# Patient Record
Sex: MALE | Race: WHITE | NOT HISPANIC OR LATINO | Employment: OTHER | URBAN - METROPOLITAN AREA
[De-identification: names, ages, dates, MRNs, and addresses within clinical notes are randomized per-mention and may not be internally consistent; named-entity substitution may affect disease eponyms.]

---

## 2017-01-04 ENCOUNTER — OFFICE VISIT (OUTPATIENT)
Dept: SURGERY | Facility: CLINIC | Age: 67
End: 2017-01-04

## 2017-01-04 DIAGNOSIS — Z09 SURGICAL FOLLOW-UP CARE: Primary | ICD-10-CM

## 2017-01-04 PROCEDURE — 99024 POSTOP FOLLOW-UP VISIT: CPT | Performed by: SURGERY

## 2017-01-04 NOTE — PROGRESS NOTES
5 WKS S/P EXC PILONIDAL CYST, PT IS W/O COMPLAINTS  Physical wound is improving.  His wife is doing the wound care without problems.  Wound is much .  There is no cellulitis.  It is smaller than it was.  Continue as is.  I will see him back in 2 weeks.

## 2017-01-04 NOTE — MR AVS SNAPSHOT
Esdras Ko   1/4/2017 2:00 PM   Office Visit    Dept Phone:  [phone]   Encounter #:  [id number]    Provider:  Roe Davila MD   Department:  [facility] GENERAL SURGERY                Your Full Care Plan              Your Updated Medication List          This list is accurate as of: 1/4/17  2:26 PM.  Always use your most recent med list.                ADVAIR DISKUS 250-50 MCG/DOSE DISKUS   Generic drug:  fluticasone-salmeterol       allopurinol 300 MG tablet   Commonly known as:  ZYLOPRIM       AMITIZA 24 MCG capsule   Generic drug:  lubiprostone       amLODIPine 10 MG tablet   Commonly known as:  NORVASC       aspirin 325 MG tablet       cetirizine 10 MG tablet   Commonly known as:  zyrTEC       clopidogrel 75 MG tablet   Commonly known as:  PLAVIX       D3-50 72277 UNITS capsule   Generic drug:  cholecalciferol       furosemide 40 MG tablet   Commonly known as:  LASIX       gabapentin 300 MG capsule   Commonly known as:  NEURONTIN       HYDROcodone-acetaminophen 5-325 MG per tablet   Commonly known as:  NORCO       levETIRAcetam 1000 MG tablet   Commonly known as:  KEPPRA       loratadine 10 MG tablet   Commonly known as:  CLARITIN       metOLazone 2.5 MG tablet   Commonly known as:  ZAROXOLYN       metoprolol tartrate 50 MG tablet   Commonly known as:  LOPRESSOR       pantoprazole 40 MG EC tablet   Commonly known as:  PROTONIX       potassium chloride 20 MEQ CR tablet   Commonly known as:  K-DUR,KLOR-CON       triamcinolone 0.1 % cream   Commonly known as:  KENALOG       ULORIC 80 MG tablet tablet   Generic drug:  febuxostat       VENTOLIN  (90 BASE) MCG/ACT inhaler   Generic drug:  albuterol               Instructions     None    Patient Instructions History      Upcoming Appointments     Visit Type Date Time Department    POST-OP 1/4/2017  2:00 PM [facility]    POST-OP 1/18/2017  2:15 PM [facility]    FOLLOW UP 1 UNIT 2/7/2017  11:40 AM [facility]    LAB 2/7/2017 11:10 AM [facility] ONC CBC LAB LAG      MyChart Signup     Our records indicate that you have declined [facility] sign[facility]. If you would like to sign up for MyChart, please email [email or web address] or call [phone] to obtain an activation code.             Other Info from Your Visit           Your Appointments     Jan 18, 2017  2:15 PM EST   Post-Op with Roe Davila MD   [facility] GENERAL SURGERY (--)    [address]  [place] [address] [phone]            Feb 07, 2017 11:10 AM EST   Lab with LAB CHAIR 1 [facility] ONCOLOGY CBC LAB ([place])    [address] [place] [address] [phone]            Feb 07, 2017 11:40 AM EST   FOLLOW UP with Irving Restrepo MD   [facility] CBC GROUP:CONSULTANTS IN BLOOD DISORDERS AND CANCER ([facility])    [address]  [address]  [place] [address] [phone]              Allergies     Amoxicillin  Hives    Ceftin [Cefuroxime Axetil]  Hives    Codeine      Pharbetol [Acetaminophen]  Hives    Spiriva Handihaler [Tiotropium Bromide Monohydrate]  Hives    Sulfa Antibiotics  Hives      Reason for Visit     Post-op Follow-up           Vital Signs     Smoking Status                   Former Smoker

## 2017-01-18 ENCOUNTER — OFFICE VISIT (OUTPATIENT)
Dept: SURGERY | Facility: CLINIC | Age: 67
End: 2017-01-18

## 2017-01-18 DIAGNOSIS — Z09 SURGICAL FOLLOW-UP CARE: Primary | ICD-10-CM

## 2017-01-18 PROCEDURE — 99024 POSTOP FOLLOW-UP VISIT: CPT | Performed by: SURGERY

## 2017-01-18 NOTE — PROGRESS NOTES
7 wks s/p pilonidal cyst exc, pt is w/o complaints  His wound continues to improve.  It is much smaller and granulating well last drainage and no evidence of cellulitis or abscess.  Continue local care and I will see him back in 1 month.

## 2017-01-18 NOTE — MR AVS SNAPSHOT
Esdras Ko   1/18/2017 2:10 PM   Office Visit    Dept Phone:  [phone]   Encounter #:  [id number]    Provider:  Roe Davila MD   Department:  [facility] GENERAL SURGERY                Your Full Care Plan              Your Updated Medication List          This list is accurate as of: 1/18/17  2:13 PM.  Always use your most recent med list.                ADVAIR DISKUS 250-50 MCG/DOSE DISKUS   Generic drug:  fluticasone-salmeterol       allopurinol 300 MG tablet   Commonly known as:  ZYLOPRIM       AMITIZA 24 MCG capsule   Generic drug:  lubiprostone       amLODIPine 10 MG tablet   Commonly known as:  NORVASC       aspirin 325 MG tablet       cetirizine 10 MG tablet   Commonly known as:  zyrTEC       clopidogrel 75 MG tablet   Commonly known as:  PLAVIX       D3-50 61733 UNITS capsule   Generic drug:  cholecalciferol       furosemide 40 MG tablet   Commonly known as:  LASIX       gabapentin 300 MG capsule   Commonly known as:  NEURONTIN       HYDROcodone-acetaminophen 5-325 MG per tablet   Commonly known as:  NORCO       levETIRAcetam 1000 MG tablet   Commonly known as:  KEPPRA       loratadine 10 MG tablet   Commonly known as:  CLARITIN       metOLazone 2.5 MG tablet   Commonly known as:  ZAROXOLYN       metoprolol tartrate 50 MG tablet   Commonly known as:  LOPRESSOR       pantoprazole 40 MG EC tablet   Commonly known as:  PROTONIX       potassium chloride 20 MEQ CR tablet   Commonly known as:  K-DUR,KLOR-CON       triamcinolone 0.1 % cream   Commonly known as:  KENALOG       ULORIC 80 MG tablet tablet   Generic drug:  febuxostat       VENTOLIN  (90 BASE) MCG/ACT inhaler   Generic drug:  albuterol               You Were Diagnosed With        Codes Comments    Surgical follow-up care    -  Primary ICD-10-CM: Z09  ICD-9-CM: V67.00       Instructions     None    Patient Instructions History      Upcoming Appointments     Visit Type Date Time Department    POST-OP 1/18/2017  2:10 PM [facility]    FOLLOW UP 1 UNIT 2/7/2017 11:40 AM [facility] ONC CBC LAGRANGE    LAB 2/7/2017 11:10 AM [facility]AG ONC CBC LAB LAG    POST-OP 2/20/2017  1:00 PM [facility]      MyChart Signup     Our records indicate that you have declined [facility] MyChart signup. If you would like to sign up for MyChart, please email [email or web address] or call [phone] to obtain an activation code.             Other Info from Your Visit           Your Appointments     Feb 07, 2017 11:10 AM EST   [facility]    [address]  [place] [address] [phone]            Feb 07, 2017 11:40 AM EST   FOLLOW UP with Irving Restrepo MD   [facility] CBC GROUP:CONSULTANTS IN BLOOD DISORDERS AND CANCER ([facility])    [address]  [address]  [place] [address] [phone]            Feb 20, 2017  1:00 PM EST   Post-Op with Roe Davila MD   [facility] GENERAL SURGERY (--)    [address]  [place] [address] [phone]              Allergies     Amoxicillin  Hives    Ceftin [Cefuroxime Axetil]  Hives    Codeine      Pharbetol [Acetaminophen]  Hives    Spiriva Handihaler [Tiotropium Bromide Monohydrate]  Hives    Sulfa Antibiotics  Hives      Reason for Visit     Post-op Follow-up           Vital Signs     Smoking Status                   Former Smoker           Problems and Diagnoses Noted     Surgical follow-up care    -  Primary

## 2017-02-07 ENCOUNTER — LAB (OUTPATIENT)
Dept: LAB | Facility: HOSPITAL | Age: 67
End: 2017-02-07

## 2017-02-07 ENCOUNTER — OFFICE VISIT (OUTPATIENT)
Dept: ONCOLOGY | Facility: CLINIC | Age: 67
End: 2017-02-07

## 2017-02-07 VITALS
SYSTOLIC BLOOD PRESSURE: 123 MMHG | RESPIRATION RATE: 20 BRPM | HEART RATE: 88 BPM | TEMPERATURE: 98 F | OXYGEN SATURATION: 89 % | BODY MASS INDEX: 45.83 KG/M2 | HEIGHT: 68 IN | WEIGHT: 302.4 LBS | DIASTOLIC BLOOD PRESSURE: 81 MMHG

## 2017-02-07 DIAGNOSIS — C83.10 MANTLE CELL LYMPHOMA, UNSPECIFIED BODY REGION (HCC): Primary | ICD-10-CM

## 2017-02-07 DIAGNOSIS — C83.10 MANTLE CELL LYMPHOMA (HCC): ICD-10-CM

## 2017-02-07 LAB
ALBUMIN SERPL-MCNC: 4 G/DL (ref 3.5–5.2)
ALBUMIN/GLOB SERPL: 1.4 G/DL
ALP SERPL-CCNC: 89 U/L (ref 40–129)
ALT SERPL W P-5'-P-CCNC: 14 U/L (ref 5–41)
ANION GAP SERPL CALCULATED.3IONS-SCNC: 14.1 MMOL/L
AST SERPL-CCNC: 14 U/L (ref 5–40)
BASOPHILS # BLD AUTO: 0.04 10*3/MM3 (ref 0–0.2)
BASOPHILS NFR BLD AUTO: 0.5 % (ref 0–2)
BILIRUB SERPL-MCNC: 0.7 MG/DL (ref 0.2–1.2)
BUN BLD-MCNC: 23 MG/DL (ref 8–23)
BUN/CREAT SERPL: 16.5 (ref 7–25)
CALCIUM SPEC-SCNC: 9.8 MG/DL (ref 8.8–10.5)
CHLORIDE SERPL-SCNC: 97 MMOL/L (ref 98–107)
CO2 SERPL-SCNC: 30.9 MMOL/L (ref 22–29)
CREAT BLD-MCNC: 1.39 MG/DL (ref 0.76–1.27)
DEPRECATED RDW RBC AUTO: 45.1 FL (ref 37–54)
EOSINOPHIL # BLD AUTO: 0.25 10*3/MM3 (ref 0.1–0.3)
EOSINOPHIL NFR BLD AUTO: 3.4 % (ref 0–4)
ERYTHROCYTE [DISTWIDTH] IN BLOOD BY AUTOMATED COUNT: 15.4 % (ref 11.5–14.5)
GFR SERPL CREATININE-BSD FRML MDRD: 51 ML/MIN/1.73
GLOBULIN UR ELPH-MCNC: 2.9 GM/DL
GLUCOSE BLD-MCNC: 124 MG/DL (ref 65–99)
HCT VFR BLD AUTO: 50.6 % (ref 42–52)
HGB BLD-MCNC: 16.8 G/DL (ref 14–18)
HOLD SPECIMEN: NORMAL
IMM GRANULOCYTES # BLD: 0.04 10*3/MM3 (ref 0–0.03)
IMM GRANULOCYTES NFR BLD: 0.5 % (ref 0–0.5)
LDH SERPL-CCNC: 178 U/L (ref 135–225)
LYMPHOCYTES # BLD AUTO: 1.2 10*3/MM3 (ref 0.6–4.8)
LYMPHOCYTES NFR BLD AUTO: 16.4 % (ref 20–45)
MCH RBC QN AUTO: 27.9 PG (ref 27–31)
MCHC RBC AUTO-ENTMCNC: 33.2 G/DL (ref 31–37)
MCV RBC AUTO: 84.1 FL (ref 80–94)
MONOCYTES # BLD AUTO: 0.63 10*3/MM3 (ref 0–1)
MONOCYTES NFR BLD AUTO: 8.6 % (ref 3–8)
NEUTROPHILS # BLD AUTO: 5.16 10*3/MM3 (ref 1.5–8.3)
NEUTROPHILS NFR BLD AUTO: 70.6 % (ref 45–70)
NRBC BLD MANUAL-RTO: 0 /100 WBC (ref 0–0)
PLATELET # BLD AUTO: 200 10*3/MM3 (ref 140–500)
PMV BLD AUTO: 10.7 FL (ref 7.4–10.4)
POTASSIUM BLD-SCNC: 3.2 MMOL/L (ref 3.5–5.2)
PROT SERPL-MCNC: 6.9 G/DL (ref 6–8.5)
RBC # BLD AUTO: 6.02 10*6/MM3 (ref 4.7–6.1)
SODIUM BLD-SCNC: 142 MMOL/L (ref 136–145)
WBC NRBC COR # BLD: 7.32 10*3/MM3 (ref 4.8–10.8)

## 2017-02-07 PROCEDURE — 99213 OFFICE O/P EST LOW 20 MIN: CPT | Performed by: INTERNAL MEDICINE

## 2017-02-07 PROCEDURE — 85025 COMPLETE CBC W/AUTO DIFF WBC: CPT | Performed by: INTERNAL MEDICINE

## 2017-02-07 PROCEDURE — 80053 COMPREHEN METABOLIC PANEL: CPT | Performed by: INTERNAL MEDICINE

## 2017-02-07 PROCEDURE — 83615 LACTATE (LD) (LDH) ENZYME: CPT | Performed by: INTERNAL MEDICINE

## 2017-02-07 NOTE — PROGRESS NOTES
Subjective   REASONS FOR FOLLOW-UP:   History of mantle cell lymphoma.       History of Present Illness    Mr. Ko is a 65-year-old man returning for followup of his history of mantle cell lymphoma previously treated with bendamustine/rituximab followed by maintenance Rituxan through the winter of 2013.   He has had no therapy or disease progression since that time.     He returned today for a six-month review doing well.  He denies unusual weight loss, night sweats, severe fatigue.  He has not noted any lymphadenopathy.  He did denies abdominal pain.  He has chronic lumbosacral back pain which is stable.    Since his last visit, he has had surgery for a pilonidal cyst by Dr. Davila.  This seems to be healing well.    PAST MEDICAL HISTORY:   1. Seizure disorder after a head trauma in .   2. Bladder cancer diagnosed in  for which he gets annual cystoscopy, currently with no evidence of disease.   3. Chronic obstructive pulmonary disease.   4. Psoriasis.   5. Hypertension.   6. Chronic renal insufficiency complicated by acute renal failure secondary to tumor lysis in 2011.   7. Ischemic cerebrovascular accidents and TIAs with the most recent event in 2014, which was a TIA.   8. History of tobacco abuse.       SOCIAL HISTORY: He is  and retired mostly from factory work. He quit smoking tobacco around  after 80 pack-year. He drank heavily in the past but none since around . Denies illicit drug use. He has had prior blood transfusions.     FAMILY HISTORY: His father had pancreatic cancer and  at age 56. Sister has anemia. Mother had heart disease. Brother has hypertension.       Review of Systems   Constitutional: Negative for activity change, fatigue and unexpected weight change.   Respiratory: Negative for shortness of breath and wheezing.    Cardiovascular: Positive for leg swelling. Negative for chest pain and palpitations.   Gastrointestinal: Negative for abdominal  "distention, abdominal pain and blood in stool.   Skin: Negative for pallor and rash.   Neurological: Negative for weakness.   Hematological: Negative for adenopathy.      A comprehensive 14 point review of systems was performed and was negative except as mentioned.    Medications:  The current medication list was reviewed in the EMR    ALLERGIES:    Allergies   Allergen Reactions   • Amoxicillin Hives   • Ceftin [Cefuroxime Axetil] Hives   • Codeine    • Pharbetol [Acetaminophen] Hives   • Spiriva Handihaler [Tiotropium Bromide Monohydrate] Hives   • Sulfa Antibiotics Hives       Objective      Vitals:    02/07/17 1134   BP: 123/81   Pulse: 88   Resp: 20   Temp: 98 °F (36.7 °C)   TempSrc: Oral   SpO2: (!) 89%   Weight: (!) 302 lb 6.4 oz (137 kg)   Height: 68\" (172.7 cm)   PainSc: 0-No pain     Current Status 2/7/2017   ECOG score 0       Physical Exam   Constitutional: He appears well-nourished.   Morbidly obese   Cardiovascular: Normal rate and regular rhythm.    Pulmonary/Chest: Effort normal. No respiratory distress.   Abdominal: Soft. He exhibits no mass.   Musculoskeletal: He exhibits edema.   Lymphadenopathy:     He has no cervical adenopathy.     He has no axillary adenopathy.        Right: No supraclavicular adenopathy present.        Left: No supraclavicular adenopathy present.   Skin: No erythema. No pallor.          RECENT LABS:  Hematology WBC   Date Value Ref Range Status   02/07/2017 7.32 4.80 - 10.80 10*3/mm3 Final   02/09/2016 6.77 4.80 - 10.80 K/Cumm Final     RBC   Date Value Ref Range Status   02/07/2017 6.02 4.70 - 6.10 10*6/mm3 Final   02/09/2016 5.90 4.70 - 6.10 Million Final     HEMOGLOBIN   Date Value Ref Range Status   02/07/2017 16.8 14.0 - 18.0 g/dL Final   02/09/2016 16.9 14.0 - 18.0 g/dL Final     HEMATOCRIT   Date Value Ref Range Status   02/07/2017 50.6 42.0 - 52.0 % Final   02/09/2016 50.3 42.0 - 52.0 % Final     PLATELETS   Date Value Ref Range Status   02/07/2017 200 140 - 500 " 10*3/mm3 Final   02/09/2016 186 140 - 500 K/Cumm Final     Lab Results   Component Value Date    GLUCOSE 124 (H) 02/07/2017    BUN 23 02/07/2017    CREATININE 1.39 (H) 02/07/2017    EGFRIFNONA 51 (L) 02/07/2017    EGFRIFAFRI  08/30/2016      Comment:      <15 Indicative of kidney failure.    BCR 16.5 02/07/2017    CO2 30.9 (H) 02/07/2017    CALCIUM 9.8 02/07/2017    ALBUMIN 4.00 02/07/2017    LABIL2 1.4 02/07/2017    AST 14 02/07/2017    ALT 14 02/07/2017              Assessment/Plan     Mr. Ko is a 65-year-old man returning for followup of his history mantle cell lymphoma with bone marrow involvement at diagnosis. He was treated with bendamustine/rituximab followed by maintenance rituximab completed in the winter of 2013. He has had no evidence of disease progression. Today he had negative symptom review and physical exam, although his exam is limited secondary to body habitus. He has no cytopenias.  At this time we will continue observation.      No follow-up CT scans are planned unless the patient develops some form of symptom which needs evaluation.  I requested he return in 6 months with CBC, CMP, and LDH.  I requested he call in the interim if any concerning symptoms such as weight loss, severe fatigue, lymphadenopathy etc.              2/7/2017      CC:

## 2017-02-20 ENCOUNTER — OFFICE VISIT (OUTPATIENT)
Dept: SURGERY | Facility: CLINIC | Age: 67
End: 2017-02-20

## 2017-02-20 DIAGNOSIS — Z09 SURGICAL FOLLOW-UP CARE: Primary | ICD-10-CM

## 2017-02-20 PROCEDURE — 99024 POSTOP FOLLOW-UP VISIT: CPT | Performed by: SURGERY

## 2017-02-20 NOTE — PROGRESS NOTES
11 WKS 5 DAYS S/P PILONIDAL CYSTECTOMY, PT IS W/O COMPLAINTS he is feeling well.  There clean wound with peroxide and showers twice a day.  The wound has nearly sealed and is now very superficial.  There is no evidence of cellulitis.  They should discontinue the hydrogen peroxide but continue the twice a day showers.  If it is not healed in 3 weeks I will need to see him back.

## 2017-03-13 ENCOUNTER — OFFICE VISIT (OUTPATIENT)
Dept: SURGERY | Facility: CLINIC | Age: 67
End: 2017-03-13

## 2017-03-13 DIAGNOSIS — T14.8XXA OPEN WOUND: Primary | ICD-10-CM

## 2017-03-13 PROCEDURE — 99211 OFF/OP EST MAY X REQ PHY/QHP: CPT | Performed by: SURGERY

## 2017-03-13 RX ORDER — DIAZEPAM 5 MG/1
TABLET ORAL
Refills: 0 | Status: ON HOLD | COMMUNITY
Start: 2017-03-07 | End: 2017-06-14

## 2017-03-13 NOTE — PROGRESS NOTES
14 wks 5 days s/p pilonidal cystectomy, pt called earlier last week stating wound wasn't healed but he states it is now  The skin is still superficially opened for a small area on either buttock cheek in the grady cleft.  I feel this is likely secondary to maceration from moisture.  He can decrease his showers to once a day and he should keep a dry gauze in the area to wick any fluid away until this heals.  he will call for problems.

## 2017-04-07 ENCOUNTER — HOSPITAL ENCOUNTER (OUTPATIENT)
Dept: GENERAL RADIOLOGY | Facility: HOSPITAL | Age: 67
Discharge: HOME | End: 2017-04-07
Attending: FAMILY MEDICINE | Admitting: FAMILY MEDICINE

## 2017-04-07 ENCOUNTER — TRANSCRIBE ORDERS (OUTPATIENT)
Dept: ADMINISTRATIVE | Facility: HOSPITAL | Age: 67
End: 2017-04-07

## 2017-04-07 ENCOUNTER — LAB (OUTPATIENT)
Dept: LAB | Facility: HOSPITAL | Age: 67
End: 2017-04-07
Attending: FAMILY MEDICINE

## 2017-04-07 DIAGNOSIS — J11.1 FLU: ICD-10-CM

## 2017-04-07 DIAGNOSIS — J11.1 FLU: Primary | ICD-10-CM

## 2017-04-07 LAB
ANION GAP SERPL CALCULATED.3IONS-SCNC: 17.3 MMOL/L
B PERT DNA SPEC QL NAA+PROBE: NOT DETECTED
BASOPHILS # BLD AUTO: 0.06 10*3/MM3 (ref 0–0.2)
BASOPHILS NFR BLD AUTO: 0.6 % (ref 0–2)
BUN BLD-MCNC: 26 MG/DL (ref 8–23)
BUN/CREAT SERPL: 15.8 (ref 7–25)
C PNEUM DNA NPH QL NAA+NON-PROBE: NOT DETECTED
CALCIUM SPEC-SCNC: 8.8 MG/DL (ref 8.8–10.5)
CHLORIDE SERPL-SCNC: 96 MMOL/L (ref 98–107)
CO2 SERPL-SCNC: 26.7 MMOL/L (ref 22–29)
CREAT BLD-MCNC: 1.65 MG/DL (ref 0.76–1.27)
DEPRECATED RDW RBC AUTO: 47.7 FL (ref 37–54)
EOSINOPHIL # BLD AUTO: 0.16 10*3/MM3 (ref 0.1–0.3)
EOSINOPHIL NFR BLD AUTO: 1.7 % (ref 0–4)
ERYTHROCYTE [DISTWIDTH] IN BLOOD BY AUTOMATED COUNT: 16 % (ref 11.5–14.5)
FLUAV AG NPH QL: NEGATIVE
FLUAV H1 2009 PAND RNA NPH QL NAA+PROBE: NOT DETECTED
FLUAV H1 HA GENE NPH QL NAA+PROBE: NOT DETECTED
FLUAV H3 RNA NPH QL NAA+PROBE: NOT DETECTED
FLUAV SUBTYP SPEC NAA+PROBE: NOT DETECTED
FLUBV AG NPH QL IA: NEGATIVE
FLUBV RNA ISLT QL NAA+PROBE: NOT DETECTED
GFR SERPL CREATININE-BSD FRML MDRD: 42 ML/MIN/1.73
GLUCOSE BLD-MCNC: 118 MG/DL (ref 65–99)
HADV DNA SPEC NAA+PROBE: NOT DETECTED
HCOV 229E RNA SPEC QL NAA+PROBE: NOT DETECTED
HCOV HKU1 RNA SPEC QL NAA+PROBE: NOT DETECTED
HCOV NL63 RNA SPEC QL NAA+PROBE: NOT DETECTED
HCOV OC43 RNA SPEC QL NAA+PROBE: NOT DETECTED
HCT VFR BLD AUTO: 53.1 % (ref 42–52)
HGB BLD-MCNC: 18 G/DL (ref 14–18)
HMPV RNA NPH QL NAA+NON-PROBE: NOT DETECTED
HPIV1 RNA SPEC QL NAA+PROBE: NOT DETECTED
HPIV2 RNA SPEC QL NAA+PROBE: NOT DETECTED
HPIV3 RNA NPH QL NAA+PROBE: NOT DETECTED
HPIV4 P GENE NPH QL NAA+PROBE: NOT DETECTED
IMM GRANULOCYTES # BLD: 0.07 10*3/MM3 (ref 0–0.03)
IMM GRANULOCYTES NFR BLD: 0.8 % (ref 0–0.5)
LYMPHOCYTES # BLD AUTO: 0.7 10*3/MM3 (ref 0.6–4.8)
LYMPHOCYTES NFR BLD AUTO: 7.6 % (ref 20–45)
M PNEUMO IGG SER IA-ACNC: NOT DETECTED
MCH RBC QN AUTO: 29 PG (ref 27–31)
MCHC RBC AUTO-ENTMCNC: 33.9 G/DL (ref 31–37)
MCV RBC AUTO: 85.6 FL (ref 80–94)
MONOCYTES # BLD AUTO: 1.2 10*3/MM3 (ref 0–1)
MONOCYTES NFR BLD AUTO: 13 % (ref 3–8)
NEUTROPHILS # BLD AUTO: 7.05 10*3/MM3 (ref 1.5–8.3)
NEUTROPHILS NFR BLD AUTO: 76.3 % (ref 45–70)
NRBC BLD MANUAL-RTO: 0 /100 WBC (ref 0–0)
PLATELET # BLD AUTO: 173 10*3/MM3 (ref 140–500)
PMV BLD AUTO: 10.5 FL (ref 7.4–10.4)
POTASSIUM BLD-SCNC: 3.1 MMOL/L (ref 3.5–5.2)
RBC # BLD AUTO: 6.2 10*6/MM3 (ref 4.7–6.1)
RHINOVIRUS RNA SPEC NAA+PROBE: NOT DETECTED
RSV RNA NPH QL NAA+NON-PROBE: NOT DETECTED
SODIUM BLD-SCNC: 140 MMOL/L (ref 136–145)
WBC NRBC COR # BLD: 9.24 10*3/MM3 (ref 4.8–10.8)

## 2017-04-07 PROCEDURE — 71020 HC CHEST PA AND LATERAL: CPT

## 2017-04-07 PROCEDURE — 87804 INFLUENZA ASSAY W/OPTIC: CPT

## 2017-04-07 PROCEDURE — 85025 COMPLETE CBC W/AUTO DIFF WBC: CPT

## 2017-04-07 PROCEDURE — 87633 RESP VIRUS 12-25 TARGETS: CPT

## 2017-04-07 PROCEDURE — 87798 DETECT AGENT NOS DNA AMP: CPT

## 2017-04-07 PROCEDURE — 87581 M.PNEUMON DNA AMP PROBE: CPT

## 2017-04-07 PROCEDURE — 87486 CHLMYD PNEUM DNA AMP PROBE: CPT

## 2017-04-07 PROCEDURE — 36415 COLL VENOUS BLD VENIPUNCTURE: CPT

## 2017-04-07 PROCEDURE — 80048 BASIC METABOLIC PNL TOTAL CA: CPT

## 2017-06-14 ENCOUNTER — HOSPITAL ENCOUNTER (INPATIENT)
Facility: HOSPITAL | Age: 67
LOS: 5 days | Discharge: HOME | End: 2017-06-19
Attending: FAMILY MEDICINE | Admitting: FAMILY MEDICINE

## 2017-06-14 ENCOUNTER — APPOINTMENT (OUTPATIENT)
Dept: GENERAL RADIOLOGY | Facility: HOSPITAL | Age: 67
End: 2017-06-14

## 2017-06-14 PROBLEM — J44.9 COPD (CHRONIC OBSTRUCTIVE PULMONARY DISEASE): Status: ACTIVE | Noted: 2017-06-14

## 2017-06-14 LAB
ALBUMIN SERPL-MCNC: 3.6 G/DL (ref 3.5–5.2)
ALBUMIN/GLOB SERPL: 1.2 G/DL
ALP SERPL-CCNC: 75 U/L (ref 40–129)
ALT SERPL W P-5'-P-CCNC: 19 U/L (ref 5–41)
ANION GAP SERPL CALCULATED.3IONS-SCNC: 17 MMOL/L
ARTERIAL PATENCY WRIST A: POSITIVE
AST SERPL-CCNC: 14 U/L (ref 5–40)
ATMOSPHERIC PRESS: 749 MMHG
B PERT DNA SPEC QL NAA+PROBE: NOT DETECTED
BASE EXCESS BLDA CALC-SCNC: 6.2 MMOL/L
BASOPHILS # BLD AUTO: 0.05 10*3/MM3 (ref 0–0.2)
BASOPHILS NFR BLD AUTO: 0.4 % (ref 0–2)
BDY SITE: NORMAL
BILIRUB SERPL-MCNC: 0.6 MG/DL (ref 0.2–1.2)
BUN BLD-MCNC: 28 MG/DL (ref 8–23)
BUN/CREAT SERPL: 22.8 (ref 7–25)
C PNEUM DNA NPH QL NAA+NON-PROBE: NOT DETECTED
CALCIUM SPEC-SCNC: 9 MG/DL (ref 8.8–10.5)
CHLORIDE SERPL-SCNC: 95 MMOL/L (ref 98–107)
CO2 SERPL-SCNC: 28 MMOL/L (ref 22–29)
CREAT BLD-MCNC: 1.23 MG/DL (ref 0.76–1.27)
DEPRECATED RDW RBC AUTO: 44.8 FL (ref 37–54)
EOSINOPHIL # BLD AUTO: 0.34 10*3/MM3 (ref 0.1–0.3)
EOSINOPHIL NFR BLD AUTO: 3 % (ref 0–4)
ERYTHROCYTE [DISTWIDTH] IN BLOOD BY AUTOMATED COUNT: 14.9 % (ref 11.5–14.5)
FLUAV H1 2009 PAND RNA NPH QL NAA+PROBE: NOT DETECTED
FLUAV H1 HA GENE NPH QL NAA+PROBE: NOT DETECTED
FLUAV H3 RNA NPH QL NAA+PROBE: NOT DETECTED
FLUAV SUBTYP SPEC NAA+PROBE: NOT DETECTED
FLUBV RNA ISLT QL NAA+PROBE: NOT DETECTED
GFR SERPL CREATININE-BSD FRML MDRD: 59 ML/MIN/1.73
GLOBULIN UR ELPH-MCNC: 3 GM/DL
GLUCOSE BLD-MCNC: 132 MG/DL (ref 65–99)
HADV DNA SPEC NAA+PROBE: NOT DETECTED
HCO3 BLDA-SCNC: 29.1 MMOL/L
HCOV 229E RNA SPEC QL NAA+PROBE: NOT DETECTED
HCOV HKU1 RNA SPEC QL NAA+PROBE: NOT DETECTED
HCOV NL63 RNA SPEC QL NAA+PROBE: NOT DETECTED
HCOV OC43 RNA SPEC QL NAA+PROBE: NOT DETECTED
HCT VFR BLD AUTO: 47.8 % (ref 42–52)
HGB BLD-MCNC: 16.2 G/DL (ref 14–18)
HGB BLDA-MCNC: 16.8 G/DL
HMPV RNA NPH QL NAA+NON-PROBE: NOT DETECTED
HOROWITZ INDEX BLD+IHG-RTO: 21 %
HPIV1 RNA SPEC QL NAA+PROBE: NOT DETECTED
HPIV2 RNA SPEC QL NAA+PROBE: NOT DETECTED
HPIV3 RNA NPH QL NAA+PROBE: NOT DETECTED
HPIV4 P GENE NPH QL NAA+PROBE: NOT DETECTED
IMM GRANULOCYTES # BLD: 0.11 10*3/MM3 (ref 0–0.03)
IMM GRANULOCYTES NFR BLD: 1 % (ref 0–0.5)
LYMPHOCYTES # BLD AUTO: 1.18 10*3/MM3 (ref 0.6–4.8)
LYMPHOCYTES NFR BLD AUTO: 10.3 % (ref 20–45)
M PNEUMO IGG SER IA-ACNC: NOT DETECTED
MCH RBC QN AUTO: 28.6 PG (ref 27–31)
MCHC RBC AUTO-ENTMCNC: 33.9 G/DL (ref 31–37)
MCV RBC AUTO: 84.3 FL (ref 80–94)
MODALITY: NORMAL
MONOCYTES # BLD AUTO: 0.95 10*3/MM3 (ref 0–1)
MONOCYTES NFR BLD AUTO: 8.3 % (ref 3–8)
NEUTROPHILS # BLD AUTO: 8.8 10*3/MM3 (ref 1.5–8.3)
NEUTROPHILS NFR BLD AUTO: 77 % (ref 45–70)
NRBC BLD MANUAL-RTO: 0 /100 WBC (ref 0–0)
NT-PROBNP SERPL-MCNC: 54.3 PG/ML (ref 5–125)
PCO2 BLDA: 36.5 MM HG
PH BLDA: 7.52 PH UNITS
PLATELET # BLD AUTO: 212 10*3/MM3 (ref 140–500)
PMV BLD AUTO: 10.4 FL (ref 7.4–10.4)
PO2 BLDA: 65.3 MM HG
POTASSIUM BLD-SCNC: 2.9 MMOL/L (ref 3.5–5.2)
PROCALCITONIN SERPL-MCNC: 0.18 NG/ML (ref 0.1–0.25)
PROT SERPL-MCNC: 6.6 G/DL (ref 6–8.5)
RBC # BLD AUTO: 5.67 10*6/MM3 (ref 4.7–6.1)
RHINOVIRUS RNA SPEC NAA+PROBE: DETECTED
RSV RNA NPH QL NAA+NON-PROBE: NOT DETECTED
SAO2 % BLDCOA: 94.6 %
SODIUM BLD-SCNC: 140 MMOL/L (ref 136–145)
TROPONIN T SERPL-MCNC: <0.01 NG/ML (ref 0–0.03)
WBC NRBC COR # BLD: 11.43 10*3/MM3 (ref 4.8–10.8)

## 2017-06-14 PROCEDURE — 82803 BLOOD GASES ANY COMBINATION: CPT | Performed by: FAMILY MEDICINE

## 2017-06-14 PROCEDURE — 87486 CHLMYD PNEUM DNA AMP PROBE: CPT | Performed by: FAMILY MEDICINE

## 2017-06-14 PROCEDURE — 87798 DETECT AGENT NOS DNA AMP: CPT | Performed by: FAMILY MEDICINE

## 2017-06-14 PROCEDURE — 25010000002 METHYLPREDNISOLONE PER 125 MG: Performed by: FAMILY MEDICINE

## 2017-06-14 PROCEDURE — 83880 ASSAY OF NATRIURETIC PEPTIDE: CPT | Performed by: FAMILY MEDICINE

## 2017-06-14 PROCEDURE — 71020 HC CHEST PA AND LATERAL: CPT

## 2017-06-14 PROCEDURE — 87205 SMEAR GRAM STAIN: CPT | Performed by: FAMILY MEDICINE

## 2017-06-14 PROCEDURE — 87633 RESP VIRUS 12-25 TARGETS: CPT | Performed by: FAMILY MEDICINE

## 2017-06-14 PROCEDURE — 94799 UNLISTED PULMONARY SVC/PX: CPT

## 2017-06-14 PROCEDURE — 87070 CULTURE OTHR SPECIMN AEROBIC: CPT | Performed by: FAMILY MEDICINE

## 2017-06-14 PROCEDURE — 36600 WITHDRAWAL OF ARTERIAL BLOOD: CPT | Performed by: FAMILY MEDICINE

## 2017-06-14 PROCEDURE — 85025 COMPLETE CBC W/AUTO DIFF WBC: CPT | Performed by: FAMILY MEDICINE

## 2017-06-14 PROCEDURE — 25010000002 ENOXAPARIN PER 10 MG: Performed by: FAMILY MEDICINE

## 2017-06-14 PROCEDURE — 84145 PROCALCITONIN (PCT): CPT | Performed by: FAMILY MEDICINE

## 2017-06-14 PROCEDURE — 25010000002 FUROSEMIDE PER 20 MG: Performed by: FAMILY MEDICINE

## 2017-06-14 PROCEDURE — 25010000002 LEVOFLOXACIN PER 250 MG: Performed by: FAMILY MEDICINE

## 2017-06-14 PROCEDURE — 80053 COMPREHEN METABOLIC PANEL: CPT | Performed by: FAMILY MEDICINE

## 2017-06-14 PROCEDURE — 87581 M.PNEUMON DNA AMP PROBE: CPT | Performed by: FAMILY MEDICINE

## 2017-06-14 PROCEDURE — 84484 ASSAY OF TROPONIN QUANT: CPT | Performed by: FAMILY MEDICINE

## 2017-06-14 PROCEDURE — 94640 AIRWAY INHALATION TREATMENT: CPT

## 2017-06-14 RX ORDER — ONDANSETRON 4 MG/1
4 TABLET, ORALLY DISINTEGRATING ORAL EVERY 6 HOURS PRN
Status: DISCONTINUED | OUTPATIENT
Start: 2017-06-14 | End: 2017-06-19 | Stop reason: HOSPADM

## 2017-06-14 RX ORDER — LUBIPROSTONE 24 UG/1
24 CAPSULE ORAL
Status: DISCONTINUED | OUTPATIENT
Start: 2017-06-14 | End: 2017-06-19 | Stop reason: HOSPADM

## 2017-06-14 RX ORDER — FEBUXOSTAT 40 MG/1
80 TABLET, FILM COATED ORAL DAILY
Status: DISCONTINUED | OUTPATIENT
Start: 2017-06-14 | End: 2017-06-19 | Stop reason: HOSPADM

## 2017-06-14 RX ORDER — METOPROLOL TARTRATE 50 MG/1
50 TABLET, FILM COATED ORAL 2 TIMES DAILY
Status: DISCONTINUED | OUTPATIENT
Start: 2017-06-14 | End: 2017-06-19 | Stop reason: HOSPADM

## 2017-06-14 RX ORDER — ONDANSETRON 2 MG/ML
4 INJECTION INTRAMUSCULAR; INTRAVENOUS EVERY 6 HOURS PRN
Status: DISCONTINUED | OUTPATIENT
Start: 2017-06-14 | End: 2017-06-19 | Stop reason: HOSPADM

## 2017-06-14 RX ORDER — ASPIRIN 325 MG
325 TABLET ORAL EVERY MORNING
Status: DISCONTINUED | OUTPATIENT
Start: 2017-06-14 | End: 2017-06-19 | Stop reason: HOSPADM

## 2017-06-14 RX ORDER — CETIRIZINE HYDROCHLORIDE 10 MG/1
10 TABLET ORAL EVERY MORNING
Status: DISCONTINUED | OUTPATIENT
Start: 2017-06-15 | End: 2017-06-19 | Stop reason: HOSPADM

## 2017-06-14 RX ORDER — FUROSEMIDE 80 MG
80 TABLET ORAL 2 TIMES DAILY
Status: DISCONTINUED | OUTPATIENT
Start: 2017-06-14 | End: 2017-06-15

## 2017-06-14 RX ORDER — POTASSIUM CHLORIDE 20 MEQ/1
40 TABLET, EXTENDED RELEASE ORAL ONCE
Status: COMPLETED | OUTPATIENT
Start: 2017-06-14 | End: 2017-06-14

## 2017-06-14 RX ORDER — LEVETIRACETAM 500 MG/1
500 TABLET ORAL 2 TIMES DAILY
Status: DISCONTINUED | OUTPATIENT
Start: 2017-06-14 | End: 2017-06-19 | Stop reason: HOSPADM

## 2017-06-14 RX ORDER — GABAPENTIN 300 MG/1
300 CAPSULE ORAL 2 TIMES DAILY
Status: DISCONTINUED | OUTPATIENT
Start: 2017-06-14 | End: 2017-06-19 | Stop reason: HOSPADM

## 2017-06-14 RX ORDER — METOLAZONE 2.5 MG/1
2.5 TABLET ORAL 3 TIMES WEEKLY
Status: DISCONTINUED | OUTPATIENT
Start: 2017-06-14 | End: 2017-06-19 | Stop reason: HOSPADM

## 2017-06-14 RX ORDER — LEVOFLOXACIN 5 MG/ML
500 INJECTION, SOLUTION INTRAVENOUS EVERY 24 HOURS
Status: DISCONTINUED | OUTPATIENT
Start: 2017-06-14 | End: 2017-06-18

## 2017-06-14 RX ORDER — SODIUM CHLORIDE FOR INHALATION 7 %
4 VIAL, NEBULIZER (ML) INHALATION ONCE
Status: DISCONTINUED | OUTPATIENT
Start: 2017-06-14 | End: 2017-06-14

## 2017-06-14 RX ORDER — POTASSIUM CHLORIDE 20 MEQ/1
40 TABLET, EXTENDED RELEASE ORAL 3 TIMES DAILY
Status: DISCONTINUED | OUTPATIENT
Start: 2017-06-14 | End: 2017-06-15

## 2017-06-14 RX ORDER — AMLODIPINE BESYLATE 5 MG/1
10 TABLET ORAL EVERY MORNING
Status: DISCONTINUED | OUTPATIENT
Start: 2017-06-14 | End: 2017-06-19 | Stop reason: HOSPADM

## 2017-06-14 RX ORDER — FUROSEMIDE 10 MG/ML
60 INJECTION INTRAMUSCULAR; INTRAVENOUS ONCE
Status: COMPLETED | OUTPATIENT
Start: 2017-06-14 | End: 2017-06-14

## 2017-06-14 RX ORDER — IPRATROPIUM BROMIDE AND ALBUTEROL SULFATE 2.5; .5 MG/3ML; MG/3ML
3 SOLUTION RESPIRATORY (INHALATION)
Status: DISCONTINUED | OUTPATIENT
Start: 2017-06-14 | End: 2017-06-19 | Stop reason: HOSPADM

## 2017-06-14 RX ORDER — CLOBETASOL PROPIONATE 0.5 MG/G
CREAM TOPICAL EVERY 12 HOURS SCHEDULED
Status: DISCONTINUED | OUTPATIENT
Start: 2017-06-14 | End: 2017-06-19 | Stop reason: HOSPADM

## 2017-06-14 RX ORDER — SODIUM CHLORIDE 0.9 % (FLUSH) 0.9 %
1-10 SYRINGE (ML) INJECTION AS NEEDED
Status: DISCONTINUED | OUTPATIENT
Start: 2017-06-14 | End: 2017-06-19 | Stop reason: HOSPADM

## 2017-06-14 RX ORDER — PANTOPRAZOLE SODIUM 40 MG/1
40 TABLET, DELAYED RELEASE ORAL EVERY MORNING
Status: DISCONTINUED | OUTPATIENT
Start: 2017-06-15 | End: 2017-06-19 | Stop reason: HOSPADM

## 2017-06-14 RX ORDER — CLOPIDOGREL BISULFATE 75 MG/1
75 TABLET ORAL EVERY MORNING
Status: DISCONTINUED | OUTPATIENT
Start: 2017-06-15 | End: 2017-06-19 | Stop reason: HOSPADM

## 2017-06-14 RX ORDER — CALCIUM CARBONATE 200(500)MG
1 TABLET,CHEWABLE ORAL 2 TIMES DAILY PRN
Status: DISCONTINUED | OUTPATIENT
Start: 2017-06-14 | End: 2017-06-19 | Stop reason: HOSPADM

## 2017-06-14 RX ORDER — BISACODYL 5 MG/1
5 TABLET, DELAYED RELEASE ORAL DAILY PRN
Status: DISCONTINUED | OUTPATIENT
Start: 2017-06-14 | End: 2017-06-19 | Stop reason: HOSPADM

## 2017-06-14 RX ORDER — METHYLPREDNISOLONE SODIUM SUCCINATE 125 MG/2ML
60 INJECTION, POWDER, LYOPHILIZED, FOR SOLUTION INTRAMUSCULAR; INTRAVENOUS EVERY 8 HOURS
Status: DISCONTINUED | OUTPATIENT
Start: 2017-06-14 | End: 2017-06-15

## 2017-06-14 RX ORDER — GUAIFENESIN 600 MG/1
1200 TABLET, EXTENDED RELEASE ORAL 2 TIMES DAILY
Status: DISCONTINUED | OUTPATIENT
Start: 2017-06-14 | End: 2017-06-19 | Stop reason: HOSPADM

## 2017-06-14 RX ORDER — LEVOFLOXACIN 500 MG/1
500 TABLET, FILM COATED ORAL DAILY
COMMUNITY
End: 2017-07-18

## 2017-06-14 RX ORDER — ALLOPURINOL 300 MG/1
300 TABLET ORAL EVERY MORNING
Status: DISCONTINUED | OUTPATIENT
Start: 2017-06-15 | End: 2017-06-19 | Stop reason: HOSPADM

## 2017-06-14 RX ORDER — BUDESONIDE 0.5 MG/2ML
0.5 INHALANT ORAL
Status: DISCONTINUED | OUTPATIENT
Start: 2017-06-14 | End: 2017-06-19 | Stop reason: HOSPADM

## 2017-06-14 RX ORDER — ONDANSETRON 4 MG/1
4 TABLET, FILM COATED ORAL EVERY 6 HOURS PRN
Status: DISCONTINUED | OUTPATIENT
Start: 2017-06-14 | End: 2017-06-19 | Stop reason: HOSPADM

## 2017-06-14 RX ORDER — ALBUTEROL SULFATE 2.5 MG/3ML
2.5 SOLUTION RESPIRATORY (INHALATION) ONCE AS NEEDED
Status: DISCONTINUED | OUTPATIENT
Start: 2017-06-14 | End: 2017-06-19 | Stop reason: HOSPADM

## 2017-06-14 RX ORDER — ACETAMINOPHEN 325 MG/1
650 TABLET ORAL EVERY 4 HOURS PRN
Status: DISCONTINUED | OUTPATIENT
Start: 2017-06-14 | End: 2017-06-19 | Stop reason: HOSPADM

## 2017-06-14 RX ADMIN — FUROSEMIDE 60 MG: 10 INJECTION, SOLUTION INTRAMUSCULAR; INTRAVENOUS at 10:48

## 2017-06-14 RX ADMIN — CLOBETASOL PROPIONATE: 0.5 CREAM TOPICAL at 21:06

## 2017-06-14 RX ADMIN — POTASSIUM CHLORIDE 40 MEQ: 1500 TABLET, EXTENDED RELEASE ORAL at 21:05

## 2017-06-14 RX ADMIN — METOPROLOL TARTRATE 50 MG: 50 TABLET, FILM COATED ORAL at 17:58

## 2017-06-14 RX ADMIN — POTASSIUM CHLORIDE 40 MEQ: 1500 TABLET, EXTENDED RELEASE ORAL at 12:03

## 2017-06-14 RX ADMIN — METHYLPREDNISOLONE SODIUM SUCCINATE 60 MG: 125 INJECTION, POWDER, FOR SOLUTION INTRAMUSCULAR; INTRAVENOUS at 21:06

## 2017-06-14 RX ADMIN — FUROSEMIDE 80 MG: 80 TABLET ORAL at 17:59

## 2017-06-14 RX ADMIN — ACETAMINOPHEN 650 MG: 325 TABLET, FILM COATED ORAL at 16:09

## 2017-06-14 RX ADMIN — GABAPENTIN 300 MG: 300 CAPSULE ORAL at 17:59

## 2017-06-14 RX ADMIN — IPRATROPIUM BROMIDE AND ALBUTEROL SULFATE 3 ML: .5; 3 SOLUTION RESPIRATORY (INHALATION) at 20:27

## 2017-06-14 RX ADMIN — IPRATROPIUM BROMIDE AND ALBUTEROL SULFATE 3 ML: .5; 3 SOLUTION RESPIRATORY (INHALATION) at 11:25

## 2017-06-14 RX ADMIN — METHYLPREDNISOLONE SODIUM SUCCINATE 60 MG: 125 INJECTION, POWDER, FOR SOLUTION INTRAMUSCULAR; INTRAVENOUS at 13:47

## 2017-06-14 RX ADMIN — POTASSIUM CHLORIDE 40 MEQ: 1500 TABLET, EXTENDED RELEASE ORAL at 16:14

## 2017-06-14 RX ADMIN — BUDESONIDE 0.5 MG: 0.5 SUSPENSION RESPIRATORY (INHALATION) at 11:28

## 2017-06-14 RX ADMIN — LEVETIRACETAM 500 MG: 500 TABLET ORAL at 17:59

## 2017-06-14 RX ADMIN — ENOXAPARIN SODIUM 30 MG: 30 INJECTION SUBCUTANEOUS at 10:56

## 2017-06-14 RX ADMIN — GUAIFENESIN 1200 MG: 600 TABLET, EXTENDED RELEASE ORAL at 17:59

## 2017-06-14 RX ADMIN — LEVOFLOXACIN 500 MG: 500 INJECTION, SOLUTION INTRAVENOUS at 10:51

## 2017-06-14 RX ADMIN — IPRATROPIUM BROMIDE AND ALBUTEROL SULFATE 3 ML: .5; 3 SOLUTION RESPIRATORY (INHALATION) at 15:47

## 2017-06-14 NOTE — PROGRESS NOTES
"Daily Progress Note:    Subjective: Cough congestion shortness of breath persisted.  Thick sputum    Flowsheet Rows         First Filed Value    Admission Height  68\" (172.7 cm) Documented at 06/14/2017 0944    Admission Weight  292 lb 9.6 oz (133 kg) Documented at 06/14/2017 0944          Patient Vitals for the past 24 hrs:   BP Temp Temp src Pulse Resp SpO2 Height Weight   06/14/17 1132 - - - 104 16 93 % - -   06/14/17 1125 - - - 104 16 92 % - -   06/14/17 0944 134/94 98.8 °F (37.1 °C) Oral 107 16 91 % 68\" (172.7 cm) 292 lb 9.6 oz (133 kg)         Intake/Output Summary (Last 24 hours) at 06/14/17 1233  Last data filed at 06/14/17 1217   Gross per 24 hour   Intake              100 ml   Output              650 ml   Net             -550 ml       Review of Systems   Constitutional: Positive for activity change, fatigue and fever. Negative for appetite change.   Respiratory: Positive for cough, shortness of breath and wheezing. Negative for chest tightness.    Cardiovascular: Positive for leg swelling. Negative for chest pain.   Gastrointestinal: Negative for abdominal distention, abdominal pain, diarrhea, nausea and vomiting.   Genitourinary: Negative for frequency.   Musculoskeletal: Positive for back pain.   Skin: Negative for rash.   Psychiatric/Behavioral: Negative for agitation.       Physical Exam   Constitutional: He appears well-developed and well-nourished.   Morbidly obese   HENT:   Head: Normocephalic.   Mouth/Throat: Oropharynx is clear and moist.   Eyes: Conjunctivae are normal.   Neck: Normal range of motion. No JVD present. No thyromegaly present.   Cardiovascular: Normal rate, regular rhythm and normal heart sounds.    No murmur heard.  Pulmonary/Chest: Effort normal. Tachypnea noted. No respiratory distress. He has wheezes (diffuse). He has no rales.   Abdominal: Soft. Bowel sounds are normal. He exhibits no distension. There is no tenderness. There is no guarding.   Neurological: He is alert.   Skin: " Skin is warm and dry. No rash noted.   Nursing note and vitals reviewed.          Medication Review:   I have reviewed the patient's current medication list      [START ON 6/15/2017] allopurinol 300 mg Oral QAM   amLODIPine 10 mg Oral QAM   aspirin 325 mg Oral QAM   budesonide 0.5 mg Nebulization BID - RT   [START ON 6/15/2017] cetirizine 10 mg Oral QAM   clobetasol  Topical Q12H   [START ON 6/15/2017] clopidogrel 75 mg Oral QAM   enoxaparin 30 mg Subcutaneous Q24H   febuxostat 80 mg Oral Daily   furosemide 80 mg Oral BID   gabapentin 300 mg Oral BID   ipratropium-albuterol 3 mL Nebulization 4x Daily - RT   levETIRAcetam 500 mg Oral BID   levoFLOXacin 500 mg Intravenous Q24H   lubiprostone 24 mcg Oral Daily With Breakfast   metOLazone 2.5 mg Oral Once per day on Mon Wed Fri   metoprolol tartrate 50 mg Oral BID   [START ON 6/15/2017] pantoprazole 40 mg Oral QAM   potassium chloride 40 mEq Oral TID           Labs:    Results from last 7 days  Lab Units 06/14/17  0950   WBC 10*3/mm3 11.43*   HEMOGLOBIN g/dL 16.2   HEMATOCRIT % 47.8   PLATELETS 10*3/mm3 212       Results from last 7 days  Lab Units 06/14/17  0950   SODIUM mmol/L 140   POTASSIUM mmol/L 2.9*   CHLORIDE mmol/L 95*   TOTAL CO2 mmol/L 28.0   BUN mg/dL 28*   CREATININE mg/dL 1.23   CALCIUM mg/dL 9.0   BILIRUBIN mg/dL 0.6   ALK PHOS U/L 75   ALT (SGPT) U/L 19   AST (SGOT) U/L 14   GLUCOSE mg/dL 132*           Lab Results (last 24 hours)     Procedure Component Value Units Date/Time    Procalcitonin [[id number]] Collected:  06/14/17 0950    Specimen:  Blood Updated:  06/14/17 0954    CBC & Differential [[id number]] Collected:  06/14/17 0950    Specimen:  Blood Updated:  06/14/17 0958    Narrative:       The following orders were created for panel order CBC & Differential.  Procedure                               Abnormality         Status                     ---------                               -----------         ------                     CBC Auto  Differential[[id number]]        Abnormal            Final result                 Please view results for these tests on the individual orders.    CBC Auto Differential [[id number]]  (Abnormal) Collected:  06/14/17 0950    Specimen:  Blood Updated:  06/14/17 0958     WBC 11.43 (H) 10*3/mm3      RBC 5.67 10*6/mm3      Hemoglobin 16.2 g/dL      Hematocrit 47.8 %      MCV 84.3 fL      MCH 28.6 pg      MCHC 33.9 g/dL      RDW 14.9 (H) %      RDW-SD 44.8 fl      MPV 10.4 fL      Platelets 212 10*3/mm3      Neutrophil % 77.0 (H) %      Lymphocyte % 10.3 (L) %      Monocyte % 8.3 (H) %      Eosinophil % 3.0 %      Basophil % 0.4 %      Immature Grans % 1.0 (H) %      Neutrophils, Absolute 8.80 (H) 10*3/mm3      Lymphocytes, Absolute 1.18 10*3/mm3      Monocytes, Absolute 0.95 10*3/mm3      Eosinophils, Absolute 0.34 (H) 10*3/mm3      Basophils, Absolute 0.05 10*3/mm3      Immature Grans, Absolute 0.11 (H) 10*3/mm3      nRBC 0.0 /100 WBC     Comprehensive Metabolic Panel [[id number]]  (Abnormal) Collected:  06/14/17 0950    Specimen:  Blood Updated:  06/14/17 1016     Glucose 132 (H) mg/dL      BUN 28 (H) mg/dL      Creatinine 1.23 mg/dL      Sodium 140 mmol/L      Potassium 2.9 (L) mmol/L      Chloride 95 (L) mmol/L      CO2 28.0 mmol/L      Calcium 9.0 mg/dL      Total Protein 6.6 g/dL      Albumin 3.60 g/dL      ALT (SGPT) 19 U/L      AST (SGOT) 14 U/L      Alkaline Phosphatase 75 U/L      Total Bilirubin 0.6 mg/dL      eGFR Non African Amer 59 (L) mL/min/1.73      Globulin 3.0 gm/dL      A/G Ratio 1.2 g/dL      BUN/Creatinine Ratio 22.8     Anion Gap 17.0 mmol/L     Troponin [[id number]]  (Normal) Collected:  06/14/17 0950    Specimen:  Blood Updated:  06/14/17 1016     Troponin T <0.010 ng/mL     Narrative:       Troponin T Reference Ranges:  Less than 0.03 ng/mL:    Negative for AMI  0.03 to 0.09 ng/mL:      Indeterminant for AMI  Greater than 0.09 ng/mL: Positive for AMI    Blood Gas, Arterial [[id number]] Collected:   06/14/17 1014    Specimen:  Arterial Blood Updated:  06/14/17 1017     Site Arterial: left radial     Michael's Test Positive     pH, Arterial 7.519 pH units      pCO2, Arterial 36.5 mm Hg      pO2, Arterial 65.3 mm Hg      HCO3, Arterial 29.1 mmol/L      Base Excess, Arterial 6.2 mmol/L      O2 Saturation Calculated 94.6 %      Hemoglobin, Blood Gas 16.8 g/dL      Barometric Pressure for Blood Gas 749 mmHg      Modality Room air     FIO2 21 %     BNP [[id number]]  (Normal) Collected:  06/14/17 0950    Specimen:  Blood Updated:  06/14/17 1042     proBNP 54.3 pg/mL     Narrative:       Among patients with dyspnea, NT-proBNP is highly sensitive for the detection of acute congestive heart failure. In addition NT-proBNP of <300 pg/ml effectively rules out acute congestive heart failure with 99% negative predictive value.    Respiratory Panel, PCR [[id number]] Collected:  06/14/17 1043    Specimen:  Swab from Nares Updated:  06/14/17 1101    Respiratory Culture [[id number]] Collected:  06/14/17 1042    Specimen:  Sputum from Alveoli Updated:  06/14/17 1101              Radiology:  Imaging Results (last 24 hours)     Procedure Component Value Units Date/Time    XR Chest 2 View [[id number]] Collected:  06/14/17 1153     Updated:  06/14/17 1156    Narrative:       CHEST X-RAY, 06/14/2017:     HISTORY:   66-year-old male with two-week history of cough and fever.     TECHNIQUE:  AP portable upright chest x-ray.     FINDINGS:  Heart size and pulmonary vascularity are normal. Mild bibasilar  scarring. No visible pulmonary infiltrate or pleural effusion. No change  since 04/07/2017. Spinal curvature.       Impression:       No active disease. No change since 04/07/2017.     This report was finalized on 6/14/2017 11:54 AM by Dr. Cehpe Catherine MD.             Cardiology:  ECG/EMG Results (last 24 hours)     ** No results found for the last 24 hours. **          I have reviewed consult notes.    Assessment and Plan:    1.  Acute  exacerbation of COPD acute bronchitis we'll continue aggressive pulmonary toilet mini nebs IV antibiotics and IV steroids.  Check while cultures and sputum cultures    2. hypertension is well controlled on medication be continued    3. chronic kidney disease stage III is a baseline monitor for any further decompensation    4.  Seizure disorder stable nothing acute home as we continued      5. hypokalemia oral medications and been ordered    6.  Reflux disease PPI will be continued

## 2017-06-14 NOTE — PLAN OF CARE
Problem: Patient Care Overview (Adult)  Goal: Plan of Care Review  Outcome: Ongoing (interventions implemented as appropriate)    06/14/17 1524   Coping/Psychosocial Response Interventions   Plan Of Care Reviewed With patient;spouse   Patient Care Overview   Progress progress toward functional goals as expected   Outcome Evaluation   Outcome Summary/Follow up Plan IV antibiotic given, IV lasix given ,up in chair, SOA with exertion, remains on room air, feeling better       Goal: Adult Individualization and Mutuality  Outcome: Ongoing (interventions implemented as appropriate)  Goal: Discharge Needs Assessment  Outcome: Ongoing (interventions implemented as appropriate)    Problem: COPD, Chronic Bronchitis/Emphysema (Adult)  Goal: Signs and Symptoms of Listed Potential Problems Will be Absent or Manageable (COPD, Chronic Bronchitis/Emphysema)  Outcome: Ongoing (interventions implemented as appropriate)

## 2017-06-15 LAB
ANION GAP SERPL CALCULATED.3IONS-SCNC: 16.5 MMOL/L
BUN BLD-MCNC: 40 MG/DL (ref 8–23)
BUN/CREAT SERPL: 26.5 (ref 7–25)
CALCIUM SPEC-SCNC: 9.3 MG/DL (ref 8.8–10.5)
CHLORIDE SERPL-SCNC: 94 MMOL/L (ref 98–107)
CO2 SERPL-SCNC: 26.5 MMOL/L (ref 22–29)
CREAT BLD-MCNC: 1.51 MG/DL (ref 0.76–1.27)
DEPRECATED RDW RBC AUTO: 43.7 FL (ref 37–54)
ERYTHROCYTE [DISTWIDTH] IN BLOOD BY AUTOMATED COUNT: 14.6 % (ref 11.5–14.5)
GFR SERPL CREATININE-BSD FRML MDRD: 46 ML/MIN/1.73
GLUCOSE BLD-MCNC: 261 MG/DL (ref 65–99)
GLUCOSE BLDC GLUCOMTR-MCNC: 308 MG/DL (ref 70–130)
GLUCOSE BLDC GLUCOMTR-MCNC: 337 MG/DL (ref 70–130)
GLUCOSE BLDC GLUCOMTR-MCNC: 399 MG/DL (ref 70–130)
GLUCOSE BLDC GLUCOMTR-MCNC: 426 MG/DL (ref 70–130)
HCT VFR BLD AUTO: 47.5 % (ref 42–52)
HGB BLD-MCNC: 16.1 G/DL (ref 14–18)
MCH RBC QN AUTO: 28.2 PG (ref 27–31)
MCHC RBC AUTO-ENTMCNC: 33.9 G/DL (ref 31–37)
MCV RBC AUTO: 83.3 FL (ref 80–94)
PLATELET # BLD AUTO: 253 10*3/MM3 (ref 140–500)
PMV BLD AUTO: 10.3 FL (ref 7.4–10.4)
POTASSIUM BLD-SCNC: 3.9 MMOL/L (ref 3.5–5.2)
RBC # BLD AUTO: 5.7 10*6/MM3 (ref 4.7–6.1)
SODIUM BLD-SCNC: 137 MMOL/L (ref 136–145)
WBC NRBC COR # BLD: 10.61 10*3/MM3 (ref 4.8–10.8)

## 2017-06-15 PROCEDURE — 85027 COMPLETE CBC AUTOMATED: CPT | Performed by: FAMILY MEDICINE

## 2017-06-15 PROCEDURE — 82962 GLUCOSE BLOOD TEST: CPT

## 2017-06-15 PROCEDURE — 63710000001 INSULIN ASPART PER 5 UNITS: Performed by: FAMILY MEDICINE

## 2017-06-15 PROCEDURE — 80048 BASIC METABOLIC PNL TOTAL CA: CPT | Performed by: FAMILY MEDICINE

## 2017-06-15 PROCEDURE — 25010000002 METHYLPREDNISOLONE PER 40 MG: Performed by: FAMILY MEDICINE

## 2017-06-15 PROCEDURE — 25010000002 METHYLPREDNISOLONE PER 40 MG

## 2017-06-15 PROCEDURE — 94799 UNLISTED PULMONARY SVC/PX: CPT

## 2017-06-15 PROCEDURE — 25010000002 ENOXAPARIN PER 10 MG: Performed by: FAMILY MEDICINE

## 2017-06-15 PROCEDURE — 25010000002 LEVOFLOXACIN PER 250 MG: Performed by: FAMILY MEDICINE

## 2017-06-15 PROCEDURE — 25010000002 METHYLPREDNISOLONE PER 125 MG: Performed by: FAMILY MEDICINE

## 2017-06-15 RX ORDER — METHYLPREDNISOLONE SODIUM SUCCINATE 40 MG/ML
40 INJECTION, POWDER, LYOPHILIZED, FOR SOLUTION INTRAMUSCULAR; INTRAVENOUS EVERY 8 HOURS
Status: DISCONTINUED | OUTPATIENT
Start: 2017-06-15 | End: 2017-06-16

## 2017-06-15 RX ORDER — DEXTROSE MONOHYDRATE 25 G/50ML
25 INJECTION, SOLUTION INTRAVENOUS
Status: DISCONTINUED | OUTPATIENT
Start: 2017-06-15 | End: 2017-06-19 | Stop reason: HOSPADM

## 2017-06-15 RX ORDER — NICOTINE POLACRILEX 4 MG
15 LOZENGE BUCCAL
Status: DISCONTINUED | OUTPATIENT
Start: 2017-06-15 | End: 2017-06-19 | Stop reason: HOSPADM

## 2017-06-15 RX ORDER — METHYLPREDNISOLONE SODIUM SUCCINATE 40 MG/ML
INJECTION, POWDER, LYOPHILIZED, FOR SOLUTION INTRAMUSCULAR; INTRAVENOUS
Status: COMPLETED
Start: 2017-06-15 | End: 2017-06-15

## 2017-06-15 RX ADMIN — IPRATROPIUM BROMIDE AND ALBUTEROL SULFATE 3 ML: .5; 3 SOLUTION RESPIRATORY (INHALATION) at 19:54

## 2017-06-15 RX ADMIN — METHYLPREDNISOLONE SODIUM SUCCINATE 60 MG: 125 INJECTION, POWDER, FOR SOLUTION INTRAMUSCULAR; INTRAVENOUS at 06:25

## 2017-06-15 RX ADMIN — ASPIRIN 325 MG: 325 TABLET, COATED ORAL at 06:24

## 2017-06-15 RX ADMIN — IPRATROPIUM BROMIDE AND ALBUTEROL SULFATE 3 ML: .5; 3 SOLUTION RESPIRATORY (INHALATION) at 15:57

## 2017-06-15 RX ADMIN — BUDESONIDE 0.5 MG: 0.5 SUSPENSION RESPIRATORY (INHALATION) at 19:55

## 2017-06-15 RX ADMIN — LEVOFLOXACIN 500 MG: 500 INJECTION, SOLUTION INTRAVENOUS at 11:26

## 2017-06-15 RX ADMIN — METOPROLOL TARTRATE 50 MG: 50 TABLET, FILM COATED ORAL at 17:17

## 2017-06-15 RX ADMIN — CLOBETASOL PROPIONATE: 0.5 CREAM TOPICAL at 21:33

## 2017-06-15 RX ADMIN — METHYLPREDNISOLONE SODIUM SUCCINATE 60 MG: 40 INJECTION, POWDER, FOR SOLUTION INTRAMUSCULAR; INTRAVENOUS at 06:25

## 2017-06-15 RX ADMIN — GUAIFENESIN 1200 MG: 600 TABLET, EXTENDED RELEASE ORAL at 17:17

## 2017-06-15 RX ADMIN — GABAPENTIN 300 MG: 300 CAPSULE ORAL at 17:17

## 2017-06-15 RX ADMIN — INSULIN ASPART 12 UNITS: 100 INJECTION, SOLUTION INTRAVENOUS; SUBCUTANEOUS at 21:33

## 2017-06-15 RX ADMIN — LEVETIRACETAM 500 MG: 500 TABLET ORAL at 08:21

## 2017-06-15 RX ADMIN — LEVETIRACETAM 500 MG: 500 TABLET ORAL at 17:16

## 2017-06-15 RX ADMIN — INSULIN ASPART 10 UNITS: 100 INJECTION, SOLUTION INTRAVENOUS; SUBCUTANEOUS at 12:49

## 2017-06-15 RX ADMIN — FEBUXOSTAT 80 MG: 40 TABLET ORAL at 08:19

## 2017-06-15 RX ADMIN — PANTOPRAZOLE SODIUM 40 MG: 40 TABLET, DELAYED RELEASE ORAL at 06:25

## 2017-06-15 RX ADMIN — LUBIPROSTONE 24 MCG: 24 CAPSULE, GELATIN COATED ORAL at 08:20

## 2017-06-15 RX ADMIN — BUDESONIDE 0.5 MG: 0.5 SUSPENSION RESPIRATORY (INHALATION) at 07:45

## 2017-06-15 RX ADMIN — IPRATROPIUM BROMIDE AND ALBUTEROL SULFATE 3 ML: .5; 3 SOLUTION RESPIRATORY (INHALATION) at 07:45

## 2017-06-15 RX ADMIN — CLOPIDOGREL 75 MG: 75 TABLET, FILM COATED ORAL at 06:25

## 2017-06-15 RX ADMIN — GABAPENTIN 300 MG: 300 CAPSULE ORAL at 08:18

## 2017-06-15 RX ADMIN — METHYLPREDNISOLONE SODIUM SUCCINATE 40 MG: 125 INJECTION, POWDER, FOR SOLUTION INTRAMUSCULAR; INTRAVENOUS at 12:49

## 2017-06-15 RX ADMIN — AMLODIPINE BESYLATE 10 MG: 5 TABLET ORAL at 06:24

## 2017-06-15 RX ADMIN — ENOXAPARIN SODIUM 30 MG: 30 INJECTION SUBCUTANEOUS at 11:26

## 2017-06-15 RX ADMIN — METOPROLOL TARTRATE 50 MG: 50 TABLET, FILM COATED ORAL at 08:17

## 2017-06-15 RX ADMIN — ALLOPURINOL 300 MG: 300 TABLET ORAL at 06:25

## 2017-06-15 RX ADMIN — GUAIFENESIN 1200 MG: 600 TABLET, EXTENDED RELEASE ORAL at 08:18

## 2017-06-15 RX ADMIN — METHYLPREDNISOLONE SODIUM SUCCINATE 40 MG: 125 INJECTION, POWDER, FOR SOLUTION INTRAMUSCULAR; INTRAVENOUS at 21:33

## 2017-06-15 RX ADMIN — INSULIN ASPART 10 UNITS: 100 INJECTION, SOLUTION INTRAVENOUS; SUBCUTANEOUS at 17:15

## 2017-06-15 RX ADMIN — CETIRIZINE HYDROCHLORIDE 10 MG: 10 TABLET, FILM COATED ORAL at 06:25

## 2017-06-15 RX ADMIN — IPRATROPIUM BROMIDE AND ALBUTEROL SULFATE 3 ML: .5; 3 SOLUTION RESPIRATORY (INHALATION) at 11:45

## 2017-06-15 NOTE — H&P
HISTORY:  This is a 66-year-old white male, well known to me, presented to the office about a week ago, complaining of cough and congestion, shortness of breath, low-grade temperature. Sputum productive of thick sputum. He was diagnosed to have acute bronchitis, placed on oral steroids and oral bronchodilators and cough suppressants and expectorants. The patient returned 2 days later saying he had not gotten any better. He was not febrile with temperature of 101 at home; therefore he was placed on oral antibiotics. The patient called today and said he was not feeling any better. Shortness of breath persisted. He could not even  walk across the floor without  getting short of breath. Complains of chest tightness. No chills. He is being admitted for failed outpatient therapy.     PAST MEDICAL HISTORY:   1.  Hypertension.  2.  Hyperlipidemia.  3.  Impaired fasting glucose.  4.  COPD.  5.  History of partial complex seizures.  6.  Mantle cell lymphoma.  7.  Chronic venous stasis edema.   8.  Allergic rhinitis.  9.  Right hemispheric TIA 02/2014.   10. Remote history of head injury.   11. History of transitional cell carcinoma of the bladder, status post resection.  12. Osteoarthritis.   13. Hyperuricemia.   14. Chronic kidney disease, stage 3.     FAMILY HISTORY:  Positive for hypertension, heart disease.     SOCIAL HISTORY:  The patient is  and lives at home. No alcohol or tobacco use.     MEDICATIONS:   1.  Advair Diskus b.i.d.   2.  Allopurinol 300 daily.   3.  Amitiza 24 mcg daily.  4.  Amlodipine 10 mg daily.  5.  Cetirizine 10 mg daily.   6.  Clobetasol b.i.d.   7.  Plavix 75 mg a day.   8.  Vitamin D3 at 50,000 units weekly.   9.  Lasix 40 mg daily.   10. Gabapentin 300 mg t.i.d.   11. Keppra 1000 mg b.i.d.   12. Levaquin 500 daily.  13. Metolazone 0.5 3 times a week.   14. Metoprolol 50 mg b.i.d.   15. Pantoprazole 40 mg daily.  16. Potassium daily.   17. Uloric 80 mg daily.  18. Ventolin 2 puffs q.i.d.      REVIEW OF SYSTEMS: The patient complains of generalized malaise, fever. No chills. Complains of cough, congestion, shortness of breath, sputum production, yellow in color thick.  Denied any chest pain.  He has chest tightness. No orthopnea or PND. No abdominal pain, nausea or vomiting, no diarrhea. Complains of lower extremity edema and chronic low back pain.      PHYSICAL EXAMINATION:  Reveals an elderly white male who is morbidly obese. His blood pressure is 130/82, respirations are 22, pulse is 100. He is afebrile.   HEENT: Pupils equal, round and reactive to light. Extraocular movements intact.  Nose and throat: Clear.  Mucous membranes moist.   NECK: Supple without adenopathy, no JVD or thyromegaly.   CHEST: Decreased breath sounds. Expiratory wheezes scattered throughout both lung fields, coarse rhonchi at both bases posteriorly.   HEART: Rate and rhythm regular. S1, S2. No S3 or S4.   ABDOMEN: Soft. Obese. Bowel sounds positive. No splenomegaly.   BACK AND SPINE:  Normal.   EXTREMITIES: Without clubbing, cyanosis, 2+ pitting edema below the knees.   NEUROLOGIC: Physiological.     DIAGNOSTIC DATA: At the time of admission: pH 7.51, pO2 of 65, pCO2 of 36. He is on room air. His sodium 140, potassium 2.9, chloride 95, CO2 of 28. BUN 28, creatinine 1.23. His   white count was slightly elevated at 11.43. Hemoglobin 16. Hematocrit 47, platelets were adequate. His chest x-ray did not show acute infiltrates.     ASSESSMENT:   1.  Acute exacerbation of COPD.  2.  Acute bronchitis.   3.  Chronic kidney disease stage 3.  4.  Hypertension.   5.  Hyperlipidemia.   6.  History of mantle cell lymphoma.  7.  Morbid obesity.     PLAN: At this time, the patient will be admitted,  started on IV antibiotics. Sputum cultures, Procalcitonin, respiratory, viral cultures will be done. Aggressive pulmonary toilet, switch to Brovana, Pulmicort and DuoNebs. We will continue to monitor. DVT prophylaxis.       Tory Arias  M.D.*  KK:marion  D:  06/15/2017 07:39:04  T:  06/15/2017 08:27:07   Job ID:  [id number]   Document ID: [id number]  cc:

## 2017-06-15 NOTE — NURSING NOTE
Discharge Planning Assessment  MAGDALENA Pollock     Patient Name: Esdras Ko  MRN: [id number]  Today's Date: 6/15/2017    Admit Date: 6/14/2017          Discharge Needs Assessment       06/15/17 1026    Living Environment    Home Accessibility bed and bath on same level    Stair Railings at Home none    Type of Financial/Environmental Concern none    Transportation Available family or friend will provide    Living Environment Comment Mr Ko lives with wife in a first floor apartment.    Living Environment    Provides Primary Care For no one, unable/limited ability to care for self    Primary Care Provided By spouse/significant other    Quality Of Family Relationships supportive;involved;helpful    Able to Return to Prior Living Arrangements yes    Discharge Needs Assessment    Concerns To Be Addressed denies needs/concerns at this time    Readmission Within The Last 30 Days no previous admission in last 30 days    Anticipated Changes Related to Illness none    Equipment Currently Used at Home none    Equipment Needed After Discharge nebulizer    Discharge Planning Comments Spoke with patient and wife at bedside.  Mr Ko lives in first floor apartment with his wife Charley.  He does not have home health - never has had.  He has no DME of his own in home.  Patient is not on oxygen currently.  Wife assists him with his ADL's.  They use [facility] in [place] and do not have any difficulty paying for meds. He does not have an advanced directive.and does not want any information concerning them.  Facesheet verified. Patient no longer drives due to a stroke in 2012. Wife provides transportation.  Plan is to go home. when medically stable.  Will continue to follow.              Discharge Plan       06/15/17 1035    Case Management/Social Work Plan    Plan To go home when medically stable.      Patient/Family In Agreement With Plan yes    Additional Comments Spoke with patient and wife at bedside.  Mr Ko lives  in first floor apartment with his wife Charley.  He does not have home health - never has had.  He has no DME of his own in home.  Patient is not on oxygen currently.  Wife assists him with his ADL's.  They use [email or web address] in [place] and do not have any difficulty paying for meds. He does not have an advanced directive.and does not want any information concerning them.  Facesheet verified. Patient no longer drives due to a stroke in 2012. Wife provides transportation.  Plan is to go home. when medically stable.  Will continue to follow.          Discharge Placement     No information found                Demographic Summary       06/15/17 1023    Referral Information    Admission Type inpatient    Arrived From home or self-care    Referral Source admission list    Reason For Consult discharge planning    Record Reviewed medical record    Contact Information    Permission Granted to Share Information With ;family/designee    Primary Care Physician Information    Name Dr Arias            Functional Status     None            Psychosocial     None            Abuse/Neglect     None            Legal     None            Substance Abuse     None            Patient Forms     None          Melanie Galicia RN

## 2017-06-15 NOTE — PLAN OF CARE
Problem: Patient Care Overview (Adult)  Goal: Plan of Care Review  Outcome: Ongoing (interventions implemented as appropriate)    06/15/17 1414   Coping/Psychosocial Response Interventions   Plan Of Care Reviewed With patient   Patient Care Overview   Progress progress toward functional goals as expected   Outcome Evaluation   Outcome Summary/Follow up Plan continues on IV steriods,SOA with ADL's ,feeling somewhat better, remains on room air       Goal: Adult Individualization and Mutuality  Outcome: Ongoing (interventions implemented as appropriate)  Goal: Discharge Needs Assessment  Outcome: Ongoing (interventions implemented as appropriate)    Problem: COPD, Chronic Bronchitis/Emphysema (Adult)  Goal: Signs and Symptoms of Listed Potential Problems Will be Absent or Manageable (COPD, Chronic Bronchitis/Emphysema)  Outcome: Ongoing (interventions implemented as appropriate)    Problem: Respiratory Insufficiency (Adult)  Goal: Identify Related Risk Factors and Signs and Symptoms  Outcome: Outcome(s) achieved Date Met:  06/15/17  Goal: Acid/Base Balance  Outcome: Ongoing (interventions implemented as appropriate)  Goal: Effective Ventilation  Outcome: Ongoing (interventions implemented as appropriate)

## 2017-06-15 NOTE — PLAN OF CARE
Problem: Patient Care Overview (Adult)  Goal: Plan of Care Review  Outcome: Ongoing (interventions implemented as appropriate)    06/15/17 0055   Coping/Psychosocial Response Interventions   Plan Of Care Reviewed With patient   Patient Care Overview   Progress progress toward functional goals as expected

## 2017-06-15 NOTE — PLAN OF CARE
Problem: COPD, Chronic Bronchitis/Emphysema (Adult)  Intervention: Match Activity with Ability/Tolerance    06/15/17 0331   Activity   Activity Type activity adjusted per tolerance       Intervention: Optimize Oxygenation/Ventilation/Perfusion    06/15/17 0331   Respiratory Interventions   Airway/Ventilation Management pulmonary hygiene promoted   Promote Aggressive Pulmonary Hygiene/Secretion Management   Cough And Deep Breathing done independently per patient       Intervention: Support/Optimize Psychosocial Response to Chronic Pulmonary Disease    06/15/17 0331   Coping Strategies   Trust Relationship/Rapport care explained         Goal: Signs and Symptoms of Listed Potential Problems Will be Absent or Manageable (COPD, Chronic Bronchitis/Emphysema)  Outcome: Ongoing (interventions implemented as appropriate)    06/15/17 0331   COPD, Chronic Bronchitis/Emphysema   Problems Assessed (COPD, Chronic Bronchitis/Emphysema) dyspnea;hypoxia/hypoxemia   Problems Present (COPD, Chronic Bronchitis/Emphysema) none

## 2017-06-15 NOTE — PROGRESS NOTES
"Daily Progress Note:    Subjective: Does not feel much better.  Still has cough congestion shortness of breath persistent    Flowsheet Rows         First Filed Value    Admission Height  68\" (172.7 cm) Documented at 06/14/2017 0944    Admission Weight  292 lb 9.6 oz (133 kg) Documented at 06/14/2017 0944          Patient Vitals for the past 24 hrs:   BP Temp Temp src Pulse Resp SpO2 Height Weight   06/15/17 0610 118/70 97.7 °F (36.5 °C) Oral 108 20 90 % - -   06/15/17 0343 138/77 97.4 °F (36.3 °C) Oral 109 20 96 % - -   06/14/17 2354 117/80 97.6 °F (36.4 °C) Oral 99 20 91 % - -   06/14/17 2027 - - - 98 20 94 % - -   06/14/17 1927 127/71 98.9 °F (37.2 °C) Oral 108 20 91 % - -   06/14/17 1559 - - - - - 95 % - -   06/14/17 1547 - - - 117 18 95 % - -   06/14/17 1500 122/77 (!) 100.8 °F (38.2 °C) Oral 116 18 92 % - -   06/14/17 1132 - - - 104 16 93 % - -   06/14/17 1125 - - - 104 16 92 % - -   06/14/17 1100 130/88 98.8 °F (37.1 °C) Oral 119 18 93 % - -   06/14/17 0944 134/94 98.8 °F (37.1 °C) Oral 107 16 91 % 68\" (172.7 cm) 292 lb 9.6 oz (133 kg)         Intake/Output Summary (Last 24 hours) at 06/15/17 0652  Last data filed at 06/15/17 0615   Gross per 24 hour   Intake             1180 ml   Output             1750 ml   Net             -570 ml       Review of Systems   Constitutional: Positive for activity change, fatigue and fever. Negative for appetite change.   Respiratory: Positive for cough, shortness of breath and wheezing. Negative for chest tightness.    Cardiovascular: Positive for leg swelling. Negative for chest pain.   Gastrointestinal: Negative for abdominal distention, abdominal pain, diarrhea, nausea and vomiting.   Genitourinary: Negative for frequency.   Musculoskeletal: Positive for back pain.   Skin: Negative for rash.   Psychiatric/Behavioral: Negative for agitation.       Physical Exam   Constitutional: He appears well-developed and well-nourished.   Morbidly obese   HENT:   Head: Normocephalic. "   Mouth/Throat: Oropharynx is clear and moist.   Eyes: Conjunctivae are normal.   Neck: Normal range of motion. No JVD present. No thyromegaly present.   Cardiovascular: Normal rate, regular rhythm and normal heart sounds.    No murmur heard.  Pulmonary/Chest: Effort normal. Tachypnea noted. No respiratory distress. He has wheezes (diffuse). He has no rales.   Abdominal: Soft. Bowel sounds are normal. He exhibits no distension. There is no tenderness. There is no guarding.   Neurological: He is alert.   Skin: Skin is warm and dry. No rash noted.   Nursing note and vitals reviewed.          Medication Review:   I have reviewed the patient's current medication list      allopurinol 300 mg Oral QAM   amLODIPine 10 mg Oral QAM   aspirin 325 mg Oral QAM   budesonide 0.5 mg Nebulization BID - RT   cetirizine 10 mg Oral QAM   clobetasol  Topical Q12H   clopidogrel 75 mg Oral QAM   enoxaparin 30 mg Subcutaneous Q24H   febuxostat 80 mg Oral Daily   furosemide 80 mg Oral BID   gabapentin 300 mg Oral BID   guaiFENesin 1,200 mg Oral BID   ipratropium-albuterol 3 mL Nebulization 4x Daily - RT   levETIRAcetam 500 mg Oral BID   levoFLOXacin 500 mg Intravenous Q24H   lubiprostone 24 mcg Oral Daily With Breakfast   methylPREDNISolone sodium succinate 60 mg Intravenous Q8H   metOLazone 2.5 mg Oral Once per day on Mon Wed Fri   metoprolol tartrate 50 mg Oral BID   pantoprazole 40 mg Oral QAM   potassium chloride 40 mEq Oral TID           Labs:    Results from last 7 days  Lab Units 06/15/17  0632 06/14/17  0950   WBC 10*3/mm3 10.61 11.43*   HEMOGLOBIN g/dL 16.1 16.2   HEMATOCRIT % 47.5 47.8   PLATELETS 10*3/mm3 253 212       Results from last 7 days  Lab Units 06/14/17  0950   SODIUM mmol/L 140   POTASSIUM mmol/L 2.9*   CHLORIDE mmol/L 95*   TOTAL CO2 mmol/L 28.0   BUN mg/dL 28*   CREATININE mg/dL 1.23   CALCIUM mg/dL 9.0   BILIRUBIN mg/dL 0.6   ALK PHOS U/L 75   ALT (SGPT) U/L 19   AST (SGOT) U/L 14   GLUCOSE mg/dL 132*           Lab  Results (last 24 hours)     Procedure Component Value Units Date/Time    CBC & Differential [[id number]] Collected:  06/14/17 0950    Specimen:  Blood Updated:  06/14/17 0958    Narrative:       The following orders were created for panel order CBC & Differential.  Procedure                               Abnormality         Status                     ---------                               -----------         ------                     CBC Auto Differential[[id number]]        Abnormal            Final result                 Please view results for these tests on the individual orders.    CBC Auto Differential [[id number]]  (Abnormal) Collected:  06/14/17 0950    Specimen:  Blood Updated:  06/14/17 0958     WBC 11.43 (H) 10*3/mm3      RBC 5.67 10*6/mm3      Hemoglobin 16.2 g/dL      Hematocrit 47.8 %      MCV 84.3 fL      MCH 28.6 pg      MCHC 33.9 g/dL      RDW 14.9 (H) %      RDW-SD 44.8 fl      MPV 10.4 fL      Platelets 212 10*3/mm3      Neutrophil % 77.0 (H) %      Lymphocyte % 10.3 (L) %      Monocyte % 8.3 (H) %      Eosinophil % 3.0 %      Basophil % 0.4 %      Immature Grans % 1.0 (H) %      Neutrophils, Absolute 8.80 (H) 10*3/mm3      Lymphocytes, Absolute 1.18 10*3/mm3      Monocytes, Absolute 0.95 10*3/mm3      Eosinophils, Absolute 0.34 (H) 10*3/mm3      Basophils, Absolute 0.05 10*3/mm3      Immature Grans, Absolute 0.11 (H) 10*3/mm3      nRBC 0.0 /100 WBC     Comprehensive Metabolic Panel [[id number]]  (Abnormal) Collected:  06/14/17 0950    Specimen:  Blood Updated:  06/14/17 1016     Glucose 132 (H) mg/dL      BUN 28 (H) mg/dL      Creatinine 1.23 mg/dL      Sodium 140 mmol/L      Potassium 2.9 (L) mmol/L      Chloride 95 (L) mmol/L      CO2 28.0 mmol/L      Calcium 9.0 mg/dL      Total Protein 6.6 g/dL      Albumin 3.60 g/dL      ALT (SGPT) 19 U/L      AST (SGOT) 14 U/L      Alkaline Phosphatase 75 U/L      Total Bilirubin 0.6 mg/dL      eGFR Non African Amer 59 (L) mL/min/1.73      Globulin 3.0 gm/dL       A/G Ratio 1.2 g/dL      BUN/Creatinine Ratio 22.8     Anion Gap 17.0 mmol/L     Troponin [[id number]]  (Normal) Collected:  06/14/17 0950    Specimen:  Blood Updated:  06/14/17 1016     Troponin T <0.010 ng/mL     Narrative:       Troponin T Reference Ranges:  Less than 0.03 ng/mL:    Negative for AMI  0.03 to 0.09 ng/mL:      Indeterminant for AMI  Greater than 0.09 ng/mL: Positive for AMI    Blood Gas, Arterial [[id number]] Collected:  06/14/17 1014    Specimen:  Arterial Blood Updated:  06/14/17 1017     Site Arterial: left radial     Michael's Test Positive     pH, Arterial 7.519 pH units      pCO2, Arterial 36.5 mm Hg      pO2, Arterial 65.3 mm Hg      HCO3, Arterial 29.1 mmol/L      Base Excess, Arterial 6.2 mmol/L      O2 Saturation Calculated 94.6 %      Hemoglobin, Blood Gas 16.8 g/dL      Barometric Pressure for Blood Gas 749 mmHg      Modality Room air     FIO2 21 %     BNP [[id number]]  (Normal) Collected:  06/14/17 0950    Specimen:  Blood Updated:  06/14/17 1042     proBNP 54.3 pg/mL     Narrative:       Among patients with dyspnea, NT-proBNP is highly sensitive for the detection of acute congestive heart failure. In addition NT-proBNP of <300 pg/ml effectively rules out acute congestive heart failure with 99% negative predictive value.    Respiratory Culture [[id number]] Collected:  06/14/17 1042    Specimen:  Sputum from [facility] Updated:  06/14/17 1101    Procalcitonin [[id number]]  (Normal) Collected:  06/14/17 0950    Specimen:  Blood Updated:  06/14/17 1516     Procalcitonin 0.18 ng/mL     Narrative:       As a Marker for Sepsis (Non-Neonates):   1. <0.5 ng/mL represents a low risk of severe sepsis and/or septic shock.  1. >2 ng/mL represents a high risk of severe sepsis and/or septic shock.    As a Marker for Lower Respiratory Tract Infections that require antibiotic therapy:  PCT on Admission     Antibiotic Therapy             6-12 Hrs later  > 0.5                Strongly Recommended           "  >0.25 - <0.5         Recommended  0.1 - 0.25           Discouraged                   Remeasure/reassess PCT  <0.1                 Strongly Discouraged          Remeasure/reassess PCT      As 28 day mortality risk marker: \"Change in Procalcitonin Result\" (> 80 % or <=80 %) if Day 0 (or Day 1) and Day 4 values are available. Refer to http://www.[facility]-pct-calculator.com/   Change in PCT <=80 %   A decrease of PCT levels below or equal to 80 % defines a positive change in PCT test result representing a higher risk for 28-day all-cause mortality of patients diagnosed with severe sepsis or septic shock.  Change in PCT > 80 %   A decrease of PCT levels of more than 80 % defines a negative change in PCT result representing a lower risk for 28-day all-cause mortality of patients diagnosed with severe sepsis or septic shock.                Respiratory Panel, PCR [[id number]]  (Abnormal) Collected:  06/14/17 1043    Specimen:  Swab from [place] Updated:  06/14/17 1605     ADENOVIRUS, PCR Not Detected     Coronavirus 229E Not Detected     Coronavirus HKU1 Not Detected     Coronavirus NL63 Not Detected     Coronavirus OC43 Not Detected     Human Metapneumovirus Not Detected     Human Rhinovirus/Enterovirus Detected (A)     Influenza B PCR Not Detected     Parainfluenza Virus 1 Not Detected     Parainfluenza Virus 2 Not Detected     Parainfluenza Virus 3 Not Detected     Parainfluenza Virus 4 Not Detected     Bordetella pertussis pcr Not Detected     Influenza 2009 H1N1 by PCR Not Detected     Chlamydophila pneumoniae PCR Not Detected     Mycoplasma pneumo by PCR Not Detected     Influenza A PCR Not Detected     Influenza A H3 Not Detected     Influenza A H1 Not Detected     RSV, PCR Not Detected    Basic Metabolic Panel [[id number]] Collected:  06/15/17 0632    Specimen:  Blood Updated:  06/15/17 0635    CBC (No Diff) [[id number]]  (Abnormal) Collected:  06/15/17 0632    Specimen:  Blood Updated:  06/15/17 0642     WBC 10.61 " 10*3/mm3      RBC 5.70 10*6/mm3      Hemoglobin 16.1 g/dL      Hematocrit 47.5 %      MCV 83.3 fL      MCH 28.2 pg      MCHC 33.9 g/dL      RDW 14.6 (H) %      RDW-SD 43.7 fl      MPV 10.3 fL      Platelets 253 10*3/mm3               Radiology:  Imaging Results (last 24 hours)     Procedure Component Value Units Date/Time    XR Chest 2 View [[id number]] Collected:  06/14/17 1153     Updated:  06/14/17 1156    Narrative:       CHEST X-RAY, 06/14/2017:     HISTORY:   66-year-old male with two-week history of cough and fever.     TECHNIQUE:  AP portable upright chest x-ray.     FINDINGS:  Heart size and pulmonary vascularity are normal. Mild bibasilar  scarring. No visible pulmonary infiltrate or pleural effusion. No change  since 04/07/2017. Spinal curvature.       Impression:       No active disease. No change since 04/07/2017.     This report was finalized on 6/14/2017 11:54 AM by Dr. Chepe Catherine MD.             Cardiology:  ECG/EMG Results (last 24 hours)     ** No results found for the last 24 hours. **          I have reviewed consult notes.    Assessment and Plan:    1.  Acute exacerbation of COPD acute bronchitis we'll continue aggressive pulmonary toilet mini nebs IV antibiotics and IV steroids.  Respiratory viral panel positive for rhinovirus certainly could explain his COPD exacerbation.  We'll continue supportive therapy IV antibiotics until sputum cultures are back    2. hypertension is well controlled on medication be continued    3. chronic kidney disease stage III is a baseline monitor for any further decompensation    4.  Seizure disorder stable nothing acute home as we continued      5. hypokalemia oral medications and been ordered    6.  Reflux disease PPI will be continued

## 2017-06-15 NOTE — PLAN OF CARE
Problem: COPD, Chronic Bronchitis/Emphysema (Adult)  Intervention: Optimize Oxygenation/Ventilation/Perfusion    06/15/17 4309   Respiratory Interventions   Airway/Ventilation Management pulmonary hygiene promoted   Promote Aggressive Pulmonary Hygiene/Secretion Management   Cough And Deep Breathing done with encouragement

## 2017-06-15 NOTE — PLAN OF CARE
Problem: Patient Care Overview (Adult)  Goal: Discharge Needs Assessment  Outcome: Ongoing (interventions implemented as appropriate)    06/15/17 1026   Discharge Needs Assessment   Concerns To Be Addressed denies needs/concerns at this time   Readmission Within The Last 30 Days no previous admission in last 30 days   Equipment Needed After Discharge nebulizer   Discharge Planning Comments Spoke with patient and wife at bedside. Mr Ko lives in first floor apartment with his wife Charley. He does not have home health - never has had. He has no DME of his own in home. Patient is not on oxygen currently. Wife assists him with his ADL's. They use [facility] in [place] and do not have any difficulty paying for meds. He does not have an advanced directive.and does not want any information concerning them. Facesheet verified. Patient no longer drives due to a stroke in 2012. Wife provides transportation. Plan is to go home. when medically stable. Will continue to follow.    Current Health   Anticipated Changes Related to Illness none   Living Environment   Transportation Available family or friend will provide   Self-Care   Equipment Currently Used at Home none

## 2017-06-16 ENCOUNTER — APPOINTMENT (OUTPATIENT)
Dept: GENERAL RADIOLOGY | Facility: HOSPITAL | Age: 67
End: 2017-06-16

## 2017-06-16 LAB
ALBUMIN SERPL-MCNC: 3.5 G/DL (ref 3.5–5.2)
ALBUMIN/GLOB SERPL: 1.3 G/DL
ALP SERPL-CCNC: 74 U/L (ref 40–129)
ALT SERPL W P-5'-P-CCNC: 19 U/L (ref 5–41)
ANION GAP SERPL CALCULATED.3IONS-SCNC: 18.1 MMOL/L
AST SERPL-CCNC: 13 U/L (ref 5–40)
BACTERIA SPEC RESP CULT: NORMAL
BILIRUB SERPL-MCNC: 0.2 MG/DL (ref 0.2–1.2)
BUN BLD-MCNC: 46 MG/DL (ref 8–23)
BUN/CREAT SERPL: 31.5 (ref 7–25)
CALCIUM SPEC-SCNC: 9.2 MG/DL (ref 8.8–10.5)
CHLORIDE SERPL-SCNC: 93 MMOL/L (ref 98–107)
CO2 SERPL-SCNC: 24.9 MMOL/L (ref 22–29)
CREAT BLD-MCNC: 1.46 MG/DL (ref 0.76–1.27)
DEPRECATED RDW RBC AUTO: 43.3 FL (ref 37–54)
ERYTHROCYTE [DISTWIDTH] IN BLOOD BY AUTOMATED COUNT: 14.4 % (ref 11.5–14.5)
GFR SERPL CREATININE-BSD FRML MDRD: 48 ML/MIN/1.73
GLOBULIN UR ELPH-MCNC: 2.7 GM/DL
GLUCOSE BLD-MCNC: 375 MG/DL (ref 65–99)
GLUCOSE BLD-MCNC: 559 MG/DL (ref 65–99)
GLUCOSE BLDC GLUCOMTR-MCNC: 343 MG/DL (ref 70–130)
GLUCOSE BLDC GLUCOMTR-MCNC: 371 MG/DL (ref 70–130)
GLUCOSE BLDC GLUCOMTR-MCNC: 371 MG/DL (ref 70–130)
GLUCOSE BLDC GLUCOMTR-MCNC: 424 MG/DL (ref 70–130)
GLUCOSE BLDC GLUCOMTR-MCNC: 447 MG/DL (ref 70–130)
GLUCOSE BLDC GLUCOMTR-MCNC: 458 MG/DL (ref 70–130)
GLUCOSE BLDC GLUCOMTR-MCNC: 475 MG/DL (ref 70–130)
GLUCOSE BLDC GLUCOMTR-MCNC: 479 MG/DL (ref 70–130)
GLUCOSE BLDC GLUCOMTR-MCNC: 482 MG/DL (ref 70–130)
GRAM STN SPEC: NORMAL
HCT VFR BLD AUTO: 45.7 % (ref 42–52)
HGB BLD-MCNC: 15.6 G/DL (ref 14–18)
MCH RBC QN AUTO: 28.6 PG (ref 27–31)
MCHC RBC AUTO-ENTMCNC: 34.1 G/DL (ref 31–37)
MCV RBC AUTO: 83.7 FL (ref 80–94)
PLATELET # BLD AUTO: 244 10*3/MM3 (ref 140–500)
PMV BLD AUTO: 10.6 FL (ref 7.4–10.4)
POTASSIUM BLD-SCNC: 3.7 MMOL/L (ref 3.5–5.2)
PROT SERPL-MCNC: 6.2 G/DL (ref 6–8.5)
RBC # BLD AUTO: 5.46 10*6/MM3 (ref 4.7–6.1)
SODIUM BLD-SCNC: 136 MMOL/L (ref 136–145)
WBC NRBC COR # BLD: 11.49 10*3/MM3 (ref 4.8–10.8)

## 2017-06-16 PROCEDURE — 82962 GLUCOSE BLOOD TEST: CPT

## 2017-06-16 PROCEDURE — 25010000002 METHYLPREDNISOLONE PER 125 MG: Performed by: FAMILY MEDICINE

## 2017-06-16 PROCEDURE — 94799 UNLISTED PULMONARY SVC/PX: CPT

## 2017-06-16 PROCEDURE — 94668 MNPJ CHEST WALL SBSQ: CPT

## 2017-06-16 PROCEDURE — 25010000002 LEVOFLOXACIN PER 250 MG: Performed by: FAMILY MEDICINE

## 2017-06-16 PROCEDURE — 63710000001 INSULIN ASPART PER 5 UNITS: Performed by: FAMILY MEDICINE

## 2017-06-16 PROCEDURE — 94667 MNPJ CHEST WALL 1ST: CPT

## 2017-06-16 PROCEDURE — 25010000002 ENOXAPARIN PER 10 MG: Performed by: FAMILY MEDICINE

## 2017-06-16 PROCEDURE — 63710000001 PREDNISONE PER 5 MG: Performed by: FAMILY MEDICINE

## 2017-06-16 PROCEDURE — 63710000001 INSULIN REGULAR HUMAN PER 5 UNITS: Performed by: FAMILY MEDICINE

## 2017-06-16 PROCEDURE — 71020 HC CHEST PA AND LATERAL: CPT

## 2017-06-16 PROCEDURE — 85027 COMPLETE CBC AUTOMATED: CPT | Performed by: FAMILY MEDICINE

## 2017-06-16 PROCEDURE — 82947 ASSAY GLUCOSE BLOOD QUANT: CPT | Performed by: FAMILY MEDICINE

## 2017-06-16 PROCEDURE — 80053 COMPREHEN METABOLIC PANEL: CPT | Performed by: FAMILY MEDICINE

## 2017-06-16 RX ORDER — PREDNISONE 10 MG/1
10 TABLET ORAL
Status: DISCONTINUED | OUTPATIENT
Start: 2017-06-16 | End: 2017-06-18

## 2017-06-16 RX ORDER — FUROSEMIDE 40 MG/1
40 TABLET ORAL DAILY
Status: DISCONTINUED | OUTPATIENT
Start: 2017-06-16 | End: 2017-06-19 | Stop reason: HOSPADM

## 2017-06-16 RX ADMIN — ALLOPURINOL 300 MG: 300 TABLET ORAL at 06:45

## 2017-06-16 RX ADMIN — FUROSEMIDE 40 MG: 40 TABLET ORAL at 10:05

## 2017-06-16 RX ADMIN — LEVETIRACETAM 500 MG: 500 TABLET ORAL at 18:27

## 2017-06-16 RX ADMIN — PREDNISONE 10 MG: 10 TABLET ORAL at 13:37

## 2017-06-16 RX ADMIN — PREDNISONE 10 MG: 10 TABLET ORAL at 18:27

## 2017-06-16 RX ADMIN — HUMAN INSULIN 10 UNITS: 100 INJECTION, SOLUTION SUBCUTANEOUS at 21:37

## 2017-06-16 RX ADMIN — GABAPENTIN 300 MG: 300 CAPSULE ORAL at 18:27

## 2017-06-16 RX ADMIN — IPRATROPIUM BROMIDE AND ALBUTEROL SULFATE 3 ML: .5; 3 SOLUTION RESPIRATORY (INHALATION) at 15:00

## 2017-06-16 RX ADMIN — METOPROLOL TARTRATE 50 MG: 50 TABLET, FILM COATED ORAL at 10:06

## 2017-06-16 RX ADMIN — IPRATROPIUM BROMIDE AND ALBUTEROL SULFATE 3 ML: .5; 3 SOLUTION RESPIRATORY (INHALATION) at 10:57

## 2017-06-16 RX ADMIN — GUAIFENESIN 1200 MG: 600 TABLET, EXTENDED RELEASE ORAL at 18:27

## 2017-06-16 RX ADMIN — ENOXAPARIN SODIUM 30 MG: 30 INJECTION SUBCUTANEOUS at 10:06

## 2017-06-16 RX ADMIN — METOLAZONE 2.5 MG: 2.5 TABLET ORAL at 10:06

## 2017-06-16 RX ADMIN — PREDNISONE 10 MG: 10 TABLET ORAL at 10:05

## 2017-06-16 RX ADMIN — BUDESONIDE 0.5 MG: 0.5 SUSPENSION RESPIRATORY (INHALATION) at 19:44

## 2017-06-16 RX ADMIN — GABAPENTIN 300 MG: 300 CAPSULE ORAL at 10:06

## 2017-06-16 RX ADMIN — BUDESONIDE 0.5 MG: 0.5 SUSPENSION RESPIRATORY (INHALATION) at 07:00

## 2017-06-16 RX ADMIN — GUAIFENESIN 1200 MG: 600 TABLET, EXTENDED RELEASE ORAL at 10:06

## 2017-06-16 RX ADMIN — INSULIN ASPART 12 UNITS: 100 INJECTION, SOLUTION INTRAVENOUS; SUBCUTANEOUS at 13:37

## 2017-06-16 RX ADMIN — LEVOFLOXACIN 500 MG: 500 INJECTION, SOLUTION INTRAVENOUS at 10:19

## 2017-06-16 RX ADMIN — LEVETIRACETAM 500 MG: 500 TABLET ORAL at 10:06

## 2017-06-16 RX ADMIN — AMLODIPINE BESYLATE 10 MG: 5 TABLET ORAL at 06:44

## 2017-06-16 RX ADMIN — IPRATROPIUM BROMIDE AND ALBUTEROL SULFATE 3 ML: .5; 3 SOLUTION RESPIRATORY (INHALATION) at 19:44

## 2017-06-16 RX ADMIN — IPRATROPIUM BROMIDE AND ALBUTEROL SULFATE 3 ML: .5; 3 SOLUTION RESPIRATORY (INHALATION) at 07:00

## 2017-06-16 RX ADMIN — INSULIN ASPART 10 UNITS: 100 INJECTION, SOLUTION INTRAVENOUS; SUBCUTANEOUS at 10:19

## 2017-06-16 RX ADMIN — METHYLPREDNISOLONE SODIUM SUCCINATE 40 MG: 125 INJECTION, POWDER, FOR SOLUTION INTRAMUSCULAR; INTRAVENOUS at 06:44

## 2017-06-16 RX ADMIN — HUMAN INSULIN 10 UNITS: 100 INJECTION, SOLUTION SUBCUTANEOUS at 19:04

## 2017-06-16 RX ADMIN — LUBIPROSTONE 24 MCG: 24 CAPSULE, GELATIN COATED ORAL at 10:06

## 2017-06-16 RX ADMIN — CLOPIDOGREL 75 MG: 75 TABLET, FILM COATED ORAL at 06:45

## 2017-06-16 RX ADMIN — CETIRIZINE HYDROCHLORIDE 10 MG: 10 TABLET, FILM COATED ORAL at 06:44

## 2017-06-16 RX ADMIN — ASPIRIN 325 MG: 325 TABLET, COATED ORAL at 06:45

## 2017-06-16 RX ADMIN — METOPROLOL TARTRATE 50 MG: 50 TABLET, FILM COATED ORAL at 18:27

## 2017-06-16 RX ADMIN — FEBUXOSTAT 80 MG: 40 TABLET ORAL at 10:06

## 2017-06-16 RX ADMIN — PANTOPRAZOLE SODIUM 40 MG: 40 TABLET, DELAYED RELEASE ORAL at 06:45

## 2017-06-16 NOTE — PLAN OF CARE
Problem: Patient Care Overview (Adult)  Goal: Plan of Care Review  Outcome: Ongoing (interventions implemented as appropriate)    06/16/17 1630   Coping/Psychosocial Response Interventions   Plan Of Care Reviewed With patient   Patient Care Overview   Progress improving   Outcome Evaluation   Outcome Summary/Follow up Plan Patient now on PO steriods. Chest physiotherapy started. Patient still on IV Levaquin       Goal: Adult Individualization and Mutuality  Outcome: Ongoing (interventions implemented as appropriate)    06/16/17 1630   Individualization   Patient Specific Goals Cough up sputum   Patient Specific Interventions chest physiotherapy started on patient TID       Goal: Discharge Needs Assessment  Outcome: Ongoing (interventions implemented as appropriate)    06/16/17 1630   Discharge Needs Assessment   Concerns To Be Addressed denies needs/concerns at this time         Problem: COPD, Chronic Bronchitis/Emphysema (Adult)  Goal: Signs and Symptoms of Listed Potential Problems Will be Absent or Manageable (COPD, Chronic Bronchitis/Emphysema)  Outcome: Ongoing (interventions implemented as appropriate)    06/16/17 1630   COPD, Chronic Bronchitis/Emphysema   Problems Assessed (COPD, Chronic Bronchitis/Emphysema) all   Problems Present (COPD, Chronic Bronchitis/Emphysema) dyspnea         Problem: Respiratory Insufficiency (Adult)  Goal: Acid/Base Balance  Outcome: Ongoing (interventions implemented as appropriate)    06/16/17 1630   Respiratory Insufficiency (Adult)   Acid/Base Balance making progress toward outcome       Goal: Effective Ventilation  Outcome: Ongoing (interventions implemented as appropriate)    06/16/17 1630   Respiratory Insufficiency (Adult)   Effective Ventilation making progress toward outcome

## 2017-06-16 NOTE — PLAN OF CARE
Problem: COPD, Chronic Bronchitis/Emphysema (Adult)  Intervention: Optimize Oxygenation/Ventilation/Perfusion    06/16/17 1723   Respiratory Interventions   Airway/Ventilation Management pulmonary hygiene promoted   Promote Aggressive Pulmonary Hygiene/Secretion Management   Cough And Deep Breathing done with encouragement

## 2017-06-16 NOTE — CONSULTS
"Adult Nutrition  Assessment/PES    Patient Name:  Esdras Ko  YOB: 1950  MRN: [id number]  Admit Date:  6/14/2017    Assessment Date:  6/16/2017        Reason for Assessment       06/16/17 1046    Reason for Assessment    Reason For Assessment/Visit nurse/nurse practitioner consult    Identified At Risk By Screening Criteria MST SCORE 2+    Pulmonary/Critical Care COPD    Renal CKD            Data Eval/ Notes       06/16/17 1049     Notes    Note pt with RN & wife. Reports eating well, could eat more! Wife diabetic so she reports they do low sugar at home but otherwise no restrictions.             Nutrition/Diet History       06/16/17 1049    Nutrition/Diet History    Patient Reported Diet/Restrictions/Preferences other (see comments)   wife watches sugar             Anthropometrics       06/16/17 1050    Anthropometrics    RD Documented Current Weight  134 kg (295 lb 6.7 oz)    Anthropometrics (Special Considerations)    RD Calculated BMI (kg/m2) 45    Usual Body Weight (UBW)    Weight Loss 4.99 kg (11 lb)    Weight Loss Time Frame 3 weeks    Body Mass Index (BMI)    BMI Grade greater than 40 - obesity grade III            Labs/Tests/Procedures/Meds       06/16/17 1050    Labs/Tests/Procedures/Meds    Labs/Tests Review Reviewed    Medication Review Steroid;Antibiotic;Insulin;Anticoag            Physical Findings       06/16/17 1054    Physical Findings/Assessment    Additional Documentation Physical Appearance (Group)    Physical Appearance    Overall Physical Appearance obese            Estimated/Assessed Needs       06/16/17 1054    Calculation Measurements    Weight Used For Calculations 134 kg (295 lb 6.7 oz)    Height Used for Calculations 1.727 m (5' 8\")    Estimated/Assessed Energy Needs    Energy Need Method Kcal/kg    kcal/kg 14   actual wt for obesity    14 Kcal/Kg (kcal) 1876    Estimated Kcal Range  1876 kcal     Estimated/Assessed Protein Needs    Weight Used " for Protein Calculation 134 kg (295 lb 6.7 oz)    Protein (gm/kg) 0.8    0.8 Gm Protein (gm) 107.2    Estimated Protein Range 107 gm     Estimated/Assessed Fluid Needs    Fluid Need Method RDA method    RDA Method (mL)  1876            Nutrition Prescription Ordered       06/16/17 1055    Nutrition Prescription PO    Common Modifiers Cardiac            Evaluation of Received Nutrient/Fluid Intake       06/16/17 1055    Evaluation of Received Nutrient/Fluid Intake    Nutrition Delivered Fluid Evaluation    Fluid Intake Evaluation    Oral Fluid (mL) 1080    Total Fluid Intake (mL) 1080    % Fluid Needs 57    PO Evaluation    Number of Meals 3    % PO Intake 100              Problem/Interventions:        Problem 1       06/16/17 1056    Nutrition Diagnoses Problem 1    Problem 1 Overweight/Obesity    Etiology (related to) Medical Diagnosis    Signs/Symptoms (evidenced by) BMI                    Intervention Goal       06/16/17 1057    Intervention Goal    General Meet nutritional needs for age/condition    PO Maintain intake;PO intake (%)    PO Intake % 100 %            Nutrition Intervention       06/16/17 1057    Nutrition Intervention    RD/Tech Action Follow Tx progress;Interview for preference;Advise alternate selection              Education/Evaluation       06/16/17 1058    Education    Education --   Pt benefit from weight loss edu, will cont to follow.     Monitor/Evaluation    Monitor Per protocol;I&O;PO intake;Weight        Comments:    Will cont to follow and monitor, provide education if pt open to change. Will follow.     Electronically signed by:  Talia Palmer RD  06/16/17 10:59 AM

## 2017-06-16 NOTE — PROGRESS NOTES
"Daily Progress Note:    Subjective: Does not feel much better.  Still has cough congestion shortness of breath persistent    Flowsheet Rows         First Filed Value    Admission Height  68\" (172.7 cm) Documented at 06/14/2017 0944    Admission Weight  292 lb 9.6 oz (133 kg) Documented at 06/14/2017 0944          Patient Vitals for the past 24 hrs:   BP Temp Temp src Pulse Resp SpO2 Weight   06/16/17 0617 - - - - - - 296 lb (134 kg)   06/16/17 0611 134/77 97.8 °F (36.6 °C) Oral 102 20 92 % -   06/16/17 0359 129/74 97.5 °F (36.4 °C) Oral 100 20 92 % -   06/16/17 0014 125/71 97.5 °F (36.4 °C) Oral 99 20 92 % -   06/15/17 1954 - - - 96 20 95 % -   06/15/17 1938 123/68 97.5 °F (36.4 °C) Oral 99 20 92 % -   06/15/17 1557 - - - 84 20 91 % -   06/15/17 1523 119/72 98.3 °F (36.8 °C) Oral 106 20 92 % -   06/15/17 1145 - - - 100 20 91 % -   06/15/17 1141 112/70 - - 101 20 90 % -   06/15/17 0745 - - - 84 20 96 % -         Intake/Output Summary (Last 24 hours) at 06/16/17 0737  Last data filed at 06/16/17 0631   Gross per 24 hour   Intake             1180 ml   Output             1300 ml   Net             -120 ml       Review of Systems   Constitutional: Positive for activity change and fatigue. Negative for appetite change.   Respiratory: Positive for cough and shortness of breath. Negative for chest tightness.    Cardiovascular: Positive for leg swelling. Negative for chest pain.   Gastrointestinal: Negative for abdominal distention, abdominal pain, diarrhea, nausea and vomiting.   Genitourinary: Negative for frequency.   Musculoskeletal: Positive for back pain.   Skin: Negative for rash.   Psychiatric/Behavioral: Negative for agitation.       Physical Exam   Constitutional: He appears well-developed and well-nourished.   Morbidly obese   HENT:   Head: Normocephalic.   Mouth/Throat: Oropharynx is clear and moist.   Eyes: Conjunctivae are normal.   Neck: Normal range of motion. No JVD present. No thyromegaly present. "   Cardiovascular: Normal rate, regular rhythm and normal heart sounds.    No murmur heard.  Pulmonary/Chest: Effort normal. No respiratory distress. He has wheezes (scattered). He has no rales.   Abdominal: Soft. Bowel sounds are normal. He exhibits no distension. There is no tenderness. There is no guarding.   Musculoskeletal: He exhibits edema (2 +).   Neurological: He is alert.   Skin: Skin is warm and dry. No rash noted.   Nursing note and vitals reviewed.          Medication Review:   I have reviewed the patient's current medication list      allopurinol 300 mg Oral QAM   amLODIPine 10 mg Oral QAM   aspirin 325 mg Oral QAM   budesonide 0.5 mg Nebulization BID - RT   cetirizine 10 mg Oral QAM   clobetasol  Topical Q12H   clopidogrel 75 mg Oral QAM   enoxaparin 30 mg Subcutaneous Q24H   febuxostat 80 mg Oral Daily   gabapentin 300 mg Oral BID   guaiFENesin 1,200 mg Oral BID   insulin aspart 0-14 Units Subcutaneous 4x Daily AC & at Bedtime   ipratropium-albuterol 3 mL Nebulization 4x Daily - RT   levETIRAcetam 500 mg Oral BID   levoFLOXacin 500 mg Intravenous Q24H   lubiprostone 24 mcg Oral Daily With Breakfast   methylPREDNISolone sodium succinate 40 mg Intravenous Q8H   metOLazone 2.5 mg Oral Once per day on Mon Wed Fri   metoprolol tartrate 50 mg Oral BID   pantoprazole 40 mg Oral QAM           Labs:    Results from last 7 days  Lab Units 06/16/17  0320 06/15/17  0632 06/14/17  0950   WBC 10*3/mm3 11.49* 10.61 11.43*   HEMOGLOBIN g/dL 15.6 16.1 16.2   HEMATOCRIT % 45.7 47.5 47.8   PLATELETS 10*3/mm3 244 253 212       Results from last 7 days  Lab Units 06/16/17  0320 06/15/17  0632 06/14/17  0950   SODIUM mmol/L 136 137 140   POTASSIUM mmol/L 3.7 3.9 2.9*   CHLORIDE mmol/L 93* 94* 95*   TOTAL CO2 mmol/L 24.9 26.5 28.0   BUN mg/dL 46* 40* 28*   CREATININE mg/dL 1.46* 1.51* 1.23   CALCIUM mg/dL 9.2 9.3 9.0   BILIRUBIN mg/dL 0.2  --  0.6   ALK PHOS U/L 74  --  75   ALT (SGPT) U/L 19  --  19   AST (SGOT) U/L 13  --   14   GLUCOSE mg/dL 375* 261* 132*           Lab Results (last 24 hours)     Procedure Component Value Units Date/Time    Respiratory Culture [[id number]] Collected:  06/14/17 1042    Specimen:  Sputum from [place] Updated:  06/15/17 1103     Respiratory Culture Heavy growth (4+) Normal Respiratory [place]     Gram Stain Result Rare (1+) WBCs seen      Rare (1+) Epithelial cells per low power field      Few (2+) Gram positive cocci in pairs, chains and clusters    POC Glucose Fingerstick [[id number]]  (Abnormal) Collected:  06/15/17 1221    Specimen:  Blood Updated:  06/15/17 1247     Glucose 308 (H) mg/dL     Narrative:       Meter: [id number] : [id number] Concetta Celestin NURSING ASSISTANT    POC Glucose Fingerstick [[id number]]  (Abnormal) Collected:  06/15/17 1634    Specimen:  Blood Updated:  06/15/17 1654     Glucose 337 (H) mg/dL     Narrative:       Meter: [id number] : [id number] Concetta Celestin NURSING ASSISTANT    POC Glucose Fingerstick [[id number]]  (Abnormal) Collected:  06/15/17 2017    Specimen:  Blood Updated:  06/15/17 2026     Glucose 426 (H) mg/dL     Narrative:       Critical repeat  Meter: [id number] : [id number] Ben Kendrick RN    POC Glucose Fingerstick [[id number]]  (Abnormal) Collected:  06/15/17 2018    Specimen:  Blood Updated:  06/15/17 2027     Glucose 399 (H) mg/dL     Narrative:       Meter: [id number] : [id number] Ben Kendrick RN    CBC (No Diff) [[id number]]  (Abnormal) Collected:  06/16/17 0320    Specimen:  Blood Updated:  06/16/17 0507     WBC 11.49 (H) 10*3/mm3      RBC 5.46 10*6/mm3      Hemoglobin 15.6 g/dL      Hematocrit 45.7 %      MCV 83.7 fL      MCH 28.6 pg      MCHC 34.1 g/dL      RDW 14.4 %      RDW-SD 43.3 fl      MPV 10.6 (H) fL      Platelets 244 10*3/mm3     Comprehensive Metabolic Panel [[id number]]  (Abnormal) Collected:  06/16/17 0320    Specimen:  Blood Updated:  06/16/17 0538     Glucose 375 (H) mg/dL      BUN 46 (H) mg/dL      Creatinine 1.46 (H)  mg/dL      Sodium 136 mmol/L      Potassium 3.7 mmol/L      Chloride 93 (L) mmol/L      CO2 24.9 mmol/L      Calcium 9.2 mg/dL      Total Protein 6.2 g/dL      Albumin 3.50 g/dL      ALT (SGPT) 19 U/L      AST (SGOT) 13 U/L      Alkaline Phosphatase 74 U/L      Total Bilirubin 0.2 mg/dL      eGFR Non African Amer 48 (L) mL/min/1.73      Globulin 2.7 gm/dL      A/G Ratio 1.3 g/dL      BUN/Creatinine Ratio 31.5 (H)     Anion Gap 18.1 mmol/L     POC Glucose Fingerstick [[id number]]  (Abnormal) Collected:  06/16/17 0716    Specimen:  Blood Updated:  06/16/17 0722     Glucose 343 (H) mg/dL     Narrative:       Meter: [id number] : [id number] Concetta Celestin NURSING ASSISTANT              Radiology:  Imaging Results (last 24 hours)     ** No results found for the last 24 hours. **          Cardiology:  ECG/EMG Results (last 24 hours)     ** No results found for the last 24 hours. **          I have reviewed consult notes.    Assessment and Plan:    1.  Acute exacerbation of COPD acute bronchitis we'll continue aggressive pulmonary toilet mini nebs IV antibiotics and IV steroids.  Respiratory viral panel positive for rhinovirus certainly could explain his COPD exacerbation.  We'll continue supportive therapy IV antibiotics until sputum cultures are back    2. hypertension is well controlled on medication be continued    3. chronic kidney disease stage III Creatinine were up a little bit.  Diuretics on hold    4.  Seizure disorder stable nothing acute home as we continued      5. hypokalemia oral medications and been ordered    6.  Reflux disease PPI will be continued    7. Steroid-induced hyperglycemia will continue with sliding scale insulin

## 2017-06-17 LAB
ANION GAP SERPL CALCULATED.3IONS-SCNC: 15.6 MMOL/L
BUN BLD-MCNC: 38 MG/DL (ref 8–23)
BUN/CREAT SERPL: 29.5 (ref 7–25)
CALCIUM SPEC-SCNC: 9.1 MG/DL (ref 8.8–10.5)
CHLORIDE SERPL-SCNC: 92 MMOL/L (ref 98–107)
CO2 SERPL-SCNC: 26.4 MMOL/L (ref 22–29)
CREAT BLD-MCNC: 1.29 MG/DL (ref 0.76–1.27)
DEPRECATED RDW RBC AUTO: 41.5 FL (ref 37–54)
ERYTHROCYTE [DISTWIDTH] IN BLOOD BY AUTOMATED COUNT: 14.1 % (ref 11.5–14.5)
GFR SERPL CREATININE-BSD FRML MDRD: 56 ML/MIN/1.73
GLUCOSE BLD-MCNC: 312 MG/DL (ref 65–99)
GLUCOSE BLDC GLUCOMTR-MCNC: 263 MG/DL (ref 70–130)
GLUCOSE BLDC GLUCOMTR-MCNC: 276 MG/DL (ref 70–130)
GLUCOSE BLDC GLUCOMTR-MCNC: 289 MG/DL (ref 70–130)
GLUCOSE BLDC GLUCOMTR-MCNC: 316 MG/DL (ref 70–130)
GLUCOSE BLDC GLUCOMTR-MCNC: 379 MG/DL (ref 70–130)
HCT VFR BLD AUTO: 43.1 % (ref 42–52)
HGB BLD-MCNC: 15.1 G/DL (ref 14–18)
MCH RBC QN AUTO: 28.7 PG (ref 27–31)
MCHC RBC AUTO-ENTMCNC: 35 G/DL (ref 31–37)
MCV RBC AUTO: 81.9 FL (ref 80–94)
PLATELET # BLD AUTO: 226 10*3/MM3 (ref 140–500)
PMV BLD AUTO: 10.1 FL (ref 7.4–10.4)
POTASSIUM BLD-SCNC: 3.2 MMOL/L (ref 3.5–5.2)
RBC # BLD AUTO: 5.26 10*6/MM3 (ref 4.7–6.1)
SODIUM BLD-SCNC: 134 MMOL/L (ref 136–145)
WBC NRBC COR # BLD: 11.05 10*3/MM3 (ref 4.8–10.8)

## 2017-06-17 PROCEDURE — 94799 UNLISTED PULMONARY SVC/PX: CPT

## 2017-06-17 PROCEDURE — 80048 BASIC METABOLIC PNL TOTAL CA: CPT | Performed by: FAMILY MEDICINE

## 2017-06-17 PROCEDURE — 25010000002 LEVOFLOXACIN PER 250 MG: Performed by: FAMILY MEDICINE

## 2017-06-17 PROCEDURE — 82962 GLUCOSE BLOOD TEST: CPT

## 2017-06-17 PROCEDURE — 63710000001 PREDNISONE PER 5 MG: Performed by: FAMILY MEDICINE

## 2017-06-17 PROCEDURE — 94668 MNPJ CHEST WALL SBSQ: CPT

## 2017-06-17 PROCEDURE — 63710000001 INSULIN ASPART PER 5 UNITS: Performed by: FAMILY MEDICINE

## 2017-06-17 PROCEDURE — 85027 COMPLETE CBC AUTOMATED: CPT | Performed by: FAMILY MEDICINE

## 2017-06-17 PROCEDURE — 25010000002 ENOXAPARIN PER 10 MG: Performed by: FAMILY MEDICINE

## 2017-06-17 RX ORDER — POTASSIUM CHLORIDE 20 MEQ/1
40 TABLET, EXTENDED RELEASE ORAL ONCE
Status: COMPLETED | OUTPATIENT
Start: 2017-06-17 | End: 2017-06-17

## 2017-06-17 RX ADMIN — LUBIPROSTONE 24 MCG: 24 CAPSULE, GELATIN COATED ORAL at 08:52

## 2017-06-17 RX ADMIN — POTASSIUM CHLORIDE 40 MEQ: 1500 TABLET, EXTENDED RELEASE ORAL at 08:50

## 2017-06-17 RX ADMIN — INSULIN ASPART 16 UNITS: 100 INJECTION, SOLUTION INTRAVENOUS; SUBCUTANEOUS at 17:06

## 2017-06-17 RX ADMIN — INSULIN ASPART 12 UNITS: 100 INJECTION, SOLUTION INTRAVENOUS; SUBCUTANEOUS at 12:28

## 2017-06-17 RX ADMIN — GUAIFENESIN 1200 MG: 600 TABLET, EXTENDED RELEASE ORAL at 08:52

## 2017-06-17 RX ADMIN — PREDNISONE 10 MG: 10 TABLET ORAL at 08:49

## 2017-06-17 RX ADMIN — GUAIFENESIN 1200 MG: 600 TABLET, EXTENDED RELEASE ORAL at 17:07

## 2017-06-17 RX ADMIN — PREDNISONE 10 MG: 10 TABLET ORAL at 12:29

## 2017-06-17 RX ADMIN — PANTOPRAZOLE SODIUM 40 MG: 40 TABLET, DELAYED RELEASE ORAL at 06:50

## 2017-06-17 RX ADMIN — INSULIN ASPART 16 UNITS: 100 INJECTION, SOLUTION INTRAVENOUS; SUBCUTANEOUS at 08:46

## 2017-06-17 RX ADMIN — ASPIRIN 325 MG: 325 TABLET, COATED ORAL at 06:50

## 2017-06-17 RX ADMIN — BUDESONIDE 0.5 MG: 0.5 SUSPENSION RESPIRATORY (INHALATION) at 07:37

## 2017-06-17 RX ADMIN — ENOXAPARIN SODIUM 30 MG: 30 INJECTION SUBCUTANEOUS at 09:08

## 2017-06-17 RX ADMIN — GABAPENTIN 300 MG: 300 CAPSULE ORAL at 08:53

## 2017-06-17 RX ADMIN — ACETAMINOPHEN 650 MG: 325 TABLET, FILM COATED ORAL at 03:36

## 2017-06-17 RX ADMIN — IPRATROPIUM BROMIDE AND ALBUTEROL SULFATE 3 ML: .5; 3 SOLUTION RESPIRATORY (INHALATION) at 15:46

## 2017-06-17 RX ADMIN — LEVETIRACETAM 500 MG: 500 TABLET ORAL at 17:08

## 2017-06-17 RX ADMIN — PREDNISONE 10 MG: 10 TABLET ORAL at 17:09

## 2017-06-17 RX ADMIN — CLOPIDOGREL 75 MG: 75 TABLET, FILM COATED ORAL at 06:50

## 2017-06-17 RX ADMIN — METOPROLOL TARTRATE 50 MG: 50 TABLET, FILM COATED ORAL at 17:08

## 2017-06-17 RX ADMIN — CETIRIZINE HYDROCHLORIDE 10 MG: 10 TABLET, FILM COATED ORAL at 06:50

## 2017-06-17 RX ADMIN — FEBUXOSTAT 80 MG: 40 TABLET ORAL at 08:48

## 2017-06-17 RX ADMIN — ALLOPURINOL 300 MG: 300 TABLET ORAL at 06:50

## 2017-06-17 RX ADMIN — LEVETIRACETAM 500 MG: 500 TABLET ORAL at 08:52

## 2017-06-17 RX ADMIN — BUDESONIDE 0.5 MG: 0.5 SUSPENSION RESPIRATORY (INHALATION) at 19:31

## 2017-06-17 RX ADMIN — INSULIN ASPART 12 UNITS: 100 INJECTION, SOLUTION INTRAVENOUS; SUBCUTANEOUS at 21:52

## 2017-06-17 RX ADMIN — IPRATROPIUM BROMIDE AND ALBUTEROL SULFATE 3 ML: .5; 3 SOLUTION RESPIRATORY (INHALATION) at 19:31

## 2017-06-17 RX ADMIN — GABAPENTIN 300 MG: 300 CAPSULE ORAL at 17:06

## 2017-06-17 RX ADMIN — LEVOFLOXACIN 500 MG: 500 INJECTION, SOLUTION INTRAVENOUS at 09:15

## 2017-06-17 RX ADMIN — METOPROLOL TARTRATE 50 MG: 50 TABLET, FILM COATED ORAL at 08:50

## 2017-06-17 RX ADMIN — IPRATROPIUM BROMIDE AND ALBUTEROL SULFATE 3 ML: .5; 3 SOLUTION RESPIRATORY (INHALATION) at 12:06

## 2017-06-17 RX ADMIN — IPRATROPIUM BROMIDE AND ALBUTEROL SULFATE 3 ML: .5; 3 SOLUTION RESPIRATORY (INHALATION) at 07:37

## 2017-06-17 RX ADMIN — AMLODIPINE BESYLATE 10 MG: 5 TABLET ORAL at 06:50

## 2017-06-17 RX ADMIN — ACETAMINOPHEN 650 MG: 325 TABLET, FILM COATED ORAL at 12:29

## 2017-06-17 RX ADMIN — FUROSEMIDE 40 MG: 40 TABLET ORAL at 08:49

## 2017-06-17 NOTE — PLAN OF CARE
Problem: Patient Care Overview (Adult)  Goal: Plan of Care Review  Outcome: Ongoing (interventions implemented as appropriate)    06/16/17 2012   Coping/Psychosocial Response Interventions   Plan Of Care Reviewed With patient   Patient Care Overview   Progress improving         Problem: COPD, Chronic Bronchitis/Emphysema (Adult)  Intervention: Optimize Oxygenation/Ventilation/Perfusion    06/16/17 2012   Respiratory Interventions   Airway/Ventilation Management airway patency maintained;pulmonary hygiene promoted   Promote Aggressive Pulmonary Hygiene/Secretion Management   Cough And Deep Breathing done with encouragement       Intervention: Reduce/Relieve Breathlessness    06/16/17 2012   Respiratory Interventions   Breathing Techniques/Airway Clearance pursed-lip breathing encouraged

## 2017-06-17 NOTE — PLAN OF CARE
Problem: Patient Care Overview (Adult)  Goal: Plan of Care Review  Outcome: Ongoing (interventions implemented as appropriate)    06/17/17 0339   Coping/Psychosocial Response Interventions   Plan Of Care Reviewed With patient   Patient Care Overview   Progress improving   Outcome Evaluation   Outcome Summary/Follow up Plan continuing to monitor blood glucose. Trending down.          Problem: COPD, Chronic Bronchitis/Emphysema (Adult)  Goal: Signs and Symptoms of Listed Potential Problems Will be Absent or Manageable (COPD, Chronic Bronchitis/Emphysema)  Outcome: Ongoing (interventions implemented as appropriate)    06/17/17 0339   COPD, Chronic Bronchitis/Emphysema   Problems Assessed (COPD, Chronic Bronchitis/Emphysema) all   Problems Present (COPD, Chronic Bronchitis/Emphysema) situational response;dyspnea         Problem: Respiratory Insufficiency (Adult)  Goal: Acid/Base Balance  Outcome: Ongoing (interventions implemented as appropriate)    06/17/17 0339   Respiratory Insufficiency (Adult)   Acid/Base Balance making progress toward outcome       Goal: Effective Ventilation  Outcome: Ongoing (interventions implemented as appropriate)

## 2017-06-17 NOTE — PROGRESS NOTES
"Daily Progress Note:    Subjective: Shortness of breath is better.  Persistent cough.    Flowsheet Rows         First Filed Value    Admission Height  68\" (172.7 cm) Documented at 06/14/2017 0944    Admission Weight  292 lb 9.6 oz (133 kg) Documented at 06/14/2017 0944          Patient Vitals for the past 24 hrs:   BP Temp Temp src Pulse Resp SpO2   06/17/17 1207 - - - 86 16 95 %   06/17/17 1139 122/73 97 °F (36.1 °C) Oral 92 14 95 %   06/17/17 0737 - - - 71 12 95 %   06/17/17 0641 123/77 98.2 °F (36.8 °C) Oral 82 18 96 %   06/16/17 2300 110/70 98.2 °F (36.8 °C) Oral 109 19 98 %   06/16/17 1944 - - - 110 20 96 %   06/16/17 1900 113/66 98 °F (36.7 °C) Oral 107 19 100 %   06/16/17 1538 109/68 96.8 °F (36 °C) Oral 103 20 95 %   06/16/17 1500 - - - 84 20 96 %         Intake/Output Summary (Last 24 hours) at 06/17/17 1313  Last data filed at 06/17/17 1200   Gross per 24 hour   Intake              690 ml   Output             2225 ml   Net            -1535 ml       Review of Systems   Constitutional: Negative for appetite change.   Respiratory: Positive for cough. Negative for chest tightness.    Cardiovascular: Positive for leg swelling. Negative for chest pain.   Gastrointestinal: Negative for abdominal distention, abdominal pain, diarrhea, nausea and vomiting.   Genitourinary: Negative for frequency.   Musculoskeletal: Positive for back pain.   Skin: Negative for rash.   Psychiatric/Behavioral: Negative for agitation.       Physical Exam   Constitutional: He appears well-developed and well-nourished.   Morbidly obese   HENT:   Head: Normocephalic.   Mouth/Throat: Oropharynx is clear and moist.   Eyes: Conjunctivae are normal.   Neck: Normal range of motion. No JVD present. No thyromegaly present.   Cardiovascular: Normal rate, regular rhythm and normal heart sounds.    No murmur heard.  Pulmonary/Chest: Effort normal. No respiratory distress. He has wheezes (scattered). He has no rales.   Abdominal: Soft. Bowel sounds " are normal. He exhibits no distension. There is no tenderness. There is no guarding.   Musculoskeletal: He exhibits edema (2 +).   Neurological: He is alert.   Skin: Skin is warm and dry. No rash noted.   Nursing note and vitals reviewed.          Medication Review:   I have reviewed the patient's current medication list      allopurinol 300 mg Oral QAM   amLODIPine 10 mg Oral QAM   aspirin 325 mg Oral QAM   budesonide 0.5 mg Nebulization BID - RT   cetirizine 10 mg Oral QAM   clobetasol  Topical Q12H   clopidogrel 75 mg Oral QAM   enoxaparin 30 mg Subcutaneous Q24H   febuxostat 80 mg Oral Daily   furosemide 40 mg Oral Daily   gabapentin 300 mg Oral BID   guaiFENesin 1,200 mg Oral BID   insulin aspart 0-24 Units Subcutaneous 4x Daily AC & at Bedtime   ipratropium-albuterol 3 mL Nebulization 4x Daily - RT   levETIRAcetam 500 mg Oral BID   levoFLOXacin 500 mg Intravenous Q24H   lubiprostone 24 mcg Oral Daily With Breakfast   metOLazone 2.5 mg Oral Once per day on Mon Wed Fri   metoprolol tartrate 50 mg Oral BID   pantoprazole 40 mg Oral QAM   predniSONE 10 mg Oral TID With Meals           Labs:    Results from last 7 days  Lab Units 06/17/17  0732 06/16/17  0320 06/15/17  0632   WBC 10*3/mm3 11.05* 11.49* 10.61   HEMOGLOBIN g/dL 15.1 15.6 16.1   HEMATOCRIT % 43.1 45.7 47.5   PLATELETS 10*3/mm3 226 244 253       Results from last 7 days  Lab Units 06/17/17  0732 06/16/17  1717 06/16/17  0320 06/15/17  0632 06/14/17  0950   SODIUM mmol/L 134*  --  136 137 140   POTASSIUM mmol/L 3.2*  --  3.7 3.9 2.9*   CHLORIDE mmol/L 92*  --  93* 94* 95*   TOTAL CO2 mmol/L 26.4  --  24.9 26.5 28.0   BUN mg/dL 38*  --  46* 40* 28*   CREATININE mg/dL 1.29*  --  1.46* 1.51* 1.23   CALCIUM mg/dL 9.1  --  9.2 9.3 9.0   BILIRUBIN mg/dL  --   --  0.2  --  0.6   ALK PHOS U/L  --   --  74  --  75   ALT (SGPT) U/L  --   --  19  --  19   AST (SGOT) U/L  --   --  13  --  14   GLUCOSE mg/dL 312* 559* 375* 261* 132*           Lab Results (last 24  hours)     Procedure Component Value Units Date/Time    POC Glucose Fingerstick [[id number]]  (Abnormal) Collected:  06/16/17 1635    Specimen:  Blood Updated:  06/16/17 1643     Glucose 479 (C) mg/dL     Narrative:       Critical repeat  Repeat Test Meter: [id number] : [id number] Concetta DE LEÓN    POC Glucose Fingerstick [[id number]]  (Abnormal) Collected:  06/16/17 1637    Specimen:  Blood Updated:  06/16/17 1644     Glucose 475 (C) mg/dL     Narrative:       Confirmed by Repeat Meter: [id number] : [id number] Concetta Celestin NURSING AS    Glucose, Random [[id number]]  (Abnormal) Collected:  06/16/17 1717    Specimen:  Blood Updated:  06/16/17 1801     Glucose 559 (C) mg/dL     POC Glucose Fingerstick [[id number]]  (Abnormal) Collected:  06/16/17 1938    Specimen:  Blood Updated:  06/16/17 1947     Glucose 482 (C) mg/dL     Narrative:       Repeat Test RN Meter: [id number] : [id number] Americo HUTSON [employer]    POC Glucose Fingerstick [[id number]]  (Abnormal) Collected:  06/16/17 2059    Specimen:  Blood Updated:  06/16/17 2107     Glucose 447 (H) mg/dL     Narrative:       Critical repeat  Meter: [id number] : [id number] Srini Chapman RN    POC Glucose Fingerstick [[id number]]  (Abnormal) Collected:  06/16/17 2101    Specimen:  Blood Updated:  06/16/17 2107     Glucose 458 (C) mg/dL     Narrative:       OK Meter: [id number] : [id number] Srini Chapman RN    POC Glucose Fingerstick [[id number]]  (Abnormal) Collected:  06/16/17 2319    Specimen:  Blood Updated:  06/16/17 2326     Glucose 371 (H) mg/dL     Narrative:       Meter: [id number] : [id number] Americo HUTSON CERT    POC Glucose Fingerstick [[id number]]  (Abnormal) Collected:  06/17/17 0333    Specimen:  Blood Updated:  06/17/17 0340     Glucose 379 (H) mg/dL     Narrative:       OK Meter: [id number] : [id number] Srini Chapman RN    CBC (No Diff) [[id number]]  (Abnormal) Collected:  06/17/17 0732    Specimen:  Blood Updated:  06/17/17 0736      WBC 11.05 (H) 10*3/mm3      RBC 5.26 10*6/mm3      Hemoglobin 15.1 g/dL      Hematocrit 43.1 %      MCV 81.9 fL      MCH 28.7 pg      MCHC 35.0 g/dL      RDW 14.1 %      RDW-SD 41.5 fl      MPV 10.1 fL      Platelets 226 10*3/mm3     POC Glucose Fingerstick [[id number]]  (Abnormal) Collected:  06/17/17 0732    Specimen:  Blood Updated:  06/17/17 0738     Glucose 276 (H) mg/dL     Narrative:       Meter: [id number] : [id number] Ana Cao Staff RN Validator    Basic Metabolic Panel [[id number]]  (Abnormal) Collected:  06/17/17 0732    Specimen:  Blood Updated:  06/17/17 0802     Glucose 312 (H) mg/dL      BUN 38 (H) mg/dL      Creatinine 1.29 (H) mg/dL      Sodium 134 (L) mmol/L      Potassium 3.2 (L) mmol/L      Chloride 92 (L) mmol/L      CO2 26.4 mmol/L      Calcium 9.1 mg/dL      eGFR Non African Amer 56 (L) mL/min/1.73      BUN/Creatinine Ratio 29.5 (H)     Anion Gap 15.6 mmol/L     Narrative:       GFR Normal >60  Chronic Kidney Disease <60  Kidney Failure <15    POC Glucose Fingerstick [[id number]]  (Abnormal) Collected:  06/17/17 1157    Specimen:  Blood Updated:  06/17/17 1204     Glucose 263 (H) mg/dL     Narrative:       Meter: [id number] : [id number] Khoa Spears NURSING ASSISTANT              Radiology:  Imaging Results (last 24 hours)     ** No results found for the last 24 hours. **          Cardiology:  ECG/EMG Results (last 24 hours)     ** No results found for the last 24 hours. **          I have reviewed consult notes.    Assessment and Plan:    1.  Acute exacerbation of COPD acute bronchitis we'll continue aggressive pulmonary toilet mini nebs IV antibiotics.  Cultures are negative.  Likely due to rhinovirus    2. hypertension is well controlled on medication be continued    3. chronic kidney disease stage III Creatinine were up a little bit.  Diuretics on hold    4.  Seizure disorder stable nothing acute home as we continued      5. hypokalemia resolved    6.  Reflux disease PPI  will be continued    7. Steroid-induced hyperglycemia will continue with sliding scale insulin

## 2017-06-18 LAB
ANION GAP SERPL CALCULATED.3IONS-SCNC: 13.8 MMOL/L
BUN BLD-MCNC: 29 MG/DL (ref 8–23)
BUN/CREAT SERPL: 25 (ref 7–25)
CALCIUM SPEC-SCNC: 8.9 MG/DL (ref 8.8–10.5)
CHLORIDE SERPL-SCNC: 93 MMOL/L (ref 98–107)
CO2 SERPL-SCNC: 29.2 MMOL/L (ref 22–29)
CREAT BLD-MCNC: 1.16 MG/DL (ref 0.76–1.27)
DEPRECATED RDW RBC AUTO: 42 FL (ref 37–54)
ERYTHROCYTE [DISTWIDTH] IN BLOOD BY AUTOMATED COUNT: 14.1 % (ref 11.5–14.5)
GFR SERPL CREATININE-BSD FRML MDRD: 63 ML/MIN/1.73
GLUCOSE BLD-MCNC: 253 MG/DL (ref 65–99)
GLUCOSE BLDC GLUCOMTR-MCNC: 169 MG/DL (ref 70–130)
GLUCOSE BLDC GLUCOMTR-MCNC: 223 MG/DL (ref 70–130)
GLUCOSE BLDC GLUCOMTR-MCNC: 246 MG/DL (ref 70–130)
GLUCOSE BLDC GLUCOMTR-MCNC: 249 MG/DL (ref 70–130)
HCT VFR BLD AUTO: 45.7 % (ref 42–52)
HGB BLD-MCNC: 15.8 G/DL (ref 14–18)
MCH RBC QN AUTO: 28.5 PG (ref 27–31)
MCHC RBC AUTO-ENTMCNC: 34.6 G/DL (ref 31–37)
MCV RBC AUTO: 82.5 FL (ref 80–94)
PLATELET # BLD AUTO: 210 10*3/MM3 (ref 140–500)
PMV BLD AUTO: 10.7 FL (ref 7.4–10.4)
POTASSIUM BLD-SCNC: 3.2 MMOL/L (ref 3.5–5.2)
RBC # BLD AUTO: 5.54 10*6/MM3 (ref 4.7–6.1)
SODIUM BLD-SCNC: 136 MMOL/L (ref 136–145)
WBC NRBC COR # BLD: 9.05 10*3/MM3 (ref 4.8–10.8)

## 2017-06-18 PROCEDURE — 94799 UNLISTED PULMONARY SVC/PX: CPT

## 2017-06-18 PROCEDURE — 25010000002 ENOXAPARIN PER 10 MG: Performed by: FAMILY MEDICINE

## 2017-06-18 PROCEDURE — 80048 BASIC METABOLIC PNL TOTAL CA: CPT | Performed by: FAMILY MEDICINE

## 2017-06-18 PROCEDURE — 25010000002 FUROSEMIDE PER 20 MG: Performed by: FAMILY MEDICINE

## 2017-06-18 PROCEDURE — 63710000001 PREDNISONE PER 5 MG: Performed by: FAMILY MEDICINE

## 2017-06-18 PROCEDURE — 63710000001 INSULIN ASPART PER 5 UNITS: Performed by: FAMILY MEDICINE

## 2017-06-18 PROCEDURE — 85027 COMPLETE CBC AUTOMATED: CPT | Performed by: FAMILY MEDICINE

## 2017-06-18 PROCEDURE — 25010000002 LEVOFLOXACIN PER 250 MG: Performed by: FAMILY MEDICINE

## 2017-06-18 PROCEDURE — 82962 GLUCOSE BLOOD TEST: CPT

## 2017-06-18 PROCEDURE — 94668 MNPJ CHEST WALL SBSQ: CPT

## 2017-06-18 RX ORDER — PREDNISONE 1 MG/1
5 TABLET ORAL
Status: DISCONTINUED | OUTPATIENT
Start: 2017-06-18 | End: 2017-06-19 | Stop reason: HOSPADM

## 2017-06-18 RX ORDER — FUROSEMIDE 10 MG/ML
40 INJECTION INTRAMUSCULAR; INTRAVENOUS ONCE
Status: COMPLETED | OUTPATIENT
Start: 2017-06-18 | End: 2017-06-18

## 2017-06-18 RX ORDER — LEVOFLOXACIN 500 MG/1
500 TABLET, FILM COATED ORAL EVERY 24 HOURS
Status: DISCONTINUED | OUTPATIENT
Start: 2017-06-19 | End: 2017-06-19 | Stop reason: HOSPADM

## 2017-06-18 RX ADMIN — BUDESONIDE 0.5 MG: 0.5 SUSPENSION RESPIRATORY (INHALATION) at 19:31

## 2017-06-18 RX ADMIN — GABAPENTIN 300 MG: 300 CAPSULE ORAL at 08:17

## 2017-06-18 RX ADMIN — METOPROLOL TARTRATE 50 MG: 50 TABLET, FILM COATED ORAL at 17:14

## 2017-06-18 RX ADMIN — PREDNISONE 5 MG: 5 TABLET ORAL at 12:02

## 2017-06-18 RX ADMIN — ALLOPURINOL 300 MG: 300 TABLET ORAL at 06:09

## 2017-06-18 RX ADMIN — PANTOPRAZOLE SODIUM 40 MG: 40 TABLET, DELAYED RELEASE ORAL at 06:10

## 2017-06-18 RX ADMIN — PREDNISONE 5 MG: 5 TABLET ORAL at 17:14

## 2017-06-18 RX ADMIN — FUROSEMIDE 40 MG: 40 TABLET ORAL at 08:35

## 2017-06-18 RX ADMIN — FEBUXOSTAT 80 MG: 40 TABLET ORAL at 08:17

## 2017-06-18 RX ADMIN — IPRATROPIUM BROMIDE AND ALBUTEROL SULFATE 3 ML: .5; 3 SOLUTION RESPIRATORY (INHALATION) at 19:31

## 2017-06-18 RX ADMIN — AMLODIPINE BESYLATE 10 MG: 5 TABLET ORAL at 06:03

## 2017-06-18 RX ADMIN — METOPROLOL TARTRATE 50 MG: 50 TABLET, FILM COATED ORAL at 08:17

## 2017-06-18 RX ADMIN — IPRATROPIUM BROMIDE AND ALBUTEROL SULFATE 3 ML: .5; 3 SOLUTION RESPIRATORY (INHALATION) at 15:45

## 2017-06-18 RX ADMIN — IPRATROPIUM BROMIDE AND ALBUTEROL SULFATE 3 ML: .5; 3 SOLUTION RESPIRATORY (INHALATION) at 07:58

## 2017-06-18 RX ADMIN — PREDNISONE 10 MG: 10 TABLET ORAL at 08:18

## 2017-06-18 RX ADMIN — IPRATROPIUM BROMIDE AND ALBUTEROL SULFATE 3 ML: .5; 3 SOLUTION RESPIRATORY (INHALATION) at 11:31

## 2017-06-18 RX ADMIN — FUROSEMIDE 40 MG: 10 INJECTION, SOLUTION INTRAMUSCULAR; INTRAVENOUS at 08:16

## 2017-06-18 RX ADMIN — LUBIPROSTONE 24 MCG: 24 CAPSULE, GELATIN COATED ORAL at 08:17

## 2017-06-18 RX ADMIN — INSULIN ASPART 8 UNITS: 100 INJECTION, SOLUTION INTRAVENOUS; SUBCUTANEOUS at 12:04

## 2017-06-18 RX ADMIN — LEVETIRACETAM 500 MG: 500 TABLET ORAL at 17:14

## 2017-06-18 RX ADMIN — GABAPENTIN 300 MG: 300 CAPSULE ORAL at 17:14

## 2017-06-18 RX ADMIN — INSULIN ASPART 4 UNITS: 100 INJECTION, SOLUTION INTRAVENOUS; SUBCUTANEOUS at 08:18

## 2017-06-18 RX ADMIN — CLOPIDOGREL 75 MG: 75 TABLET, FILM COATED ORAL at 06:51

## 2017-06-18 RX ADMIN — BUDESONIDE 0.5 MG: 0.5 SUSPENSION RESPIRATORY (INHALATION) at 07:58

## 2017-06-18 RX ADMIN — GUAIFENESIN 1200 MG: 600 TABLET, EXTENDED RELEASE ORAL at 17:14

## 2017-06-18 RX ADMIN — LEVETIRACETAM 500 MG: 500 TABLET ORAL at 08:17

## 2017-06-18 RX ADMIN — ASPIRIN 325 MG: 325 TABLET, COATED ORAL at 06:03

## 2017-06-18 RX ADMIN — GUAIFENESIN 1200 MG: 600 TABLET, EXTENDED RELEASE ORAL at 08:18

## 2017-06-18 RX ADMIN — CETIRIZINE HYDROCHLORIDE 10 MG: 10 TABLET, FILM COATED ORAL at 06:08

## 2017-06-18 RX ADMIN — INSULIN ASPART 8 UNITS: 100 INJECTION, SOLUTION INTRAVENOUS; SUBCUTANEOUS at 21:20

## 2017-06-18 RX ADMIN — INSULIN ASPART 8 UNITS: 100 INJECTION, SOLUTION INTRAVENOUS; SUBCUTANEOUS at 17:14

## 2017-06-18 RX ADMIN — ENOXAPARIN SODIUM 30 MG: 30 INJECTION SUBCUTANEOUS at 08:17

## 2017-06-18 RX ADMIN — LEVOFLOXACIN 500 MG: 500 INJECTION, SOLUTION INTRAVENOUS at 09:57

## 2017-06-18 NOTE — PLAN OF CARE
Problem: Patient Care Overview (Adult)  Goal: Plan of Care Review  Outcome: Ongoing (interventions implemented as appropriate)    06/18/17 5565   Outcome Evaluation   Outcome Summary/Follow up Plan Tentative D/C 6/19/17; D. Wt. 299lb 9.6oz; Strict I&O; AC/HS-High Dose; ABX Therapy; Gualberto-Nebs; Diuretics;Steroids; LBM 6/18         Problem: COPD, Chronic Bronchitis/Emphysema (Adult)  Goal: Signs and Symptoms of Listed Potential Problems Will be Absent or Manageable (COPD, Chronic Bronchitis/Emphysema)  Outcome: Ongoing (interventions implemented as appropriate)

## 2017-06-18 NOTE — PLAN OF CARE
Problem: Patient Care Overview (Adult)  Goal: Plan of Care Review  Outcome: Ongoing (interventions implemented as appropriate)    06/17/17 2314   Coping/Psychosocial Response Interventions   Plan Of Care Reviewed With patient   Patient Care Overview   Progress progress towards functional goals is fair         Problem: COPD, Chronic Bronchitis/Emphysema (Adult)  Goal: Signs and Symptoms of Listed Potential Problems Will be Absent or Manageable (COPD, Chronic Bronchitis/Emphysema)  Outcome: Ongoing (interventions implemented as appropriate)    06/17/17 2314   COPD, Chronic Bronchitis/Emphysema   Problems Assessed (COPD, Chronic Bronchitis/Emphysema) all   Problems Present (COPD, Chronic Bronchitis/Emphysema) none

## 2017-06-18 NOTE — PROGRESS NOTES
"Daily Progress Note:    Subjective: Shortness of breath is better.  Persistent cough.    Flowsheet Rows         First Filed Value    Admission Height  68\" (172.7 cm) Documented at 06/14/2017 0944    Admission Weight  292 lb 9.6 oz (133 kg) Documented at 06/14/2017 0944          Patient Vitals for the past 24 hrs:   BP Temp Temp src Pulse Resp SpO2 Weight   06/18/17 1118 118/72 97.8 °F (36.6 °C) Oral 96 18 95 % -   06/18/17 0800 - - - 70 16 - -   06/18/17 0758 - - - 70 18 94 % -   06/18/17 0621 143/86 97.6 °F (36.4 °C) Oral 67 18 92 % 299 lb 9.6 oz (136 kg)   06/18/17 0300 145/93 97.3 °F (36.3 °C) Oral 73 18 92 % -   06/17/17 2340 136/82 98.2 °F (36.8 °C) Oral 75 18 93 % -   06/17/17 1959 119/77 97.3 °F (36.3 °C) Oral 92 18 93 % -   06/17/17 1945 - - - - - 96 % -   06/17/17 1943 - - - - - 96 % -   06/17/17 1546 123/77 97.1 °F (36.2 °C) - 92 16 94 % -   06/17/17 1207 - - - 86 16 95 % -   06/17/17 1139 122/73 97 °F (36.1 °C) Oral 92 14 95 % -         Intake/Output Summary (Last 24 hours) at 06/18/17 1122  Last data filed at 06/18/17 1100   Gross per 24 hour   Intake              936 ml   Output             3850 ml   Net            -2914 ml       Review of Systems   Constitutional: Negative for appetite change.   Respiratory: Positive for cough. Negative for chest tightness.    Cardiovascular: Positive for leg swelling. Negative for chest pain.   Gastrointestinal: Negative for abdominal distention, abdominal pain, diarrhea, nausea and vomiting.   Genitourinary: Negative for frequency.   Musculoskeletal: Positive for back pain.   Skin: Negative for rash.   Psychiatric/Behavioral: Negative for agitation.       Physical Exam   Constitutional: He appears well-developed and well-nourished.   Morbidly obese   HENT:   Head: Normocephalic.   Mouth/Throat: Oropharynx is clear and moist.   Eyes: Conjunctivae are normal.   Neck: Normal range of motion. No JVD present. No thyromegaly present.   Cardiovascular: Normal rate, regular " rhythm and normal heart sounds.    No murmur heard.  Pulmonary/Chest: Effort normal. No respiratory distress. He has wheezes (scattered). He has no rales.   Abdominal: Soft. Bowel sounds are normal. He exhibits no distension. There is no tenderness. There is no guarding.   Musculoskeletal: He exhibits edema (2 +).   Neurological: He is alert.   Skin: Skin is warm and dry. No rash noted.   Nursing note and vitals reviewed.          Medication Review:   I have reviewed the patient's current medication list      allopurinol 300 mg Oral QAM   amLODIPine 10 mg Oral QAM   aspirin 325 mg Oral QAM   budesonide 0.5 mg Nebulization BID - RT   cetirizine 10 mg Oral QAM   clobetasol  Topical Q12H   clopidogrel 75 mg Oral QAM   enoxaparin 30 mg Subcutaneous Q24H   febuxostat 80 mg Oral Daily   furosemide 40 mg Oral Daily   gabapentin 300 mg Oral BID   guaiFENesin 1,200 mg Oral BID   insulin aspart 0-24 Units Subcutaneous 4x Daily AC & at Bedtime   ipratropium-albuterol 3 mL Nebulization 4x Daily - RT   levETIRAcetam 500 mg Oral BID   levoFLOXacin 500 mg Intravenous Q24H   lubiprostone 24 mcg Oral Daily With Breakfast   metOLazone 2.5 mg Oral Once per day on Mon Wed Fri   metoprolol tartrate 50 mg Oral BID   pantoprazole 40 mg Oral QAM   predniSONE 10 mg Oral TID With Meals           Labs:    Results from last 7 days  Lab Units 06/18/17  0331 06/17/17  0732 06/16/17  0320   WBC 10*3/mm3 9.05 11.05* 11.49*   HEMOGLOBIN g/dL 15.8 15.1 15.6   HEMATOCRIT % 45.7 43.1 45.7   PLATELETS 10*3/mm3 210 226 244       Results from last 7 days  Lab Units 06/18/17  0331 06/17/17  0732 06/16/17  1717 06/16/17  0320  06/14/17  0950   SODIUM mmol/L 136 134*  --  136  < > 140   POTASSIUM mmol/L 3.2* 3.2*  --  3.7  < > 2.9*   CHLORIDE mmol/L 93* 92*  --  93*  < > 95*   TOTAL CO2 mmol/L 29.2* 26.4  --  24.9  < > 28.0   BUN mg/dL 29* 38*  --  46*  < > 28*   CREATININE mg/dL 1.16 1.29*  --  1.46*  < > 1.23   CALCIUM mg/dL 8.9 9.1  --  9.2  < > 9.0    BILIRUBIN mg/dL  --   --   --  0.2  --  0.6   ALK PHOS U/L  --   --   --  74  --  75   ALT (SGPT) U/L  --   --   --  19  --  19   AST (SGOT) U/L  --   --   --  13  --  14   GLUCOSE mg/dL 253* 312* 559* 375*  < > 132*   < > = values in this interval not displayed.        Lab Results (last 24 hours)     Procedure Component Value Units Date/Time    POC Glucose Fingerstick [[id number]]  (Abnormal) Collected:  06/17/17 1157    Specimen:  Blood Updated:  06/17/17 1204     Glucose 263 (H) mg/dL     Narrative:       Meter: [id number] : [id number] Khoa Spears NURSING ASSISTANT    POC Glucose Fingerstick [[id number]]  (Abnormal) Collected:  06/17/17 1633    Specimen:  Blood Updated:  06/17/17 1640     Glucose 316 (H) mg/dL     Narrative:       Meter: [id number] : [id number] Khoa Spears NURSING ASSISTANT    POC Glucose Fingerstick [[id number]]  (Abnormal) Collected:  06/17/17 2026    Specimen:  Blood Updated:  06/17/17 2032     Glucose 289 (H) mg/dL     Narrative:       Meter: [id number] : [id number] Jasen CHANDRA    CBC (No Diff) [[id number]]  (Abnormal) Collected:  06/18/17 0331    Specimen:  Blood Updated:  06/18/17 0430     WBC 9.05 10*3/mm3      RBC 5.54 10*6/mm3      Hemoglobin 15.8 g/dL      Hematocrit 45.7 %      MCV 82.5 fL      MCH 28.5 pg      MCHC 34.6 g/dL      RDW 14.1 %      RDW-SD 42.0 fl      MPV 10.7 (H) fL      Platelets 210 10*3/mm3     Basic Metabolic Panel [[id number]]  (Abnormal) Collected:  06/18/17 0331    Specimen:  Blood Updated:  06/18/17 0444     Glucose 253 (H) mg/dL      BUN 29 (H) mg/dL      Creatinine 1.16 mg/dL      Sodium 136 mmol/L      Potassium 3.2 (L) mmol/L      Chloride 93 (L) mmol/L      CO2 29.2 (H) mmol/L      Calcium 8.9 mg/dL      eGFR Non African Amer 63 mL/min/1.73      BUN/Creatinine Ratio 25.0     Anion Gap 13.8 mmol/L     Narrative:       GFR Normal >60  Chronic Kidney Disease <60  Kidney Failure <15    POC Glucose Fingerstick [[id number]]  (Abnormal)  Collected:  06/18/17 0737    Specimen:  Blood Updated:  06/18/17 0751     Glucose 169 (H) mg/dL     Narrative:       Meter: [id number] : [id number] Radha Harvey NURSING ASSISTANT              Radiology:  Imaging Results (last 24 hours)     ** No results found for the last 24 hours. **          Cardiology:  ECG/EMG Results (last 24 hours)     ** No results found for the last 24 hours. **          I have reviewed consult notes.    Assessment and Plan:    1.  Acute exacerbation of COPD acute bronchitis we'll continue aggressive pulmonary toilet mini nebs IV antibiotics.  Cultures are negative.  Likely due to rhinovirus    2. hypertension is well controlled on medication be continued    3. chronic kidney disease stage III Creatinine were up a little bit.  Diuretics on hold    4.  Seizure disorder stable nothing acute home as we continued      5. hypokalemia resolved    6.  Reflux disease PPI will be continued    7. Steroid-induced hyperglycemia Improved.  Will decrease by mouth steroids likely be discharged tomorrow

## 2017-06-18 NOTE — PLAN OF CARE
Problem: COPD, Chronic Bronchitis/Emphysema (Adult)  Intervention: Match Activity with Ability/Tolerance    06/17/17 2340   Activity   Activity Type up in chair       Intervention: Optimize Oxygenation/Ventilation/Perfusion    06/17/17 2340   Respiratory Interventions   Airway/Ventilation Management airway patency maintained;pulmonary hygiene promoted   Promote Aggressive Pulmonary Hygiene/Secretion Management   Cough And Deep Breathing done with encouragement       Intervention: Reduce/Relieve Breathlessness    06/17/17 2340   Respiratory Interventions   Breathing Techniques/Airway Clearance pursed-lip breathing encouraged         Goal: Signs and Symptoms of Listed Potential Problems Will be Absent or Manageable (COPD, Chronic Bronchitis/Emphysema)  Outcome: Ongoing (interventions implemented as appropriate)    06/17/17 2340   COPD, Chronic Bronchitis/Emphysema   Problems Assessed (COPD, Chronic Bronchitis/Emphysema) all   Problems Present (COPD, Chronic Bronchitis/Emphysema) none         Problem: Respiratory Insufficiency (Adult)  Intervention: Provide Oxygenation/Ventilation/Perfusion Support    06/17/17 2340   Activity   Activity Type up in chair;activity adjusted per tolerance   Respiratory Interventions   Airway/Ventilation Management airway patency maintained;pulmonary hygiene promoted         Goal: Acid/Base Balance  Outcome: Ongoing (interventions implemented as appropriate)    06/17/17 2340   Respiratory Insufficiency (Adult)   Acid/Base Balance making progress toward outcome       Goal: Effective Ventilation  Outcome: Ongoing (interventions implemented as appropriate)    06/17/17 2340   Respiratory Insufficiency (Adult)   Effective Ventilation making progress toward outcome

## 2017-06-19 VITALS
BODY MASS INDEX: 45.44 KG/M2 | TEMPERATURE: 97 F | DIASTOLIC BLOOD PRESSURE: 87 MMHG | RESPIRATION RATE: 20 BRPM | HEIGHT: 68 IN | WEIGHT: 299.8 LBS | SYSTOLIC BLOOD PRESSURE: 136 MMHG | OXYGEN SATURATION: 92 % | HEART RATE: 88 BPM

## 2017-06-19 LAB
ALBUMIN SERPL-MCNC: 3.3 G/DL (ref 3.5–5.2)
ALBUMIN/GLOB SERPL: 1.4 G/DL
ALP SERPL-CCNC: 60 U/L (ref 40–129)
ALT SERPL W P-5'-P-CCNC: 19 U/L (ref 5–41)
ANION GAP SERPL CALCULATED.3IONS-SCNC: 14.7 MMOL/L
AST SERPL-CCNC: 13 U/L (ref 5–40)
BILIRUB SERPL-MCNC: 0.3 MG/DL (ref 0.2–1.2)
BUN BLD-MCNC: 28 MG/DL (ref 8–23)
BUN/CREAT SERPL: 24.6 (ref 7–25)
CALCIUM SPEC-SCNC: 8.7 MG/DL (ref 8.8–10.5)
CHLORIDE SERPL-SCNC: 94 MMOL/L (ref 98–107)
CO2 SERPL-SCNC: 29.3 MMOL/L (ref 22–29)
CREAT BLD-MCNC: 1.14 MG/DL (ref 0.76–1.27)
GFR SERPL CREATININE-BSD FRML MDRD: 64 ML/MIN/1.73
GLOBULIN UR ELPH-MCNC: 2.3 GM/DL
GLUCOSE BLD-MCNC: 182 MG/DL (ref 65–99)
GLUCOSE BLDC GLUCOMTR-MCNC: 120 MG/DL (ref 70–130)
POTASSIUM BLD-SCNC: 3 MMOL/L (ref 3.5–5.2)
PROT SERPL-MCNC: 5.6 G/DL (ref 6–8.5)
SODIUM BLD-SCNC: 138 MMOL/L (ref 136–145)

## 2017-06-19 PROCEDURE — 80053 COMPREHEN METABOLIC PANEL: CPT | Performed by: FAMILY MEDICINE

## 2017-06-19 PROCEDURE — 94799 UNLISTED PULMONARY SVC/PX: CPT

## 2017-06-19 PROCEDURE — 94668 MNPJ CHEST WALL SBSQ: CPT

## 2017-06-19 PROCEDURE — 63710000001 PREDNISONE PER 5 MG: Performed by: FAMILY MEDICINE

## 2017-06-19 PROCEDURE — 82962 GLUCOSE BLOOD TEST: CPT

## 2017-06-19 RX ORDER — PREDNISONE 1 MG/1
5 TABLET ORAL
Qty: 9 TABLET | Refills: 0 | Status: SHIPPED | OUTPATIENT
Start: 2017-06-19 | End: 2017-06-22

## 2017-06-19 RX ORDER — POTASSIUM CHLORIDE 20 MEQ/1
40 TABLET, EXTENDED RELEASE ORAL ONCE
Status: COMPLETED | OUTPATIENT
Start: 2017-06-19 | End: 2017-06-19

## 2017-06-19 RX ORDER — POTASSIUM CHLORIDE 20 MEQ/1
TABLET, EXTENDED RELEASE ORAL
Status: COMPLETED
Start: 2017-06-19 | End: 2017-06-19

## 2017-06-19 RX ADMIN — METOPROLOL TARTRATE 50 MG: 50 TABLET, FILM COATED ORAL at 08:22

## 2017-06-19 RX ADMIN — CLOPIDOGREL 75 MG: 75 TABLET, FILM COATED ORAL at 06:09

## 2017-06-19 RX ADMIN — CETIRIZINE HYDROCHLORIDE 10 MG: 10 TABLET, FILM COATED ORAL at 06:09

## 2017-06-19 RX ADMIN — ALLOPURINOL 300 MG: 300 TABLET ORAL at 06:09

## 2017-06-19 RX ADMIN — METOLAZONE 2.5 MG: 2.5 TABLET ORAL at 08:22

## 2017-06-19 RX ADMIN — PANTOPRAZOLE SODIUM 40 MG: 40 TABLET, DELAYED RELEASE ORAL at 06:09

## 2017-06-19 RX ADMIN — POTASSIUM CHLORIDE 40 MEQ: 20 TABLET, EXTENDED RELEASE ORAL at 06:10

## 2017-06-19 RX ADMIN — LUBIPROSTONE 24 MCG: 24 CAPSULE, GELATIN COATED ORAL at 08:21

## 2017-06-19 RX ADMIN — AMLODIPINE BESYLATE 10 MG: 5 TABLET ORAL at 06:09

## 2017-06-19 RX ADMIN — FUROSEMIDE 40 MG: 40 TABLET ORAL at 08:23

## 2017-06-19 RX ADMIN — LEVOFLOXACIN 500 MG: 500 TABLET, FILM COATED ORAL at 08:20

## 2017-06-19 RX ADMIN — POTASSIUM CHLORIDE 40 MEQ: 1500 TABLET, EXTENDED RELEASE ORAL at 06:10

## 2017-06-19 RX ADMIN — BUDESONIDE 0.5 MG: 0.5 SUSPENSION RESPIRATORY (INHALATION) at 07:12

## 2017-06-19 RX ADMIN — FEBUXOSTAT 80 MG: 40 TABLET ORAL at 08:21

## 2017-06-19 RX ADMIN — GUAIFENESIN 1200 MG: 600 TABLET, EXTENDED RELEASE ORAL at 08:23

## 2017-06-19 RX ADMIN — ASPIRIN 325 MG: 325 TABLET, COATED ORAL at 06:09

## 2017-06-19 RX ADMIN — GABAPENTIN 300 MG: 300 CAPSULE ORAL at 08:31

## 2017-06-19 RX ADMIN — LEVETIRACETAM 500 MG: 500 TABLET ORAL at 08:21

## 2017-06-19 RX ADMIN — IPRATROPIUM BROMIDE AND ALBUTEROL SULFATE 3 ML: .5; 3 SOLUTION RESPIRATORY (INHALATION) at 07:12

## 2017-06-19 RX ADMIN — PREDNISONE 5 MG: 5 TABLET ORAL at 08:23

## 2017-06-19 NOTE — PLAN OF CARE
Problem: Patient Care Overview (Adult)  Goal: Plan of Care Review  Outcome: Outcome(s) achieved Date Met:  06/19/17 06/19/17 0821   Coping/Psychosocial Response Interventions   Plan Of Care Reviewed With patient;spouse   Patient Care Overview   Progress improving       Goal: Adult Individualization and Mutuality  Outcome: Outcome(s) achieved Date Met:  06/19/17  Goal: Discharge Needs Assessment  Outcome: Outcome(s) achieved Date Met:  06/19/17    Problem: COPD, Chronic Bronchitis/Emphysema (Adult)  Goal: Signs and Symptoms of Listed Potential Problems Will be Absent or Manageable (COPD, Chronic Bronchitis/Emphysema)  Outcome: Outcome(s) achieved Date Met:  06/19/17    Problem: Respiratory Insufficiency (Adult)  Goal: Acid/Base Balance  Outcome: Outcome(s) achieved Date Met:  06/19/17  Goal: Effective Ventilation  Outcome: Outcome(s) achieved Date Met:  06/19/17

## 2017-06-19 NOTE — PROGRESS NOTES
"Daily Progress Note:    Subjective: Shortness of breath is better.  Persistent cough.    Flowsheet Rows         First Filed Value    Admission Height  68\" (172.7 cm) Documented at 06/14/2017 0944    Admission Weight  292 lb 9.6 oz (133 kg) Documented at 06/14/2017 0944          Patient Vitals for the past 24 hrs:   BP Temp Temp src Pulse Resp SpO2 Weight   06/19/17 0619 136/87 97 °F (36.1 °C) Oral 66 20 90 % 299 lb 12.8 oz (136 kg)   06/19/17 0326 124/77 97.4 °F (36.3 °C) Oral 79 20 91 % -   06/18/17 2314 114/79 97.9 °F (36.6 °C) Oral 91 20 92 % -   06/18/17 1931 - - - 87 20 92 % -   06/18/17 1900 109/68 96.7 °F (35.9 °C) Oral 87 18 93 % -   06/18/17 1545 - - - 90 18 96 % -   06/18/17 1527 119/75 97.5 °F (36.4 °C) Oral 92 18 95 % -   06/18/17 1131 - - - 96 18 98 % -   06/18/17 1118 118/72 97.8 °F (36.6 °C) Oral 96 18 95 % -   06/18/17 0800 - - - 70 16 - -   06/18/17 0758 - - - 70 18 94 % -         Intake/Output Summary (Last 24 hours) at 06/19/17 0739  Last data filed at 06/19/17 0649   Gross per 24 hour   Intake             3331 ml   Output             4800 ml   Net            -1469 ml       Review of Systems   Constitutional: Negative for appetite change.   Respiratory: Positive for cough. Negative for chest tightness.    Cardiovascular: Positive for leg swelling. Negative for chest pain.   Gastrointestinal: Negative for abdominal distention, abdominal pain, diarrhea, nausea and vomiting.   Genitourinary: Negative for frequency.   Musculoskeletal: Positive for back pain.   Skin: Negative for rash.   Psychiatric/Behavioral: Negative for agitation.       Physical Exam   Constitutional: He appears well-developed and well-nourished.   Morbidly obese   HENT:   Head: Normocephalic.   Mouth/Throat: Oropharynx is clear and moist.   Eyes: Conjunctivae are normal.   Neck: Normal range of motion. No JVD present. No thyromegaly present.   Cardiovascular: Normal rate, regular rhythm and normal heart sounds.    No murmur " heard.  Pulmonary/Chest: Effort normal. No respiratory distress. He has wheezes (scattered). He has no rales.   Abdominal: Soft. Bowel sounds are normal. He exhibits no distension. There is no tenderness. There is no guarding.   Musculoskeletal: He exhibits edema (2 +).   Neurological: He is alert.   Skin: Skin is warm and dry. No rash noted.   Nursing note and vitals reviewed.          Medication Review:   I have reviewed the patient's current medication list      allopurinol 300 mg Oral QAM   amLODIPine 10 mg Oral QAM   aspirin 325 mg Oral QAM   budesonide 0.5 mg Nebulization BID - RT   cetirizine 10 mg Oral QAM   clobetasol  Topical Q12H   clopidogrel 75 mg Oral QAM   enoxaparin 30 mg Subcutaneous Q24H   febuxostat 80 mg Oral Daily   furosemide 40 mg Oral Daily   gabapentin 300 mg Oral BID   guaiFENesin 1,200 mg Oral BID   insulin aspart 0-24 Units Subcutaneous 4x Daily AC & at Bedtime   ipratropium-albuterol 3 mL Nebulization 4x Daily - RT   levETIRAcetam 500 mg Oral BID   levoFLOXacin 500 mg Oral Q24H   lubiprostone 24 mcg Oral Daily With Breakfast   metOLazone 2.5 mg Oral Once per day on Mon Wed Fri   metoprolol tartrate 50 mg Oral BID   pantoprazole 40 mg Oral QAM   predniSONE 5 mg Oral TID With Meals           Labs:    Results from last 7 days  Lab Units 06/18/17  0331 06/17/17  0732 06/16/17  0320   WBC 10*3/mm3 9.05 11.05* 11.49*   HEMOGLOBIN g/dL 15.8 15.1 15.6   HEMATOCRIT % 45.7 43.1 45.7   PLATELETS 10*3/mm3 210 226 244       Results from last 7 days  Lab Units 06/19/17  0336 06/18/17  0331 06/17/17  0732  06/16/17  0320  06/14/17  0950   SODIUM mmol/L 138 136 134*  --  136  < > 140   POTASSIUM mmol/L 3.0* 3.2* 3.2*  --  3.7  < > 2.9*   CHLORIDE mmol/L 94* 93* 92*  --  93*  < > 95*   TOTAL CO2 mmol/L 29.3* 29.2* 26.4  --  24.9  < > 28.0   BUN mg/dL 28* 29* 38*  --  46*  < > 28*   CREATININE mg/dL 1.14 1.16 1.29*  --  1.46*  < > 1.23   CALCIUM mg/dL 8.7* 8.9 9.1  --  9.2  < > 9.0   BILIRUBIN mg/dL 0.3   --   --   --  0.2  --  0.6   ALK PHOS U/L 60  --   --   --  74  --  75   ALT (SGPT) U/L 19  --   --   --  19  --  19   AST (SGOT) U/L 13  --   --   --  13  --  14   GLUCOSE mg/dL 182* 253* 312*  < > 375*  < > 132*   < > = values in this interval not displayed.        Lab Results (last 24 hours)     Procedure Component Value Units Date/Time    POC Glucose Fingerstick [[id number]]  (Abnormal) Collected:  06/18/17 0737    Specimen:  Blood Updated:  06/18/17 0751     Glucose 169 (H) mg/dL     Narrative:       Meter: [id number] : [id number] Sylvan Source Candice NURSING ASSISTANT    POC Glucose Fingerstick [[id number]]  (Abnormal) Collected:  06/18/17 1122    Specimen:  Blood Updated:  06/18/17 1150     Glucose 249 (H) mg/dL     Narrative:       Meter: [id number] : [id number] Sylvan Source Candice NURSING ASSISTANT    POC Glucose Fingerstick [[id number]]  (Abnormal) Collected:  06/18/17 1639    Specimen:  Blood Updated:  06/18/17 1647     Glucose 223 (H) mg/dL     Narrative:       Meter: [id number] : [id number] Sylvan Source Candice NURSING ASSISTANT    POC Glucose Fingerstick [[id number]]  (Abnormal) Collected:  06/18/17 2006    Specimen:  Blood Updated:  06/18/17 2018     Glucose 246 (H) mg/dL     Narrative:       Meter: [id number] : [id number] Clarice CHANDRA.    Comprehensive Metabolic Panel [[id number]]  (Abnormal) Collected:  06/19/17 0336    Specimen:  Blood Updated:  06/19/17 0455     Glucose 182 (H) mg/dL      BUN 28 (H) mg/dL      Creatinine 1.14 mg/dL      Sodium 138 mmol/L      Potassium 3.0 (L) mmol/L      Chloride 94 (L) mmol/L      CO2 29.3 (H) mmol/L      Calcium 8.7 (L) mg/dL      Total Protein 5.6 (L) g/dL      Albumin 3.30 (L) g/dL      ALT (SGPT) 19 U/L      AST (SGOT) 13 U/L      Alkaline Phosphatase 60 U/L      Total Bilirubin 0.3 mg/dL      eGFR Non African Amer 64 mL/min/1.73      Globulin 2.3 gm/dL      A/G Ratio 1.4 g/dL      BUN/Creatinine Ratio 24.6     Anion Gap 14.7 mmol/L                Radiology:  Imaging Results (last 24 hours)     ** No results found for the last 24 hours. **          Cardiology:  ECG/EMG Results (last 24 hours)     ** No results found for the last 24 hours. **          I have reviewed consult notes.    Assessment and Plan:    1.  Acute exacerbation of COPD acute bronchitis much improved back to baseline okay discharge home today 5 days.  Levaquin    2. hypertension is well controlled on medication be continued    3. chronic kidney disease stage III Creatinine were up a little bit.  Diuretics on hold    4.  Seizure disorder stable nothing acute home as we continued      5. hypokalemia     6.  Reflux disease PPI will be continued    7. Steroid-induced hyperglycemia Improved.

## 2017-06-19 NOTE — PLAN OF CARE
Problem: COPD, Chronic Bronchitis/Emphysema (Adult)  Goal: Signs and Symptoms of Listed Potential Problems Will be Absent or Manageable (COPD, Chronic Bronchitis/Emphysema)  Outcome: Ongoing (interventions implemented as appropriate)    06/19/17 0339   COPD, Chronic Bronchitis/Emphysema   Problems Assessed (COPD, Chronic Bronchitis/Emphysema) all   Problems Present (COPD, Chronic Bronchitis/Emphysema) none

## 2017-06-19 NOTE — DISCHARGE SUMMARY
DATE OF ADMISSION:  06/14/2017  DATE OF DISCHARGE:  06/19/2017    DISCHARGE DIAGNOSES:    1.   Acute chronic obstructive pulmonary disease, probably secondary to rhinovirus.   2.   Acute bronchitis.   3.   Hypertension.   4.   Hyperlipidemia.   5.   Chronic kidney disease, stage 3.   6.   Profound hyperglycemia secondary to steroids.   7.   Mantle cell lymphoma.   8.   History of bladder cancer.   9.   Anemia of chronic disease.   10. Hypokalemia.   11. Morbid obesity.   12. Multijoint osteoarthritis.   13. Partial complex seizures.     HISTORY:  This is a 66-year-old white male she presented a week ago chronic cough, no shortness of breath issues, oral antibiotics and steroids, did not improve, persistent thick, yellow sputum.  The patient was admitted for acute COPD bronchitis. He was started on IV Levaquin, responded to it, Theresa Dominique and Sravan.  The patient had a temperature of 100.5 for the first 48 hours but then become afebrile. His procalcitonin was negative. Sputum cultures came back negative and his respiratory viral panel came up with rhinovirus. The patient developed significant for hyperglycemia from IV steroids, which was treated with insulin sliding scale. The patient gradually improved. His steroids improved. Her steroids were decreased. The patient has had some hypokalemia which will resolve with p.o. potassium supplementation. His renal insufficiency worsened for a couple of days during the hospital.  BUN went up to 40, creatinine went to 1.8. Diuretics were held and this responded well.  The patient was back to baseline. Weight was stable and discharged in satisfactory condition. Only new medicine of prednisone 5 mg t.i.d. for 3 more days.  He already has an appointment in the office on Wednesday, which he will keep.  He is to continue low-carb, low-sodium diet and finish off the Levaquin that he had been prescribed before.            Tory Arias M.D.*  Doris  D:  06/19/2017  07:46:09   T:  06/19/2017 09:07:35   Job ID:  [id number]  Document ID:  [id number]  cc:

## 2017-06-19 NOTE — NURSING NOTE
Continued Stay Note  MAGDALENA Pollock     Patient Name: Esdras Ko  MRN: [id number]  Today's Date: 6/19/2017    Admit Date: 6/14/2017          Discharge Plan       06/19/17 0837    Case Management/Social Work Plan    Plan Home today    Patient/Family In Agreement With Plan yes    Additional Comments Spoke with patient and wife at bedside.  Updated IMM.  NO needs identified.  Will continue to follow to discharge.                Discharge Codes     None        Expected Discharge Date and Time     Expected Discharge Date Expected Discharge Time    Jun 19, 2017             Melanie Galicia RN

## 2017-06-19 NOTE — PLAN OF CARE
Problem: Patient Care Overview (Adult)  Goal: Plan of Care Review  Outcome: Ongoing (interventions implemented as appropriate)    06/19/17 0021   Coping/Psychosocial Response Interventions   Plan Of Care Reviewed With patient         Problem: Respiratory Insufficiency (Adult)  Intervention: Provide Oxygenation/Ventilation/Perfusion Support    06/19/17 0021   Respiratory Interventions   Airway/Ventilation Management airway patency maintained;pulmonary hygiene promoted

## 2017-07-05 ENCOUNTER — TRANSCRIBE ORDERS (OUTPATIENT)
Dept: ADMINISTRATIVE | Facility: HOSPITAL | Age: 67
End: 2017-07-05

## 2017-07-05 DIAGNOSIS — R09.02 HYPOXIA: Primary | ICD-10-CM

## 2017-07-07 ENCOUNTER — HOSPITAL ENCOUNTER (OUTPATIENT)
Dept: CT IMAGING | Facility: HOSPITAL | Age: 67
Discharge: HOME | End: 2017-07-07
Attending: INTERNAL MEDICINE | Admitting: INTERNAL MEDICINE

## 2017-07-07 DIAGNOSIS — R09.02 HYPOXIA: ICD-10-CM

## 2017-07-07 PROCEDURE — 0 IOPAMIDOL PER 1 ML: Performed by: INTERNAL MEDICINE

## 2017-07-07 PROCEDURE — 71275 CT ANGIOGRAPHY CHEST: CPT

## 2017-07-07 RX ADMIN — IOPAMIDOL 50 ML: 755 INJECTION, SOLUTION INTRAVENOUS at 13:08

## 2017-07-18 ENCOUNTER — OFFICE VISIT (OUTPATIENT)
Dept: ONCOLOGY | Facility: CLINIC | Age: 67
End: 2017-07-18

## 2017-07-18 ENCOUNTER — LAB (OUTPATIENT)
Dept: LAB | Facility: HOSPITAL | Age: 67
End: 2017-07-18

## 2017-07-18 VITALS
HEART RATE: 108 BPM | RESPIRATION RATE: 20 BRPM | OXYGEN SATURATION: 88 % | WEIGHT: 290.1 LBS | BODY MASS INDEX: 43.97 KG/M2 | DIASTOLIC BLOOD PRESSURE: 76 MMHG | TEMPERATURE: 98.3 F | SYSTOLIC BLOOD PRESSURE: 103 MMHG | HEIGHT: 68 IN

## 2017-07-18 DIAGNOSIS — C83.10 MANTLE CELL LYMPHOMA, UNSPECIFIED BODY REGION (HCC): ICD-10-CM

## 2017-07-18 DIAGNOSIS — C83.10 MANTLE CELL LYMPHOMA, UNSPECIFIED BODY REGION (HCC): Primary | ICD-10-CM

## 2017-07-18 LAB
ALBUMIN SERPL-MCNC: 4.2 G/DL (ref 3.5–5.2)
ALBUMIN/GLOB SERPL: 1.5 G/DL
ALP SERPL-CCNC: 92 U/L (ref 40–129)
ALT SERPL W P-5'-P-CCNC: 21 U/L (ref 5–41)
ANION GAP SERPL CALCULATED.3IONS-SCNC: 14.5 MMOL/L
AST SERPL-CCNC: 19 U/L (ref 5–40)
BASOPHILS # BLD AUTO: 0.06 10*3/MM3 (ref 0–0.2)
BASOPHILS NFR BLD AUTO: 0.7 % (ref 0–2)
BILIRUB SERPL-MCNC: 0.9 MG/DL (ref 0.2–1.2)
BUN BLD-MCNC: 27 MG/DL (ref 8–23)
BUN/CREAT SERPL: 18.2 (ref 7–25)
CALCIUM SPEC-SCNC: 9.9 MG/DL (ref 8.8–10.5)
CHLORIDE SERPL-SCNC: 95 MMOL/L (ref 98–107)
CO2 SERPL-SCNC: 30.5 MMOL/L (ref 22–29)
CREAT BLD-MCNC: 1.48 MG/DL (ref 0.76–1.27)
DEPRECATED RDW RBC AUTO: 47.4 FL (ref 37–54)
EOSINOPHIL # BLD AUTO: 0.24 10*3/MM3 (ref 0.1–0.3)
EOSINOPHIL NFR BLD AUTO: 2.9 % (ref 0–4)
ERYTHROCYTE [DISTWIDTH] IN BLOOD BY AUTOMATED COUNT: 17.4 % (ref 11.5–14.5)
GFR SERPL CREATININE-BSD FRML MDRD: 47 ML/MIN/1.73
GLOBULIN UR ELPH-MCNC: 2.8 GM/DL
GLUCOSE BLD-MCNC: 128 MG/DL (ref 65–99)
HCT VFR BLD AUTO: 46.6 % (ref 42–52)
HGB BLD-MCNC: 15.9 G/DL (ref 14–18)
IMM GRANULOCYTES # BLD: 0.12 10*3/MM3 (ref 0–0.03)
IMM GRANULOCYTES NFR BLD: 1.5 % (ref 0–0.5)
LDH SERPL-CCNC: 272 U/L (ref 135–225)
LYMPHOCYTES # BLD AUTO: 1.37 10*3/MM3 (ref 0.6–4.8)
LYMPHOCYTES NFR BLD AUTO: 16.7 % (ref 20–45)
MCH RBC QN AUTO: 27.9 PG (ref 27–31)
MCHC RBC AUTO-ENTMCNC: 34.1 G/DL (ref 31–37)
MCV RBC AUTO: 81.9 FL (ref 80–94)
MONOCYTES # BLD AUTO: 0.85 10*3/MM3 (ref 0–1)
MONOCYTES NFR BLD AUTO: 10.4 % (ref 3–8)
NEUTROPHILS # BLD AUTO: 5.54 10*3/MM3 (ref 1.5–8.3)
NEUTROPHILS NFR BLD AUTO: 67.8 % (ref 45–70)
NRBC BLD MANUAL-RTO: 0 /100 WBC (ref 0–0)
PLATELET # BLD AUTO: 206 10*3/MM3 (ref 140–500)
PMV BLD AUTO: 10.1 FL (ref 7.4–10.4)
POTASSIUM BLD-SCNC: 3.1 MMOL/L (ref 3.5–5.2)
PROT SERPL-MCNC: 7 G/DL (ref 6–8.5)
RBC # BLD AUTO: 5.69 10*6/MM3 (ref 4.7–6.1)
SODIUM BLD-SCNC: 140 MMOL/L (ref 136–145)
WBC NRBC COR # BLD: 8.18 10*3/MM3 (ref 4.8–10.8)

## 2017-07-18 PROCEDURE — 36415 COLL VENOUS BLD VENIPUNCTURE: CPT

## 2017-07-18 PROCEDURE — 85025 COMPLETE CBC W/AUTO DIFF WBC: CPT

## 2017-07-18 PROCEDURE — 99214 OFFICE O/P EST MOD 30 MIN: CPT | Performed by: INTERNAL MEDICINE

## 2017-07-18 PROCEDURE — 83615 LACTATE (LD) (LDH) ENZYME: CPT

## 2017-07-18 PROCEDURE — 80053 COMPREHEN METABOLIC PANEL: CPT

## 2017-07-18 NOTE — PROGRESS NOTES
Subjective   REASONS FOR FOLLOW-UP:   History of mantle cell lymphoma.       History of Present Illness    Mr. Ko is a 65-year-old man returning for followup of his history of mantle cell lymphoma previously treated with bendamustine/rituximab followed by maintenance Rituxan through the winter of 2013.   He has had no therapy or disease progression since that time.     He returns today for review.  He was admitted to the hospital in June with acute hypoxic respiratory failure secondary to rhinovirus.  He continues to feel short of breath and weak.  He has lost 10 pounds of weight.  He is not wearing oxygen today as prescribed and O2 sats on room air were 88%.  He is seeing pulmonary medicine and had a recent CT of the chest with arteriogram showing no thrombus.  He had centrilobular emphysema and bronchial wall thickening throughout the central lungs with ill-defined reticular nodular interstitial pattern in both lungs including peripheral tree and blood nodularity suggesting bronchiolitis.  Pulmonary medicine is considering bronchoscopy.    The patient complains of 10 pound weight loss over the past 2 weeks.  He reports a good appetite.  He was having some lower quadrant abdominal pain 1 week ago which has now improved.  He denies nausea, vomiting, diarrhea.  He denies fever.  He denies night sweats.    PAST MEDICAL HISTORY:   1. Seizure disorder after a head trauma in 1970.   2. Bladder cancer diagnosed in 2008 for which he gets annual cystoscopy, currently with no evidence of disease.   3. Chronic obstructive pulmonary disease.   4. Psoriasis.   5. Hypertension.   6. Chronic renal insufficiency complicated by acute renal failure secondary to tumor lysis in April 2011.   7. Ischemic cerebrovascular accidents and TIAs with the most recent event in February of 2014, which was a TIA.   8. History of tobacco abuse.       SOCIAL HISTORY: He is  and retired mostly from factory work. He quit smoking tobacco  "around  after 80 pack-year. He drank heavily in the past but none since around . Denies illicit drug use. He has had prior blood transfusions.     FAMILY HISTORY: His father had pancreatic cancer and  at age 56. Sister has anemia. Mother had heart disease. Brother has hypertension.       Review of Systems   Constitutional: Positive for unexpected weight change. Negative for activity change and fatigue.   Respiratory: Negative for shortness of breath and wheezing.    Cardiovascular: Positive for leg swelling. Negative for chest pain and palpitations.   Gastrointestinal: Positive for abdominal pain. Negative for abdominal distention and blood in stool.   Skin: Negative for pallor and rash.   Neurological: Negative for weakness.   Hematological: Negative for adenopathy.      A comprehensive 14 point review of systems was performed and was negative except as mentioned.    Medications:  The current medication list was reviewed in the EMR    ALLERGIES:    Allergies   Allergen Reactions   • Amoxicillin Hives   • Ceftin [Cefuroxime Axetil] Hives   • Codeine    • Flonase [Fluticasone] Hives   • Pharbetol [Acetaminophen] Hives   • Spiriva Handihaler [Tiotropium Bromide Monohydrate] Hives   • Sulfa Antibiotics Hives   • Phenobarbital Rash       Objective      Vitals:    17 1301   Weight: 290 lb 1.6 oz (132 kg)   Height: 68\" (172.7 cm)   PainSc: 0-No pain     Current Status 2017   ECOG score 1       Physical Exam   Constitutional: He appears well-nourished.   Morbidly obese   Cardiovascular: Normal rate and regular rhythm.    Pulmonary/Chest: Effort normal. No respiratory distress.   Abdominal: Soft. He exhibits no mass.   Musculoskeletal: He exhibits edema.   Lymphadenopathy:     He has no cervical adenopathy.     He has no axillary adenopathy.        Right: No supraclavicular adenopathy present.        Left: No supraclavicular adenopathy present.   Skin: No erythema. No pallor.          RECENT " LABS:  Hematology WBC   Date Value Ref Range Status   06/18/2017 9.05 4.80 - 10.80 10*3/mm3 Final     RBC   Date Value Ref Range Status   06/18/2017 5.54 4.70 - 6.10 10*6/mm3 Final     Hemoglobin   Date Value Ref Range Status   06/18/2017 15.8 14.0 - 18.0 g/dL Final     Hematocrit   Date Value Ref Range Status   06/18/2017 45.7 42.0 - 52.0 % Final     Platelets   Date Value Ref Range Status   06/18/2017 210 140 - 500 10*3/mm3 Final     Lab Results   Component Value Date    GLUCOSE 182 (H) 06/19/2017    BUN 28 (H) 06/19/2017    CREATININE 1.14 06/19/2017    EGFRIFNONA 64 06/19/2017    EGFRIFAFRI  08/30/2016      Comment:      <15 Indicative of kidney failure.    BCR 24.6 06/19/2017    CO2 29.3 (H) 06/19/2017    CALCIUM 8.7 (L) 06/19/2017    ALBUMIN 3.30 (L) 06/19/2017    LABIL2 1.4 06/19/2017    AST 13 06/19/2017    ALT 19 06/19/2017          CT injury gram of the chest 7/7/17 showed no PE, evidence of pulmonary emphysema and bronchiolitis.  There was no lymphadenopathy in the chest to suggest recurrent lymphoma.    Assessment/Plan     Mr. Ko is a 65-year-old man returning for followup of his history mantle cell lymphoma with bone marrow involvement at diagnosis. He was treated with bendamustine/rituximab followed by maintenance rituximab completed in the winter of 2013.     The patient comes in today with complaints of weight loss and was having abdominal pain last week which has now resolved.  He was recently admitted with Dony virus and acute hypoxic respiratory failure.  He remains on home oxygen although he is not wearing today.  CT scan shows emphysema and bronchiolitis in the lungs.    He and his wife are concerned regarding recurrent lymphoma.  I see no evidence of lymphoma in the chest.  Given his weight loss and abdominal symptoms, I recommended that we complete evaluation with a CT of the abdomen and pelvis which will be scheduled.  CBC is unremarkable, so there does not appear to be suspicion of  progressive disease in the marrow.      We will call the patient with the CT results when available and arrange follow-up thereafter.      ADDENDUM:  CT of the abdomen and pelvis reviewed shows no progressive lymphadenopathy and stable mild splenomegaly with no clear evidence of progressive lymphoma.  We will update the patient by phone and arrange follow-up in 4 months with labs.            7/18/2017      CC:

## 2017-07-21 ENCOUNTER — HOSPITAL ENCOUNTER (OUTPATIENT)
Dept: CT IMAGING | Facility: HOSPITAL | Age: 67
Discharge: HOME | End: 2017-07-21
Attending: INTERNAL MEDICINE | Admitting: INTERNAL MEDICINE

## 2017-07-21 ENCOUNTER — APPOINTMENT (OUTPATIENT)
Dept: CT IMAGING | Facility: HOSPITAL | Age: 67
End: 2017-07-21
Attending: INTERNAL MEDICINE

## 2017-07-21 ENCOUNTER — TELEPHONE (OUTPATIENT)
Dept: ONCOLOGY | Facility: CLINIC | Age: 67
End: 2017-07-21

## 2017-07-21 ENCOUNTER — TELEPHONE (OUTPATIENT)
Dept: ONCOLOGY | Facility: HOSPITAL | Age: 67
End: 2017-07-21

## 2017-07-21 DIAGNOSIS — C83.10 MANTLE CELL LYMPHOMA, UNSPECIFIED BODY REGION (HCC): ICD-10-CM

## 2017-07-21 DIAGNOSIS — C83.10 MANTLE CELL LYMPHOMA, UNSPECIFIED BODY REGION (HCC): Primary | ICD-10-CM

## 2017-07-21 PROCEDURE — 74176 CT ABD & PELVIS W/O CONTRAST: CPT

## 2017-07-21 NOTE — TELEPHONE ENCOUNTER
----- Message from Irving Restrepo MD sent at 7/21/2017  1:10 PM EDT -----  Please let this patient know CT abdomen/pelvis was negative with no evidence lymphoma recurrence.  He can follow up with [facility] clinic 4 months with cbc cmp ldh day of visit  ----- Message -----     From: Interface, Rad Results [place] In     Sent: 7/21/2017   9:40 AM       To: Irving Restrepo MD

## 2017-07-21 NOTE — TELEPHONE ENCOUNTER
----- Message from Irving Restrepo MD sent at 7/21/2017  1:10 PM EDT -----  Please let this patient know CT abdomen/pelvis was negative with no evidence lymphoma recurrence.  He can follow up with [facility] clinic 4 months with cbc cmp ldh day of visit  ----- Message -----     From: Interface, Rad Results [place] In     Sent: 7/21/2017   9:40 AM       To: Irving Restrepo MD        Called and spoke with pt's wife. She v/u. Sent message to scheduling to make appt in 4 months. Order for labs placed.

## 2017-07-25 ENCOUNTER — APPOINTMENT (OUTPATIENT)
Dept: SLEEP MEDICINE | Facility: HOSPITAL | Age: 67
End: 2017-07-25

## 2017-08-17 ENCOUNTER — TRANSCRIBE ORDERS (OUTPATIENT)
Dept: SLEEP MEDICINE | Facility: HOSPITAL | Age: 67
End: 2017-08-17

## 2017-08-17 DIAGNOSIS — G47.33 OSA (OBSTRUCTIVE SLEEP APNEA): Primary | ICD-10-CM

## 2017-08-18 ENCOUNTER — ANESTHESIA EVENT (OUTPATIENT)
Dept: PERIOP | Facility: HOSPITAL | Age: 67
End: 2017-08-18

## 2017-08-21 ENCOUNTER — ANESTHESIA (OUTPATIENT)
Dept: PERIOP | Facility: HOSPITAL | Age: 67
End: 2017-08-21

## 2017-08-21 ENCOUNTER — APPOINTMENT (OUTPATIENT)
Dept: GENERAL RADIOLOGY | Facility: HOSPITAL | Age: 67
End: 2017-08-21

## 2017-08-21 ENCOUNTER — HOSPITAL ENCOUNTER (OUTPATIENT)
Facility: HOSPITAL | Age: 67
Setting detail: HOSPITAL OUTPATIENT SURGERY
Discharge: HOME | End: 2017-08-21
Attending: INTERNAL MEDICINE | Admitting: INTERNAL MEDICINE

## 2017-08-21 VITALS
DIASTOLIC BLOOD PRESSURE: 81 MMHG | SYSTOLIC BLOOD PRESSURE: 132 MMHG | HEIGHT: 68 IN | HEART RATE: 87 BPM | OXYGEN SATURATION: 95 % | TEMPERATURE: 98.9 F | RESPIRATION RATE: 18 BRPM | WEIGHT: 294 LBS | BODY MASS INDEX: 44.56 KG/M2

## 2017-08-21 DIAGNOSIS — R91.8 PULMONARY INFILTRATE: ICD-10-CM

## 2017-08-21 DIAGNOSIS — R91.8 PULMONARY INFILTRATES: ICD-10-CM

## 2017-08-21 LAB
APPEARANCE FLD: ABNORMAL
COLOR FLD: COLORLESS
LYMPHOCYTES NFR FLD MANUAL: 66 %
MONOS+MACROS NFR FLD: 30 %
NEUTROPHILS NFR FLD MANUAL: 4 %
OTHER CELLS FLUID PER 100/WBCS: 7 /100 WBCS
POTASSIUM BLD-SCNC: 3.5 MMOL/L (ref 3.5–5.2)
RBC # FLD AUTO: 420 /MM3
WBC # FLD: 173 /MM3

## 2017-08-21 PROCEDURE — 87206 SMEAR FLUORESCENT/ACID STAI: CPT | Performed by: INTERNAL MEDICINE

## 2017-08-21 PROCEDURE — 76000 FLUOROSCOPY <1 HR PHYS/QHP: CPT

## 2017-08-21 PROCEDURE — 25010000002 MIDAZOLAM PER 1 MG: Performed by: NURSE ANESTHETIST, CERTIFIED REGISTERED

## 2017-08-21 PROCEDURE — 87071 CULTURE AEROBIC QUANT OTHER: CPT | Performed by: INTERNAL MEDICINE

## 2017-08-21 PROCEDURE — 87116 MYCOBACTERIA CULTURE: CPT | Performed by: INTERNAL MEDICINE

## 2017-08-21 PROCEDURE — 25010000002 ONDANSETRON PER 1 MG: Performed by: NURSE ANESTHETIST, CERTIFIED REGISTERED

## 2017-08-21 PROCEDURE — 93005 ELECTROCARDIOGRAM TRACING: CPT | Performed by: NURSE ANESTHETIST, CERTIFIED REGISTERED

## 2017-08-21 PROCEDURE — 87205 SMEAR GRAM STAIN: CPT | Performed by: INTERNAL MEDICINE

## 2017-08-21 PROCEDURE — 25010000002 PROPOFOL 10 MG/ML EMULSION: Performed by: NURSE ANESTHETIST, CERTIFIED REGISTERED

## 2017-08-21 PROCEDURE — 89051 BODY FLUID CELL COUNT: CPT | Performed by: INTERNAL MEDICINE

## 2017-08-21 PROCEDURE — 25010000002 DIPHENHYDRAMINE PER 50 MG

## 2017-08-21 PROCEDURE — 94640 AIRWAY INHALATION TREATMENT: CPT

## 2017-08-21 PROCEDURE — 84132 ASSAY OF SERUM POTASSIUM: CPT | Performed by: NURSE ANESTHETIST, CERTIFIED REGISTERED

## 2017-08-21 RX ORDER — LIDOCAINE HYDROCHLORIDE 20 MG/ML
INJECTION, SOLUTION INFILTRATION; PERINEURAL AS NEEDED
Status: DISCONTINUED | OUTPATIENT
Start: 2017-08-21 | End: 2017-08-21 | Stop reason: HOSPADM

## 2017-08-21 RX ORDER — KETAMINE HYDROCHLORIDE 100 MG/ML
INJECTION INTRAMUSCULAR; INTRAVENOUS AS NEEDED
Status: DISCONTINUED | OUTPATIENT
Start: 2017-08-21 | End: 2017-08-21 | Stop reason: SURG

## 2017-08-21 RX ORDER — LIDOCAINE HYDROCHLORIDE 10 MG/ML
0.5 INJECTION, SOLUTION EPIDURAL; INFILTRATION; INTRACAUDAL; PERINEURAL ONCE AS NEEDED
Status: DISCONTINUED | OUTPATIENT
Start: 2017-08-21 | End: 2017-08-21 | Stop reason: HOSPADM

## 2017-08-21 RX ORDER — SODIUM CHLORIDE, SODIUM LACTATE, POTASSIUM CHLORIDE, CALCIUM CHLORIDE 600; 310; 30; 20 MG/100ML; MG/100ML; MG/100ML; MG/100ML
9 INJECTION, SOLUTION INTRAVENOUS CONTINUOUS
Status: DISCONTINUED | OUTPATIENT
Start: 2017-08-21 | End: 2017-08-21 | Stop reason: HOSPADM

## 2017-08-21 RX ORDER — LIDOCAINE HYDROCHLORIDE 20 MG/ML
INJECTION, SOLUTION INFILTRATION; PERINEURAL AS NEEDED
Status: DISCONTINUED | OUTPATIENT
Start: 2017-08-21 | End: 2017-08-21 | Stop reason: SURG

## 2017-08-21 RX ORDER — SODIUM CHLORIDE 0.9 % (FLUSH) 0.9 %
1-10 SYRINGE (ML) INJECTION AS NEEDED
Status: DISCONTINUED | OUTPATIENT
Start: 2017-08-21 | End: 2017-08-21 | Stop reason: HOSPADM

## 2017-08-21 RX ORDER — ONDANSETRON 2 MG/ML
4 INJECTION INTRAMUSCULAR; INTRAVENOUS ONCE AS NEEDED
Status: COMPLETED | OUTPATIENT
Start: 2017-08-21 | End: 2017-08-21

## 2017-08-21 RX ORDER — DIPHENHYDRAMINE HYDROCHLORIDE 50 MG/ML
12.5 INJECTION INTRAMUSCULAR; INTRAVENOUS ONCE
Status: COMPLETED | OUTPATIENT
Start: 2017-08-21 | End: 2017-08-21

## 2017-08-21 RX ORDER — IPRATROPIUM BROMIDE AND ALBUTEROL SULFATE 2.5; .5 MG/3ML; MG/3ML
3 SOLUTION RESPIRATORY (INHALATION)
Status: DISCONTINUED | OUTPATIENT
Start: 2017-08-21 | End: 2017-08-21 | Stop reason: HOSPADM

## 2017-08-21 RX ORDER — MIDAZOLAM HYDROCHLORIDE 1 MG/ML
INJECTION INTRAMUSCULAR; INTRAVENOUS AS NEEDED
Status: DISCONTINUED | OUTPATIENT
Start: 2017-08-21 | End: 2017-08-21 | Stop reason: SURG

## 2017-08-21 RX ORDER — PROPOFOL 10 MG/ML
VIAL (ML) INTRAVENOUS AS NEEDED
Status: DISCONTINUED | OUTPATIENT
Start: 2017-08-21 | End: 2017-08-21 | Stop reason: SURG

## 2017-08-21 RX ORDER — FAMOTIDINE 10 MG/ML
20 INJECTION, SOLUTION INTRAVENOUS
Status: DISCONTINUED | OUTPATIENT
Start: 2017-08-21 | End: 2017-08-21 | Stop reason: HOSPADM

## 2017-08-21 RX ORDER — LIDOCAINE HYDROCHLORIDE 10 MG/ML
INJECTION, SOLUTION INFILTRATION; PERINEURAL AS NEEDED
Status: DISCONTINUED | OUTPATIENT
Start: 2017-08-21 | End: 2017-08-21 | Stop reason: HOSPADM

## 2017-08-21 RX ORDER — DIPHENHYDRAMINE HYDROCHLORIDE 50 MG/ML
INJECTION INTRAMUSCULAR; INTRAVENOUS
Status: COMPLETED
Start: 2017-08-21 | End: 2017-08-21

## 2017-08-21 RX ADMIN — KETAMINE HYDROCHLORIDE 50 MG: 100 INJECTION INTRAMUSCULAR; INTRAVENOUS at 07:53

## 2017-08-21 RX ADMIN — DIPHENHYDRAMINE HYDROCHLORIDE 12.5 MG: 50 INJECTION INTRAMUSCULAR; INTRAVENOUS at 07:12

## 2017-08-21 RX ADMIN — FAMOTIDINE 20 MG: 10 INJECTION, SOLUTION INTRAVENOUS at 07:16

## 2017-08-21 RX ADMIN — ONDANSETRON 4 MG: 2 INJECTION, SOLUTION INTRAMUSCULAR; INTRAVENOUS at 07:16

## 2017-08-21 RX ADMIN — LIDOCAINE HYDROCHLORIDE 100 MG: 20 INJECTION, SOLUTION INFILTRATION; PERINEURAL at 07:53

## 2017-08-21 RX ADMIN — SODIUM CHLORIDE, POTASSIUM CHLORIDE, SODIUM LACTATE AND CALCIUM CHLORIDE: 600; 310; 30; 20 INJECTION, SOLUTION INTRAVENOUS at 07:44

## 2017-08-21 RX ADMIN — KETAMINE HYDROCHLORIDE 20 MG: 100 INJECTION INTRAMUSCULAR; INTRAVENOUS at 07:58

## 2017-08-21 RX ADMIN — PROPOFOL 30 MG: 10 INJECTION, EMULSION INTRAVENOUS at 07:59

## 2017-08-21 RX ADMIN — LIDOCAINE HYDROCHLORIDE 100 MG: 20 INJECTION, SOLUTION INFILTRATION; PERINEURAL at 07:50

## 2017-08-21 RX ADMIN — DIPHENHYDRAMINE HYDROCHLORIDE 12.5 MG: 50 INJECTION, SOLUTION INTRAMUSCULAR; INTRAVENOUS at 07:12

## 2017-08-21 RX ADMIN — PROPOFOL 30 MG: 10 INJECTION, EMULSION INTRAVENOUS at 07:53

## 2017-08-21 RX ADMIN — MIDAZOLAM HYDROCHLORIDE 1 MG: 1 INJECTION, SOLUTION INTRAMUSCULAR; INTRAVENOUS at 07:53

## 2017-08-21 RX ADMIN — TOPICAL ANESTHETIC 2 SPRAY: 200 SPRAY DENTAL; PERIODONTAL at 07:49

## 2017-08-21 RX ADMIN — PROPOFOL 30 MG: 10 INJECTION, EMULSION INTRAVENOUS at 07:56

## 2017-08-21 RX ADMIN — IPRATROPIUM BROMIDE AND ALBUTEROL SULFATE 3 ML: .5; 3 SOLUTION RESPIRATORY (INHALATION) at 07:13

## 2017-08-21 RX ADMIN — MIDAZOLAM HYDROCHLORIDE 1 MG: 1 INJECTION, SOLUTION INTRAMUSCULAR; INTRAVENOUS at 07:56

## 2017-08-21 NOTE — PLAN OF CARE
Problem: Patient Care Overview (Adult)  Goal: Plan of Care Review  Outcome: Ongoing (interventions implemented as appropriate)    08/21/17 0703   Coping/Psychosocial Response Interventions   Plan Of Care Reviewed With patient;spouse   Patient Care Overview   Progress no change   Outcome Evaluation   Outcome Summary/Follow up Plan VSS, NO C/O, READY FOR PROCEDURE       Goal: Adult Individualization and Mutuality  Outcome: Ongoing (interventions implemented as appropriate)    Problem: Bronchoscopy (Adult)  Goal: Signs and Symptoms of Listed Potential Problems Will be Absent or Manageable (Bronchoscopy)  Outcome: Ongoing (interventions implemented as appropriate)

## 2017-08-21 NOTE — OP NOTE
Bronchoscopy with BAL of the RML and transbronchial biopsies of the RLL     Indications:  Diffuse bilateral micronodular pulmonary infiltrates     Sedation:  MAC with LMA and Ketamine, Versed and Propofol      Procedure note:  An informed consent was obtained from the patient. Risks and benefits of the procedure were explained at length.      Time out was performed.    The bronchoscope was inserted into the airway through the LMA tube. 5 ml Lidocaine 2% were used to anesthetize the cords and another 5 ml instilled into the trachea.   The tracheobronchial tree was examined at the level of segmental and subsegmental bronchi. The bronchial mucosa looked normal and there was no endobronchial lesions.  BAL of the RML, medal sgment was performed by instillation of 120 ml saline. Return was about 40 ml and was cloudy in color. Fluid was sent for bacterial culture, cell count, AFB and cytology.   6 transbronchial biopsies of the anterior and lateral segments were done with fluoroscopy guidance.      Estimated blood loss was: < 3 ml  The patient tolerated the procedure very well without immediate complications.

## 2017-08-21 NOTE — PLAN OF CARE
Problem: Bronchoscopy (Adult)  Goal: Signs and Symptoms of Listed Potential Problems Will be Absent or Manageable (Bronchoscopy)  Outcome: Ongoing (interventions implemented as appropriate)    08/21/17 0855   Bronchoscopy   Problems Assessed (Bronchoscopy) all   Problems Present (Bronchoscopy) none

## 2017-08-21 NOTE — ANESTHESIA PREPROCEDURE EVALUATION
Anesthesia Evaluation     Patient summary reviewed and Nursing notes reviewed   no history of anesthetic complications:  NPO Solid Status: > 8 hours  NPO Liquid Status: > 8 hours     Airway   Mallampati: II  TM distance: >3 FB  Neck ROM: full  possible difficult intubation  Dental    (+) edentulous    Pulmonary    (+) a smoker (quit 2002 smoked for 40yrs) Former, COPD severe, shortness of breath, sleep apnea (doesn't use CPAP), decreased breath sounds,     ROS comment: 02 2L   INDICATION:  Cough and congestion. Shortness of air      COMPARISON:  06/14/2017      FINDINGS: PA and lateral views of the chest.  Heart and mediastinal  contours are normal.  The lungs are hyperinflated suggesting background  obstructive lung disease. No focal consolidation. Mildly increased  chronic interstitial opacities are unchanged.  No pneumothorax or  pleural effusion.      IMPRESSION:  No acute findings. Background COPD.      This report was finalized on 6/16/2017 8:48 AM by Dr. Jose Carlos Costa MD.      Cardiovascular   Exercise tolerance: poor (<4 METS)    Rhythm: regular  Rate: normal    (+) hypertension well controlled, past MI , PVD (swelling in legs), hyperlipidemia    ROS comment: · Left ventricular function is normal. Calculated EF = 70.4%. Estimated EF was in agreement with the calculated EF. Estimated EF = 70%. Normal left ventricular cavity size and wall thickness noted. All left ventricular wall segments contract normally. Left ventricular diastolic dysfunction is noted (grade I) consistent with impaired relaxation.  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  Use of blood products discussed with patient  Consented to blood products.

## 2017-08-21 NOTE — H&P
Please review the scanned H&P dated 8/7/17.  No changes in the H&P since the last visit.  Essentially, this is a 66-year-old male patient with COPD and bilateral micronodular, peripherally located infiltrates suggestive of an interstitial process.  He is here today for bronchoscopy with BAL and TBB.

## 2017-08-21 NOTE — PLAN OF CARE
Problem: Patient Care Overview (Adult)  Goal: Plan of Care Review  Outcome: Outcome(s) achieved Date Met:  08/21/17 08/21/17 0900   Coping/Psychosocial Response Interventions   Plan Of Care Reviewed With patient;spouse   Patient Care Overview   Progress improving   Outcome Evaluation   Outcome Summary/Follow up Plan VSS, NO C/O, ENC. TO USE HOME O2 TODAY, TAKING PO, READY FOR D/C HOME       Goal: Adult Individualization and Mutuality  Outcome: Outcome(s) achieved Date Met:  08/21/17    Problem: Bronchoscopy (Adult)  Goal: Signs and Symptoms of Listed Potential Problems Will be Absent or Manageable (Bronchoscopy)  Outcome: Outcome(s) achieved Date Met:  08/21/17

## 2017-08-21 NOTE — ANESTHESIA POSTPROCEDURE EVALUATION
Patient: Esdras Ko    Procedure Summary     Date Anesthesia Start Anesthesia Stop Room / Location    08/21/17 0744 0833 [facility] / [facility]       Procedure Diagnosis Surgeon Provider    BRONCHOSCOPY with fluro and biopsy and lavage. (N/A Bronchus) (R91.8) MD Porsha Hart CRNA          Anesthesia Type: MAC  Last vitals  BP        Temp        Pulse       Resp        SpO2          Post Anesthesia Care and Evaluation    Patient location during evaluation: PHASE II  Patient participation: complete - patient participated  Level of consciousness: awake and alert  Pain score: 0  Pain management: adequate  Airway patency: patent  Anesthetic complications: No anesthetic complications    Cardiovascular status: acceptable  Respiratory status: acceptable  Hydration status: acceptable

## 2017-08-22 LAB
LAB AP CASE REPORT: NORMAL
LAB AP CASE REPORT: NORMAL
Lab: NORMAL
Lab: NORMAL
PATH REPORT.FINAL DX SPEC: NORMAL
STAT OF ADQ CVX/VAG CYTO-IMP: NORMAL

## 2017-08-23 LAB
BACTERIA SPEC AEROBE CULT: NO GROWTH
GRAM STN SPEC: NORMAL

## 2017-09-01 ENCOUNTER — APPOINTMENT (OUTPATIENT)
Dept: SLEEP MEDICINE | Facility: HOSPITAL | Age: 67
End: 2017-09-01

## 2017-09-07 ENCOUNTER — HOSPITAL ENCOUNTER (OUTPATIENT)
Dept: SLEEP MEDICINE | Facility: HOSPITAL | Age: 67
Discharge: HOME | End: 2017-09-07
Attending: INTERNAL MEDICINE | Admitting: INTERNAL MEDICINE

## 2017-09-07 DIAGNOSIS — G47.33 OSA (OBSTRUCTIVE SLEEP APNEA): ICD-10-CM

## 2017-09-07 PROCEDURE — 95810 POLYSOM 6/> YRS 4/> PARAM: CPT

## 2017-09-13 DIAGNOSIS — G47.33 OSA (OBSTRUCTIVE SLEEP APNEA): Primary | ICD-10-CM

## 2017-09-14 ENCOUNTER — TELEPHONE (OUTPATIENT)
Dept: SLEEP MEDICINE | Facility: HOSPITAL | Age: 67
End: 2017-09-14

## 2017-09-19 ENCOUNTER — TELEPHONE (OUTPATIENT)
Dept: SLEEP MEDICINE | Facility: HOSPITAL | Age: 67
End: 2017-09-19

## 2017-09-19 NOTE — TELEPHONE ENCOUNTER
Tech called out to Dr. Topete, Pt is not wanting to do Over night titration study. Starting pt On Auto Cpap per Dr. Topete. Pt cancled Titration study portion. MAB

## 2017-10-02 LAB
MYCOBACTERIUM SPEC CULT: NORMAL
NIGHT BLUE STAIN TISS: NORMAL

## 2017-10-26 ENCOUNTER — TELEPHONE (OUTPATIENT)
Dept: ONCOLOGY | Facility: HOSPITAL | Age: 67
End: 2017-10-26

## 2017-10-26 NOTE — TELEPHONE ENCOUNTER
Pt wife called, states pt has been feeling great and would like to push his appt out to February which would be one year.  Reviewed with Dr Restrepo and ok to push out to February. Message sent to scheduling to reschedule.

## 2018-02-13 ENCOUNTER — LAB (OUTPATIENT)
Dept: LAB | Facility: HOSPITAL | Age: 68
End: 2018-02-13

## 2018-02-13 ENCOUNTER — OFFICE VISIT (OUTPATIENT)
Dept: ONCOLOGY | Facility: CLINIC | Age: 68
End: 2018-02-13

## 2018-02-13 VITALS
HEART RATE: 103 BPM | WEIGHT: 306.2 LBS | TEMPERATURE: 97.8 F | BODY MASS INDEX: 46.41 KG/M2 | SYSTOLIC BLOOD PRESSURE: 123 MMHG | HEIGHT: 68 IN | RESPIRATION RATE: 16 BRPM | DIASTOLIC BLOOD PRESSURE: 82 MMHG | OXYGEN SATURATION: 93 %

## 2018-02-13 DIAGNOSIS — C83.10 MANTLE CELL LYMPHOMA, UNSPECIFIED BODY REGION (HCC): ICD-10-CM

## 2018-02-13 DIAGNOSIS — C83.10 MANTLE CELL LYMPHOMA, UNSPECIFIED BODY REGION (HCC): Primary | ICD-10-CM

## 2018-02-13 LAB
ALBUMIN SERPL-MCNC: 4.1 G/DL (ref 3.5–5.2)
ALBUMIN/GLOB SERPL: 1.6 G/DL
ALP SERPL-CCNC: 103 U/L (ref 40–129)
ALT SERPL W P-5'-P-CCNC: 23 U/L (ref 5–41)
ANION GAP SERPL CALCULATED.3IONS-SCNC: 16.8 MMOL/L
AST SERPL-CCNC: 18 U/L (ref 5–40)
BASOPHILS # BLD AUTO: 0.05 10*3/MM3 (ref 0–0.2)
BASOPHILS NFR BLD AUTO: 0.5 % (ref 0–2)
BILIRUB SERPL-MCNC: 0.7 MG/DL (ref 0.2–1.2)
BUN BLD-MCNC: 20 MG/DL (ref 8–23)
BUN/CREAT SERPL: 15.7 (ref 7–25)
CALCIUM SPEC-SCNC: 9.4 MG/DL (ref 8.8–10.5)
CHLORIDE SERPL-SCNC: 96 MMOL/L (ref 98–107)
CO2 SERPL-SCNC: 30.2 MMOL/L (ref 22–29)
CREAT BLD-MCNC: 1.27 MG/DL (ref 0.76–1.27)
DEPRECATED RDW RBC AUTO: 43.8 FL (ref 37–54)
EOSINOPHIL # BLD AUTO: 0.29 10*3/MM3 (ref 0.1–0.3)
EOSINOPHIL NFR BLD AUTO: 3.2 % (ref 0–4)
ERYTHROCYTE [DISTWIDTH] IN BLOOD BY AUTOMATED COUNT: 15.2 % (ref 11.5–14.5)
GFR SERPL CREATININE-BSD FRML MDRD: 56 ML/MIN/1.73
GLOBULIN UR ELPH-MCNC: 2.6 GM/DL
GLUCOSE BLD-MCNC: 191 MG/DL (ref 65–99)
HCT VFR BLD AUTO: 50.1 % (ref 42–52)
HGB BLD-MCNC: 17.3 G/DL (ref 14–18)
IMM GRANULOCYTES # BLD: 0.1 10*3/MM3 (ref 0–0.03)
IMM GRANULOCYTES NFR BLD: 1.1 % (ref 0–0.5)
LDH SERPL-CCNC: 192 U/L (ref 135–225)
LYMPHOCYTES # BLD AUTO: 1.44 10*3/MM3 (ref 0.6–4.8)
LYMPHOCYTES NFR BLD AUTO: 15.7 % (ref 20–45)
MCH RBC QN AUTO: 28.8 PG (ref 27–31)
MCHC RBC AUTO-ENTMCNC: 34.5 G/DL (ref 31–37)
MCV RBC AUTO: 83.5 FL (ref 80–94)
MONOCYTES # BLD AUTO: 0.65 10*3/MM3 (ref 0–1)
MONOCYTES NFR BLD AUTO: 7.1 % (ref 3–8)
NEUTROPHILS # BLD AUTO: 6.67 10*3/MM3 (ref 1.5–8.3)
NEUTROPHILS NFR BLD AUTO: 72.4 % (ref 45–70)
NRBC BLD MANUAL-RTO: 0 /100 WBC (ref 0–0)
PLATELET # BLD AUTO: 165 10*3/MM3 (ref 140–500)
PMV BLD AUTO: 10.9 FL (ref 7.4–10.4)
POTASSIUM BLD-SCNC: 3.2 MMOL/L (ref 3.5–5.2)
PROT SERPL-MCNC: 6.7 G/DL (ref 6–8.5)
RBC # BLD AUTO: 6 10*6/MM3 (ref 4.7–6.1)
SODIUM BLD-SCNC: 143 MMOL/L (ref 136–145)
WBC NRBC COR # BLD: 9.2 10*3/MM3 (ref 4.8–10.8)

## 2018-02-13 PROCEDURE — 83615 LACTATE (LD) (LDH) ENZYME: CPT | Performed by: INTERNAL MEDICINE

## 2018-02-13 PROCEDURE — 99213 OFFICE O/P EST LOW 20 MIN: CPT | Performed by: INTERNAL MEDICINE

## 2018-02-13 PROCEDURE — 80053 COMPREHEN METABOLIC PANEL: CPT | Performed by: INTERNAL MEDICINE

## 2018-02-13 PROCEDURE — 85025 COMPLETE CBC W/AUTO DIFF WBC: CPT | Performed by: INTERNAL MEDICINE

## 2018-02-13 PROCEDURE — 36415 COLL VENOUS BLD VENIPUNCTURE: CPT | Performed by: INTERNAL MEDICINE

## 2018-02-13 NOTE — PROGRESS NOTES
Subjective   REASONS FOR FOLLOW-UP:   History of mantle cell lymphoma.       History of Present Illness    Mr. Ko is a 65-year-old man returning for followup of his history of mantle cell lymphoma previously treated with bendamustine/rituximab followed by maintenance Rituxan through the winter of 2013.   He has had no therapy or disease progression since that time.     The patient returned today for a 6 month follow-up and review.  He has been doing well with no unusual weight loss, night sweats, severe fatigue and has not noted any lymphadenopathy.    The patient underwent a bronchoscopy in 2017 for evaluation of reticulonodular opacities in the lung, I reviewed the pathology which was negative for any malignancy.    PAST MEDICAL HISTORY:   1. Seizure disorder after a head trauma in .   2. Bladder cancer diagnosed in  for which he gets annual cystoscopy, currently with no evidence of disease.   3. Chronic obstructive pulmonary disease.   4. Psoriasis.   5. Hypertension.   6. Chronic renal insufficiency complicated by acute renal failure secondary to tumor lysis in 2011.   7. Ischemic cerebrovascular accidents and TIAs with the most recent event in 2014, which was a TIA.   8. History of tobacco abuse.       SOCIAL HISTORY: He is  and retired mostly from factory work. He quit smoking tobacco around  after 80 pack-year. He drank heavily in the past but none since around . Denies illicit drug use. He has had prior blood transfusions.     FAMILY HISTORY: His father had pancreatic cancer and  at age 56. Sister has anemia. Mother had heart disease. Brother has hypertension.       Review of Systems   Constitutional: Negative for activity change, fatigue and unexpected weight change.   Respiratory: Negative for shortness of breath.    Cardiovascular: Positive for leg swelling. Negative for chest pain and palpitations.   Gastrointestinal: Positive for abdominal pain.  "Negative for abdominal distention and blood in stool.   Skin: Negative for pallor and rash.   Neurological: Negative for weakness.   Hematological: Negative for adenopathy.      A comprehensive 14 point review of systems was performed and was negative except as mentioned.    Medications:  The current medication list was reviewed in the EMR    ALLERGIES:    Allergies   Allergen Reactions   • Amoxicillin Hives   • Ceftin [Cefuroxime Axetil] Hives   • Codeine    • Flonase [Fluticasone] Hives   • Pharbetol [Acetaminophen] Hives   • Spiriva Handihaler [Tiotropium Bromide Monohydrate] Hives   • Sulfa Antibiotics Hives   • Phenobarbital Rash       Objective      Vitals:    02/13/18 1038   BP: 123/82   Pulse: 103   Resp: 16   Temp: 97.8 °F (36.6 °C)   TempSrc: Oral   SpO2: 93%   Weight: (!) 139 kg (306 lb 3.2 oz)   Height: 172.7 cm (67.99\")   PainSc: 0-No pain     Current Status 2/13/2018   ECOG score 0       Physical Exam   Constitutional: He appears well-nourished.   Morbidly obese   Cardiovascular: Normal rate and regular rhythm.    Pulmonary/Chest: Effort normal. No respiratory distress.   Abdominal: Soft. He exhibits no mass.   Musculoskeletal: He exhibits edema.   Lymphadenopathy:     He has no cervical adenopathy.     He has no axillary adenopathy.        Right: No supraclavicular adenopathy present.        Left: No supraclavicular adenopathy present.   Skin: No erythema. No pallor.          RECENT LABS:  Hematology WBC   Date Value Ref Range Status   02/13/2018 9.20 4.80 - 10.80 10*3/mm3 Final     RBC   Date Value Ref Range Status   02/13/2018 6.00 4.70 - 6.10 10*6/mm3 Final     Hemoglobin   Date Value Ref Range Status   02/13/2018 17.3 14.0 - 18.0 g/dL Final     Hematocrit   Date Value Ref Range Status   02/13/2018 50.1 42.0 - 52.0 % Final     Platelets   Date Value Ref Range Status   02/13/2018 165 140 - 500 10*3/mm3 Final     Lab Results   Component Value Date    GLUCOSE 128 (H) 07/18/2017    BUN 27 (H) " 07/18/2017    CREATININE 1.48 (H) 07/18/2017    EGFRIFNONA 47 (L) 07/18/2017    EGFRIFAFRI  08/30/2016      Comment:      <15 Indicative of kidney failure.    BCR 18.2 07/18/2017    CO2 30.5 (H) 07/18/2017    CALCIUM 9.9 07/18/2017    ALBUMIN 4.20 07/18/2017    LABIL2 1.5 07/18/2017    AST 19 07/18/2017    ALT 21 07/18/2017              Assessment/Plan     Mr. Ko is a 65-year-old man returning for followup of his history mantle cell lymphoma with bone marrow involvement at diagnosis. He was treated with bendamustine/rituximab followed by maintenance rituximab completed in the winter of 2013.     He returned today for routine follow-up with no specific complaints.  He has no unusual weight loss, night sweats, fever or fatigue out of his ordinary.  His exam is negative for any obvious lymphadenopathy or splenomegaly although severely limited secondary to his morbid obesity.    His most recent scans were performed in July 2017 with no lymphadenopathy or splenomegaly noted.    I discussed with the patient today his routine follow-up.  He did not want to undergo any form of surveillance scan unless a symptom develops that needs evaluation.  I will see him back in 6 months for review.  I recommended the patient called our clinic in the interim if he develops weight loss, night sweats, lymphadenopathy, etc.            2/13/2018      CC:

## 2018-03-14 ENCOUNTER — PREP FOR SURGERY (OUTPATIENT)
Dept: OTHER | Facility: HOSPITAL | Age: 68
End: 2018-03-14

## 2018-03-14 RX ORDER — SODIUM CHLORIDE 9 MG/ML
40 INJECTION, SOLUTION INTRAVENOUS AS NEEDED
Status: CANCELLED | OUTPATIENT
Start: 2018-03-14

## 2018-03-14 RX ORDER — BISACODYL 5 MG/1
5 TABLET, DELAYED RELEASE ORAL DAILY PRN
Status: CANCELLED | OUTPATIENT
Start: 2018-03-14

## 2018-03-14 RX ORDER — SODIUM CHLORIDE 0.9 % (FLUSH) 0.9 %
1-10 SYRINGE (ML) INJECTION AS NEEDED
Status: CANCELLED | OUTPATIENT
Start: 2018-03-14

## 2018-03-14 RX ORDER — NICOTINE POLACRILEX 4 MG
15 LOZENGE BUCCAL
Status: CANCELLED | OUTPATIENT
Start: 2018-03-14

## 2018-03-14 RX ORDER — DEXTROSE MONOHYDRATE 25 G/50ML
25 INJECTION, SOLUTION INTRAVENOUS
Status: CANCELLED | OUTPATIENT
Start: 2018-03-14

## 2018-03-14 RX ORDER — CALCIUM CARBONATE 200(500)MG
1 TABLET,CHEWABLE ORAL 2 TIMES DAILY PRN
Status: CANCELLED | OUTPATIENT
Start: 2018-03-14

## 2018-03-15 ENCOUNTER — HOSPITAL ENCOUNTER (INPATIENT)
Facility: HOSPITAL | Age: 68
LOS: 4 days | Discharge: HOME | End: 2018-03-19
Attending: FAMILY MEDICINE | Admitting: FAMILY MEDICINE

## 2018-03-15 PROBLEM — IMO0002 UNCONTROLLED DIABETES MELLITUS: Status: ACTIVE | Noted: 2018-03-15

## 2018-03-15 LAB
ANION GAP SERPL CALCULATED.3IONS-SCNC: 20.9 MMOL/L
BUN BLD-MCNC: 36 MG/DL (ref 8–23)
BUN/CREAT SERPL: 21.1 (ref 7–25)
CALCIUM SPEC-SCNC: 9.9 MG/DL (ref 8.8–10.5)
CHLORIDE SERPL-SCNC: 81 MMOL/L (ref 98–107)
CO2 SERPL-SCNC: 26.1 MMOL/L (ref 22–29)
CREAT BLD-MCNC: 1.71 MG/DL (ref 0.76–1.27)
GFR SERPL CREATININE-BSD FRML MDRD: 40 ML/MIN/1.73
GLUCOSE BLD-MCNC: 675 MG/DL (ref 65–99)
GLUCOSE BLD-MCNC: 686 MG/DL (ref 65–99)
GLUCOSE BLDC GLUCOMTR-MCNC: 387 MG/DL (ref 70–130)
GLUCOSE BLDC GLUCOMTR-MCNC: 404 MG/DL (ref 70–130)
GLUCOSE BLDC GLUCOMTR-MCNC: 414 MG/DL (ref 70–130)
GLUCOSE BLDC GLUCOMTR-MCNC: 425 MG/DL (ref 70–130)
GLUCOSE BLDC GLUCOMTR-MCNC: 425 MG/DL (ref 70–130)
GLUCOSE BLDC GLUCOMTR-MCNC: 467 MG/DL (ref 70–130)
GLUCOSE BLDC GLUCOMTR-MCNC: 475 MG/DL (ref 70–130)
GLUCOSE BLDC GLUCOMTR-MCNC: 479 MG/DL (ref 70–130)
GLUCOSE BLDC GLUCOMTR-MCNC: 496 MG/DL (ref 70–130)
GLUCOSE BLDC GLUCOMTR-MCNC: 506 MG/DL (ref 70–130)
GLUCOSE BLDC GLUCOMTR-MCNC: 553 MG/DL (ref 70–130)
GLUCOSE BLDC GLUCOMTR-MCNC: 571 MG/DL (ref 70–130)
GLUCOSE BLDC GLUCOMTR-MCNC: 586 MG/DL (ref 70–130)
GLUCOSE BLDC GLUCOMTR-MCNC: >599 MG/DL (ref 70–130)
GLUCOSE BLDC GLUCOMTR-MCNC: >599 MG/DL (ref 70–130)
HBA1C MFR BLD: 10 % (ref 4.8–5.6)
POTASSIUM BLD-SCNC: 3.9 MMOL/L (ref 3.5–5.2)
SODIUM BLD-SCNC: 128 MMOL/L (ref 136–145)

## 2018-03-15 PROCEDURE — 63710000001 INSULIN DETEMIR PER 5 UNITS: Performed by: FAMILY MEDICINE

## 2018-03-15 PROCEDURE — 63710000001 INSULIN REGULAR HUMAN PER 5 UNITS: Performed by: FAMILY MEDICINE

## 2018-03-15 PROCEDURE — 63710000001 INSULIN ASPART PER 5 UNITS: Performed by: FAMILY MEDICINE

## 2018-03-15 PROCEDURE — 83036 HEMOGLOBIN GLYCOSYLATED A1C: CPT | Performed by: FAMILY MEDICINE

## 2018-03-15 PROCEDURE — 82947 ASSAY GLUCOSE BLOOD QUANT: CPT | Performed by: FAMILY MEDICINE

## 2018-03-15 PROCEDURE — 94640 AIRWAY INHALATION TREATMENT: CPT

## 2018-03-15 PROCEDURE — 82962 GLUCOSE BLOOD TEST: CPT

## 2018-03-15 PROCEDURE — 80048 BASIC METABOLIC PNL TOTAL CA: CPT | Performed by: FAMILY MEDICINE

## 2018-03-15 PROCEDURE — 94799 UNLISTED PULMONARY SVC/PX: CPT

## 2018-03-15 PROCEDURE — 25010000002 ENOXAPARIN PER 10 MG: Performed by: FAMILY MEDICINE

## 2018-03-15 RX ORDER — SODIUM CHLORIDE 9 MG/ML
40 INJECTION, SOLUTION INTRAVENOUS AS NEEDED
Status: DISCONTINUED | OUTPATIENT
Start: 2018-03-15 | End: 2018-03-19 | Stop reason: HOSPADM

## 2018-03-15 RX ORDER — ALBUTEROL SULFATE 2.5 MG/3ML
2.5 SOLUTION RESPIRATORY (INHALATION) EVERY 6 HOURS PRN
Status: DISCONTINUED | OUTPATIENT
Start: 2018-03-15 | End: 2018-03-19 | Stop reason: HOSPADM

## 2018-03-15 RX ORDER — AMLODIPINE BESYLATE 5 MG/1
10 TABLET ORAL EVERY MORNING
Status: DISCONTINUED | OUTPATIENT
Start: 2018-03-15 | End: 2018-03-19 | Stop reason: HOSPADM

## 2018-03-15 RX ORDER — CLOBETASOL PROPIONATE 0.5 MG/G
CREAM TOPICAL EVERY 12 HOURS SCHEDULED
Status: DISCONTINUED | OUTPATIENT
Start: 2018-03-15 | End: 2018-03-19 | Stop reason: HOSPADM

## 2018-03-15 RX ORDER — PANTOPRAZOLE SODIUM 40 MG/1
40 TABLET, DELAYED RELEASE ORAL EVERY MORNING
Status: DISCONTINUED | OUTPATIENT
Start: 2018-03-15 | End: 2018-03-19 | Stop reason: HOSPADM

## 2018-03-15 RX ORDER — BISACODYL 5 MG/1
5 TABLET, DELAYED RELEASE ORAL DAILY PRN
Status: DISCONTINUED | OUTPATIENT
Start: 2018-03-15 | End: 2018-03-19 | Stop reason: HOSPADM

## 2018-03-15 RX ORDER — NICOTINE POLACRILEX 4 MG
15 LOZENGE BUCCAL
Status: DISCONTINUED | OUTPATIENT
Start: 2018-03-15 | End: 2018-03-19 | Stop reason: HOSPADM

## 2018-03-15 RX ORDER — METOPROLOL TARTRATE 50 MG/1
50 TABLET, FILM COATED ORAL EVERY 12 HOURS SCHEDULED
Status: DISCONTINUED | OUTPATIENT
Start: 2018-03-15 | End: 2018-03-19 | Stop reason: HOSPADM

## 2018-03-15 RX ORDER — LUBIPROSTONE 24 UG/1
24 CAPSULE ORAL 2 TIMES DAILY WITH MEALS
Status: DISCONTINUED | OUTPATIENT
Start: 2018-03-15 | End: 2018-03-19 | Stop reason: HOSPADM

## 2018-03-15 RX ORDER — SODIUM CHLORIDE 0.9 % (FLUSH) 0.9 %
1-10 SYRINGE (ML) INJECTION AS NEEDED
Status: DISCONTINUED | OUTPATIENT
Start: 2018-03-15 | End: 2018-03-19 | Stop reason: HOSPADM

## 2018-03-15 RX ORDER — BUDESONIDE AND FORMOTEROL FUMARATE DIHYDRATE 160; 4.5 UG/1; UG/1
2 AEROSOL RESPIRATORY (INHALATION)
Status: DISCONTINUED | OUTPATIENT
Start: 2018-03-15 | End: 2018-03-19 | Stop reason: HOSPADM

## 2018-03-15 RX ORDER — LEVETIRACETAM 500 MG/1
500 TABLET ORAL EVERY 12 HOURS SCHEDULED
Status: DISCONTINUED | OUTPATIENT
Start: 2018-03-15 | End: 2018-03-19 | Stop reason: HOSPADM

## 2018-03-15 RX ORDER — DEXTROSE MONOHYDRATE 25 G/50ML
25 INJECTION, SOLUTION INTRAVENOUS
Status: DISCONTINUED | OUTPATIENT
Start: 2018-03-15 | End: 2018-03-19 | Stop reason: HOSPADM

## 2018-03-15 RX ORDER — CLOPIDOGREL BISULFATE 75 MG/1
75 TABLET ORAL EVERY MORNING
Status: DISCONTINUED | OUTPATIENT
Start: 2018-03-15 | End: 2018-03-19 | Stop reason: HOSPADM

## 2018-03-15 RX ORDER — ALLOPURINOL 300 MG/1
300 TABLET ORAL EVERY MORNING
Status: DISCONTINUED | OUTPATIENT
Start: 2018-03-15 | End: 2018-03-19 | Stop reason: HOSPADM

## 2018-03-15 RX ORDER — CALCIUM CARBONATE 200(500)MG
1 TABLET,CHEWABLE ORAL 2 TIMES DAILY PRN
Status: DISCONTINUED | OUTPATIENT
Start: 2018-03-15 | End: 2018-03-19 | Stop reason: HOSPADM

## 2018-03-15 RX ORDER — SODIUM CHLORIDE 9 MG/ML
75 INJECTION, SOLUTION INTRAVENOUS CONTINUOUS
Status: DISCONTINUED | OUTPATIENT
Start: 2018-03-15 | End: 2018-03-17

## 2018-03-15 RX ORDER — CETIRIZINE HYDROCHLORIDE 10 MG/1
10 TABLET ORAL EVERY MORNING
Status: DISCONTINUED | OUTPATIENT
Start: 2018-03-15 | End: 2018-03-19 | Stop reason: HOSPADM

## 2018-03-15 RX ORDER — ASPIRIN 325 MG
325 TABLET ORAL EVERY MORNING
Status: DISCONTINUED | OUTPATIENT
Start: 2018-03-15 | End: 2018-03-19 | Stop reason: HOSPADM

## 2018-03-15 RX ORDER — FEBUXOSTAT 40 MG/1
80 TABLET, FILM COATED ORAL DAILY
Status: DISCONTINUED | OUTPATIENT
Start: 2018-03-15 | End: 2018-03-19 | Stop reason: HOSPADM

## 2018-03-15 RX ADMIN — CLOBETASOL PROPIONATE: 0.5 CREAM TOPICAL at 20:40

## 2018-03-15 RX ADMIN — METOPROLOL TARTRATE 50 MG: 50 TABLET ORAL at 08:35

## 2018-03-15 RX ADMIN — SODIUM CHLORIDE 20 UNITS/HR: 9 INJECTION, SOLUTION INTRAVENOUS at 22:23

## 2018-03-15 RX ADMIN — IPRATROPIUM BROMIDE 0.5 MG: 0.5 SOLUTION RESPIRATORY (INHALATION) at 12:06

## 2018-03-15 RX ADMIN — FEBUXOSTAT 80 MG: 40 TABLET ORAL at 08:35

## 2018-03-15 RX ADMIN — ENOXAPARIN SODIUM 40 MG: 40 INJECTION SUBCUTANEOUS at 08:35

## 2018-03-15 RX ADMIN — CLOBETASOL PROPIONATE: 0.5 CREAM TOPICAL at 08:39

## 2018-03-15 RX ADMIN — METOPROLOL TARTRATE 50 MG: 50 TABLET ORAL at 20:43

## 2018-03-15 RX ADMIN — SODIUM CHLORIDE 100 ML/HR: 9 INJECTION, SOLUTION INTRAVENOUS at 08:20

## 2018-03-15 RX ADMIN — HUMAN INSULIN 5 UNITS: 100 INJECTION, SOLUTION SUBCUTANEOUS at 08:30

## 2018-03-15 RX ADMIN — IPRATROPIUM BROMIDE 0.5 MG: 0.5 SOLUTION RESPIRATORY (INHALATION) at 19:23

## 2018-03-15 RX ADMIN — INSULIN ASPART 10 UNITS: 100 INJECTION, SOLUTION INTRAVENOUS; SUBCUTANEOUS at 06:51

## 2018-03-15 RX ADMIN — SODIUM CHLORIDE 18 UNITS/HR: 9 INJECTION, SOLUTION INTRAVENOUS at 23:32

## 2018-03-15 RX ADMIN — CETIRIZINE HYDROCHLORIDE 10 MG: 10 TABLET, FILM COATED ORAL at 08:36

## 2018-03-15 RX ADMIN — LEVETIRACETAM 500 MG: 500 TABLET ORAL at 20:41

## 2018-03-15 RX ADMIN — SODIUM CHLORIDE 100 ML/HR: 9 INJECTION, SOLUTION INTRAVENOUS at 17:33

## 2018-03-15 RX ADMIN — LEVETIRACETAM 500 MG: 500 TABLET ORAL at 08:36

## 2018-03-15 RX ADMIN — INSULIN DETEMIR 25 UNITS: 100 INJECTION, SOLUTION SUBCUTANEOUS at 20:37

## 2018-03-15 RX ADMIN — CLOPIDOGREL 75 MG: 75 TABLET, FILM COATED ORAL at 08:36

## 2018-03-15 RX ADMIN — AMLODIPINE BESYLATE 10 MG: 5 TABLET ORAL at 08:36

## 2018-03-15 RX ADMIN — PANTOPRAZOLE SODIUM 40 MG: 40 TABLET, DELAYED RELEASE ORAL at 08:35

## 2018-03-15 RX ADMIN — IPRATROPIUM BROMIDE 0.5 MG: 0.5 SOLUTION RESPIRATORY (INHALATION) at 16:27

## 2018-03-15 RX ADMIN — BUDESONIDE AND FORMOTEROL FUMARATE DIHYDRATE 2 PUFF: 160; 4.5 AEROSOL RESPIRATORY (INHALATION) at 19:23

## 2018-03-15 RX ADMIN — HUMAN INSULIN 10 UNITS: 100 INJECTION, SOLUTION SUBCUTANEOUS at 18:52

## 2018-03-15 RX ADMIN — HUMAN INSULIN 10 UNITS: 100 INJECTION, SOLUTION SUBCUTANEOUS at 15:04

## 2018-03-15 RX ADMIN — ALLOPURINOL 300 MG: 300 TABLET ORAL at 08:36

## 2018-03-15 RX ADMIN — LINAGLIPTIN 5 MG: 5 TABLET, FILM COATED ORAL at 08:36

## 2018-03-15 RX ADMIN — HUMAN INSULIN 10 UNITS: 100 INJECTION, SOLUTION SUBCUTANEOUS at 11:12

## 2018-03-15 RX ADMIN — ENOXAPARIN SODIUM 40 MG: 40 INJECTION SUBCUTANEOUS at 20:40

## 2018-03-15 RX ADMIN — BUDESONIDE AND FORMOTEROL FUMARATE DIHYDRATE 2 PUFF: 160; 4.5 AEROSOL RESPIRATORY (INHALATION) at 09:55

## 2018-03-15 RX ADMIN — ASPIRIN 325 MG: 325 TABLET, COATED ORAL at 08:36

## 2018-03-15 NOTE — NURSING NOTE
Discharge Planning Assessment  [facility]     Patient Name: Esdras Ko  MRN: [id number]  Today's Date: 3/15/2018    Admit Date: 3/15/2018          Discharge Needs Assessment     Row Name 03/15/18 1138       Living Environment    Potentially Unsafe Housing Conditions --   Wife states oxygen tubing feels unsafe stretched across the floow as though a falls hazard.    Row Name 03/15/18 1115       Discharge Needs Assessment    Discharge Coordination/Progress Spoke with Mr Ko and his wife (with permission) at bedside.  They live in an apartment here in [place].  He has not had home health in the past.  He currently has oxygen through [facility] @ 2l/min/nc.  Spoke with Macey @ [facility] and he has a concentraotr at home and that is all - no portable tank.   He has no other DME.  He has been independent with his ADL's and the wife drives him to his appointments.  The pharmacy is [facility] in [place].  There are no issues with paying for his prescriptions.   Facesheet verified.  He does not have an advanced directive and has no interest in such.  Plan is uncertain at this time. Will continue to follow.    Row Name 03/15/18 1052       Living Environment    Lives With spouse    Name(s) of Who Lives With Patient Charley Ko    Current Living Arrangements home/apartment/condo    Primary Care Provided by self    Provides Primary Care For no one, unable/limited ability to care for self    Able to Return to Prior Arrangements yes       Resource/Environmental Concerns    Resource/Environmental Concerns none            Discharge Plan     Row Name 03/15/18 2611       Plan    Plan home    Plan Comments Spoke with Mr Ko and his wife (with permission) at bedside.  They live in an apartment here in [place].  He has not had home health in the past.  He currently has oxygen through [facility] @ 2l/min/nc.  Spoke with Macey @ [facility] and he has a concentraotr at home and that is all - no portable tank.   He has no other  DME.  He has been independent with his ADL's and the wife drives him to his appointments.  The pharmacy is [email or web address] in [place].  There are no issues with paying for his prescriptions.   Facesheet verified.  He does not have an advanced directive and has no interest in such.  Plan is uncertain at this time. Will continue to follow.        Destination     No service coordination in this encounter.      Durable Medical Equipment     No service coordination in this encounter.      Dialysis/Infusion     No service coordination in this encounter.      Home Medical Care     No service coordination in this encounter.      Social Care     No service coordination in this encounter.                Demographic Summary     Row Name 03/15/18 2565       General Information    Admission Type inpatient    Arrived From home    Referral Source admission list;high risk screening    Reason for Consult discharge planning    Preferred Language English     Used During This Interaction no       Contact Information    Permission Granted to Share Info With             Functional Status    No documentation.           Psychosocial    No documentation.           Abuse/Neglect    No documentation.           Legal    No documentation.           Substance Abuse    No documentation.           Patient Forms    No documentation.         Melanie Galicia RN

## 2018-03-15 NOTE — NURSING NOTE
Contacted Dr. Arias to notify him regarding elevated glucose level of 686 and to inform him of symptoms pt was having (dizziness, nasua, hunger, thirst). He told me that he had orders pending and to give IV insulin.

## 2018-03-15 NOTE — PLAN OF CARE
Problem: Patient Care Overview  Goal: Discharge Needs Assessment  Outcome: Ongoing (interventions implemented as appropriate)   03/15/18 0542 03/15/18 1115   Discharge Needs Assessment   Patient/Family Anticipates Transition to home --    Transportation Concerns car, none --    Discharge Coordination/Progress --  Spoke with Mr Ko and his wife (with permission) at bedside. They live in an apartment here in [place]. He has not had home health in the past. He currently has oxygen through [facility] @ 2l/min/nc. Spoke with Macey @ [facility] and he has a concentraotr at home and that is all - no portable tank. He has no other DME. He has been independent with his ADL's and the wife drives him to his appointments. The pharmacy is [facility] in [place]. There are no issues with paying for his prescriptions. Facesheet verified. He does not have an advanced directive and has no interest in such. Plan is uncertain at this time. Will continue to follow.

## 2018-03-15 NOTE — NURSING NOTE
Notified Dr. Arias that per hospital policy, novalog was not able to be given in med/surg IV. As per his instructions (last resort) give sub q and recheck in 1 hour. Dr. Arias became agitated that I gave him a courtesy update that I gave subq due to policy as well as IV status. Aid, Shelly was told to recheck glucose at 8:00 am. Today.

## 2018-03-15 NOTE — PLAN OF CARE
Problem: Patient Care Overview  Goal: Plan of Care Review  Outcome: Ongoing (interventions implemented as appropriate)   03/15/18 1542   Coping/Psychosocial   Plan of Care Reviewed With patient;spouse   Plan of Care Review   Progress improving   OTHER   Outcome Summary VSS. Blood glucose levels steadly lowering, pt. receiving Novolin SQ and IV. Tolerating IV fluids. On 2L O2, wears at home. Kpad for leg cramps.        Problem: Fall Risk (Adult)  Goal: Identify Related Risk Factors and Signs and Symptoms  Outcome: Ongoing (interventions implemented as appropriate)   03/15/18 1542   Fall Risk (Adult)   Related Risk Factors (Fall Risk) fatigue/slow reaction;gait/mobility problems;homeostatic imbalance;environment unfamiliar   Signs and Symptoms (Fall Risk) presence of risk factors     Goal: Absence of Fall  Outcome: Ongoing (interventions implemented as appropriate)   03/15/18 1542   Fall Risk (Adult)   Absence of Fall making progress toward outcome       Problem: Diabetes, Type 2 (Adult)  Goal: Signs and Symptoms of Listed Potential Problems Will be Absent, Minimized or Managed (Diabetes, Type 2)  Outcome: Ongoing (interventions implemented as appropriate)   03/15/18 1542   Goal/Outcome Evaluation   Problems Assessed (Type 2 Diabetes) all   Problems Present (Type 2 Diabetes) hyperglycemia;situational response

## 2018-03-15 NOTE — NURSING NOTE
Called Dr. Arias back to verify type of IV insulin he wanted to give patient considering trouble to provide IV access. He told me to give insulin subq at last resort. Pharmacy said the only way to give novalog insulin IV was to move patient to ICU. Dr. Arias said he did not want the patient to be moved to the unit. After unsuccessful attempt to give IV, family said the doppler needed to be used. Charge, house, and ICU was notified and unavailable.

## 2018-03-15 NOTE — H&P
HISTORY AND PHYSICAL      Patient Care Team:  Nitesh Arias MD as PCP - General (Family Medicine)  Beto Jama MD as Referring Physician (Hematology and Oncology)  Irving Restrepo MD as Consulting Physician (Hematology and Oncology)    CHIEF COMPLAINT: Weakness, dizziness, weight loss    HISTORY OF PRESENT ILLNESS:    67-year-old white male with multiple medical problems who presented for a routine office follow-up yesterday was complaining of increasing fatigue, lack of energy, unintentional 10-15 pound weight loss, dizziness, and generalized malaise.  Patient has a known history of impaired fasting glucose and fasting blood sugars usually run between 120 and 1:30 routine blood work returned from his office visit with a blood sugar of 767.  He is being admitted for new onset diabetes    PAST MEDICAL HISTORY:   1.  Hypertension.  2.  Hyperlipidemia.  3.  Impaired fasting glucose.  4.  COPD.  5.  History of partial complex seizures.  6.  Mantle cell lymphoma.  7.  Chronic venous stasis edema.   8.  Allergic rhinitis.  9.  Right hemispheric TIA 02/2014.   10. Remote history of head injury.   11. History of transitional cell carcinoma of the bladder, status post resection.  12. Osteoarthritis.   13. Hyperuricemia.   14. Chronic kidney disease, stage 3.     Past Surgical History:   Procedure Laterality Date   • BRONCHOSCOPY N/A 8/21/2017    Procedure: BRONCHOSCOPY with fluro and biopsy and lavage.;  Surgeon: Red Topete MD;  Location: [facility];  Service:    • PILONIDAL CYST / SINUS EXCISION     • PILONIDAL CYSTECTOMY N/A 11/30/2016    Procedure: PILONIDAL CYSTECTOMY;  Surgeon: Roe Davila MD;  Location: [facility] OR;  Service:    • SINUS SURGERY     • SKIN LESION EXCISION      DR. DAVILA ABOUT 10 YEARS AGO     Family History   Problem Relation Age of Onset   • Heart defect Mother    • Pancreatic cancer Father 56   • Anemia Sister    • Hypertension Brother    • Heart disease Brother    •  Mental illness Brother    • Other Brother      Splenectomy   • Cancer Cousin      Social History   Substance Use Topics   • Smoking status: Former Smoker     Packs/day: 3.00     Years: 40.00     Types: Cigarettes     Quit date: 2000   • Smokeless tobacco: Never Used      Comment: 80 pack year   • Alcohol use No      Comment: Heavy in past, none since around 2000     Prescriptions Prior to Admission   Medication Sig Dispense Refill Last Dose   • ADVAIR DISKUS 250-50 MCG/DOSE DISKUS Inhale 1 puff 2 (two) times a day. inhale 1 dose by mouth twice a day  0 3/14/2018 at 1900   • allopurinol (ZYLOPRIM) 300 MG tablet Take 300 mg by mouth Every Morning. take 1 tablet by mouth daily  0 3/14/2018 at 0700   • AMITIZA 24 MCG capsule 24 mcg 2 (Two) Times a Day As Needed.  0 Past Week at Unknown time   • amLODIPine (NORVASC) 10 MG tablet Take 10 mg by mouth Every Morning.  0 3/14/2018 at 0700   • aspirin 325 MG tablet Take 325 mg by mouth Every Morning.   3/14/2018 at 0700   • cetirizine (ZyrTEC) 10 MG tablet Take 10 mg by mouth Every Morning.   3/14/2018 at 0700   • clobetasol (TEMOVATE) 0.05 % cream apply to affected area (RASH FREELY) twice a day  0 3/14/2018 at 0700    • clopidogrel (PLAVIX) 75 MG tablet Take 75 mg by mouth Every Morning.  0 3/14/2018 at 41409   • D3-50 75327 UNITS capsule Take 50,000 Units by mouth Every 7 (Seven) Days. On mondays  0 Past Week at Unknown time   • furosemide (LASIX) 40 MG tablet Take 80 mg by mouth 2 (two) times a day. take 2 tablets by mouth twice a day  0 3/14/2018 at 1200   • INCRUSE ELLIPTA 62.5 MCG/INH aerosol powder  Inhale 1 puff Daily.  0 3/14/2018 at 0700   • levETIRAcetam (KEPPRA) 1000 MG tablet Take 500 mg by mouth 2 (Two) Times a Day.  0 3/14/2018 at 1900   • metolazone (ZAROXOLYN) 2.5 MG tablet Take 2.5 mg by mouth. Take on Monday, Wednesday & friday  0 3/14/2018 at 0700   • metoprolol tartrate (LOPRESSOR) 50 MG tablet Take 50 mg by mouth 2 (two) times a day.  0 3/14/2018 at 1900  "  • O2 (OXYGEN) Inhale 2 L/min Daily.   3/15/2018 at Unknown time   • pantoprazole (PROTONIX) 40 MG EC tablet Take 40 mg by mouth Every Morning. take 1 tablet by mouth once daily  0 3/14/2018 at 0700   • potassium chloride (K-DUR,KLOR-CON) 20 MEQ CR tablet Take 40 mEq by mouth 3 (Three) Times a Day. 3 TABS BID AND 2 TABS ONCE A DAY  0 3/14/2018 at 1900   • ULORIC 80 MG tablet tablet 80 mg Daily.  0 3/14/2018 at 0700   • VENTOLIN  (90 BASE) MCG/ACT inhaler inhale 2 puffs by mouth four times a day  0 3/14/2018 at 1900     Allergies:  Amoxicillin; Ceftin [cefuroxime axetil]; Codeine; Flonase [fluticasone]; Pharbetol [acetaminophen]; Spiriva handihaler [tiotropium bromide monohydrate]; Sulfa antibiotics; and Phenobarbital     Review of Systems   Constitutional: Positive for activity change, fatigue and unexpected weight change. Negative for appetite change.   Respiratory: Negative for cough, chest tightness, shortness of breath and wheezing.    Cardiovascular: Positive for leg swelling. Negative for chest pain and palpitations.   Gastrointestinal: Negative for abdominal distention, abdominal pain, diarrhea, nausea and vomiting.   Genitourinary: Positive for urgency (incontinence). Negative for frequency.   Musculoskeletal: Positive for arthralgias and back pain.   Skin: Negative for rash.   Neurological: Positive for dizziness and light-headedness.   Psychiatric/Behavioral: Positive for confusion and sleep disturbance. Negative for agitation.       Vital Signs  Temp:  [97.5 °F (36.4 °C)] 97.5 °F (36.4 °C)  Heart Rate:  [112] 112  Resp:  [22] 22  BP: (156)/(95) 156/95    Flowsheet Rows    Flowsheet Row First Filed Value   Admission Height 172.7 cm (68\") Documented at 03/15/2018 0512   Admission Weight (!)  136 kg (300 lb 4 oz) Documented at 03/15/2018 0512             Physical Exam   Constitutional: He appears well-developed and well-nourished.   Morbidly obese   HENT:   Head: Normocephalic.   Mouth/Throat: " Oropharynx is clear and moist.   Eyes: Conjunctivae are normal.   Neck: Normal range of motion. No JVD present. No thyromegaly present.   Cardiovascular: Normal rate, regular rhythm and normal heart sounds.    No murmur heard.  Pulmonary/Chest: Effort normal and breath sounds normal. No respiratory distress. He has no wheezes. He has no rales.   Abdominal: Soft. Bowel sounds are normal. He exhibits no distension. There is no tenderness. There is no guarding.   Musculoskeletal: He exhibits edema (Trace bilateral lower extremity).   Neurological: He is alert.   Skin: Skin is warm and dry. No rash noted.   Nursing note and vitals reviewed.     Results Review:    I reviewed the patient's new clinical results.  Lab Results (most recent)     Procedure Component Value Units Date/Time    Basic Metabolic Panel [[id number]]  (Abnormal) Collected:  03/15/18 0618    Specimen:  Blood Updated:  03/15/18 0700     Glucose 675 (C) mg/dL      BUN 36 (H) mg/dL      Creatinine 1.71 (H) mg/dL      Sodium 128 (L) mmol/L      Potassium 3.9 mmol/L      Chloride 81 (L) mmol/L      CO2 26.1 mmol/L      Calcium 9.9 mg/dL      eGFR Non African Amer 40 (L) mL/min/1.73      BUN/Creatinine Ratio 21.1     Anion Gap 20.9 mmol/L     Narrative:       GFR Normal >60  Chronic Kidney Disease <60  Kidney Failure <15    Hemoglobin A1c [[id number]]  (Abnormal) Collected:  03/15/18 0618    Specimen:  Blood Updated:  03/15/18 0635     Hemoglobin A1C 10.00 (H) %     Narrative:       Hemoglobin A1C Ranges:    Increased Risk for Diabetes  5.7% to 6.4%  Diabetes                     >= 6.5%  Diabetic Goal                < 7.0%    Glucose, Random [[id number]]  (Abnormal) Collected:  03/15/18 0526    Specimen:  Blood Updated:  03/15/18 0544     Glucose 686 (C) mg/dL     POC Glucose Once [[id number]]  (Abnormal) Collected:  03/15/18 0507    Specimen:  Blood Updated:  03/15/18 0517     Glucose >599 (C) mg/dL     Narrative:       RN NOTIFIED Meter: [id number] :  [id number] Mikieevaristo Hernandez CN          Imaging Results (most recent)     None        reviewed    ECG/EMG Results (most recent)     None        reviewed    Assessment/Plan     1.  Symptomatic uncontrolled new onset diabetes patient will be started on Accu-Cheks every 2 hours and insulin if patient does not respond will start on insulin drip    2.  Acute kidney injury chronic kidney disease IV fluids were started and patient we monitored carefully    3.  Malaise fatigue and weight loss likely due to uncontrolled diabetes will continue to monitor    4.  Hypertension well controlled home medications be continued    5.  Hyperlipidemia nothing acute continue statin    6.  COPD home inhalers we continued nothing acute    7.  Seizure disorder stable continue Keppra    8.  History of right hemispheric TIA continue aspirin Plavix    9.  History of transitional cell cancer of the bladder status post resection stable    10.  History of mantle cell lymphoma    11.  Recurrent major depression continue SSRI    12.  GERD change to H2 from his PPI had until renal functions improve    13.  Morbid obesity weight loss interventions have discussed    I discussed the patients findings and my recommendations with patient and wife.     Ntiesh Arias MD  03/15/18  7:44 AM

## 2018-03-16 LAB
ANION GAP SERPL CALCULATED.3IONS-SCNC: 12.3 MMOL/L
BUN BLD-MCNC: 33 MG/DL (ref 8–23)
BUN/CREAT SERPL: 23.9 (ref 7–25)
CALCIUM SPEC-SCNC: 8.9 MG/DL (ref 8.8–10.5)
CHLORIDE SERPL-SCNC: 91 MMOL/L (ref 98–107)
CO2 SERPL-SCNC: 30.7 MMOL/L (ref 22–29)
CREAT BLD-MCNC: 1.38 MG/DL (ref 0.76–1.27)
GFR SERPL CREATININE-BSD FRML MDRD: 51 ML/MIN/1.73
GLUCOSE BLD-MCNC: 135 MG/DL (ref 65–99)
GLUCOSE BLDC GLUCOMTR-MCNC: 116 MG/DL (ref 70–130)
GLUCOSE BLDC GLUCOMTR-MCNC: 124 MG/DL (ref 70–130)
GLUCOSE BLDC GLUCOMTR-MCNC: 133 MG/DL (ref 70–130)
GLUCOSE BLDC GLUCOMTR-MCNC: 141 MG/DL (ref 70–130)
GLUCOSE BLDC GLUCOMTR-MCNC: 167 MG/DL (ref 70–130)
GLUCOSE BLDC GLUCOMTR-MCNC: 267 MG/DL (ref 70–130)
GLUCOSE BLDC GLUCOMTR-MCNC: 284 MG/DL (ref 70–130)
GLUCOSE BLDC GLUCOMTR-MCNC: 306 MG/DL (ref 70–130)
GLUCOSE BLDC GLUCOMTR-MCNC: 336 MG/DL (ref 70–130)
GLUCOSE BLDC GLUCOMTR-MCNC: 385 MG/DL (ref 70–130)
GLUCOSE BLDC GLUCOMTR-MCNC: 391 MG/DL (ref 70–130)
GLUCOSE BLDC GLUCOMTR-MCNC: 99 MG/DL (ref 70–130)
POTASSIUM BLD-SCNC: 2.6 MMOL/L (ref 3.5–5.2)
SODIUM BLD-SCNC: 134 MMOL/L (ref 136–145)

## 2018-03-16 PROCEDURE — 94799 UNLISTED PULMONARY SVC/PX: CPT

## 2018-03-16 PROCEDURE — 63710000001 INSULIN ASPART PER 5 UNITS: Performed by: FAMILY MEDICINE

## 2018-03-16 PROCEDURE — 25010000002 ENOXAPARIN PER 10 MG: Performed by: FAMILY MEDICINE

## 2018-03-16 PROCEDURE — 82962 GLUCOSE BLOOD TEST: CPT

## 2018-03-16 PROCEDURE — 80048 BASIC METABOLIC PNL TOTAL CA: CPT | Performed by: FAMILY MEDICINE

## 2018-03-16 PROCEDURE — 63710000001 INSULIN DETEMIR PER 5 UNITS: Performed by: FAMILY MEDICINE

## 2018-03-16 RX ORDER — POTASSIUM CHLORIDE 20 MEQ/1
40 TABLET, EXTENDED RELEASE ORAL
Status: COMPLETED | OUTPATIENT
Start: 2018-03-16 | End: 2018-03-16

## 2018-03-16 RX ADMIN — INSULIN ASPART 20 UNITS: 100 INJECTION, SOLUTION INTRAVENOUS; SUBCUTANEOUS at 12:04

## 2018-03-16 RX ADMIN — INSULIN ASPART 16 UNITS: 100 INJECTION, SOLUTION INTRAVENOUS; SUBCUTANEOUS at 21:18

## 2018-03-16 RX ADMIN — IPRATROPIUM BROMIDE 0.5 MG: 0.5 SOLUTION RESPIRATORY (INHALATION) at 07:31

## 2018-03-16 RX ADMIN — POTASSIUM CHLORIDE 40 MEQ: 1500 TABLET, EXTENDED RELEASE ORAL at 10:07

## 2018-03-16 RX ADMIN — LEVETIRACETAM 500 MG: 500 TABLET ORAL at 21:16

## 2018-03-16 RX ADMIN — METOPROLOL TARTRATE 50 MG: 50 TABLET ORAL at 10:10

## 2018-03-16 RX ADMIN — ASPIRIN 325 MG: 325 TABLET, COATED ORAL at 06:29

## 2018-03-16 RX ADMIN — SODIUM CHLORIDE 75 ML/HR: 9 INJECTION, SOLUTION INTRAVENOUS at 16:13

## 2018-03-16 RX ADMIN — BUDESONIDE AND FORMOTEROL FUMARATE DIHYDRATE 2 PUFF: 160; 4.5 AEROSOL RESPIRATORY (INHALATION) at 07:36

## 2018-03-16 RX ADMIN — IPRATROPIUM BROMIDE 0.5 MG: 0.5 SOLUTION RESPIRATORY (INHALATION) at 19:21

## 2018-03-16 RX ADMIN — PANTOPRAZOLE SODIUM 40 MG: 40 TABLET, DELAYED RELEASE ORAL at 06:29

## 2018-03-16 RX ADMIN — INSULIN DETEMIR 30 UNITS: 100 INJECTION, SOLUTION SUBCUTANEOUS at 21:20

## 2018-03-16 RX ADMIN — ENOXAPARIN SODIUM 40 MG: 40 INJECTION SUBCUTANEOUS at 21:15

## 2018-03-16 RX ADMIN — ALLOPURINOL 300 MG: 300 TABLET ORAL at 06:29

## 2018-03-16 RX ADMIN — CLOBETASOL PROPIONATE: 0.5 CREAM TOPICAL at 10:08

## 2018-03-16 RX ADMIN — POTASSIUM CHLORIDE 40 MEQ: 1500 TABLET, EXTENDED RELEASE ORAL at 06:29

## 2018-03-16 RX ADMIN — METOPROLOL TARTRATE 50 MG: 50 TABLET ORAL at 21:16

## 2018-03-16 RX ADMIN — IPRATROPIUM BROMIDE 0.5 MG: 0.5 SOLUTION RESPIRATORY (INHALATION) at 13:16

## 2018-03-16 RX ADMIN — LINAGLIPTIN 5 MG: 5 TABLET, FILM COATED ORAL at 10:10

## 2018-03-16 RX ADMIN — CETIRIZINE HYDROCHLORIDE 10 MG: 10 TABLET, FILM COATED ORAL at 06:29

## 2018-03-16 RX ADMIN — INSULIN ASPART 10 UNITS: 100 INJECTION, SOLUTION INTRAVENOUS; SUBCUTANEOUS at 17:32

## 2018-03-16 RX ADMIN — ENOXAPARIN SODIUM 40 MG: 40 INJECTION SUBCUTANEOUS at 10:07

## 2018-03-16 RX ADMIN — AMLODIPINE BESYLATE 10 MG: 5 TABLET ORAL at 06:29

## 2018-03-16 RX ADMIN — CLOPIDOGREL 75 MG: 75 TABLET, FILM COATED ORAL at 06:29

## 2018-03-16 RX ADMIN — INSULIN ASPART 10 UNITS: 100 INJECTION, SOLUTION INTRAVENOUS; SUBCUTANEOUS at 14:47

## 2018-03-16 RX ADMIN — BUDESONIDE AND FORMOTEROL FUMARATE DIHYDRATE 2 PUFF: 160; 4.5 AEROSOL RESPIRATORY (INHALATION) at 19:21

## 2018-03-16 RX ADMIN — LEVETIRACETAM 500 MG: 500 TABLET ORAL at 10:07

## 2018-03-16 RX ADMIN — FEBUXOSTAT 80 MG: 40 TABLET ORAL at 10:08

## 2018-03-16 RX ADMIN — IPRATROPIUM BROMIDE 0.5 MG: 0.5 SOLUTION RESPIRATORY (INHALATION) at 16:50

## 2018-03-16 RX ADMIN — SODIUM CHLORIDE 100 ML/HR: 9 INJECTION, SOLUTION INTRAVENOUS at 03:51

## 2018-03-16 NOTE — PROGRESS NOTES
"Daily Progress Note:    Subjective: Patient relates no complaints.  Had poor response to bolus dosing of insulin yesterday and blood sugars did the crease but not significantly.  He was subsequently moved to the ICU used to her insulin drip and his blood sugars are much better.    Flowsheet Rows    Flowsheet Row First Filed Value   Admission Height 172.7 cm (68\") Documented at 03/15/2018 0512   Admission Weight (!)  136 kg (300 lb 4 oz) Documented at 03/15/2018 0512          Documented weights    03/15/18 0512   Weight: (!) 136 kg (300 lb 4 oz)       Patient Vitals for the past 24 hrs:   BP Temp Temp src Pulse Resp SpO2   03/16/18 0357 122/60 97.9 °F (36.6 °C) Oral 83 20 91 %   03/15/18 2330 126/84 97.3 °F (36.3 °C) Oral 89 20 94 %   03/15/18 2155 126/79 - - 99 - 94 %   03/15/18 2009 125/81 98 °F (36.7 °C) Oral 109 20 94 %   03/15/18 1932 - - - 100 20 97 %   03/15/18 1923 - - - 102 20 97 %   03/15/18 1634 - - - 96 20 -   03/15/18 1627 - - - 96 20 97 %   03/15/18 1511 119/78 97.4 °F (36.3 °C) Oral 97 20 91 %   03/15/18 1214 - - - 92 20 (!) 63 %   03/15/18 1206 - - - 88 20 96 %   03/15/18 1001 - - - 88 20 -   03/15/18 0955 - - - 88 20 97 %       (!) 136 kg (300 lb 4 oz)      Intake/Output Summary (Last 24 hours) at 03/16/18 0705  Last data filed at 03/16/18 0505   Gross per 24 hour   Intake          4244.83 ml   Output              725 ml   Net          3519.83 ml       Review of Systems   Constitutional: Negative for appetite change.   Respiratory: Positive for cough. Negative for chest tightness.    Cardiovascular: Positive for leg swelling. Negative for chest pain.   Gastrointestinal: Negative for abdominal distention, abdominal pain, diarrhea, nausea and vomiting.   Genitourinary: Negative for frequency.   Musculoskeletal: Positive for back pain.   Skin: Negative for rash.   Psychiatric/Behavioral: Negative for agitation.       Physical Exam   Constitutional: He appears well-developed and well-nourished. "   Morbidly obese   HENT:   Head: Normocephalic.   Mouth/Throat: Oropharynx is clear and moist.   Eyes: Conjunctivae are normal.   Neck: Normal range of motion. No JVD present. No thyromegaly present.   Cardiovascular: Normal rate, regular rhythm and normal heart sounds.    No murmur heard.  Pulmonary/Chest: Effort normal. No respiratory distress. He has wheezes (scattered). He has no rales.   Abdominal: Soft. Bowel sounds are normal. He exhibits no distension. There is no tenderness. There is no guarding.   Musculoskeletal: He exhibits edema (trace).   Neurological: He is alert.   Skin: Skin is warm and dry. No rash noted.   Nursing note and vitals reviewed.          Medication Review:     Medication Review:   I have reviewed the patient's current medication list    Current Facility-Administered Medications:   •  albuterol (PROVENTIL) nebulizer solution 0.083% 2.5 mg/3mL, 2.5 mg, Nebulization, Q6H PRN, Nitesh Arias MD  •  allopurinol (ZYLOPRIM) tablet 300 mg, 300 mg, Oral, QAM, Nitesh Arias MD, 300 mg at 03/16/18 0629  •  amLODIPine (NORVASC) tablet 10 mg, 10 mg, Oral, QAM, Nitesh Arias MD, 10 mg at 03/16/18 0629  •  aspirin tablet 325 mg, 325 mg, Oral, QAM, Nitesh Arias MD, 325 mg at 03/16/18 0629  •  bisacodyl (DULCOLAX) EC tablet 5 mg, 5 mg, Oral, Daily PRN, Nitesh Arias MD  •  budesonide-formoterol (SYMBICORT) 160-4.5 MCG/ACT inhaler 2 puff, 2 puff, Inhalation, BID - RT, Nitesh Arias MD, 2 puff at 03/15/18 1923  •  calcium carbonate (TUMS) chewable tablet 500 mg (200 mg elemental), 1 tablet, Oral, BID PRN, Nitesh Arias MD  •  cetirizine (zyrTEC) tablet 10 mg, 10 mg, Oral, QAM, Nitesh Arias MD, 10 mg at 03/16/18 0629  •  [START ON 3/19/2018] cholecalciferol (VITAMIN D3) capsule 50,000 Units, 50,000 Units, Oral, Q7 Days, Nitesh Arias MD  •  clobetasol (TEMOVATE) 0.05 % cream, , Topical, Q12H,  Nitesh Arias MD  •  clopidogrel (PLAVIX) tablet 75 mg, 75 mg, Oral, QAM, Nitesh Arias MD, 75 mg at 03/16/18 0629  •  dextrose (D50W) solution 25 g, 25 g, Intravenous, Q15 Min PRN, Nitesh Arias MD  •  dextrose (GLUTOSE) oral gel 15 g, 15 g, Oral, Q15 Min PRN, Nitesh Arias MD  •  enoxaparin (LOVENOX) syringe 40 mg, 40 mg, Subcutaneous, Q12H, Nitesh Arias MD, 40 mg at 03/15/18 2040  •  febuxostat (ULORIC) tablet 80 mg, 80 mg, Oral, Daily, Nitesh Arias MD, 80 mg at 03/15/18 0835  •  glucagon (GLUCAGEN) injection 1 mg, 1 mg, Subcutaneous, PRN, Nitesh Arias MD  •  insulin detemir (LEVEMIR) injection 25 Units, 25 Units, Subcutaneous, Nightly, Nitesh Arias MD, 25 Units at 03/15/18 2037  •  insulin regular (humuLIN R,novoLIN R) 100 UNIT/ML injection  - ADS Override Pull, , , ,   •  insulin regular (HumuLIN R,NovoLIN R) 100 Units in sodium chloride 0.9 % 100 mL (1 Units/mL) infusion, 20 Units/hr, Intravenous, Titrated, Nitesh Arias MD, Last Rate: 3.2 mL/hr at 03/16/18 0449, 3.2 Units/hr at 03/16/18 0449  •  ipratropium (ATROVENT) nebulizer solution 0.5 mg, 0.5 mg, Nebulization, 4x Daily - RT, Nitesh Arias MD, 0.5 mg at 03/15/18 1923  •  levETIRAcetam (KEPPRA) tablet 500 mg, 500 mg, Oral, Q12H, Nitesh Arias MD, 500 mg at 03/15/18 2041  •  linagliptin (TRADJENTA) tablet 5 mg, 5 mg, Oral, Daily, Nitesh Arias MD, 5 mg at 03/15/18 0836  •  lubiprostone (AMITIZA) capsule 24 mcg, 24 mcg, Oral, BID With Meals, Nitesh Arias MD  •  magnesium hydroxide (MILK OF MAGNESIA) suspension 2400 mg/10mL 10 mL, 10 mL, Oral, Daily PRN, Nitesh Arias MD  •  metoprolol tartrate (LOPRESSOR) tablet 50 mg, 50 mg, Oral, Q12H, Nitesh Arias MD, 50 mg at 03/15/18 2043  •  O2 (OXYGEN), 2 L/min, Inhalation, Daily, Nitesh Arias MD, 2 L/min at 03/15/18  0834  •  pantoprazole (PROTONIX) EC tablet 40 mg, 40 mg, Oral, QAM, Nitesh Arias MD, 40 mg at 03/16/18 0629  •  potassium chloride (K-DUR,KLOR-CON) CR tablet 40 mEq, 40 mEq, Oral, Q2H, Nitesh Arias MD, 40 mEq at 03/16/18 0629  •  sodium chloride 0.9 % flush 1-10 mL, 1-10 mL, Intravenous, PRN, Nitesh Arias MD  •  sodium chloride 0.9 % infusion 40 mL, 40 mL, Intravenous, PRN, Nitesh Arias MD  •  sodium chloride 0.9 % infusion, 100 mL/hr, Intravenous, Continuous, Nitesh Arias MD, Last Rate: 100 mL/hr at 03/16/18 0351, 100 mL/hr at 03/16/18 0351      Labs:        Invalid input(s): NEUTOPHILPCT    Results from last 7 days  Lab Units 03/16/18  0510 03/15/18  0618 03/15/18  0526   SODIUM mmol/L 134* 128*  --    POTASSIUM mmol/L 2.6* 3.9  --    CHLORIDE mmol/L 91* 81*  --    CO2 mmol/L 30.7* 26.1  --    BUN mg/dL 33* 36*  --    CREATININE mg/dL 1.38* 1.71*  --    CALCIUM mg/dL 8.9 9.9  --    GLUCOSE mg/dL 135* 675* 686*           Lab Results (last 24 hours)     Procedure Component Value Units Date/Time    POC Glucose Once [[id number]]  (Abnormal) Collected:  03/16/18 0645    Specimen:  Blood Updated:  03/16/18 0652     Glucose 141 (H) mg/dL     Narrative:       Meter: [id number] : [id number] Nayely Jackson RN    Basic Metabolic Panel [[id number]]  (Abnormal) Collected:  03/16/18 0510    Specimen:  Blood Updated:  03/16/18 0557     Glucose 135 (H) mg/dL      BUN 33 (H) mg/dL      Creatinine 1.38 (H) mg/dL      Sodium 134 (L) mmol/L      Potassium 2.6 (L) mmol/L      Chloride 91 (L) mmol/L      CO2 30.7 (H) mmol/L      Calcium 8.9 mg/dL      eGFR Non African Amer 51 (L) mL/min/1.73      BUN/Creatinine Ratio 23.9     Anion Gap 12.3 mmol/L     Narrative:       GFR Normal >60  Chronic Kidney Disease <60  Kidney Failure <15    POC Glucose Once [[id number]]  (Abnormal) Collected:  03/16/18 0446    Specimen:  Blood Updated:  03/16/18 0455     Glucose 133 (H) mg/dL      Narrative:       Meter: [id number] : [id number] Nayely Jackson RN    POC Glucose Once [[id number]]  (Normal) Collected:  03/16/18 0329    Specimen:  Blood Updated:  03/16/18 0339     Glucose 124 mg/dL     Narrative:       Meter: [id number] : [id number] Nayely Jackson RN    POC Glucose Once [[id number]]  (Normal) Collected:  03/16/18 0229    Specimen:  Blood Updated:  03/16/18 0238     Glucose 99 mg/dL     Narrative:       Meter: [id number] : [id number] Nayely Jackson RN    POC Glucose Once [[id number]]  (Normal) Collected:  03/16/18 0134    Specimen:  Blood Updated:  03/16/18 0140     Glucose 116 mg/dL     Narrative:       Meter: [id number] : [id number] Jasen CHANDRA    POC Glucose Once [[id number]]  (Abnormal) Collected:  03/16/18 0027    Specimen:  Blood Updated:  03/16/18 0033     Glucose 267 (H) mg/dL     Narrative:       Meter: [id number] : [id number] Nayely Jackson RN    POC Glucose Once [[id number]]  (Abnormal) Collected:  03/15/18 2329    Specimen:  Blood Updated:  03/15/18 2341     Glucose 414 (H) mg/dL     Narrative:       Confirmed by Repeat Meter: [id number] : [id number] Nayely Jackson RN    POC Glucose Once [[id number]]  (Abnormal) Collected:  03/15/18 2241    Specimen:  Blood Updated:  03/15/18 2249     Glucose 553 (C) mg/dL     Narrative:       Confirmed by Repeat Meter: [id number] : [id number] Nayely Jackson RN    POC Glucose Once [[id number]]  (Abnormal) Collected:  03/15/18 2037    Specimen:  Blood Updated:  03/15/18 2045     Glucose 586 (C) mg/dL     Narrative:       Repeat Test Meter: [id number] : [id number] Yuli Tamayo  RN    POC Glucose Once [[id number]]  (Abnormal) Collected:  03/15/18 2035    Specimen:  Blood Updated:  03/15/18 2044     Glucose >599 (C) mg/dL     Narrative:       RN NOTIIED Meter: [id number] : [id number] Yuli Tamayo RN    POC Glucose Once [[id number]]  (Abnormal) Collected:  03/15/18 1827    Specimen:  Blood Updated:  03/15/18 1948      Glucose 425 (H) mg/dL     Narrative:       RN Notified R and V Physician Notified PER RN SOFIYA Meter: [id number] :    POC Glucose Once [[id number]]  (Abnormal) Collected:  03/15/18 1826    Specimen:  Blood Updated:  03/15/18 1948     Glucose 506 (C) mg/dL     Narrative:       Repeat Test Meter: [id number] : [id number] Alirio Shelly NURSING ASSISTANT    POC Glucose Once [[id number]]  (Abnormal) Collected:  03/15/18 1639    Specimen:  Blood Updated:  03/15/18 1912     Glucose 387 (H) mg/dL     Narrative:       RN Notified R and V Physician Notified PER RN SOFIYA Meter: [id number] :    POC Glucose Once [[id number]]  (Abnormal) Collected:  03/15/18 1638    Specimen:  Blood Updated:  03/15/18 1911     Glucose 425 (H) mg/dL     Narrative:       Repeat Test Meter: [id number] : [id number] Alirio Museie NURSING ASSISTANT    POC Glucose Once [[id number]]  (Abnormal) Collected:  03/15/18 1418    Specimen:  Blood Updated:  03/15/18 1426     Glucose 479 (C) mg/dL     Narrative:       RN Notified R and V Physician Notified PER RN SOFIYA Meter: [id number] :    POC Glucose Once [[id number]]  (Abnormal) Collected:  03/15/18 1417    Specimen:  Blood Updated:  03/15/18 1426     Glucose 475 (C) mg/dL     Narrative:       Repeat Test Meter: [id number] : [id number] Alirio Museie NURSING ASSISTANT    POC Glucose Once [[id number]]  (Abnormal) Collected:  03/15/18 1236    Specimen:  Blood Updated:  03/15/18 1246     Glucose 467 (C) mg/dL     Narrative:       RN Notified R and V Physician Notified PER SOFIYA RN Meter: [id number] :    POC Glucose Once [[id number]]  (Abnormal) Collected:  03/15/18 1234    Specimen:  Blood Updated:  03/15/18 1245     Glucose 404 (H) mg/dL     Narrative:       Repeat Test Meter: [id number] : [id number] Alirio Shelly NURSING ASSISTANT    POC Glucose Once [[id number]]  (Abnormal) Collected:  03/15/18 1024    Specimen:  Blood Updated:  03/15/18 1032     Glucose 496 (C)  mg/dL     Narrative:       RN Notified R and V Physician Notified PER JO ANN ARRIAZA Meter: [id number] :    POC Glucose Once [[id number]]  (Abnormal) Collected:  03/15/18 0756    Specimen:  Blood Updated:  03/15/18 0804     Glucose 571 (C) mg/dL     Narrative:       RN Notified R and V Physician Notified PER SOFIYA CHERRY Meter: [id number] :              Radiology:  Imaging Results (last 24 hours)     ** No results found for the last 24 hours. **          Cardiology:  ECG/EMG Results (last 24 hours)     ** No results found for the last 24 hours. **          I have reviewed consult notes.    Assessment and Plan:    1.  Symptomatic uncontrolled new onset diabetes good response to insulin drip will change to Accu-Cheks and sliding scale insulin and monitor   2.  Acute kidney injury chronic kidney disease is improving with IV fluids will continue to monitor     3.  Hypokalemia.  Potassium is been ordered     4.  Hypertension well controlled home medications be continued     5.  Hyperlipidemia nothing acute continue statin     6.  COPD home inhalers we continued nothing acute     7.  Seizure disorder stable continue Keppra     8.  History of right hemispheric TIA continue aspirin Plavix     9.  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  History of mantle cell lymphoma     11.  Recurrent major depression continue SSRI     12.  GERD change to H2 from his PPI had until renal functions improve

## 2018-03-16 NOTE — CONSULTS
"Diabetes Education  Assessment/Teaching    Patient Name:  Esdras Ko  YOB: 1950  MRN: [id number]  Admit Date:  3/15/2018      Assessment Date:  3/16/2018  Flowsheet Row Most Recent Value   General Information    Height 172.7 cm (68\")   Height Method Stated   Weight (!)  136 kg (300 lb 4 oz)   Weight Method Bed scale   Pregnancy Assessment   Diabetes History   What type of diabetes do you have? Type 2   Length of Diabetes Diagnosis Newly diagnosed <6 months   Education Preferences   Nutrition Information   Assessment Topics   Healthy Eating - Assessment Needs education   Being Active - Assessment Needs education   Taking Medication - Assessment Needs education   Problem Solving - Assessment Needs education   Reducing Risk - Assessment Needs education   Healthy Coping - Assessment Needs education   Monitoring - Assessment Needs education   DM Goals          Flowsheet Row Most Recent Value   DM Education Needs   Meter Meter provided [Contour Next One]   Meter Type Contour   Frequency of Testing 3 times a day   Blood Glucose Target Range fasting  and less than 180 2 hours after a meal   Medication Insulin, Administration, Pen   Problem Solving Hypoglycemia, Hyperglycemia, Signs, Symptoms, Treatment   Reducing Risks A1C testing   Physical Activity None   Physical Activity Frequency Discussed exercise importance   Healthy Coping Appropriate   Discharge Plan Home   Motivation Moderate   Teaching Method Explanation, Discussion, Demonstration, Handouts, Teach back   Patient Response Verbalized understanding, Demonstrates adequately            Other Comments:  Instructed the patient on how to use an insulin pen if he should happen to go home on insulin.  He was able to correctly demonstrate how to use but needed many prompts.  He states that he has memory problems.  His wife was at the bedside and stated that she has used an insulin pen before and remember how to use.  In case he goes home on " syringe/vial method, his wife knows how to do this as  This is how she gives herself inulin.  I also provided the patient with a Contour Next One BGM and instructed in use.  I recommended that he check his blood glucose at least 3 times a day.  I highly recommended that he stop drinking regular Pepsi and eating Milky Way candy bars.  Literature and BGM left at the bedside.  Thank you for this referral.  Please reconsult if needed.        Electronically signed by:  Yessenia Swain RN  03/16/18 1:45 PM

## 2018-03-16 NOTE — NURSING NOTE
Pts insulin drip d/c'd this AM. Blood sugar was 391 when checked at noon. Pt was given 20 units of novolog per md order, sliding scale.     Rechecked pts blood sugar at 1400, and pts blood sugar is 385. Will notify MD and continue to monitor.

## 2018-03-16 NOTE — PAYOR COMM NOTE
"Lexie Ko (67 y.o. Male)     ATTN: NURSE REVIEWER  REF#[id number]  [facility] FOR INPT REVIEW. PLEASE REPLY TO BERTRAND CAMPA AT FAX#[phone] OR PHONE#[phone]. THANK YOU.       Date of Birth Social Security Number Address Home Phone MRN    1950  [id number]  [facility] [place] [address] [phone] [id number]    Amish Marital Status          Judaism        Admission Date Admission Type Admitting Provider Attending Provider Department, Room/Bed    3/15/18 Elective Nitesh Arias MD Kemparajurs, Plavakeerthi, MD [facility] ICU, ICU6/1    Discharge Date Discharge Disposition Discharge Destination                       Attending Provider:  Nitesh Arias MD    Allergies:  Amoxicillin, Ceftin [Cefuroxime Axetil], Codeine, Flonase [Fluticasone], Pharbetol [Acetaminophen], Spiriva Handihaler [Tiotropium Bromide Monohydrate], Sulfa Antibiotics, Phenobarbital    Isolation:  None   Infection:  None   Code Status:  FULL    Ht:  172.7 cm (68\")   Wt:  136 kg (300 lb 4 oz)    Admission Cmt:  None   Principal Problem:  None                Active Insurance as of 3/15/2018     Primary Coverage     Payor Plan Insurance Group Employer/Plan Group    ANTHEM MEDICARE REPLACEMENT ANTHEM MEDICARE ADVANTAGE [id number]     Payor Plan Address Payor Plan Phone Number Effective From Effective To    [address] [phone] 1/1/2018     [place] [address]       Subscriber Name Subscriber Birth Date Member ID       LEXIE KO 1950 [id number]                 Emergency Contacts      (Rel.) Home Phone Work Phone Mobile Phone    Charley Ko (Spouse) [phone] -- --               History & Physical      Nitesh Arias MD at 3/15/2018  7:44 AM              HISTORY AND PHYSICAL      Patient Care Team:  Nitesh Arias MD as PCP - General (Family Medicine)  Beto Jama MD as Referring Physician (Hematology and " Oncology)  Irving Restrepo MD as Consulting Physician (Hematology and Oncology)    CHIEF COMPLAINT: Weakness, dizziness, weight loss    HISTORY OF PRESENT ILLNESS:    67-year-old white male with multiple medical problems who presented for a routine office follow-up yesterday was complaining of increasing fatigue, lack of energy, unintentional 10-15 pound weight loss, dizziness, and generalized malaise.  Patient has a known history of impaired fasting glucose and fasting blood sugars usually run between 120 and 1:30 routine blood work returned from his office visit with a blood sugar of 767.  He is being admitted for new onset diabetes    PAST MEDICAL HISTORY:   1.  Hypertension.  2.  Hyperlipidemia.  3.  Impaired fasting glucose.  4.  COPD.  5.  History of partial complex seizures.  6.  Mantle cell lymphoma.  7.  Chronic venous stasis edema.   8.  Allergic rhinitis.  9.  Right hemispheric TIA 02/2014.   10. Remote history of head injury.   11. History of transitional cell carcinoma of the bladder, status post resection.  12. Osteoarthritis.   13. Hyperuricemia.   14. Chronic kidney disease, stage 3.     Past Surgical History:   Procedure Laterality Date   • BRONCHOSCOPY N/A 8/21/2017    Procedure: BRONCHOSCOPY with fluro and biopsy and lavage.;  Surgeon: Red Topete MD;  Location: [facility] OR;  Service:    • PILONIDAL CYST / SINUS EXCISION     • PILONIDAL CYSTECTOMY N/A 11/30/2016    Procedure: PILONIDAL CYSTECTOMY;  Surgeon: Roe Davila MD;  Location: [facility] OR;  Service:    • SINUS SURGERY     • SKIN LESION EXCISION      DR. DAVILA ABOUT 10 YEARS AGO     Family History   Problem Relation Age of Onset   • Heart defect Mother    • Pancreatic cancer Father 56   • Anemia Sister    • Hypertension Brother    • Heart disease Brother    • Mental illness Brother    • Other Brother      Splenectomy   • Cancer Cousin      Social History   Substance Use Topics   • Smoking status: Former Smoker     Packs/day: 3.00      Years: 40.00     Types: Cigarettes     Quit date: 2000   • Smokeless tobacco: Never Used      Comment: 80 pack year   • Alcohol use No      Comment: Heavy in past, none since around 2000     Prescriptions Prior to Admission   Medication Sig Dispense Refill Last Dose   • ADVAIR DISKUS 250-50 MCG/DOSE DISKUS Inhale 1 puff 2 (two) times a day. inhale 1 dose by mouth twice a day  0 3/14/2018 at 1900   • allopurinol (ZYLOPRIM) 300 MG tablet Take 300 mg by mouth Every Morning. take 1 tablet by mouth daily  0 3/14/2018 at 0700   • AMITIZA 24 MCG capsule 24 mcg 2 (Two) Times a Day As Needed.  0 Past Week at Unknown time   • amLODIPine (NORVASC) 10 MG tablet Take 10 mg by mouth Every Morning.  0 3/14/2018 at 0700   • aspirin 325 MG tablet Take 325 mg by mouth Every Morning.   3/14/2018 at 0700   • cetirizine (ZyrTEC) 10 MG tablet Take 10 mg by mouth Every Morning.   3/14/2018 at 0700   • clobetasol (TEMOVATE) 0.05 % cream apply to affected area (RASH FREELY) twice a day  0 3/14/2018 at 0700    • clopidogrel (PLAVIX) 75 MG tablet Take 75 mg by mouth Every Morning.  0 3/14/2018 at 37246   • D3-50 76482 UNITS capsule Take 50,000 Units by mouth Every 7 (Seven) Days. On mondays  0 Past Week at Unknown time   • furosemide (LASIX) 40 MG tablet Take 80 mg by mouth 2 (two) times a day. take 2 tablets by mouth twice a day  0 3/14/2018 at 1200   • INCRUSE ELLIPTA 62.5 MCG/INH aerosol powder  Inhale 1 puff Daily.  0 3/14/2018 at 0700   • levETIRAcetam (KEPPRA) 1000 MG tablet Take 500 mg by mouth 2 (Two) Times a Day.  0 3/14/2018 at 1900   • metolazone (ZAROXOLYN) 2.5 MG tablet Take 2.5 mg by mouth. Take on Monday, Wednesday & friday  0 3/14/2018 at 0700   • metoprolol tartrate (LOPRESSOR) 50 MG tablet Take 50 mg by mouth 2 (two) times a day.  0 3/14/2018 at 1900   • O2 (OXYGEN) Inhale 2 L/min Daily.   3/15/2018 at Unknown time   • pantoprazole (PROTONIX) 40 MG EC tablet Take 40 mg by mouth Every Morning. take 1 tablet by mouth once  "daily  0 3/14/2018 at 0700   • potassium chloride (K-DUR,KLOR-CON) 20 MEQ CR tablet Take 40 mEq by mouth 3 (Three) Times a Day. 3 TABS BID AND 2 TABS ONCE A DAY  0 3/14/2018 at 1900   • ULORIC 80 MG tablet tablet 80 mg Daily.  0 3/14/2018 at 0700   • VENTOLIN  (90 BASE) MCG/ACT inhaler inhale 2 puffs by mouth four times a day  0 3/14/2018 at 1900     Allergies:  Amoxicillin; Ceftin [cefuroxime axetil]; Codeine; Flonase [fluticasone]; Pharbetol [acetaminophen]; Spiriva handihaler [tiotropium bromide monohydrate]; Sulfa antibiotics; and Phenobarbital     Review of Systems   Constitutional: Positive for activity change, fatigue and unexpected weight change. Negative for appetite change.   Respiratory: Negative for cough, chest tightness, shortness of breath and wheezing.    Cardiovascular: Positive for leg swelling. Negative for chest pain and palpitations.   Gastrointestinal: Negative for abdominal distention, abdominal pain, diarrhea, nausea and vomiting.   Genitourinary: Positive for urgency (incontinence). Negative for frequency.   Musculoskeletal: Positive for arthralgias and back pain.   Skin: Negative for rash.   Neurological: Positive for dizziness and light-headedness.   Psychiatric/Behavioral: Positive for confusion and sleep disturbance. Negative for agitation.       Vital Signs  Temp:  [97.5 °F (36.4 °C)] 97.5 °F (36.4 °C)  Heart Rate:  [112] 112  Resp:  [22] 22  BP: (156)/(95) 156/95    Flowsheet Rows    Flowsheet Row First Filed Value   Admission Height 172.7 cm (68\") Documented at 03/15/2018 0512   Admission Weight (!)  136 kg (300 lb 4 oz) Documented at 03/15/2018 0512             Physical Exam   Constitutional: He appears well-developed and well-nourished.   Morbidly obese   HENT:   Head: Normocephalic.   Mouth/Throat: Oropharynx is clear and moist.   Eyes: Conjunctivae are normal.   Neck: Normal range of motion. No JVD present. No thyromegaly present.   Cardiovascular: Normal rate, regular " rhythm and normal heart sounds.    No murmur heard.  Pulmonary/Chest: Effort normal and breath sounds normal. No respiratory distress. He has no wheezes. He has no rales.   Abdominal: Soft. Bowel sounds are normal. He exhibits no distension. There is no tenderness. There is no guarding.   Musculoskeletal: He exhibits edema (Trace bilateral lower extremity).   Neurological: He is alert.   Skin: Skin is warm and dry. No rash noted.   Nursing note and vitals reviewed.     Results Review:    I reviewed the patient's new clinical results.  Lab Results (most recent)     Procedure Component Value Units Date/Time    Basic Metabolic Panel [[id number]]  (Abnormal) Collected:  03/15/18 0618    Specimen:  Blood Updated:  03/15/18 0700     Glucose 675 (C) mg/dL      BUN 36 (H) mg/dL      Creatinine 1.71 (H) mg/dL      Sodium 128 (L) mmol/L      Potassium 3.9 mmol/L      Chloride 81 (L) mmol/L      CO2 26.1 mmol/L      Calcium 9.9 mg/dL      eGFR Non African Amer 40 (L) mL/min/1.73      BUN/Creatinine Ratio 21.1     Anion Gap 20.9 mmol/L     Narrative:       GFR Normal >60  Chronic Kidney Disease <60  Kidney Failure <15    Hemoglobin A1c [[id number]]  (Abnormal) Collected:  03/15/18 0618    Specimen:  Blood Updated:  03/15/18 0635     Hemoglobin A1C 10.00 (H) %     Narrative:       Hemoglobin A1C Ranges:    Increased Risk for Diabetes  5.7% to 6.4%  Diabetes                     >= 6.5%  Diabetic Goal                < 7.0%    Glucose, Random [[id number]]  (Abnormal) Collected:  03/15/18 0526    Specimen:  Blood Updated:  03/15/18 0544     Glucose 686 (C) mg/dL     POC Glucose Once [[id number]]  (Abnormal) Collected:  03/15/18 0507    Specimen:  Blood Updated:  03/15/18 0517     Glucose >599 (C) mg/dL     Narrative:       RN NOTIFIED Meter: [id number] : [id number] Mikie Mary LOUIE          Imaging Results (most recent)     None        reviewed    ECG/EMG Results (most recent)     None        reviewed    Assessment/Plan     1.   Symptomatic uncontrolled new onset diabetes patient will be started on Accu-Cheks every 2 hours and insulin if patient does not respond will start on insulin drip    2.  Acute kidney injury chronic kidney disease IV fluids were started and patient we monitored carefully    3.  Malaise fatigue and weight loss likely due to uncontrolled diabetes will continue to monitor    4.  Hypertension well controlled home medications be continued    5.  Hyperlipidemia nothing acute continue statin    6.  COPD home inhalers we continued nothing acute    7.  Seizure disorder stable continue Keppra    8.  History of right hemispheric TIA continue aspirin Plavix    9.  History of transitional cell cancer of the bladder status post resection stable    10.  History of mantle cell lymphoma    11.  Recurrent major depression continue SSRI    12.  GERD change to H2 from his PPI had until renal functions improve    13.  Morbid obesity weight loss interventions have discussed    I discussed the patients findings and my recommendations with patient and wife.     Nitesh Arias MD  03/15/18  7:44 AM        Electronically signed by Nitesh Arias MD at 3/15/2018  7:53 AM             ICU Vital Signs     Row Name 03/16/18 1100 03/16/18 0741 03/16/18 0736 03/16/18 0731 03/16/18 0357       Vitals    Temp 97.4 °F (36.3 °C)  --  --  -- 97.9 °F (36.6 °C)    Temp src Oral  --  --  -- Oral    Pulse  -- 82 82 82 83    Heart Rate Source Monitor Monitor Monitor Monitor Monitor    Resp 20 20 20 20 20    Resp Rate Source Visual Visual Visual Visual Visual    /64  --  --  -- 122/60    BP Location Left arm  --  --  -- Left arm    BP Method Automatic  --  --  -- Automatic    Patient Position Lying  --  --  -- Sitting       Oxygen Therapy    SpO2  -- 94 % 92 % 92 % 91 %    Pulse Oximetry Type  -- Continuous Continuous Continuous Continuous    Device (Oxygen Therapy)  -- nasal cannula nasal cannula nasal cannula nasal cannula    Flow  (L/min)  -- 2 2 2 2    Row Name 03/15/18 2333 03/15/18 2330 03/15/18 2230 03/15/18 2155 03/15/18 2009       Vitals    Temp -- 97.3 °F (36.3 °C)  --  -- 98 °F (36.7 °C)    Temp src  -- Oral  --  -- Oral    Pulse  -- 89  -- 99 109    Heart Rate Source  -- Monitor  --  -- Monitor    Resp  -- 20  --  -- 20    Resp Rate Source  --  --  --  -- Visual    BP  -- 126/84  -- 126/79 125/81    Noninvasive MAP (mmHg)  -- 97  -- 98  --    BP Location  -- Left arm  --  -- Left arm    BP Method  -- Automatic  --  -- Automatic    Patient Position  -- Lying  --  -- Lying       Oxygen Therapy    SpO2  -- 94 %  -- 94 % 94 %    Pulse Oximetry Type  -- Intermittent  --  --  --    Device (Oxygen Therapy)  -- nasal cannula  --  -- nasal cannula    Flow (L/min)  -- 2 2  -- 2    Row Name 03/15/18 1932 03/15/18 1923 03/15/18 1634 03/15/18 1627 03/15/18 1511       Vitals    Temp  --  --  --  -- 97.4 °F (36.3 °C)    Temp src  --  --  --  -- Oral    Pulse 100 102 96 96 97    Heart Rate Source  --  --  --  -- Monitor    Resp 20 20 20 20 20    Resp Rate Source  --  --  --  -- Visual    BP  --  --  --  -- 119/78    BP Location  --  --  --  -- Left arm    BP Method  --  --  --  -- Automatic    Patient Position  --  --  --  -- Sitting       Oxygen Therapy    SpO2 97 % 97 %  -- 97 % 91 %    Pulse Oximetry Type Intermittent Intermittent  --  --  --    Device (Oxygen Therapy) nasal cannula nasal cannula  -- nasal cannula nasal cannula    Flow (L/min) 2 2  -- 2 2        Lines, Drains & Airways    Active LDAs     Name:   Placement date:   Placement time:   Site:   Days:    Peripheral IV 03/15/18 Right Forearm  03/15/18    0740    Forearm    1         Inactive LDAs     None                Hospital Medications (all)       Dose Frequency Start End    albuterol (PROVENTIL) nebulizer solution 0.083% 2.5 mg/3mL 2.5 mg Every 6 Hours PRN 3/15/2018     Sig - Route: Take 2.5 mg by nebulization Every 6 (Six) Hours As Needed for Shortness of Air. - Nebulization     allopurinol (ZYLOPRIM) tablet 300 mg 300 mg Every Morning 3/15/2018     Sig - Route: Take 1 tablet by mouth Every Morning. - Oral    amLODIPine (NORVASC) tablet 10 mg 10 mg Every Morning 3/15/2018     Sig - Route: Take 2 tablets by mouth Every Morning. - Oral    aspirin tablet 325 mg 325 mg Every Morning 3/15/2018     Sig - Route: Take 1 tablet by mouth Every Morning. - Oral    bisacodyl (DULCOLAX) EC tablet 5 mg 5 mg Daily PRN 3/15/2018     Sig - Route: Take 1 tablet by mouth Daily As Needed for Constipation. - Oral    budesonide-formoterol (SYMBICORT) 160-4.5 MCG/ACT inhaler 2 puff 2 puff 2 Times Daily - RT 3/15/2018     Sig - Route: Inhale 2 puffs 2 (Two) Times a Day. - Inhalation    calcium carbonate (TUMS) chewable tablet 500 mg (200 mg elemental) 1 tablet 2 Times Daily PRN 3/15/2018     Sig - Route: Chew 500 mg 2 (Two) Times a Day As Needed for Heartburn. - Oral    cetirizine (zyrTEC) tablet 10 mg 10 mg Every Morning 3/15/2018     Sig - Route: Take 1 tablet by mouth Every Morning. - Oral    cholecalciferol (VITAMIN D3) capsule 50,000 Units 50,000 Units Every 7 Days 3/19/2018     Sig - Route: Take 1 capsule by mouth Every 7 (Seven) Days. - Oral    clobetasol (TEMOVATE) 0.05 % cream  Every 12 Hours Scheduled 3/15/2018     Sig - Route: Apply  topically Every 12 (Twelve) Hours. - Topical    clopidogrel (PLAVIX) tablet 75 mg 75 mg Every Morning 3/15/2018     Sig - Route: Take 1 tablet by mouth Every Morning. - Oral    dextrose (D50W) solution 25 g 25 g Every 15 Minutes PRN 3/15/2018     Sig - Route: Infuse 50 mL into a venous catheter Every 15 (Fifteen) Minutes As Needed for Low Blood Sugar (Blood Sugar Less Than 70). - Intravenous    dextrose (GLUTOSE) oral gel 15 g 15 g Every 15 Minutes PRN 3/15/2018     Sig - Route: Take 15 g by mouth Every 15 (Fifteen) Minutes As Needed for Low Blood Sugar (Blood sugar less than 70). - Oral    enoxaparin (LOVENOX) syringe 40 mg 40 mg Every 12 Hours 3/15/2018     Sig - Route:  Inject 0.4 mL under the skin Every 12 (Twelve) Hours. - Subcutaneous    febuxostat (ULORIC) tablet 80 mg 80 mg Daily 3/15/2018     Sig - Route: Take 2 tablets by mouth Daily. - Oral    glucagon (GLUCAGEN) injection 1 mg 1 mg As Needed 3/15/2018     Sig - Route: Inject 1 mg under the skin As Needed (Blood Glucose Less Than 70). - Subcutaneous    insulin aspart (novoLOG) 100 UNIT/ML injection  - ADS Override Pull   3/15/2018 3/15/2018    Notes to Pharmacy: Created by cabinet override    insulin aspart (novoLOG) injection 0-24 Units 0-24 Units 4 Times Daily Before Meals & Nightly 3/16/2018     Sig - Route: Inject 0-24 Units under the skin 4 (Four) Times a Day Before Meals & at Bedtime. - Subcutaneous    insulin aspart (novoLOG) injection 10 Units 10 Units Once 3/15/2018 3/15/2018    Sig - Route: Inject 10 Units under the skin 1 (One) Time. - Subcutaneous    insulin detemir (LEVEMIR) injection 25 Units 25 Units Nightly 3/15/2018     Sig - Route: Inject 25 Units under the skin Every Night. - Subcutaneous    insulin regular (humuLIN R,novoLIN R) 100 UNIT/ML injection  - ADS Override Pull   3/15/2018 3/16/2018    Notes to Pharmacy: Created by cabinet override    insulin regular (humuLIN R,novoLIN R) injection 10 Units 10 Units Once 3/15/2018 3/15/2018    Sig - Route: Inject 10 Units under the skin 1 (One) Time. - Subcutaneous    insulin regular (humuLIN R,novoLIN R) injection 10 Units 10 Units Once 3/15/2018 3/15/2018    Sig - Route: Infuse 10 Units into a venous catheter 1 (One) Time. - Intravenous    insulin regular (humuLIN R,novoLIN R) injection 10 Units 10 Units Once 3/15/2018 3/15/2018    Sig - Route: Infuse 10 Units into a venous catheter 1 (One) Time. - Intravenous    insulin regular (humuLIN R,novoLIN R) injection 5 Units 5 Units Once 3/15/2018 3/15/2018    Sig - Route: Infuse 5 Units into a venous catheter 1 (One) Time. - Intravenous    ipratropium (ATROVENT) nebulizer solution 0.5 mg 0.5 mg 4 Times Daily - RT  3/15/2018     Sig - Route: Take 2.5 mL by nebulization 4 (Four) Times a Day. - Nebulization    levETIRAcetam (KEPPRA) tablet 500 mg 500 mg Every 12 Hours Scheduled 3/15/2018     Sig - Route: Take 1 tablet by mouth Every 12 (Twelve) Hours. - Oral    linagliptin (TRADJENTA) tablet 5 mg 5 mg Daily 3/15/2018     Sig - Route: Take 1 tablet by mouth Daily. - Oral    lubiprostone (AMITIZA) capsule 24 mcg 24 mcg 2 Times Daily With Meals 3/15/2018     Sig - Route: Take 1 capsule by mouth 2 (Two) Times a Day With Meals. - Oral    magnesium hydroxide (MILK OF MAGNESIA) suspension 2400 mg/10mL 10 mL 10 mL Daily PRN 3/15/2018     Sig - Route: Take 10 mL by mouth Daily As Needed for Constipation. - Oral    metoprolol tartrate (LOPRESSOR) tablet 50 mg 50 mg Every 12 Hours Scheduled 3/15/2018     Sig - Route: Take 1 tablet by mouth Every 12 (Twelve) Hours. - Oral    O2 (OXYGEN) 2 L/min Daily 3/15/2018     Sig - Route: Inhale 2 L/min Daily. - Inhalation    pantoprazole (PROTONIX) EC tablet 40 mg 40 mg Every Morning 3/15/2018     Sig - Route: Take 1 tablet by mouth Every Morning. - Oral    potassium chloride (K-DUR,KLOR-CON) CR tablet 40 mEq 40 mEq Every 2 Hours 3/16/2018 3/16/2018    Sig - Route: Take 2 tablets by mouth Every 2 (Two) Hours. - Oral    sodium chloride 0.9 % flush 1-10 mL 1-10 mL As Needed 3/15/2018     Sig - Route: Infuse 1-10 mL into a venous catheter As Needed for Line Care. - Intravenous    sodium chloride 0.9 % infusion 40 mL 40 mL As Needed 3/15/2018     Sig - Route: Infuse 40 mL into a venous catheter As Needed for Line Care. - Intravenous    sodium chloride 0.9 % infusion 75 mL/hr Continuous 3/15/2018     Sig - Route: Infuse 75 mL/hr into a venous catheter Continuous. - Intravenous    insulin regular (HumuLIN R,NovoLIN R) 100 Units in sodium chloride 0.9 % 100 mL (1 Units/mL) infusion (Discontinued) 20 Units/hr Titrated 3/15/2018 3/16/2018    Sig - Route: Infuse 20 Units/hr into a venous catheter Dose Adjusted  By Provider As Needed. - Intravenous    linagliptin (TRADJENTA) tablet 5 mg (Discontinued) 5 mg Daily 3/16/2018 3/16/2018    Sig - Route: Take 1 tablet by mouth Daily. - Oral    Reason for Discontinue: Duplicate order          Blood Administration Record     None          Lab Results (last 24 hours)     Procedure Component Value Units Date/Time    POC Glucose Once [[id number]]  (Abnormal) Collected:  03/16/18 1139    Specimen:  Blood Updated:  03/16/18 1151     Glucose 391 (H) mg/dL     Narrative:       Meter: [id number] : [id number] Ruiz Karen Mointor Tech    POC Glucose Once [[id number]]  (Abnormal) Collected:  03/16/18 0814    Specimen:  Blood Updated:  03/16/18 0826     Glucose 167 (H) mg/dL     Narrative:       Meter: [id number] : [id number] Ruiz Karen Mointor Tech    POC Glucose Once [[id number]]  (Abnormal) Collected:  03/16/18 0645    Specimen:  Blood Updated:  03/16/18 0652     Glucose 141 (H) mg/dL     Narrative:       Meter: [id number] : [id number] Nayely Jackson RN    Basic Metabolic Panel [[id number]]  (Abnormal) Collected:  03/16/18 0510    Specimen:  Blood Updated:  03/16/18 0557     Glucose 135 (H) mg/dL      BUN 33 (H) mg/dL      Creatinine 1.38 (H) mg/dL      Sodium 134 (L) mmol/L      Potassium 2.6 (L) mmol/L      Chloride 91 (L) mmol/L      CO2 30.7 (H) mmol/L      Calcium 8.9 mg/dL      eGFR Non African Amer 51 (L) mL/min/1.73      BUN/Creatinine Ratio 23.9     Anion Gap 12.3 mmol/L     Narrative:       GFR Normal >60  Chronic Kidney Disease <60  Kidney Failure <15    POC Glucose Once [[id number]]  (Abnormal) Collected:  03/16/18 0446    Specimen:  Blood Updated:  03/16/18 0455     Glucose 133 (H) mg/dL     Narrative:       Meter: [id number] : [id number] Nayely Jackson RN    POC Glucose Once [[id number]]  (Normal) Collected:  03/16/18 0329    Specimen:  Blood Updated:  03/16/18 0339     Glucose 124 mg/dL     Narrative:       Meter: [id number] : [id number] Nayely Jackson RN    POC  Glucose Once [[id number]]  (Normal) Collected:  03/16/18 0229    Specimen:  Blood Updated:  03/16/18 0238     Glucose 99 mg/dL     Narrative:       Meter: [id number] : [id number] Nayely Jackson RN    POC Glucose Once [[id number]]  (Normal) Collected:  03/16/18 0134    Specimen:  Blood Updated:  03/16/18 0140     Glucose 116 mg/dL     Narrative:       Meter: [id number] : [id number] Jasen Ritchie HARESH [facility]    POC Glucose Once [[id number]]  (Abnormal) Collected:  03/16/18 0027    Specimen:  Blood Updated:  03/16/18 0033     Glucose 267 (H) mg/dL     Narrative:       Meter: [id number] : [id number] Nayely Jackson RN    POC Glucose Once [[id number]]  (Abnormal) Collected:  03/15/18 2329    Specimen:  Blood Updated:  03/15/18 2341     Glucose 414 (H) mg/dL     Narrative:       Confirmed by Repeat Meter: [id number] : [id number] Nayely Jackson RN    POC Glucose Once [[id number]]  (Abnormal) Collected:  03/15/18 2241    Specimen:  Blood Updated:  03/15/18 2249     Glucose 553 (C) mg/dL     Narrative:       Confirmed by Repeat Meter: [id number] : [id number] Nayely Jackson RN    POC Glucose Once [[id number]]  (Abnormal) Collected:  03/15/18 2037    Specimen:  Blood Updated:  03/15/18 2045     Glucose 586 (C) mg/dL     Narrative:       Repeat Test Meter: [id number] : [id number] Yuli Tamayo RN    POC Glucose Once [[id number]]  (Abnormal) Collected:  03/15/18 2035    Specimen:  Blood Updated:  03/15/18 2044     Glucose >599 (C) mg/dL     Narrative:       JO ANN NOTIIED Meter: [id number] : [id number] Yuli Tamayo RN    POC Glucose Once [[id number]]  (Abnormal) Collected:  03/15/18 1827    Specimen:  Blood Updated:  03/15/18 1948     Glucose 425 (H) mg/dL     Narrative:       RN Notified R and V Physician Notified PER JO ANN ARRIAZA Meter: [id number] :    POC Glucose Once [[id number]]  (Abnormal) Collected:  03/15/18 1826    Specimen:  Blood Updated:  03/15/18 1948     Glucose 506 (C) mg/dL     Narrative:   "     Repeat Test Meter: [id number] : [id number] Alirio Bravo NURSING ASSISTANT    POC Glucose Once [[id number]]  (Abnormal) Collected:  03/15/18 1639    Specimen:  Blood Updated:  03/15/18 1912     Glucose 387 (H) mg/dL     Narrative:       RN Notified R and V Physician Notified PER RN SOFIYA Meter: [id number] :    POC Glucose Once [[id number]]  (Abnormal) Collected:  03/15/18 1638    Specimen:  Blood Updated:  03/15/18 1911     Glucose 425 (H) mg/dL     Narrative:       Repeat Test Meter: [id number] : [id number] Alirio Bravo NURSING ASSISTANT    POC Glucose Once [[id number]]  (Abnormal) Collected:  03/15/18 1418    Specimen:  Blood Updated:  03/15/18 1426     Glucose 479 (C) mg/dL     Narrative:       RN Notified R and V Physician Notified PER RN SOFIYA Meter: [id number] :    POC Glucose Once [[id number]]  (Abnormal) Collected:  03/15/18 1417    Specimen:  Blood Updated:  03/15/18 1426     Glucose 475 (C) mg/dL     Narrative:       Repeat Test Meter: [id number] : [id number] Alirio Bravo NURSING ASSISTANT             Physician Progress Notes (last 72 hours) (Notes from 3/13/2018  1:01 PM through 3/16/2018  1:01 PM)      Nitesh Arias MD at 3/16/2018  7:05 AM          Daily Progress Note:    Subjective: Patient relates no complaints.  Had poor response to bolus dosing of insulin yesterday and blood sugars did the crease but not significantly.  He was subsequently moved to the ICU used to her insulin drip and his blood sugars are much better.    Flowsheet Rows    Flowsheet Row First Filed Value   Admission Height 172.7 cm (68\") Documented at 03/15/2018 0512   Admission Weight (!)  136 kg (300 lb 4 oz) Documented at 03/15/2018 0512          Documented weights    03/15/18 0512   Weight: (!) 136 kg (300 lb 4 oz)       Patient Vitals for the past 24 hrs:   BP Temp Temp src Pulse Resp SpO2   03/16/18 0357 122/60 97.9 °F (36.6 °C) Oral 83 20 91 %   03/15/18 2330 126/84 97.3 °F (36.3 " °C) Oral 89 20 94 %   03/15/18 2155 126/79 - - 99 - 94 %   03/15/18 2009 125/81 98 °F (36.7 °C) Oral 109 20 94 %   03/15/18 1932 - - - 100 20 97 %   03/15/18 1923 - - - 102 20 97 %   03/15/18 1634 - - - 96 20 -   03/15/18 1627 - - - 96 20 97 %   03/15/18 1511 119/78 97.4 °F (36.3 °C) Oral 97 20 91 %   03/15/18 1214 - - - 92 20 (!) 63 %   03/15/18 1206 - - - 88 20 96 %   03/15/18 1001 - - - 88 20 -   03/15/18 0955 - - - 88 20 97 %       (!) 136 kg (300 lb 4 oz)      Intake/Output Summary (Last 24 hours) at 03/16/18 0705  Last data filed at 03/16/18 0505   Gross per 24 hour   Intake          4244.83 ml   Output              725 ml   Net          3519.83 ml       Review of Systems   Constitutional: Negative for appetite change.   Respiratory: Positive for cough. Negative for chest tightness.    Cardiovascular: Positive for leg swelling. Negative for chest pain.   Gastrointestinal: Negative for abdominal distention, abdominal pain, diarrhea, nausea and vomiting.   Genitourinary: Negative for frequency.   Musculoskeletal: Positive for back pain.   Skin: Negative for rash.   Psychiatric/Behavioral: Negative for agitation.       Physical Exam   Constitutional: He appears well-developed and well-nourished.   Morbidly obese   HENT:   Head: Normocephalic.   Mouth/Throat: Oropharynx is clear and moist.   Eyes: Conjunctivae are normal.   Neck: Normal range of motion. No JVD present. No thyromegaly present.   Cardiovascular: Normal rate, regular rhythm and normal heart sounds.    No murmur heard.  Pulmonary/Chest: Effort normal. No respiratory distress. He has wheezes (scattered). He has no rales.   Abdominal: Soft. Bowel sounds are normal. He exhibits no distension. There is no tenderness. There is no guarding.   Musculoskeletal: He exhibits edema (trace).   Neurological: He is alert.   Skin: Skin is warm and dry. No rash noted.   Nursing note and vitals reviewed.          Medication Review:     Medication Review:   I have  reviewed the patient's current medication list    Current Facility-Administered Medications:   •  albuterol (PROVENTIL) nebulizer solution 0.083% 2.5 mg/3mL, 2.5 mg, Nebulization, Q6H PRN, Nitesh Arias MD  •  allopurinol (ZYLOPRIM) tablet 300 mg, 300 mg, Oral, QAM, Nitesh Arias MD, 300 mg at 03/16/18 0629  •  amLODIPine (NORVASC) tablet 10 mg, 10 mg, Oral, QAM, Nitesh Arias MD, 10 mg at 03/16/18 0629  •  aspirin tablet 325 mg, 325 mg, Oral, QAM, Nitesh Arias MD, 325 mg at 03/16/18 0629  •  bisacodyl (DULCOLAX) EC tablet 5 mg, 5 mg, Oral, Daily PRN, Nitesh Arias MD  •  budesonide-formoterol (SYMBICORT) 160-4.5 MCG/ACT inhaler 2 puff, 2 puff, Inhalation, BID - RT, Nitesh Arias MD, 2 puff at 03/15/18 1923  •  calcium carbonate (TUMS) chewable tablet 500 mg (200 mg elemental), 1 tablet, Oral, BID PRN, Nitesh Arias MD  •  cetirizine (zyrTEC) tablet 10 mg, 10 mg, Oral, QAM, Nitesh Arias MD, 10 mg at 03/16/18 0629  •  [START ON 3/19/2018] cholecalciferol (VITAMIN D3) capsule 50,000 Units, 50,000 Units, Oral, Q7 Days, Nitesh Arias MD  •  clobetasol (TEMOVATE) 0.05 % cream, , Topical, Q12H, Nitesh Arias MD  •  clopidogrel (PLAVIX) tablet 75 mg, 75 mg, Oral, QAM, Nitesh Arias MD, 75 mg at 03/16/18 0629  •  dextrose (D50W) solution 25 g, 25 g, Intravenous, Q15 Min PRN, Nitesh Arias MD  •  dextrose (GLUTOSE) oral gel 15 g, 15 g, Oral, Q15 Min PRN, Nitesh Arias MD  •  enoxaparin (LOVENOX) syringe 40 mg, 40 mg, Subcutaneous, Q12H, Nitesh Arias MD, 40 mg at 03/15/18 2040  •  febuxostat (ULORIC) tablet 80 mg, 80 mg, Oral, Daily, Nitesh Arias MD, 80 mg at 03/15/18 0835  •  glucagon (GLUCAGEN) injection 1 mg, 1 mg, Subcutaneous, PRN, Nitesh Arias MD  •  insulin detemir (LEVEMIR) injection 25 Units, 25 Units, Subcutaneous,  Nightly, Nitesh Arias MD, 25 Units at 03/15/18 2037  •  insulin regular (humuLIN R,novoLIN R) 100 UNIT/ML injection  - ADS Override Pull, , , ,   •  insulin regular (HumuLIN R,NovoLIN R) 100 Units in sodium chloride 0.9 % 100 mL (1 Units/mL) infusion, 20 Units/hr, Intravenous, Titrated, Nitesh Arias MD, Last Rate: 3.2 mL/hr at 03/16/18 0449, 3.2 Units/hr at 03/16/18 0449  •  ipratropium (ATROVENT) nebulizer solution 0.5 mg, 0.5 mg, Nebulization, 4x Daily - RT, Nitesh Arias MD, 0.5 mg at 03/15/18 1923  •  levETIRAcetam (KEPPRA) tablet 500 mg, 500 mg, Oral, Q12H, Nitesh Arias MD, 500 mg at 03/15/18 2041  •  linagliptin (TRADJENTA) tablet 5 mg, 5 mg, Oral, Daily, Nitesh Arias MD, 5 mg at 03/15/18 0836  •  lubiprostone (AMITIZA) capsule 24 mcg, 24 mcg, Oral, BID With Meals, Nitesh Arias MD  •  magnesium hydroxide (MILK OF MAGNESIA) suspension 2400 mg/10mL 10 mL, 10 mL, Oral, Daily PRN, Nitesh Arias MD  •  metoprolol tartrate (LOPRESSOR) tablet 50 mg, 50 mg, Oral, Q12H, Nitesh Arias MD, 50 mg at 03/15/18 2043  •  O2 (OXYGEN), 2 L/min, Inhalation, Daily, Nitesh Arias MD, 2 L/min at 03/15/18 0834  •  pantoprazole (PROTONIX) EC tablet 40 mg, 40 mg, Oral, QAM, Nitesh Arias MD, 40 mg at 03/16/18 0629  •  potassium chloride (K-DUR,KLOR-CON) CR tablet 40 mEq, 40 mEq, Oral, Q2H, Nitesh Arias MD, 40 mEq at 03/16/18 0629  •  sodium chloride 0.9 % flush 1-10 mL, 1-10 mL, Intravenous, PRN, Nitesh Arias MD  •  sodium chloride 0.9 % infusion 40 mL, 40 mL, Intravenous, PRN, Nitesh Arias MD  •  sodium chloride 0.9 % infusion, 100 mL/hr, Intravenous, Continuous, Nitesh Arias MD, Last Rate: 100 mL/hr at 03/16/18 0351, 100 mL/hr at 03/16/18 0351      Labs:        Invalid input(s): NEUTOPHILPCT    Results from last 7 days  Lab Units 03/16/18  0510 03/15/18  0618  03/15/18  0526   SODIUM mmol/L 134* 128*  --    POTASSIUM mmol/L 2.6* 3.9  --    CHLORIDE mmol/L 91* 81*  --    CO2 mmol/L 30.7* 26.1  --    BUN mg/dL 33* 36*  --    CREATININE mg/dL 1.38* 1.71*  --    CALCIUM mg/dL 8.9 9.9  --    GLUCOSE mg/dL 135* 675* 686*           Lab Results (last 24 hours)     Procedure Component Value Units Date/Time    POC Glucose Once [[id number]]  (Abnormal) Collected:  03/16/18 0645    Specimen:  Blood Updated:  03/16/18 0652     Glucose 141 (H) mg/dL     Narrative:       Meter: [id number] : [id number] Jackson RN    Basic Metabolic Panel [[id number]]  (Abnormal) Collected:  03/16/18 0510    Specimen:  Blood Updated:  03/16/18 0557     Glucose 135 (H) mg/dL      BUN 33 (H) mg/dL      Creatinine 1.38 (H) mg/dL      Sodium 134 (L) mmol/L      Potassium 2.6 (L) mmol/L      Chloride 91 (L) mmol/L      CO2 30.7 (H) mmol/L      Calcium 8.9 mg/dL      eGFR Non African Amer 51 (L) mL/min/1.73      BUN/Creatinine Ratio 23.9     Anion Gap 12.3 mmol/L     Narrative:       GFR Normal >60  Chronic Kidney Disease <60  Kidney Failure <15    POC Glucose Once [[id number]]  (Abnormal) Collected:  03/16/18 0446    Specimen:  Blood Updated:  03/16/18 0455     Glucose 133 (H) mg/dL     Narrative:       Meter: [id number] : [id number] Jackson RN    POC Glucose Once [[id number]]  (Normal) Collected:  03/16/18 0329    Specimen:  Blood Updated:  03/16/18 0339     Glucose 124 mg/dL     Narrative:       Meter: [id number] : [id number] Jackson RN    POC Glucose Once [[id number]]  (Normal) Collected:  03/16/18 0229    Specimen:  Blood Updated:  03/16/18 0238     Glucose 99 mg/dL     Narrative:       Meter: [id number] : [id number] Jackson RN    POC Glucose Once [[id number]]  (Normal) Collected:  03/16/18 0134    Specimen:  Blood Updated:  03/16/18 0140     Glucose 116 mg/dL     Narrative:       Meter: [id number] : [id number] Jasen CHANDRA    POC Glucose Once [[id number]]   (Abnormal) Collected:  03/16/18 0027    Specimen:  Blood Updated:  03/16/18 0033     Glucose 267 (H) mg/dL     Narrative:       Meter: [id number] : [id number] Nayely Jackson RN    POC Glucose Once [[id number]]  (Abnormal) Collected:  03/15/18 2329    Specimen:  Blood Updated:  03/15/18 2341     Glucose 414 (H) mg/dL     Narrative:       Confirmed by Repeat Meter: [id number] : [id number] Nayely Jackson RN    POC Glucose Once [[id number]]  (Abnormal) Collected:  03/15/18 2241    Specimen:  Blood Updated:  03/15/18 2249     Glucose 553 (C) mg/dL     Narrative:       Confirmed by Repeat Meter: [id number] : [id number] Nayely Jackson RN    POC Glucose Once [[id number]]  (Abnormal) Collected:  03/15/18 2037    Specimen:  Blood Updated:  03/15/18 2045     Glucose 586 (C) mg/dL     Narrative:       Repeat Test Meter: [id number] : [id number] Yuli Tamayo RN    POC Glucose Once [[id number]]  (Abnormal) Collected:  03/15/18 2035    Specimen:  Blood Updated:  03/15/18 2044     Glucose >599 (C) mg/dL     Narrative:       RN NOTIIED Meter: [id number] : [id number] Yuli Tamayo RN    POC Glucose Once [[id number]]  (Abnormal) Collected:  03/15/18 1827    Specimen:  Blood Updated:  03/15/18 1948     Glucose 425 (H) mg/dL     Narrative:       RN Notified R and V Physician Notified PER JO ANN SOFIYA Meter: [id number] :    POC Glucose Once [[id number]]  (Abnormal) Collected:  03/15/18 1826    Specimen:  Blood Updated:  03/15/18 1948     Glucose 506 (C) mg/dL     Narrative:       Repeat Test Meter: [id number] : [id number] Alirio Bravo NURSING ASSISTANT    POC Glucose Once [[id number]]  (Abnormal) Collected:  03/15/18 1639    Specimen:  Blood Updated:  03/15/18 1912     Glucose 387 (H) mg/dL     Narrative:       RN Notified R and V Physician Notified PER RN SOFIYA Meter: [id number] :    POC Glucose Once [[id number]]  (Abnormal) Collected:  03/15/18 1638    Specimen:  Blood Updated:  03/15/18 1918      Glucose 425 (H) mg/dL     Narrative:       Repeat Test Meter: [id number] : [id number] Alirio Shelly NURSING ASSISTANT    POC Glucose Once [[id number]]  (Abnormal) Collected:  03/15/18 1418    Specimen:  Blood Updated:  03/15/18 1426     Glucose 479 (C) mg/dL     Narrative:       RN Notified R and V Physician Notified PER RN SOFIYA Meter: [id number] :    POC Glucose Once [[id number]]  (Abnormal) Collected:  03/15/18 1417    Specimen:  Blood Updated:  03/15/18 1426     Glucose 475 (C) mg/dL     Narrative:       Repeat Test Meter: [id number] : [id number] Alirio Shelly NURSING ASSISTANT    POC Glucose Once [[id number]]  (Abnormal) Collected:  03/15/18 1236    Specimen:  Blood Updated:  03/15/18 1246     Glucose 467 (C) mg/dL     Narrative:       RN Notified R and V Physician Notified PER SOFIYA RN Meter: [id number] :    POC Glucose Once [[id number]]  (Abnormal) Collected:  03/15/18 1234    Specimen:  Blood Updated:  03/15/18 1245     Glucose 404 (H) mg/dL     Narrative:       Repeat Test Meter: [id number] : [id number] Alirio Shelly NURSING ASSISTANT    POC Glucose Once [[id number]]  (Abnormal) Collected:  03/15/18 1024    Specimen:  Blood Updated:  03/15/18 1032     Glucose 496 (C) mg/dL     Narrative:       RN Notified R and V Physician Notified PER RN SOFIYA Meter: [id number] :    POC Glucose Once [[id number]]  (Abnormal) Collected:  03/15/18 0756    Specimen:  Blood Updated:  03/15/18 0804     Glucose 571 (C) mg/dL     Narrative:       RN Notified R and V Physician Notified PER SOFIYA RN Meter: [id number] :              Radiology:  Imaging Results (last 24 hours)     ** No results found for the last 24 hours. **          Cardiology:  ECG/EMG Results (last 24 hours)     ** No results found for the last 24 hours. **          I have reviewed consult notes.    Assessment and Plan:    1.  Symptomatic uncontrolled new onset diabetes good response to insulin drip will change to  Accu-Cheks and sliding scale insulin and monitor   2.  Acute kidney injury chronic kidney disease is improving with IV fluids will continue to monitor     3.   Hypokalemia.  Potassium is been ordered     4.  Hypertension well controlled home medications be continued     5.  Hyperlipidemia nothing acute continue statin     6.  COPD home inhalers we continued nothing acute     7.  Seizure disorder stable continue Keppra     8.  History of right hemispheric TIA continue aspirin Plavix     9.  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  History of mantle cell lymphoma     11.  Recurrent major depression continue SSRI     12.  GERD change to H2 from his PPI had until renal functions improve         Electronically signed by Nitesh Arias MD at 3/16/2018  7:13 AM

## 2018-03-17 LAB
ANION GAP SERPL CALCULATED.3IONS-SCNC: 10.4 MMOL/L
BUN BLD-MCNC: 26 MG/DL (ref 8–23)
BUN/CREAT SERPL: 21.7 (ref 7–25)
CALCIUM SPEC-SCNC: 8.5 MG/DL (ref 8.8–10.5)
CHLORIDE SERPL-SCNC: 97 MMOL/L (ref 98–107)
CO2 SERPL-SCNC: 25.6 MMOL/L (ref 22–29)
CREAT BLD-MCNC: 1.2 MG/DL (ref 0.76–1.27)
DEPRECATED RDW RBC AUTO: 44.7 FL (ref 37–54)
ERYTHROCYTE [DISTWIDTH] IN BLOOD BY AUTOMATED COUNT: 14.9 % (ref 11.5–14.5)
GFR SERPL CREATININE-BSD FRML MDRD: 60 ML/MIN/1.73
GLUCOSE BLD-MCNC: 376 MG/DL (ref 65–99)
GLUCOSE BLDC GLUCOMTR-MCNC: 219 MG/DL (ref 70–130)
GLUCOSE BLDC GLUCOMTR-MCNC: 261 MG/DL (ref 70–130)
GLUCOSE BLDC GLUCOMTR-MCNC: 274 MG/DL (ref 70–130)
GLUCOSE BLDC GLUCOMTR-MCNC: 316 MG/DL (ref 70–130)
GLUCOSE BLDC GLUCOMTR-MCNC: 347 MG/DL (ref 70–130)
GLUCOSE BLDC GLUCOMTR-MCNC: 377 MG/DL (ref 70–130)
HCT VFR BLD AUTO: 44.5 % (ref 42–52)
HGB BLD-MCNC: 15.7 G/DL (ref 14–18)
MAGNESIUM SERPL-MCNC: 2.3 MG/DL (ref 1.7–2.5)
MCH RBC QN AUTO: 29.6 PG (ref 27–31)
MCHC RBC AUTO-ENTMCNC: 35.3 G/DL (ref 31–37)
MCV RBC AUTO: 83.8 FL (ref 80–94)
PLATELET # BLD AUTO: 126 10*3/MM3 (ref 140–500)
PMV BLD AUTO: 11.9 FL (ref 7.4–10.4)
POTASSIUM BLD-SCNC: 3.2 MMOL/L (ref 3.5–5.2)
RBC # BLD AUTO: 5.31 10*6/MM3 (ref 4.7–6.1)
SODIUM BLD-SCNC: 133 MMOL/L (ref 136–145)
WBC NRBC COR # BLD: 6.54 10*3/MM3 (ref 4.8–10.8)

## 2018-03-17 PROCEDURE — 85027 COMPLETE CBC AUTOMATED: CPT | Performed by: FAMILY MEDICINE

## 2018-03-17 PROCEDURE — 94799 UNLISTED PULMONARY SVC/PX: CPT

## 2018-03-17 PROCEDURE — 80048 BASIC METABOLIC PNL TOTAL CA: CPT | Performed by: FAMILY MEDICINE

## 2018-03-17 PROCEDURE — 82962 GLUCOSE BLOOD TEST: CPT

## 2018-03-17 PROCEDURE — 63710000001 INSULIN ASPART PER 5 UNITS: Performed by: FAMILY MEDICINE

## 2018-03-17 PROCEDURE — 25010000002 ENOXAPARIN PER 10 MG: Performed by: FAMILY MEDICINE

## 2018-03-17 PROCEDURE — 63710000001 INSULIN DETEMIR PER 5 UNITS: Performed by: FAMILY MEDICINE

## 2018-03-17 PROCEDURE — 83735 ASSAY OF MAGNESIUM: CPT | Performed by: FAMILY MEDICINE

## 2018-03-17 RX ORDER — GLIPIZIDE 5 MG/1
5 TABLET ORAL
Status: DISCONTINUED | OUTPATIENT
Start: 2018-03-17 | End: 2018-03-19 | Stop reason: HOSPADM

## 2018-03-17 RX ORDER — POTASSIUM CHLORIDE 20 MEQ/1
40 TABLET, EXTENDED RELEASE ORAL ONCE
Status: COMPLETED | OUTPATIENT
Start: 2018-03-17 | End: 2018-03-17

## 2018-03-17 RX ORDER — FUROSEMIDE 80 MG
80 TABLET ORAL DAILY
Status: DISCONTINUED | OUTPATIENT
Start: 2018-03-17 | End: 2018-03-19 | Stop reason: HOSPADM

## 2018-03-17 RX ADMIN — INSULIN DETEMIR 40 UNITS: 100 INJECTION, SOLUTION SUBCUTANEOUS at 22:16

## 2018-03-17 RX ADMIN — ENOXAPARIN SODIUM 40 MG: 40 INJECTION SUBCUTANEOUS at 22:23

## 2018-03-17 RX ADMIN — IPRATROPIUM BROMIDE 0.5 MG: 0.5 SOLUTION RESPIRATORY (INHALATION) at 11:34

## 2018-03-17 RX ADMIN — IPRATROPIUM BROMIDE 0.5 MG: 0.5 SOLUTION RESPIRATORY (INHALATION) at 18:59

## 2018-03-17 RX ADMIN — LEVETIRACETAM 500 MG: 500 TABLET ORAL at 22:25

## 2018-03-17 RX ADMIN — GLIPIZIDE 5 MG: 5 TABLET ORAL at 08:32

## 2018-03-17 RX ADMIN — METOPROLOL TARTRATE 50 MG: 50 TABLET ORAL at 22:25

## 2018-03-17 RX ADMIN — BUDESONIDE AND FORMOTEROL FUMARATE DIHYDRATE 2 PUFF: 160; 4.5 AEROSOL RESPIRATORY (INHALATION) at 07:48

## 2018-03-17 RX ADMIN — ENOXAPARIN SODIUM 40 MG: 40 INJECTION SUBCUTANEOUS at 08:22

## 2018-03-17 RX ADMIN — SODIUM CHLORIDE 75 ML/HR: 9 INJECTION, SOLUTION INTRAVENOUS at 05:34

## 2018-03-17 RX ADMIN — FUROSEMIDE 80 MG: 80 TABLET ORAL at 14:34

## 2018-03-17 RX ADMIN — FEBUXOSTAT 80 MG: 40 TABLET ORAL at 08:22

## 2018-03-17 RX ADMIN — ALLOPURINOL 300 MG: 300 TABLET ORAL at 07:42

## 2018-03-17 RX ADMIN — ASPIRIN 325 MG: 325 TABLET, COATED ORAL at 07:42

## 2018-03-17 RX ADMIN — METOPROLOL TARTRATE 50 MG: 50 TABLET ORAL at 08:22

## 2018-03-17 RX ADMIN — INSULIN ASPART 12 UNITS: 100 INJECTION, SOLUTION INTRAVENOUS; SUBCUTANEOUS at 12:23

## 2018-03-17 RX ADMIN — IPRATROPIUM BROMIDE 0.5 MG: 0.5 SOLUTION RESPIRATORY (INHALATION) at 16:37

## 2018-03-17 RX ADMIN — INSULIN ASPART 8 UNITS: 100 INJECTION, SOLUTION INTRAVENOUS; SUBCUTANEOUS at 16:44

## 2018-03-17 RX ADMIN — BISACODYL 5 MG: 5 TABLET, COATED ORAL at 10:55

## 2018-03-17 RX ADMIN — LUBIPROSTONE 24 MCG: 24 CAPSULE ORAL at 10:03

## 2018-03-17 RX ADMIN — INSULIN ASPART 16 UNITS: 100 INJECTION, SOLUTION INTRAVENOUS; SUBCUTANEOUS at 08:23

## 2018-03-17 RX ADMIN — LINAGLIPTIN 5 MG: 5 TABLET, FILM COATED ORAL at 08:22

## 2018-03-17 RX ADMIN — CETIRIZINE HYDROCHLORIDE 10 MG: 10 TABLET, FILM COATED ORAL at 07:41

## 2018-03-17 RX ADMIN — LUBIPROSTONE 24 MCG: 24 CAPSULE ORAL at 18:13

## 2018-03-17 RX ADMIN — PANTOPRAZOLE SODIUM 40 MG: 40 TABLET, DELAYED RELEASE ORAL at 07:41

## 2018-03-17 RX ADMIN — INSULIN ASPART 12 UNITS: 100 INJECTION, SOLUTION INTRAVENOUS; SUBCUTANEOUS at 22:19

## 2018-03-17 RX ADMIN — GLIPIZIDE 5 MG: 5 TABLET ORAL at 16:44

## 2018-03-17 RX ADMIN — LEVETIRACETAM 500 MG: 500 TABLET ORAL at 08:22

## 2018-03-17 RX ADMIN — AMLODIPINE BESYLATE 10 MG: 5 TABLET ORAL at 07:42

## 2018-03-17 RX ADMIN — CLOPIDOGREL 75 MG: 75 TABLET, FILM COATED ORAL at 07:41

## 2018-03-17 RX ADMIN — BUDESONIDE AND FORMOTEROL FUMARATE DIHYDRATE 2 PUFF: 160; 4.5 AEROSOL RESPIRATORY (INHALATION) at 18:59

## 2018-03-17 RX ADMIN — POTASSIUM CHLORIDE 40 MEQ: 1500 TABLET, EXTENDED RELEASE ORAL at 08:22

## 2018-03-17 RX ADMIN — INSULIN ASPART 5 UNITS: 100 INJECTION, SOLUTION INTRAVENOUS; SUBCUTANEOUS at 08:23

## 2018-03-17 RX ADMIN — IPRATROPIUM BROMIDE 0.5 MG: 0.5 SOLUTION RESPIRATORY (INHALATION) at 07:50

## 2018-03-17 NOTE — PROGRESS NOTES
"Daily Progress Note:    Subjective: Patient feels much better today.  Slept well no shortness of breath no cough congestion blood sugars slowly improving glipizide started this am    Flowsheet Rows    Flowsheet Row First Filed Value   Admission Height 172.7 cm (68\") Documented at 03/15/2018 0512   Admission Weight (!)  136 kg (300 lb 4 oz) Documented at 03/15/2018 0512          Documented weights    03/15/18 0512   Weight: (!) 136 kg (300 lb 4 oz)       Patient Vitals for the past 24 hrs:   BP Temp Temp src Pulse Resp SpO2   03/17/18 1140 - - - 75 - 95 %   03/17/18 1135 - - - 76 20 94 %   03/17/18 1116 101/64 - - 79 20 94 %   03/17/18 0800 - - - - - 97 %   03/17/18 0750 - - - 88 16 93 %   03/17/18 0609 119/69 98.7 °F (37.1 °C) Oral 85 18 95 %   03/17/18 0318 107/65 98 °F (36.7 °C) Oral 84 16 91 %   03/16/18 2319 115/96 98.5 °F (36.9 °C) Oral 95 18 92 %   03/16/18 1922 - - - 85 18 95 %   03/16/18 1900 133/82 98.1 °F (36.7 °C) Oral 89 18 92 %   03/16/18 1700 - - - 85 16 90 %   03/16/18 1650 - - - 84 18 93 %   03/16/18 1500 121/70 97.5 °F (36.4 °C) Oral - 18 -   03/16/18 1325 - - - 83 18 93 %   03/16/18 1316 - - - 83 18 90 %       (!) 136 kg (300 lb 4 oz)      Intake/Output Summary (Last 24 hours) at 03/17/18 1237  Last data filed at 03/17/18 0832   Gross per 24 hour   Intake          2221.25 ml   Output              750 ml   Net          1471.25 ml       Review of Systems   Constitutional: Negative for appetite change.   Respiratory: Negative for chest tightness.    Cardiovascular: Positive for leg swelling. Negative for chest pain.   Gastrointestinal: Negative for abdominal distention, abdominal pain, diarrhea, nausea and vomiting.   Genitourinary: Negative for frequency.   Skin: Negative for rash.   Psychiatric/Behavioral: Negative for agitation.       Physical Exam   Constitutional: He appears well-developed and well-nourished.   Morbidly obese   HENT:   Head: Normocephalic.   Mouth/Throat: Oropharynx is clear and " moist.   Eyes: Conjunctivae are normal.   Neck: Normal range of motion. No JVD present. No thyromegaly present.   Cardiovascular: Normal rate, regular rhythm and normal heart sounds.    No murmur heard.  Pulmonary/Chest: Effort normal. No respiratory distress. He has no rales.   Abdominal: Soft. Bowel sounds are normal. He exhibits no distension. There is no tenderness. There is no guarding.   Musculoskeletal: He exhibits edema (trace).   Neurological: He is alert.   Skin: Skin is warm and dry. No rash noted.   Nursing note and vitals reviewed.          Medication Review:     Medication Review:   I have reviewed the patient's current medication list    Current Facility-Administered Medications:   •  albuterol (PROVENTIL) nebulizer solution 0.083% 2.5 mg/3mL, 2.5 mg, Nebulization, Q6H PRN, Nitesh Arias MD  •  allopurinol (ZYLOPRIM) tablet 300 mg, 300 mg, Oral, QAM, Nitesh Arias MD, 300 mg at 03/17/18 0742  •  amLODIPine (NORVASC) tablet 10 mg, 10 mg, Oral, QAM, Nitesh Arias MD, 10 mg at 03/17/18 0742  •  aspirin tablet 325 mg, 325 mg, Oral, QAM, Nitesh Arias MD, 325 mg at 03/17/18 0742  •  bisacodyl (DULCOLAX) EC tablet 5 mg, 5 mg, Oral, Daily PRN, Nitesh Arias MD, 5 mg at 03/17/18 1055  •  budesonide-formoterol (SYMBICORT) 160-4.5 MCG/ACT inhaler 2 puff, 2 puff, Inhalation, BID - RT, Nitesh Arias MD, 2 puff at 03/17/18 0748  •  calcium carbonate (TUMS) chewable tablet 500 mg (200 mg elemental), 1 tablet, Oral, BID PRN, Nitesh Arias MD  •  cetirizine (zyrTEC) tablet 10 mg, 10 mg, Oral, QAM, Nitesh Arias MD, 10 mg at 03/17/18 0741  •  [START ON 3/19/2018] cholecalciferol (VITAMIN D3) capsule 50,000 Units, 50,000 Units, Oral, Q7 Days, Nitesh Arias MD  •  clobetasol (TEMOVATE) 0.05 % cream, , Topical, Q12H, Nitesh Arias MD  •  clopidogrel (PLAVIX) tablet 75 mg, 75 mg, Oral, QAM, Nitesh  MD [place], 75 mg at 03/17/18 0741  •  dextrose (D50W) solution 25 g, 25 g, Intravenous, Q15 Min PRN, Nitesh Arias MD  •  dextrose (GLUTOSE) oral gel 15 g, 15 g, Oral, Q15 Min PRN, Nitesh Arias MD  •  enoxaparin (LOVENOX) syringe 40 mg, 40 mg, Subcutaneous, Q12H, Nitesh Arias MD, 40 mg at 03/17/18 0822  •  febuxostat (ULORIC) tablet 80 mg, 80 mg, Oral, Daily, Nitesh Arias MD, 80 mg at 03/17/18 0822  •  glipiZIDE (GLUCOTROL) tablet 5 mg, 5 mg, Oral, BID AC, Nitesh Arias MD, 5 mg at 03/17/18 0832  •  glucagon (GLUCAGEN) injection 1 mg, 1 mg, Subcutaneous, PRN, Nitesh Arias MD  •  insulin aspart (novoLOG) injection 0-24 Units, 0-24 Units, Subcutaneous, 4x Daily AC & at Bedtime, Nitesh Arias MD, 12 Units at 03/17/18 1223  •  insulin detemir (LEVEMIR) injection 30 Units, 30 Units, Subcutaneous, Nightly, Nitesh Arias MD, 30 Units at 03/16/18 2120  •  ipratropium (ATROVENT) nebulizer solution 0.5 mg, 0.5 mg, Nebulization, 4x Daily - RT, Nitesh Arias MD, 0.5 mg at 03/17/18 1134  •  levETIRAcetam (KEPPRA) tablet 500 mg, 500 mg, Oral, Q12H, Nitesh Arias MD, 500 mg at 03/17/18 0822  •  linagliptin (TRADJENTA) tablet 5 mg, 5 mg, Oral, Daily, Nitesh Arias MD, 5 mg at 03/17/18 0822  •  lubiprostone (AMITIZA) capsule 24 mcg, 24 mcg, Oral, BID With Meals, Nitesh Arias MD, 24 mcg at 03/17/18 1003  •  magnesium hydroxide (MILK OF MAGNESIA) suspension 2400 mg/10mL 10 mL, 10 mL, Oral, Daily PRN, Nitesh Arias MD  •  metoprolol tartrate (LOPRESSOR) tablet 50 mg, 50 mg, Oral, Q12H, Nitesh Arias MD, 50 mg at 03/17/18 0822  •  O2 (OXYGEN), 2 L/min, Inhalation, Daily, Nitesh Arias MD, 2 L/min at 03/16/18 1009  •  pantoprazole (PROTONIX) EC tablet 40 mg, 40 mg, Oral, QAM, Nitesh Arias MD, 40 mg at 03/17/18 0741  •  sodium chloride  0.9 % flush 1-10 mL, 1-10 mL, Intravenous, PRN, Nitesh Arias MD  •  sodium chloride 0.9 % infusion 40 mL, 40 mL, Intravenous, PRN, Nitesh Arias MD      Labs:    Results from last 7 days  Lab Units 03/17/18  0438   WBC 10*3/mm3 6.54   HEMOGLOBIN g/dL 15.7   HEMATOCRIT % 44.5   PLATELETS 10*3/mm3 126*       Results from last 7 days  Lab Units 03/17/18  0438 03/16/18  0510 03/15/18  0618 03/15/18  0526   SODIUM mmol/L 133* 134* 128*  --    POTASSIUM mmol/L 3.2* 2.6* 3.9  --    CHLORIDE mmol/L 97* 91* 81*  --    CO2 mmol/L 25.6 30.7* 26.1  --    BUN mg/dL 26* 33* 36*  --    CREATININE mg/dL 1.20 1.38* 1.71*  --    CALCIUM mg/dL 8.5* 8.9 9.9  --    GLUCOSE mg/dL 376* 135* 675* 686*       Results from last 7 days  Lab Units 03/17/18  0438   MAGNESIUM mg/dL 2.3       Lab Results (last 24 hours)     Procedure Component Value Units Date/Time    POC Glucose Once [[id number]]  (Abnormal) Collected:  03/17/18 1201    Specimen:  Blood Updated:  03/17/18 1208     Glucose 261 (H) mg/dL     Narrative:       Meter: [id number] : [id number] [employer] CNA    Magnesium [[id number]]  (Normal) Collected:  03/17/18 0438    Specimen:  Blood Updated:  03/17/18 0815     Magnesium 2.3 mg/dL     POC Glucose Once [[id number]]  (Abnormal) Collected:  03/17/18 0720    Specimen:  Blood Updated:  03/17/18 0726     Glucose 347 (H) mg/dL     Narrative:       Meter: [id number] : [id number] [employer] CNA    Basic Metabolic Panel [[id number]]  (Abnormal) Collected:  03/17/18 0438    Specimen:  Blood Updated:  03/17/18 0535     Glucose 376 (H) mg/dL      BUN 26 (H) mg/dL      Creatinine 1.20 mg/dL      Sodium 133 (L) mmol/L      Potassium 3.2 (L) mmol/L      Chloride 97 (L) mmol/L      CO2 25.6 mmol/L      Calcium 8.5 (L) mg/dL      eGFR Non African Amer 60 (L) mL/min/1.73      BUN/Creatinine Ratio 21.7     Anion Gap 10.4 mmol/L     Narrative:       GFR Normal >60  Chronic Kidney Disease <60  Kidney Failure <15    CBC  (No Diff) [[id number]]  (Abnormal) Collected:  03/17/18 0438    Specimen:  Blood Updated:  03/17/18 0508     WBC 6.54 10*3/mm3      RBC 5.31 10*6/mm3      Hemoglobin 15.7 g/dL      Hematocrit 44.5 %      MCV 83.8 fL      MCH 29.6 pg      MCHC 35.3 g/dL      RDW 14.9 (H) %      RDW-SD 44.7 fl      MPV 11.9 (H) fL      Platelets 126 (L) 10*3/mm3     POC Glucose Once [[id number]]  (Abnormal) Collected:  03/17/18 0237    Specimen:  Blood Updated:  03/17/18 0243     Glucose 377 (H) mg/dL     Narrative:       Meter: [id number] : [id number] ROSSY SALAZAR RN    POC Glucose Once [[id number]]  (Abnormal) Collected:  03/17/18 0027    Specimen:  Blood Updated:  03/17/18 0033     Glucose 316 (H) mg/dL     Narrative:       Meter: [id number] : [id number] ROSSY SALAZAR RN    POC Glucose Once [[id number]]  (Abnormal) Collected:  03/16/18 2100    Specimen:  Blood Updated:  03/16/18 2107     Glucose 336 (H) mg/dL     Narrative:       Meter: [id number] : [id number] ROSSY SALAZAR RN    POC Glucose Once [[id number]]  (Abnormal) Collected:  03/16/18 1656    Specimen:  Blood Updated:  03/16/18 1704     Glucose 306 (H) mg/dL     Narrative:       Meter: [id number] : [id number] Ruiz Karen Mointor Tech    POC Glucose Once [[id number]]  (Abnormal) Collected:  03/16/18 1606    Specimen:  Blood Updated:  03/16/18 1614     Glucose 284 (H) mg/dL     Narrative:       Meter: [id number] : [id number] Ruiz Karen Mointor Tech    POC Glucose Once [[id number]]  (Abnormal) Collected:  03/16/18 1413    Specimen:  Blood Updated:  03/16/18 1426     Glucose 385 (H) mg/dL     Narrative:       Meter: [id number] : [id number] Nikki Moss RN              Radiology:  Imaging Results (last 24 hours)     ** No results found for the last 24 hours. **          Cardiology:  ECG/EMG Results (last 24 hours)     ** No results found for the last 24 hours. **          I have reviewed consult notes.    Assessment and Plan:    1.  Recent onset  of diabetes mellitus with hyperglycemia is slowly improving have added glipizide today along with Januvia and long acting Levemir will continue to adjust dosage accordingly    2.  Acute kidney injury chronic kidney disease has resolved and is back to baseline     3.  Hypokalemia.  Potassium is been ordered     4.  Hypertension well controlled home medications be continued     5.  Hyperlipidemia nothing acute continue statin     6.  COPD home inhalers we continued nothing acute     7.  Seizure disorder stable continue Keppra     8.  History of right hemispheric TIA continue aspirin Plavix     9.  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  History of mantle cell lymphoma     11.  Recurrent major depression continue SSRI     12.  GERD change to H2 from his PPI had until renal functions improve

## 2018-03-17 NOTE — PLAN OF CARE
Problem: Patient Care Overview  Goal: Plan of Care Review  Outcome: Ongoing (interventions implemented as appropriate)   03/17/18 1853   Coping/Psychosocial   Plan of Care Reviewed With patient   Plan of Care Review   Progress improving   OTHER   Outcome Summary Patient's blood glucose now down in the 200s and maintaining. Glipizide started. IV Novolog given this AM. SSI given ACHS. IVF stopped. Lasix given. Legs elevated to reduce swelling. Patient ambulated in room with walker and stand-by assist. Will continue to monitor blood glucose levels.     Goal: Individualization and Mutuality  Outcome: Ongoing (interventions implemented as appropriate)   03/17/18 1853   Individualization   Patient Specific Goals (Include Timeframe) blood glucose management before discharge   Patient Specific Interventions patient on accuchecks, glipizide, SSI, and levemir     Goal: Discharge Needs Assessment  Outcome: Ongoing (interventions implemented as appropriate)   03/17/18 1853   Discharge Needs Assessment   Concerns to be Addressed denies needs/concerns at this time     Goal: Interprofessional Rounds/Family Conf  Outcome: Ongoing (interventions implemented as appropriate)      Problem: Fall Risk (Adult)  Goal: Identify Related Risk Factors and Signs and Symptoms  Outcome: Outcome(s) achieved Date Met: 03/17/18   03/15/18 1542   Fall Risk (Adult)   Related Risk Factors (Fall Risk) fatigue/slow reaction;gait/mobility problems;homeostatic imbalance;environment unfamiliar   Signs and Symptoms (Fall Risk) presence of risk factors     Goal: Absence of Fall  Outcome: Ongoing (interventions implemented as appropriate)   03/17/18 1853   Fall Risk (Adult)   Absence of Fall making progress toward outcome       Problem: Diabetes, Type 2 (Adult)  Goal: Signs and Symptoms of Listed Potential Problems Will be Absent, Minimized or Managed (Diabetes, Type 2)  Outcome: Ongoing (interventions implemented as appropriate)   03/17/18 1853   Goal/Outcome  Evaluation   Problems Assessed (Type 2 Diabetes) all   Problems Present (Type 2 Diabetes) hyperglycemia;situational response       Problem: Skin Injury Risk (Adult)  Goal: Identify Related Risk Factors and Signs and Symptoms  Outcome: Outcome(s) achieved Date Met: 03/17/18 03/17/18 1853   Skin Injury Risk (Adult)   Related Risk Factors (Skin Injury Risk) edema;body weight extremes;critical care admission;mobility impaired     Goal: Skin Health and Integrity  Outcome: Ongoing (interventions implemented as appropriate)   03/17/18 1853   Skin Injury Risk (Adult)   Skin Health and Integrity making progress toward outcome       Problem: Fluid Volume Excess (Adult)  Goal: Identify Related Risk Factors and Signs and Symptoms  Outcome: Outcome(s) achieved Date Met: 03/17/18 03/17/18 1853   Fluid Volume Excess (Adult)   Related Risk Factors (Fluid Volume Excess) fluid intake excessive   Signs and Symptoms (Fluid Volume Excess) acute weight gain;edema     Goal: Optimal Fluid Balance  Outcome: Ongoing (interventions implemented as appropriate)   03/17/18 1853   Fluid Volume Excess (Adult)   Optimal Fluid Balance making progress toward outcome       Problem: Renal Failure/Kidney Injury, Acute (Adult)  Goal: Signs and Symptoms of Listed Potential Problems Will be Absent, Minimized or Managed (Renal Failure/Kidney Injury, Acute)  Outcome: Ongoing (interventions implemented as appropriate)   03/17/18 1853   Goal/Outcome Evaluation   Problems Assessed (Acute Renal Failure/Kidney Injury) all   Problems Present (ARF/Kidney Injury) fluid imbalance

## 2018-03-18 LAB
ANION GAP SERPL CALCULATED.3IONS-SCNC: 10.2 MMOL/L
BUN BLD-MCNC: 18 MG/DL (ref 8–23)
BUN/CREAT SERPL: 16.7 (ref 7–25)
CALCIUM SPEC-SCNC: 9 MG/DL (ref 8.8–10.5)
CHLORIDE SERPL-SCNC: 98 MMOL/L (ref 98–107)
CO2 SERPL-SCNC: 27.8 MMOL/L (ref 22–29)
CORTIS SERPL-MCNC: 10.98 MCG/DL
CREAT BLD-MCNC: 1.08 MG/DL (ref 0.76–1.27)
GFR SERPL CREATININE-BSD FRML MDRD: 68 ML/MIN/1.73
GLUCOSE BLD-MCNC: 233 MG/DL (ref 65–99)
GLUCOSE BLDC GLUCOMTR-MCNC: 200 MG/DL (ref 70–130)
GLUCOSE BLDC GLUCOMTR-MCNC: 245 MG/DL (ref 70–130)
GLUCOSE BLDC GLUCOMTR-MCNC: 264 MG/DL (ref 70–130)
GLUCOSE BLDC GLUCOMTR-MCNC: 279 MG/DL (ref 70–130)
GLUCOSE BLDC GLUCOMTR-MCNC: 366 MG/DL (ref 70–130)
POTASSIUM BLD-SCNC: 3.3 MMOL/L (ref 3.5–5.2)
SODIUM BLD-SCNC: 136 MMOL/L (ref 136–145)

## 2018-03-18 PROCEDURE — 63710000001 INSULIN ASPART PER 5 UNITS: Performed by: FAMILY MEDICINE

## 2018-03-18 PROCEDURE — 80048 BASIC METABOLIC PNL TOTAL CA: CPT | Performed by: FAMILY MEDICINE

## 2018-03-18 PROCEDURE — 25010000002 ENOXAPARIN PER 10 MG: Performed by: FAMILY MEDICINE

## 2018-03-18 PROCEDURE — 94799 UNLISTED PULMONARY SVC/PX: CPT

## 2018-03-18 PROCEDURE — 82533 TOTAL CORTISOL: CPT | Performed by: FAMILY MEDICINE

## 2018-03-18 PROCEDURE — 63710000001 INSULIN DETEMIR PER 5 UNITS: Performed by: FAMILY MEDICINE

## 2018-03-18 PROCEDURE — 82962 GLUCOSE BLOOD TEST: CPT

## 2018-03-18 RX ORDER — POTASSIUM CHLORIDE 20 MEQ/1
40 TABLET, EXTENDED RELEASE ORAL ONCE
Status: COMPLETED | OUTPATIENT
Start: 2018-03-18 | End: 2018-03-18

## 2018-03-18 RX ORDER — SENNA AND DOCUSATE SODIUM 50; 8.6 MG/1; MG/1
TABLET, FILM COATED ORAL
Status: COMPLETED
Start: 2018-03-18 | End: 2018-03-18

## 2018-03-18 RX ORDER — SENNOSIDES 8.6 MG
1 TABLET ORAL 2 TIMES DAILY
Status: DISCONTINUED | OUTPATIENT
Start: 2018-03-18 | End: 2018-03-19 | Stop reason: HOSPADM

## 2018-03-18 RX ADMIN — CETIRIZINE HYDROCHLORIDE 10 MG: 10 TABLET, FILM COATED ORAL at 06:24

## 2018-03-18 RX ADMIN — LEVETIRACETAM 500 MG: 500 TABLET ORAL at 08:04

## 2018-03-18 RX ADMIN — CLOBETASOL PROPIONATE: 0.5 CREAM TOPICAL at 21:26

## 2018-03-18 RX ADMIN — FEBUXOSTAT 80 MG: 40 TABLET ORAL at 08:04

## 2018-03-18 RX ADMIN — BUDESONIDE AND FORMOTEROL FUMARATE DIHYDRATE 2 PUFF: 160; 4.5 AEROSOL RESPIRATORY (INHALATION) at 19:42

## 2018-03-18 RX ADMIN — GLIPIZIDE 5 MG: 5 TABLET ORAL at 08:04

## 2018-03-18 RX ADMIN — LINAGLIPTIN 5 MG: 5 TABLET, FILM COATED ORAL at 08:04

## 2018-03-18 RX ADMIN — INSULIN ASPART 8 UNITS: 100 INJECTION, SOLUTION INTRAVENOUS; SUBCUTANEOUS at 07:58

## 2018-03-18 RX ADMIN — SENNOSIDES 8.6 MG: 8.6 TABLET, FILM COATED ORAL at 21:29

## 2018-03-18 RX ADMIN — METOPROLOL TARTRATE 50 MG: 50 TABLET ORAL at 21:15

## 2018-03-18 RX ADMIN — DOCUSATE SODIUM AND SENNOSIDES 1 TABLET: 8.6; 5 TABLET, FILM COATED ORAL at 21:16

## 2018-03-18 RX ADMIN — CLOPIDOGREL 75 MG: 75 TABLET, FILM COATED ORAL at 06:24

## 2018-03-18 RX ADMIN — ASPIRIN 325 MG: 325 TABLET, COATED ORAL at 06:24

## 2018-03-18 RX ADMIN — LUBIPROSTONE 24 MCG: 24 CAPSULE ORAL at 08:04

## 2018-03-18 RX ADMIN — OFLOXACIN 50000 UNITS: 300 TABLET, COATED ORAL at 23:37

## 2018-03-18 RX ADMIN — POTASSIUM CHLORIDE 40 MEQ: 1500 TABLET, EXTENDED RELEASE ORAL at 11:55

## 2018-03-18 RX ADMIN — IPRATROPIUM BROMIDE 0.5 MG: 0.5 SOLUTION RESPIRATORY (INHALATION) at 19:40

## 2018-03-18 RX ADMIN — INSULIN ASPART 8 UNITS: 100 INJECTION, SOLUTION INTRAVENOUS; SUBCUTANEOUS at 11:55

## 2018-03-18 RX ADMIN — INSULIN DETEMIR 50 UNITS: 100 INJECTION, SOLUTION SUBCUTANEOUS at 21:26

## 2018-03-18 RX ADMIN — METOPROLOL TARTRATE 50 MG: 50 TABLET ORAL at 08:04

## 2018-03-18 RX ADMIN — IPRATROPIUM BROMIDE 0.5 MG: 0.5 SOLUTION RESPIRATORY (INHALATION) at 16:31

## 2018-03-18 RX ADMIN — AMLODIPINE BESYLATE 10 MG: 5 TABLET ORAL at 06:24

## 2018-03-18 RX ADMIN — PANTOPRAZOLE SODIUM 40 MG: 40 TABLET, DELAYED RELEASE ORAL at 06:24

## 2018-03-18 RX ADMIN — BUDESONIDE AND FORMOTEROL FUMARATE DIHYDRATE 2 PUFF: 160; 4.5 AEROSOL RESPIRATORY (INHALATION) at 09:40

## 2018-03-18 RX ADMIN — ALLOPURINOL 300 MG: 300 TABLET ORAL at 06:24

## 2018-03-18 RX ADMIN — IPRATROPIUM BROMIDE 0.5 MG: 0.5 SOLUTION RESPIRATORY (INHALATION) at 13:16

## 2018-03-18 RX ADMIN — ENOXAPARIN SODIUM 40 MG: 40 INJECTION SUBCUTANEOUS at 21:16

## 2018-03-18 RX ADMIN — GLIPIZIDE 5 MG: 5 TABLET ORAL at 17:24

## 2018-03-18 RX ADMIN — LEVETIRACETAM 500 MG: 500 TABLET ORAL at 21:16

## 2018-03-18 RX ADMIN — ENOXAPARIN SODIUM 40 MG: 40 INJECTION SUBCUTANEOUS at 08:04

## 2018-03-18 RX ADMIN — CLOBETASOL PROPIONATE: 0.5 CREAM TOPICAL at 08:04

## 2018-03-18 RX ADMIN — IPRATROPIUM BROMIDE 0.5 MG: 0.5 SOLUTION RESPIRATORY (INHALATION) at 09:40

## 2018-03-18 RX ADMIN — FUROSEMIDE 80 MG: 80 TABLET ORAL at 10:14

## 2018-03-18 RX ADMIN — LUBIPROSTONE 24 MCG: 24 CAPSULE ORAL at 17:24

## 2018-03-18 RX ADMIN — INSULIN ASPART 12 UNITS: 100 INJECTION, SOLUTION INTRAVENOUS; SUBCUTANEOUS at 17:23

## 2018-03-18 RX ADMIN — INSULIN ASPART 20 UNITS: 100 INJECTION, SOLUTION INTRAVENOUS; SUBCUTANEOUS at 21:26

## 2018-03-18 NOTE — PROGRESS NOTES
"Daily Progress Note:    Subjective: Feels much better no new complaints    Flowsheet Rows    Flowsheet Row First Filed Value   Admission Height 172.7 cm (68\") Documented at 03/15/2018 0512   Admission Weight (!)  136 kg (300 lb 4 oz) Documented at 03/15/2018 0512          Documented weights    03/15/18 0512 03/17/18 1814 03/18/18 0630   Weight: (!) 136 kg (300 lb 4 oz) (!) 140 kg (309 lb 9.6 oz) (!) 138 kg (304 lb)       Patient Vitals for the past 24 hrs:   BP Temp Temp src Pulse Resp SpO2 Weight   03/18/18 1107 133/66 97.5 °F (36.4 °C) Oral 75 20 92 % -   03/18/18 0940 - - - 68 20 91 % -   03/18/18 0630 139/87 97.7 °F (36.5 °C) Oral 91 20 91 % (!) 138 kg (304 lb)   03/18/18 0329 97/70 97.8 °F (36.6 °C) Oral 90 22 91 % -   03/17/18 2300 147/87 97.7 °F (36.5 °C) Oral 98 20 91 % -   03/17/18 2137 119/65 97.7 °F (36.5 °C) Oral 96 21 93 % -   03/17/18 1906 - - - 82 - 96 % -   03/17/18 1859 - - - 80 16 96 % -   03/17/18 1814 - - - - - - (!) 140 kg (309 lb 9.6 oz)   03/17/18 1637 - - - 81 16 94 % -   03/17/18 1620 100/66 98.3 °F (36.8 °C) Oral 80 16 92 % -   03/17/18 1140 - - - 75 - 95 % -   03/17/18 1135 - - - 76 20 94 % -   03/17/18 1116 101/64 98 °F (36.7 °C) Oral 79 20 94 % -       (!) 138 kg (304 lb)      Intake/Output Summary (Last 24 hours) at 03/18/18 1115  Last data filed at 03/18/18 0917   Gross per 24 hour   Intake             1260 ml   Output              400 ml   Net              860 ml       Review of Systems   Constitutional: Positive for fatigue. Negative for appetite change.   Respiratory: Negative for chest tightness.    Cardiovascular: Positive for leg swelling. Negative for chest pain.   Gastrointestinal: Negative for abdominal distention, abdominal pain, diarrhea, nausea and vomiting.   Genitourinary: Negative for frequency.   Skin: Negative for rash.   Psychiatric/Behavioral: Negative for agitation.       Physical Exam   Constitutional: He appears well-developed and well-nourished.   Morbidly obese "   HENT:   Head: Normocephalic.   Mouth/Throat: Oropharynx is clear and moist.   Eyes: Conjunctivae are normal.   Neck: Normal range of motion. No JVD present. No thyromegaly present.   Cardiovascular: Normal rate, regular rhythm and normal heart sounds.    No murmur heard.  Pulmonary/Chest: Effort normal. No respiratory distress. He has no rales.   Abdominal: Soft. Bowel sounds are normal. He exhibits no distension. There is no tenderness. There is no guarding.   Musculoskeletal: He exhibits edema (trace).   Neurological: He is alert.   Skin: Skin is warm and dry. No rash noted.   Nursing note and vitals reviewed.          Medication Review:     Medication Review:   I have reviewed the patient's current medication list    Current Facility-Administered Medications:   •  albuterol (PROVENTIL) nebulizer solution 0.083% 2.5 mg/3mL, 2.5 mg, Nebulization, Q6H PRN, Nitesh Arias MD  •  allopurinol (ZYLOPRIM) tablet 300 mg, 300 mg, Oral, QAM, Nitesh rAias MD, 300 mg at 03/18/18 0624  •  amLODIPine (NORVASC) tablet 10 mg, 10 mg, Oral, QAM, Nitesh Arias MD, 10 mg at 03/18/18 0624  •  aspirin tablet 325 mg, 325 mg, Oral, QAM, Nitesh Arias MD, 325 mg at 03/18/18 0624  •  bisacodyl (DULCOLAX) EC tablet 5 mg, 5 mg, Oral, Daily PRN, Nitesh Arias MD, 5 mg at 03/17/18 1055  •  budesonide-formoterol (SYMBICORT) 160-4.5 MCG/ACT inhaler 2 puff, 2 puff, Inhalation, BID - RT, Nitesh Arias MD, 2 puff at 03/18/18 0940  •  calcium carbonate (TUMS) chewable tablet 500 mg (200 mg elemental), 1 tablet, Oral, BID PRN, Nitesh Arias MD  •  cetirizine (zyrTEC) tablet 10 mg, 10 mg, Oral, QAM, Nitesh Arias MD, 10 mg at 03/18/18 0624  •  [START ON 3/19/2018] cholecalciferol (VITAMIN D3) capsule 50,000 Units, 50,000 Units, Oral, Q7 Days, Nitesh Arias MD  •  clobetasol (TEMOVATE) 0.05 % cream, , Topical, Q12H, Nitesh Arias  MD  •  clopidogrel (PLAVIX) tablet 75 mg, 75 mg, Oral, QAM, Nitesh Arias MD, 75 mg at 03/18/18 0624  •  dextrose (D50W) solution 25 g, 25 g, Intravenous, Q15 Min PRN, Nitesh Arias MD  •  dextrose (GLUTOSE) oral gel 15 g, 15 g, Oral, Q15 Min PRN, Nitesh Arias MD  •  enoxaparin (LOVENOX) syringe 40 mg, 40 mg, Subcutaneous, Q12H, Nitesh Arias MD, 40 mg at 03/18/18 0804  •  febuxostat (ULORIC) tablet 80 mg, 80 mg, Oral, Daily, Nitesh Arias MD, 80 mg at 03/18/18 0804  •  furosemide (LASIX) tablet 80 mg, 80 mg, Oral, Daily, Nitesh Arias MD, 80 mg at 03/18/18 1014  •  glipiZIDE (GLUCOTROL) tablet 5 mg, 5 mg, Oral, BID AC, Nitesh Arias MD, 5 mg at 03/18/18 0804  •  glucagon (GLUCAGEN) injection 1 mg, 1 mg, Subcutaneous, PRN, Nitesh Arias MD  •  insulin aspart (novoLOG) injection 0-24 Units, 0-24 Units, Subcutaneous, 4x Daily AC & at Bedtime, Nitesh Arias MD, 8 Units at 03/18/18 0758  •  insulin detemir (LEVEMIR) injection 40 Units, 40 Units, Subcutaneous, Nightly, Nitesh Arias MD, 40 Units at 03/17/18 2216  •  ipratropium (ATROVENT) nebulizer solution 0.5 mg, 0.5 mg, Nebulization, 4x Daily - RT, Nitesh Arias MD, 0.5 mg at 03/18/18 0940  •  levETIRAcetam (KEPPRA) tablet 500 mg, 500 mg, Oral, Q12H, Nitesh Arias MD, 500 mg at 03/18/18 0804  •  linagliptin (TRADJENTA) tablet 5 mg, 5 mg, Oral, Daily, Nitesh Arias MD, 5 mg at 03/18/18 0804  •  lubiprostone (AMITIZA) capsule 24 mcg, 24 mcg, Oral, BID With Meals, Nitesh Arias MD, 24 mcg at 03/18/18 0804  •  magnesium hydroxide (MILK OF MAGNESIA) suspension 2400 mg/10mL 10 mL, 10 mL, Oral, Daily PRN, Nitesh Arias MD  •  metoprolol tartrate (LOPRESSOR) tablet 50 mg, 50 mg, Oral, Q12H, Nitesh Arias MD, 50 mg at 03/18/18 0804  •  O2 (OXYGEN), 2 L/min, Inhalation, Daily,  Nitesh Arias MD, 2 L/min at 03/16/18 1009  •  pantoprazole (PROTONIX) EC tablet 40 mg, 40 mg, Oral, QAM, Nitesh Arias MD, 40 mg at 03/18/18 0624  •  potassium chloride (K-DUR,KLOR-CON) CR tablet 40 mEq, 40 mEq, Oral, Once, Nitesh Arias MD  •  sodium chloride 0.9 % flush 1-10 mL, 1-10 mL, Intravenous, PRN, Nitesh Arias MD  •  sodium chloride 0.9 % infusion 40 mL, 40 mL, Intravenous, PRN, Nitesh Arias MD      Labs:    Results from last 7 days  Lab Units 03/17/18 0438   WBC 10*3/mm3 6.54   HEMOGLOBIN g/dL 15.7   HEMATOCRIT % 44.5   PLATELETS 10*3/mm3 126*       Results from last 7 days  Lab Units 03/18/18  0355 03/17/18  0438 03/16/18  0510 03/15/18  0618 03/15/18  0526   SODIUM mmol/L 136 133* 134* 128*  --    POTASSIUM mmol/L 3.3* 3.2* 2.6* 3.9  --    CHLORIDE mmol/L 98 97* 91* 81*  --    CO2 mmol/L 27.8 25.6 30.7* 26.1  --    BUN mg/dL 18 26* 33* 36*  --    CREATININE mg/dL 1.08 1.20 1.38* 1.71*  --    CALCIUM mg/dL 9.0 8.5* 8.9 9.9  --    GLUCOSE mg/dL 233* 376* 135* 675* 686*       Results from last 7 days  Lab Units 03/17/18  0438   MAGNESIUM mg/dL 2.3       Lab Results (last 24 hours)     Procedure Component Value Units Date/Time    Cortisol [[id number]]  (Normal) Collected:  03/18/18 0355    Specimen:  Blood Updated:  03/18/18 0917     Cortisol 10.98 mcg/dL     Narrative:       Cortisol Reference Ranges:    Cortisol 6AM - 10AM Range: 6.02-18.40 mcg/dl  Cortisol 4PM - 8PM Range: 2.68-10.50 mcg/dl  Critical AM/PM:    Less than 2 mcg/dl    POC Glucose Once [[id number]]  (Abnormal) Collected:  03/18/18 0729    Specimen:  Blood Updated:  03/18/18 0735     Glucose 200 (H) mg/dL     Narrative:       Meter: [id number] : [id number] Gurpreet Rice NURSING ASSISTANT    Basic Metabolic Panel [[id number]]  (Abnormal) Collected:  03/18/18 0355    Specimen:  Blood Updated:  03/18/18 0502     Glucose 233 (H) mg/dL      BUN 18 mg/dL      Creatinine 1.08 mg/dL       Sodium 136 mmol/L      Potassium 3.3 (L) mmol/L      Chloride 98 mmol/L      CO2 27.8 mmol/L      Calcium 9.0 mg/dL      eGFR Non African Amer 68 mL/min/1.73      BUN/Creatinine Ratio 16.7     Anion Gap 10.2 mmol/L     Narrative:       GFR Normal >60  Chronic Kidney Disease <60  Kidney Failure <15    POC Glucose Once [[id number]]  (Abnormal) Collected:  03/18/18 0047    Specimen:  Blood Updated:  03/18/18 0104     Glucose 264 (H) mg/dL     Narrative:       Meter: [id number] : [id number] Emily Ace RN    POC Glucose Once [[id number]]  (Abnormal) Collected:  03/17/18 2131    Specimen:  Blood Updated:  03/17/18 2150     Glucose 274 (H) mg/dL     Narrative:       Meter: [id number] : [id number] Justin Stone CNA    POC Glucose Once [[id number]]  (Abnormal) Collected:  03/17/18 1619    Specimen:  Blood Updated:  03/17/18 1625     Glucose 219 (H) mg/dL     Narrative:       Meter: [id number] : [id number] Flavia Lange CNA    POC Glucose Once [[id number]]  (Abnormal) Collected:  03/17/18 1201    Specimen:  Blood Updated:  03/17/18 1208     Glucose 261 (H) mg/dL     Narrative:       Meter: [id number] : [id number] Flavia Lange CNA              Radiology:  Imaging Results (last 24 hours)     ** No results found for the last 24 hours. **          Cardiology:  ECG/EMG Results (last 24 hours)     ** No results found for the last 24 hours. **          I have reviewed consult notes.    Assessment and Plan:      1.  Recent onset of diabetes mellitus with hyperglycemia is slowly improving have added glipizide today along with Januvia and long acting Levemir will continue to adjust dosage accordingly    2.  Acute kidney injury chronic kidney disease has resolved and is back to baseline     3.  Hypokalemia.  Potassium is been ordered     4.  Hypertension well controlled home medications be continued     5.  Hyperlipidemia nothing acute continue statin     6.  COPD home inhalers we continued nothing acute     7.   Seizure disorder stable continue Keppra     8.  History of right hemispheric TIA continue aspirin Plavix     9.  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  History of mantle cell lymphoma     11.  Recurrent major depression continue SSRI     12.  GERD stable

## 2018-03-18 NOTE — PAYOR COMM NOTE
"Lexie Ko (67 y.o. Male)     ATTN:NURSE REVIEWER  REF#[id number]  FAXING ADDITIONAL CLINICALS ON PENDING INPT REVIEW. PLEASE REPLY TO BERTRAND CAMPA AT FAX#[phone] OR PHONE#[phone]. THANK YOU.      Date of Birth Social Security Number Address Home Phone MRN    1950  [id number]  [place] [address] [phone] [id number]    Buddhism Marital Status          Catholic        Admission Date Admission Type Admitting Provider Attending Provider Department, Room/Bed    3/15/18 Elective Nitesh Arias MD Kemparajurs, Plavakeerthi, MD [facility] MED SURG, 1414/1    Discharge Date Discharge Disposition Discharge Destination                       Attending Provider:  Nitesh Arias MD    Allergies:  Amoxicillin, Ceftin [Cefuroxime Axetil], Codeine, Flonase [Fluticasone], Pharbetol [Acetaminophen], Spiriva Handihaler [Tiotropium Bromide Monohydrate], Sulfa Antibiotics, Phenobarbital    Isolation:  None   Infection:  None   Code Status:  FULL    Ht:  172.7 cm (68\")   Wt:  138 kg (304 lb)    Admission Cmt:  None   Principal Problem:  None                Active Insurance as of 3/15/2018     Primary Coverage     Payor Plan Insurance Group Employer/Plan Group    ANTH MEDICARE REPLACEMENT ANTH MEDICARE ADVANTAGE [id number]     Payor Plan Address Payor Plan Phone Number Effective From Effective To    [address] [phone] 1/1/2018     [place] [address]       Subscriber Name Subscriber Birth Date Member ID       LEXIE KO 1950 [id number]                 Emergency Contacts      (Rel.) Home Phone Work Phone Mobile Phone    Charley Ko (Spouse) [phone] -- --                  ICU Vital Signs     Row Name 03/18/18 1640 03/18/18 1631 03/18/18 1534 03/18/18 1323 03/18/18 1316       Vitals    Temp  --  -- 98.1 °F (36.7 °C)  --  --    Temp src  --  -- Oral  --  --    Pulse 84 82 87 81 91    Heart Rate Source Monitor Monitor " Monitor Monitor Monitor    Resp 18 18 18 16 16    Resp Rate Source Visual Visual Visual Visual Visual    BP  --  -- 120/73  --  --    BP Location  --  -- Left arm  --  --    BP Method  --  -- Automatic  --  --    Patient Position  --  -- Sitting  --  --       Oxygen Therapy    SpO2 92 % 94 % 95 % 93 % 92 %    Pulse Oximetry Type Intermittent Intermittent  -- Intermittent Intermittent    Device (Oxygen Therapy) room air room air room air room air room air    Row Name 03/18/18 1107 03/18/18 0940 03/18/18 0808 03/18/18 0630 03/18/18 0445       Height and Weight    Weight  --  --  -- (!)  138 kg (304 lb)  --    Weight Method  --  --  -- Bed scale  --       Vitals    Temp 97.5 °F (36.4 °C)  --  -- 97.7 °F (36.5 °C)  --    Temp src Oral  --  -- Oral  --    Pulse 75 68  -- 91  --    Heart Rate Source Monitor Monitor  -- Monitor  --    Resp 20 20  -- 20  --    Resp Rate Source Visual Visual  -- Visual  --    /66  --  -- 139/87  --    BP Location Right leg  --  -- Left arm  --    BP Method Automatic  --  -- Automatic  --    Patient Position Lying  --  -- Sitting  --       Oxygen Therapy    SpO2 92 % 91 %  -- 91 %  --    Pulse Oximetry Type  -- Intermittent  --  --  --    Device (Oxygen Therapy) room air room air room air room air room air    Row Name 03/18/18 0329 03/17/18 2300 03/17/18 2137 03/17/18 1906 03/17/18 1859       Vitals    Temp 97.8 °F (36.6 °C) 97.7 °F (36.5 °C) 97.7 °F (36.5 °C)  --  --    Temp src Oral Oral Oral  --  --    Pulse 90 98 96 82 80    Heart Rate Source Monitor Monitor Monitor Monitor Monitor    Resp 22 20 21  -- 16    Resp Rate Source Visual Visual Visual  -- Visual    BP 97/70 147/87 119/65  --  --    BP Location Left arm Right arm Left arm  --  --    BP Method Automatic Automatic Automatic  --  --    Patient Position Sitting Sitting Sitting  --  --       Oxygen Therapy    SpO2 91 % 91 % 93 % 96 % 96 %    Pulse Oximetry Type  --  -- Intermittent  --  --    Device (Oxygen Therapy) room air  room air nasal cannula;humidified  -- nasal cannula    Flow (L/min)  --  --  --  -- 2        Lines, Drains & Airways    Active LDAs     Name:   Placement date:   Placement time:   Site:   Days:    Peripheral IV 03/15/18 Right Forearm  03/15/18    0740    Forearm    3                [facility] Medications (active)       Dose Frequency Start End    albuterol (PROVENTIL) nebulizer solution 0.083% 2.5 mg/3mL 2.5 mg Every 6 Hours PRN 3/15/2018     Sig - Route: Take 2.5 mg by nebulization Every 6 (Six) Hours As Needed for Shortness of Air. - Nebulization    allopurinol (ZYLOPRIM) tablet 300 mg 300 mg Every Morning 3/15/2018     Sig - Route: Take 1 tablet by mouth Every Morning. - Oral    amLODIPine (NORVASC) tablet 10 mg 10 mg Every Morning 3/15/2018     Sig - Route: Take 2 tablets by mouth Every Morning. - Oral    aspirin tablet 325 mg 325 mg Every Morning 3/15/2018     Sig - Route: Take 1 tablet by mouth Every Morning. - Oral    bisacodyl (DULCOLAX) EC tablet 5 mg 5 mg Daily PRN 3/15/2018     Sig - Route: Take 1 tablet by mouth Daily As Needed for Constipation. - Oral    budesonide-formoterol (SYMBICORT) 160-4.5 MCG/ACT inhaler 2 puff 2 puff 2 Times Daily - RT 3/15/2018     Sig - Route: Inhale 2 puffs 2 (Two) Times a Day. - Inhalation    calcium carbonate (TUMS) chewable tablet 500 mg (200 mg elemental) 1 tablet 2 Times Daily PRN 3/15/2018     Sig - Route: Chew 500 mg 2 (Two) Times a Day As Needed for Heartburn. - Oral    cetirizine (zyrTEC) tablet 10 mg 10 mg Every Morning 3/15/2018     Sig - Route: Take 1 tablet by mouth Every Morning. - Oral    cholecalciferol (VITAMIN D3) capsule 50,000 Units 50,000 Units Every 7 Days 3/19/2018     Sig - Route: Take 1 capsule by mouth Every 7 (Seven) Days. - Oral    clobetasol (TEMOVATE) 0.05 % cream  Every 12 Hours Scheduled 3/15/2018     Sig - Route: Apply  topically Every 12 (Twelve) Hours. - Topical    clopidogrel (PLAVIX) tablet 75 mg 75 mg Every Morning 3/15/2018     Sig -  Route: Take 1 tablet by mouth Every Morning. - Oral    dextrose (D50W) solution 25 g 25 g Every 15 Minutes PRN 3/15/2018     Sig - Route: Infuse 50 mL into a venous catheter Every 15 (Fifteen) Minutes As Needed for Low Blood Sugar (Blood Sugar Less Than 70). - Intravenous    dextrose (GLUTOSE) oral gel 15 g 15 g Every 15 Minutes PRN 3/15/2018     Sig - Route: Take 15 g by mouth Every 15 (Fifteen) Minutes As Needed for Low Blood Sugar (Blood sugar less than 70). - Oral    enoxaparin (LOVENOX) syringe 40 mg 40 mg Every 12 Hours 3/15/2018     Sig - Route: Inject 0.4 mL under the skin Every 12 (Twelve) Hours. - Subcutaneous    febuxostat (ULORIC) tablet 80 mg 80 mg Daily 3/15/2018     Sig - Route: Take 2 tablets by mouth Daily. - Oral    furosemide (LASIX) tablet 80 mg 80 mg Daily 3/17/2018     Sig - Route: Take 1 tablet by mouth Daily. - Oral    glipiZIDE (GLUCOTROL) tablet 5 mg 5 mg 2 Times Daily Before Meals 3/17/2018     Sig - Route: Take 1 tablet by mouth 2 (Two) Times a Day Before Meals. - Oral    glucagon (GLUCAGEN) injection 1 mg 1 mg As Needed 3/15/2018     Sig - Route: Inject 1 mg under the skin As Needed (Blood Glucose Less Than 70). - Subcutaneous    insulin aspart (novoLOG) injection 0-24 Units 0-24 Units 4 Times Daily Before Meals & Nightly 3/16/2018     Sig - Route: Inject 0-24 Units under the skin 4 (Four) Times a Day Before Meals & at Bedtime. - Subcutaneous    insulin detemir (LEVEMIR) injection 50 Units 50 Units Nightly 3/18/2018     Sig - Route: Inject 50 Units under the skin Every Night. - Subcutaneous    ipratropium (ATROVENT) nebulizer solution 0.5 mg 0.5 mg 4 Times Daily - RT 3/15/2018     Sig - Route: Take 2.5 mL by nebulization 4 (Four) Times a Day. - Nebulization    levETIRAcetam (KEPPRA) tablet 500 mg 500 mg Every 12 Hours Scheduled 3/15/2018     Sig - Route: Take 1 tablet by mouth Every 12 (Twelve) Hours. - Oral    linagliptin (TRADJENTA) tablet 5 mg 5 mg Daily 3/15/2018     Sig - Route:  Take 1 tablet by mouth Daily. - Oral    lubiprostone (AMITIZA) capsule 24 mcg 24 mcg 2 Times Daily With Meals 3/15/2018     Sig - Route: Take 1 capsule by mouth 2 (Two) Times a Day With Meals. - Oral    magnesium hydroxide (MILK OF MAGNESIA) suspension 2400 mg/10mL 10 mL 10 mL Daily PRN 3/15/2018     Sig - Route: Take 10 mL by mouth Daily As Needed for Constipation. - Oral    metoprolol tartrate (LOPRESSOR) tablet 50 mg 50 mg Every 12 Hours Scheduled 3/15/2018     Sig - Route: Take 1 tablet by mouth Every 12 (Twelve) Hours. - Oral    O2 (OXYGEN) 2 L/min Daily 3/15/2018     Sig - Route: Inhale 2 L/min Daily. - Inhalation    pantoprazole (PROTONIX) EC tablet 40 mg 40 mg Every Morning 3/15/2018     Sig - Route: Take 1 tablet by mouth Every Morning. - Oral    potassium chloride (K-DUR,KLOR-CON) CR tablet 40 mEq 40 mEq Once 3/18/2018 3/18/2018    Sig - Route: Take 2 tablets by mouth 1 (One) Time. - Oral    sodium chloride 0.9 % flush 1-10 mL 1-10 mL As Needed 3/15/2018     Sig - Route: Infuse 1-10 mL into a venous catheter As Needed for Line Care. - Intravenous    sodium chloride 0.9 % infusion 40 mL 40 mL As Needed 3/15/2018     Sig - Route: Infuse 40 mL into a venous catheter As Needed for Line Care. - Intravenous    insulin detemir (LEVEMIR) injection 40 Units (Discontinued) 40 Units Nightly 3/17/2018 3/18/2018    Sig - Route: Inject 40 Units under the skin Every Night. - Subcutaneous          Lab Results (last 24 hours)     Procedure Component Value Units Date/Time    POC Glucose Once [[id number]]  (Abnormal) Collected:  03/18/18 1638    Specimen:  Blood Updated:  03/18/18 1644     Glucose 279 (H) mg/dL     Narrative:       Meter: [id number] : [id number] Gurpreet Rice NURSING ASSISTANT    POC Glucose Once [[id number]]  (Abnormal) Collected:  03/18/18 1110    Specimen:  Blood Updated:  03/18/18 1116     Glucose 245 (H) mg/dL     Narrative:       Meter: [id number] : [id number] Gurpreet Rice NURSING ASSISTANT     "Cortisol [[id number]]  (Normal) Collected:  03/18/18 0355    Specimen:  Blood Updated:  03/18/18 0917     Cortisol 10.98 mcg/dL     Narrative:       Cortisol Reference Ranges:    Cortisol 6AM - 10AM Range: 6.02-18.40 mcg/dl  Cortisol 4PM - 8PM Range: 2.68-10.50 mcg/dl  Critical AM/PM:    Less than 2 mcg/dl    POC Glucose Once [[id number]]  (Abnormal) Collected:  03/18/18 0729    Specimen:  Blood Updated:  03/18/18 0735     Glucose 200 (H) mg/dL     Narrative:       Meter: [id number] : [id number] Gurpreet RICKS ASSISTANT    Basic Metabolic Panel [[id number]]  (Abnormal) Collected:  03/18/18 0355    Specimen:  Blood Updated:  03/18/18 0502     Glucose 233 (H) mg/dL      BUN 18 mg/dL      Creatinine 1.08 mg/dL      Sodium 136 mmol/L      Potassium 3.3 (L) mmol/L      Chloride 98 mmol/L      CO2 27.8 mmol/L      Calcium 9.0 mg/dL      eGFR Non African Amer 68 mL/min/1.73      BUN/Creatinine Ratio 16.7     Anion Gap 10.2 mmol/L     Narrative:       GFR Normal >60  Chronic Kidney Disease <60  Kidney Failure <15    POC Glucose Once [[id number]]  (Abnormal) Collected:  03/18/18 0047    Specimen:  Blood Updated:  03/18/18 0104     Glucose 264 (H) mg/dL     Narrative:       Meter: [id number] : [id number] Emily Ace RN    POC Glucose Once [[id number]]  (Abnormal) Collected:  03/17/18 2131    Specimen:  Blood Updated:  03/17/18 2150     Glucose 274 (H) mg/dL     Narrative:       Meter: [id number] : [id number] Justin Stone CNA             Physician Progress Notes (last 24 hours) (Notes from 3/17/2018  6:52 PM through 3/18/2018  6:52 PM)      Nitesh Arias MD at 3/18/2018 11:15 AM          Daily Progress Note:    Subjective: Feels much better no new complaints    Flowsheet Rows    Flowsheet Row First Filed Value   Admission Height 172.7 cm (68\") Documented at 03/15/2018 0512   Admission Weight (!)  136 kg (300 lb 4 oz) Documented at 03/15/2018 0512          Documented weights    03/15/18 0512 " 03/17/18 1814 03/18/18 0630   Weight: (!) 136 kg (300 lb 4 oz) (!) 140 kg (309 lb 9.6 oz) (!) 138 kg (304 lb)       Patient Vitals for the past 24 hrs:   BP Temp Temp src Pulse Resp SpO2 Weight   03/18/18 1107 133/66 97.5 °F (36.4 °C) Oral 75 20 92 % -   03/18/18 0940 - - - 68 20 91 % -   03/18/18 0630 139/87 97.7 °F (36.5 °C) Oral 91 20 91 % (!) 138 kg (304 lb)   03/18/18 0329 97/70 97.8 °F (36.6 °C) Oral 90 22 91 % -   03/17/18 2300 147/87 97.7 °F (36.5 °C) Oral 98 20 91 % -   03/17/18 2137 119/65 97.7 °F (36.5 °C) Oral 96 21 93 % -   03/17/18 1906 - - - 82 - 96 % -   03/17/18 1859 - - - 80 16 96 % -   03/17/18 1814 - - - - - - (!) 140 kg (309 lb 9.6 oz)   03/17/18 1637 - - - 81 16 94 % -   03/17/18 1620 100/66 98.3 °F (36.8 °C) Oral 80 16 92 % -   03/17/18 1140 - - - 75 - 95 % -   03/17/18 1135 - - - 76 20 94 % -   03/17/18 1116 101/64 98 °F (36.7 °C) Oral 79 20 94 % -       (!) 138 kg (304 lb)      Intake/Output Summary (Last 24 hours) at 03/18/18 1115  Last data filed at 03/18/18 0917   Gross per 24 hour   Intake             1260 ml   Output              400 ml   Net              860 ml       Review of Systems   Constitutional: Positive for fatigue. Negative for appetite change.   Respiratory: Negative for chest tightness.    Cardiovascular: Positive for leg swelling. Negative for chest pain.   Gastrointestinal: Negative for abdominal distention, abdominal pain, diarrhea, nausea and vomiting.   Genitourinary: Negative for frequency.   Skin: Negative for rash.   Psychiatric/Behavioral: Negative for agitation.       Physical Exam   Constitutional: He appears well-developed and well-nourished.   Morbidly obese   HENT:   Head: Normocephalic.   Mouth/Throat: Oropharynx is clear and moist.   Eyes: Conjunctivae are normal.   Neck: Normal range of motion. No JVD present. No thyromegaly present.   Cardiovascular: Normal rate, regular rhythm and normal heart sounds.    No murmur heard.  Pulmonary/Chest: Effort normal. No  respiratory distress. He has no rales.   Abdominal: Soft. Bowel sounds are normal. He exhibits no distension. There is no tenderness. There is no guarding.   Musculoskeletal: He exhibits edema (trace).   Neurological: He is alert.   Skin: Skin is warm and dry. No rash noted.   Nursing note and vitals reviewed.          Medication Review:     Medication Review:   I have reviewed the patient's current medication list    Current Facility-Administered Medications:   •  albuterol (PROVENTIL) nebulizer solution 0.083% 2.5 mg/3mL, 2.5 mg, Nebulization, Q6H PRN, Nitesh Arias MD  •  allopurinol (ZYLOPRIM) tablet 300 mg, 300 mg, Oral, QAM, Nitesh Arais MD, 300 mg at 03/18/18 0624  •  amLODIPine (NORVASC) tablet 10 mg, 10 mg, Oral, QAM, Nitesh Arias MD, 10 mg at 03/18/18 0624  •  aspirin tablet 325 mg, 325 mg, Oral, QAM, Nitesh Arias MD, 325 mg at 03/18/18 0624  •  bisacodyl (DULCOLAX) EC tablet 5 mg, 5 mg, Oral, Daily PRN, Nitesh Arias MD, 5 mg at 03/17/18 1055  •  budesonide-formoterol (SYMBICORT) 160-4.5 MCG/ACT inhaler 2 puff, 2 puff, Inhalation, BID - RT, Nitesh Arias MD, 2 puff at 03/18/18 0940  •  calcium carbonate (TUMS) chewable tablet 500 mg (200 mg elemental), 1 tablet, Oral, BID PRN, Nitesh Arias MD  •  cetirizine (zyrTEC) tablet 10 mg, 10 mg, Oral, QAM, Nitesh Arias MD, 10 mg at 03/18/18 0624  •  [START ON 3/19/2018] cholecalciferol (VITAMIN D3) capsule 50,000 Units, 50,000 Units, Oral, Q7 Days, Nitesh Arias MD  •  clobetasol (TEMOVATE) 0.05 % cream, , Topical, Q12H, Nitesh Arias MD  •  clopidogrel (PLAVIX) tablet 75 mg, 75 mg, Oral, QAM, Nitesh Arias MD, 75 mg at 03/18/18 0624  •  dextrose (D50W) solution 25 g, 25 g, Intravenous, Q15 Min PRN, Nitesh Arias MD  •  dextrose (GLUTOSE) oral gel 15 g, 15 g, Oral, Q15 Min PRN, Nitesh Arias MD  •   enoxaparin (LOVENOX) syringe 40 mg, 40 mg, Subcutaneous, Q12H, Nitesh Arias MD, 40 mg at 03/18/18 0804  •  febuxostat (ULORIC) tablet 80 mg, 80 mg, Oral, Daily, Nitesh Arias MD, 80 mg at 03/18/18 0804  •  furosemide (LASIX) tablet 80 mg, 80 mg, Oral, Daily, Nitesh Arias MD, 80 mg at 03/18/18 1014  •  glipiZIDE (GLUCOTROL) tablet 5 mg, 5 mg, Oral, BID AC, Nitesh Arias MD, 5 mg at 03/18/18 0804  •  glucagon (GLUCAGEN) injection 1 mg, 1 mg, Subcutaneous, PRN, Nitesh Arias MD  •  insulin aspart (novoLOG) injection 0-24 Units, 0-24 Units, Subcutaneous, 4x Daily AC & at Bedtime, Nitesh Arais MD, 8 Units at 03/18/18 0758  •  insulin detemir (LEVEMIR) injection 40 Units, 40 Units, Subcutaneous, Nightly, Nitesh Arias MD, 40 Units at 03/17/18 2216  •  ipratropium (ATROVENT) nebulizer solution 0.5 mg, 0.5 mg, Nebulization, 4x Daily - RT, Nitesh Arias MD, 0.5 mg at 03/18/18 0940  •  levETIRAcetam (KEPPRA) tablet 500 mg, 500 mg, Oral, Q12H, Nitesh Arias MD, 500 mg at 03/18/18 0804  •  linagliptin (TRADJENTA) tablet 5 mg, 5 mg, Oral, Daily, Nitesh Arias MD, 5 mg at 03/18/18 0804  •  lubiprostone (AMITIZA) capsule 24 mcg, 24 mcg, Oral, BID With Meals, Nitesh Arias MD, 24 mcg at 03/18/18 0804  •  magnesium hydroxide (MILK OF MAGNESIA) suspension 2400 mg/10mL 10 mL, 10 mL, Oral, Daily PRN, Nitesh Arias MD  •  metoprolol tartrate (LOPRESSOR) tablet 50 mg, 50 mg, Oral, Q12H, Nitesh Arias MD, 50 mg at 03/18/18 0804  •  O2 (OXYGEN), 2 L/min, Inhalation, Daily, Nitesh Arias MD, 2 L/min at 03/16/18 1009  •  pantoprazole (PROTONIX) EC tablet 40 mg, 40 mg, Oral, QAM, Nitesh Arias MD, 40 mg at 03/18/18 0624  •  potassium chloride (K-DUR,KLOR-CON) CR tablet 40 mEq, 40 mEq, Oral, Once, Nitesh Arias MD  •  sodium chloride 0.9 % flush 1-10  mL, 1-10 mL, Intravenous, PRN, Nitesh Arias MD  •  sodium chloride 0.9 % infusion 40 mL, 40 mL, Intravenous, PRN, Nitesh Arias MD      Labs:    Results from last 7 days  Lab Units 03/17/18  0438   WBC 10*3/mm3 6.54   HEMOGLOBIN g/dL 15.7   HEMATOCRIT % 44.5   PLATELETS 10*3/mm3 126*       Results from last 7 days  Lab Units 03/18/18  0355 03/17/18  0438 03/16/18  0510 03/15/18  0618 03/15/18  0526   SODIUM mmol/L 136 133* 134* 128*  --    POTASSIUM mmol/L 3.3* 3.2* 2.6* 3.9  --    CHLORIDE mmol/L 98 97* 91* 81*  --    CO2 mmol/L 27.8 25.6 30.7* 26.1  --    BUN mg/dL 18 26* 33* 36*  --    CREATININE mg/dL 1.08 1.20 1.38* 1.71*  --    CALCIUM mg/dL 9.0 8.5* 8.9 9.9  --    GLUCOSE mg/dL 233* 376* 135* 675* 686*       Results from last 7 days  Lab Units 03/17/18  0438   MAGNESIUM mg/dL 2.3       Lab Results (last 24 hours)     Procedure Component Value Units Date/Time    Cortisol [[id number]]  (Normal) Collected:  03/18/18 0355    Specimen:  Blood Updated:  03/18/18 0917     Cortisol 10.98 mcg/dL     Narrative:       Cortisol Reference Ranges:    Cortisol 6AM - 10AM Range: 6.02-18.40 mcg/dl  Cortisol 4PM - 8PM Range: 2.68-10.50 mcg/dl  Critical AM/PM:    Less than 2 mcg/dl    POC Glucose Once [[id number]]  (Abnormal) Collected:  03/18/18 0729    Specimen:  Blood Updated:  03/18/18 0735     Glucose 200 (H) mg/dL     Narrative:       Meter: [id number] : [id number] Gurpreet Rice NURSING ASSISTANT    Basic Metabolic Panel [[id number]]  (Abnormal) Collected:  03/18/18 0355    Specimen:  Blood Updated:  03/18/18 0502     Glucose 233 (H) mg/dL      BUN 18 mg/dL      Creatinine 1.08 mg/dL      Sodium 136 mmol/L      Potassium 3.3 (L) mmol/L      Chloride 98 mmol/L      CO2 27.8 mmol/L      Calcium 9.0 mg/dL      eGFR Non African Amer 68 mL/min/1.73      BUN/Creatinine Ratio 16.7     Anion Gap 10.2 mmol/L     Narrative:       GFR Normal >60  Chronic Kidney Disease <60  Kidney Failure <15    POC  Glucose Once [[id number]]  (Abnormal) Collected:  03/18/18 0047    Specimen:  Blood Updated:  03/18/18 0104     Glucose 264 (H) mg/dL     Narrative:       Meter: [id number] : [id number] Emily Ace RN    POC Glucose Once [[id number]]  (Abnormal) Collected:  03/17/18 2131    Specimen:  Blood Updated:  03/17/18 2150     Glucose 274 (H) mg/dL     Narrative:       Meter: [id number] : [id number] Justin Stone CNA    POC Glucose Once [[id number]]  (Abnormal) Collected:  03/17/18 1619    Specimen:  Blood Updated:  03/17/18 1625     Glucose 219 (H) mg/dL     Narrative:       Meter: [id number] : [id number] Flavia Lange CNA    POC Glucose Once [[id number]]  (Abnormal) Collected:  03/17/18 1201    Specimen:  Blood Updated:  03/17/18 1208     Glucose 261 (H) mg/dL     Narrative:       Meter: [id number] : [id number] Flavia Lange CNA              Radiology:  Imaging Results (last 24 hours)     ** No results found for the last 24 hours. **          Cardiology:  ECG/EMG Results (last 24 hours)     ** No results found for the last 24 hours. **          I have reviewed consult notes.    Assessment and Plan:      1.  Recent onset of diabetes mellitus with hyperglycemia is slowly improving have added glipizide today along with Januvia and long acting Levemir will continue to adjust dosage accordingly    2.  Acute kidney injury chronic kidney disease has resolved and is back to baseline     3.  Hypokalemia.  Potassium is been ordered     4.  Hypertension well controlled home medications be continued     5.  Hyperlipidemia nothing acute continue statin     6.  COPD home inhalers we continued nothing acute     7.  Seizure disorder stable continue Keppra     8.  History of right hemispheric TIA continue aspirin Plavix     9.  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  History of mantle cell lymphoma     11.  Recurrent major depression continue SSRI     12.  GERD   stable         Electronically signed by Nitesh Arias MD at 3/18/2018 11:17 AM

## 2018-03-18 NOTE — PLAN OF CARE
Problem: Patient Care Overview  Goal: Plan of Care Review  Outcome: Ongoing (interventions implemented as appropriate)   03/18/18 1540   Coping/Psychosocial   Plan of Care Reviewed With patient;spouse   Plan of Care Review   Progress improving   OTHER   Outcome Summary VSS. BG in the 200s this shift, well-controlled on oral hypoglycemics and SSI, Levemir HS. Ambulating in room ad jose but is encouraged to use walker. Pt. is ready for discharge, plan to go home tomorrow.        Problem: Fall Risk (Adult)  Goal: Absence of Fall  Outcome: Ongoing (interventions implemented as appropriate)   03/18/18 1540   Fall Risk (Adult)   Absence of Fall making progress toward outcome       Problem: Diabetes, Type 2 (Adult)  Goal: Signs and Symptoms of Listed Potential Problems Will be Absent, Minimized or Managed (Diabetes, Type 2)  Outcome: Ongoing (interventions implemented as appropriate)   03/18/18 1540   Goal/Outcome Evaluation   Problems Assessed (Type 2 Diabetes) hyperglycemia   Problems Present (Type 2 Diabetes) hyperglycemia       Problem: Skin Injury Risk (Adult)  Goal: Skin Health and Integrity  Outcome: Ongoing (interventions implemented as appropriate)   03/18/18 1540   Skin Injury Risk (Adult)   Skin Health and Integrity making progress toward outcome       Problem: Fluid Volume Excess (Adult)  Goal: Optimal Fluid Balance  Outcome: Ongoing (interventions implemented as appropriate)   03/18/18 1540   Fluid Volume Excess (Adult)   Optimal Fluid Balance making progress toward outcome       Problem: Renal Failure/Kidney Injury, Acute (Adult)  Goal: Signs and Symptoms of Listed Potential Problems Will be Absent, Minimized or Managed (Renal Failure/Kidney Injury, Acute)  Outcome: Ongoing (interventions implemented as appropriate)   03/18/18 1540   Goal/Outcome Evaluation   Problems Assessed (Acute Renal Failure/Kidney Injury) all   Problems Present (ARF/Kidney Injury) none

## 2018-03-18 NOTE — PLAN OF CARE
Problem: Patient Care Overview  Goal: Plan of Care Review  Outcome: Ongoing (interventions implemented as appropriate)   03/18/18 0916   Coping/Psychosocial   Plan of Care Reviewed With patient   Plan of Care Review   Progress improving   OTHER   Outcome Summary BG remained in 200's, VSS, restless through night, Tele SR, ambulating with stand by assist and walker, generalized, >BLE edema cont. , Pt. encouraged to use urinal to measure output, occurances charted related to patient not using urinal, wishes to go home     Goal: Individualization and Mutuality  Outcome: Ongoing (interventions implemented as appropriate)    Goal: Discharge Needs Assessment  Outcome: Ongoing (interventions implemented as appropriate)      Problem: Fall Risk (Adult)  Goal: Absence of Fall  Outcome: Ongoing (interventions implemented as appropriate)      Problem: Diabetes, Type 2 (Adult)  Goal: Signs and Symptoms of Listed Potential Problems Will be Absent, Minimized or Managed (Diabetes, Type 2)  Outcome: Ongoing (interventions implemented as appropriate)      Problem: Skin Injury Risk (Adult)  Goal: Skin Health and Integrity  Outcome: Ongoing (interventions implemented as appropriate)      Problem: Fluid Volume Excess (Adult)  Goal: Optimal Fluid Balance  Outcome: Ongoing (interventions implemented as appropriate)      Problem: Renal Failure/Kidney Injury, Acute (Adult)  Goal: Signs and Symptoms of Listed Potential Problems Will be Absent, Minimized or Managed (Renal Failure/Kidney Injury, Acute)  Outcome: Ongoing (interventions implemented as appropriate)

## 2018-03-19 VITALS
BODY MASS INDEX: 46.51 KG/M2 | RESPIRATION RATE: 18 BRPM | TEMPERATURE: 98 F | OXYGEN SATURATION: 95 % | DIASTOLIC BLOOD PRESSURE: 73 MMHG | HEIGHT: 68 IN | SYSTOLIC BLOOD PRESSURE: 118 MMHG | WEIGHT: 306.9 LBS | HEART RATE: 74 BPM

## 2018-03-19 LAB
ANION GAP SERPL CALCULATED.3IONS-SCNC: 9.4 MMOL/L
BUN BLD-MCNC: 17 MG/DL (ref 8–23)
BUN/CREAT SERPL: 16.8 (ref 7–25)
CALCIUM SPEC-SCNC: 8.9 MG/DL (ref 8.8–10.5)
CHLORIDE SERPL-SCNC: 100 MMOL/L (ref 98–107)
CO2 SERPL-SCNC: 26.6 MMOL/L (ref 22–29)
CREAT BLD-MCNC: 1.01 MG/DL (ref 0.76–1.27)
DEPRECATED RDW RBC AUTO: 46 FL (ref 37–54)
ERYTHROCYTE [DISTWIDTH] IN BLOOD BY AUTOMATED COUNT: 14.9 % (ref 11.5–14.5)
GFR SERPL CREATININE-BSD FRML MDRD: 73 ML/MIN/1.73
GLUCOSE BLD-MCNC: 255 MG/DL (ref 65–99)
GLUCOSE BLDC GLUCOMTR-MCNC: 222 MG/DL (ref 70–130)
HCT VFR BLD AUTO: 46.5 % (ref 42–52)
HGB BLD-MCNC: 16.3 G/DL (ref 14–18)
MCH RBC QN AUTO: 30.1 PG (ref 27–31)
MCHC RBC AUTO-ENTMCNC: 35.1 G/DL (ref 31–37)
MCV RBC AUTO: 86 FL (ref 80–94)
PLATELET # BLD AUTO: 130 10*3/MM3 (ref 140–500)
PMV BLD AUTO: 12 FL (ref 7.4–10.4)
POTASSIUM BLD-SCNC: 3.4 MMOL/L (ref 3.5–5.2)
RBC # BLD AUTO: 5.41 10*6/MM3 (ref 4.7–6.1)
SODIUM BLD-SCNC: 136 MMOL/L (ref 136–145)
WBC NRBC COR # BLD: 5.95 10*3/MM3 (ref 4.8–10.8)

## 2018-03-19 PROCEDURE — 82962 GLUCOSE BLOOD TEST: CPT

## 2018-03-19 PROCEDURE — 25010000002 ENOXAPARIN PER 10 MG: Performed by: FAMILY MEDICINE

## 2018-03-19 PROCEDURE — 85027 COMPLETE CBC AUTOMATED: CPT | Performed by: FAMILY MEDICINE

## 2018-03-19 PROCEDURE — 80048 BASIC METABOLIC PNL TOTAL CA: CPT | Performed by: FAMILY MEDICINE

## 2018-03-19 PROCEDURE — 63710000001 INSULIN ASPART PER 5 UNITS: Performed by: FAMILY MEDICINE

## 2018-03-19 RX ORDER — POTASSIUM CHLORIDE 20 MEQ/1
40 TABLET, EXTENDED RELEASE ORAL ONCE
Status: COMPLETED | OUTPATIENT
Start: 2018-03-19 | End: 2018-03-19

## 2018-03-19 RX ORDER — GLIPIZIDE 5 MG/1
5 TABLET ORAL
Qty: 60 TABLET | Refills: 2 | Status: ON HOLD | OUTPATIENT
Start: 2018-03-19 | End: 2018-09-17 | Stop reason: ALTCHOICE

## 2018-03-19 RX ADMIN — INSULIN ASPART 8 UNITS: 100 INJECTION, SOLUTION INTRAVENOUS; SUBCUTANEOUS at 07:58

## 2018-03-19 RX ADMIN — ASPIRIN 325 MG: 325 TABLET, COATED ORAL at 06:40

## 2018-03-19 RX ADMIN — PANTOPRAZOLE SODIUM 40 MG: 40 TABLET, DELAYED RELEASE ORAL at 06:40

## 2018-03-19 RX ADMIN — LEVETIRACETAM 500 MG: 500 TABLET ORAL at 09:10

## 2018-03-19 RX ADMIN — CETIRIZINE HYDROCHLORIDE 10 MG: 10 TABLET, FILM COATED ORAL at 06:40

## 2018-03-19 RX ADMIN — CLOPIDOGREL 75 MG: 75 TABLET, FILM COATED ORAL at 06:40

## 2018-03-19 RX ADMIN — ENOXAPARIN SODIUM 40 MG: 40 INJECTION SUBCUTANEOUS at 08:02

## 2018-03-19 RX ADMIN — FEBUXOSTAT 80 MG: 40 TABLET ORAL at 08:03

## 2018-03-19 RX ADMIN — ALLOPURINOL 300 MG: 300 TABLET ORAL at 06:40

## 2018-03-19 RX ADMIN — LUBIPROSTONE 24 MCG: 24 CAPSULE ORAL at 08:01

## 2018-03-19 RX ADMIN — SENNOSIDES 8.6 MG: 8.6 TABLET, FILM COATED ORAL at 08:03

## 2018-03-19 RX ADMIN — AMLODIPINE BESYLATE 10 MG: 5 TABLET ORAL at 06:40

## 2018-03-19 RX ADMIN — POTASSIUM CHLORIDE 40 MEQ: 1500 TABLET, EXTENDED RELEASE ORAL at 08:05

## 2018-03-19 RX ADMIN — CLOBETASOL PROPIONATE: 0.5 CREAM TOPICAL at 08:01

## 2018-03-19 RX ADMIN — FUROSEMIDE 80 MG: 80 TABLET ORAL at 08:03

## 2018-03-19 RX ADMIN — METOPROLOL TARTRATE 50 MG: 50 TABLET ORAL at 08:03

## 2018-03-19 RX ADMIN — LINAGLIPTIN 5 MG: 5 TABLET, FILM COATED ORAL at 08:03

## 2018-03-19 RX ADMIN — GLIPIZIDE 5 MG: 5 TABLET ORAL at 08:03

## 2018-03-19 NOTE — DISCHARGE INSTR - APPOINTMENTS
Patient has discharge follow-up on Friday 23,2018@ 10:30a.m.  Nitesh Arias MD    PCP - General Family Medicine   [phone]   [address] [address]    Next Steps: Follow up     Instructions: me thursday or friday

## 2018-03-19 NOTE — PROGRESS NOTES
"Daily Progress Note:    Subjective: Blood sugars are still elevated otherwise no complaints    Flowsheet Rows    Flowsheet Row First Filed Value   Admission Height 172.7 cm (68\") Documented at 03/15/2018 0512   Admission Weight (!)  136 kg (300 lb 4 oz) Documented at 03/15/2018 0512          Documented weights    03/15/18 0512 03/17/18 1814 03/18/18 0630 03/19/18 0511   Weight: (!) 136 kg (300 lb 4 oz) (!) 140 kg (309 lb 9.6 oz) (!) 138 kg (304 lb) (!) 139 kg (306 lb 14.4 oz)       Patient Vitals for the past 24 hrs:   BP Temp Temp src Pulse Resp SpO2 Weight   03/19/18 0620 118/73 98 °F (36.7 °C) Oral 74 18 95 % -   03/19/18 0511 - - - - - - (!) 139 kg (306 lb 14.4 oz)   03/18/18 2326 105/65 97.7 °F (36.5 °C) Oral 97 18 93 % -   03/18/18 2001 118/71 97.7 °F (36.5 °C) Oral 103 18 93 % -   03/18/18 1943 - - - 86 18 94 % -   03/18/18 1938 - - - 86 18 94 % -   03/18/18 1640 - - - 84 18 92 % -   03/18/18 1631 - - - 82 18 94 % -   03/18/18 1534 120/73 98.1 °F (36.7 °C) Oral 87 18 95 % -   03/18/18 1323 - - - 81 16 93 % -   03/18/18 1316 - - - 91 16 92 % -   03/18/18 1107 133/66 97.5 °F (36.4 °C) Oral 75 20 92 % -   03/18/18 0940 - - - 68 20 91 % -       (!) 139 kg (306 lb 14.4 oz)      Intake/Output Summary (Last 24 hours) at 03/19/18 0727  Last data filed at 03/18/18 1834   Gross per 24 hour   Intake             1800 ml   Output                0 ml   Net             1800 ml       Review of Systems    Physical Exam   Constitutional: He appears well-developed and well-nourished.   Morbidly obese   HENT:   Head: Normocephalic.   Mouth/Throat: Oropharynx is clear and moist.   Eyes: Conjunctivae are normal.   Neck: Normal range of motion. No JVD present. No thyromegaly present.   Cardiovascular: Normal rate, regular rhythm and normal heart sounds.    No murmur heard.  Pulmonary/Chest: Effort normal. No respiratory distress. He has no rales.   Abdominal: Soft. Bowel sounds are normal. He exhibits no distension. There is no " tenderness. There is no guarding.   Musculoskeletal: He exhibits edema (trace).   Neurological: He is alert.   Skin: Skin is warm and dry. No rash noted.   Nursing note and vitals reviewed.          Medication Review:     Medication Review:   I have reviewed the patient's current medication list    Current Facility-Administered Medications:   •  albuterol (PROVENTIL) nebulizer solution 0.083% 2.5 mg/3mL, 2.5 mg, Nebulization, Q6H PRN, Nitesh Arias MD  •  allopurinol (ZYLOPRIM) tablet 300 mg, 300 mg, Oral, QAM, Nitesh Arias MD, 300 mg at 03/19/18 0640  •  amLODIPine (NORVASC) tablet 10 mg, 10 mg, Oral, QAM, Nitesh Arias MD, 10 mg at 03/19/18 0640  •  aspirin tablet 325 mg, 325 mg, Oral, QAM, Nitesh Arias MD, 325 mg at 03/19/18 0640  •  bisacodyl (DULCOLAX) EC tablet 5 mg, 5 mg, Oral, Daily PRN, Nitesh Arias MD, 5 mg at 03/17/18 1055  •  budesonide-formoterol (SYMBICORT) 160-4.5 MCG/ACT inhaler 2 puff, 2 puff, Inhalation, BID - RT, Nitesh Arias MD, 2 puff at 03/18/18 1942  •  calcium carbonate (TUMS) chewable tablet 500 mg (200 mg elemental), 1 tablet, Oral, BID PRN, Nitesh Arias MD  •  cetirizine (zyrTEC) tablet 10 mg, 10 mg, Oral, QAM, Nitesh Arias MD, 10 mg at 03/19/18 0640  •  cholecalciferol (VITAMIN D3) capsule 50,000 Units, 50,000 Units, Oral, Q7 Days, Nitesh Arias MD, 50,000 Units at 03/18/18 2337  •  clobetasol (TEMOVATE) 0.05 % cream, , Topical, Q12H, Nitesh Arias MD  •  clopidogrel (PLAVIX) tablet 75 mg, 75 mg, Oral, QAM, Nitesh Arias MD, 75 mg at 03/19/18 0640  •  dextrose (D50W) solution 25 g, 25 g, Intravenous, Q15 Min PRN, Nitesh Arias MD  •  dextrose (GLUTOSE) oral gel 15 g, 15 g, Oral, Q15 Min PRN, Nitesh Arias MD  •  enoxaparin (LOVENOX) syringe 40 mg, 40 mg, Subcutaneous, Q12H, Nitesh Arias MD, 40 mg at 03/18/18 2116  •   febuxostat (ULORIC) tablet 80 mg, 80 mg, Oral, Daily, Nitesh Arias MD, 80 mg at 03/18/18 0804  •  furosemide (LASIX) tablet 80 mg, 80 mg, Oral, Daily, Nitesh Arias MD, 80 mg at 03/18/18 1014  •  glipiZIDE (GLUCOTROL) tablet 5 mg, 5 mg, Oral, BID AC, Nitesh Arias MD, 5 mg at 03/18/18 1724  •  glucagon (GLUCAGEN) injection 1 mg, 1 mg, Subcutaneous, PRN, Nitesh Arias MD  •  insulin aspart (novoLOG) injection 0-24 Units, 0-24 Units, Subcutaneous, 4x Daily AC & at Bedtime, Nitesh Arias MD, 20 Units at 03/18/18 2126  •  insulin detemir (LEVEMIR) injection 50 Units, 50 Units, Subcutaneous, Nightly, Nitesh Arias MD, 50 Units at 03/18/18 2126  •  ipratropium (ATROVENT) nebulizer solution 0.5 mg, 0.5 mg, Nebulization, 4x Daily - RT, Nitesh Arias MD, 0.5 mg at 03/18/18 1940  •  levETIRAcetam (KEPPRA) tablet 500 mg, 500 mg, Oral, Q12H, Nitesh Arias MD, 500 mg at 03/18/18 2116  •  linagliptin (TRADJENTA) tablet 5 mg, 5 mg, Oral, Daily, Nitesh Arias MD, 5 mg at 03/18/18 0804  •  lubiprostone (AMITIZA) capsule 24 mcg, 24 mcg, Oral, BID With Meals, Nitesh Arias MD, 24 mcg at 03/18/18 1724  •  magnesium hydroxide (MILK OF MAGNESIA) suspension 2400 mg/10mL 10 mL, 10 mL, Oral, Daily PRN, Nitesh Arias MD  •  metoprolol tartrate (LOPRESSOR) tablet 50 mg, 50 mg, Oral, Q12H, Nitesh Arias MD, 50 mg at 03/18/18 2115  •  O2 (OXYGEN), 2 L/min, Inhalation, Daily, Nitesh Arias MD, 2 L/min at 03/16/18 1009  •  pantoprazole (PROTONIX) EC tablet 40 mg, 40 mg, Oral, QAM, Nitesh Arias MD, 40 mg at 03/19/18 0640  •  senna (SENOKOT) tablet 8.6 mg, 1 tablet, Oral, BID, Nitesh Arias MD, 8.6 mg at 03/18/18 2129  •  sodium chloride 0.9 % flush 1-10 mL, 1-10 mL, Intravenous, PRN, Nitesh Arias MD  •  sodium chloride 0.9 % infusion 40 mL, 40 mL,  Intravenous, PRN, Nitesh Arias MD      Labs:    Results from last 7 days  Lab Units 03/19/18  0404 03/17/18  0438   WBC 10*3/mm3 5.95 6.54   HEMOGLOBIN g/dL 16.3 15.7   HEMATOCRIT % 46.5 44.5   PLATELETS 10*3/mm3 130* 126*       Results from last 7 days  Lab Units 03/19/18  0404 03/18/18  0355 03/17/18  0438 03/16/18  0510 03/15/18  0618 03/15/18  0526   SODIUM mmol/L 136 136 133* 134* 128*  --    POTASSIUM mmol/L 3.4* 3.3* 3.2* 2.6* 3.9  --    CHLORIDE mmol/L 100 98 97* 91* 81*  --    CO2 mmol/L 26.6 27.8 25.6 30.7* 26.1  --    BUN mg/dL 17 18 26* 33* 36*  --    CREATININE mg/dL 1.01 1.08 1.20 1.38* 1.71*  --    CALCIUM mg/dL 8.9 9.0 8.5* 8.9 9.9  --    GLUCOSE mg/dL 255* 233* 376* 135* 675* 686*       Results from last 7 days  Lab Units 03/17/18  0438   MAGNESIUM mg/dL 2.3       Lab Results (last 24 hours)     Procedure Component Value Units Date/Time    Basic Metabolic Panel [[id number]]  (Abnormal) Collected:  03/19/18 0404    Specimen:  Blood Updated:  03/19/18 0500     Glucose 255 (H) mg/dL      BUN 17 mg/dL      Creatinine 1.01 mg/dL      Sodium 136 mmol/L      Potassium 3.4 (L) mmol/L      Chloride 100 mmol/L      CO2 26.6 mmol/L      Calcium 8.9 mg/dL      eGFR Non African Amer 73 mL/min/1.73      BUN/Creatinine Ratio 16.8     Anion Gap 9.4 mmol/L     Narrative:       GFR Normal >60  Chronic Kidney Disease <60  Kidney Failure <15    CBC (No Diff) [[id number]]  (Abnormal) Collected:  03/19/18 0404    Specimen:  Blood Updated:  03/19/18 0446     WBC 5.95 10*3/mm3      RBC 5.41 10*6/mm3      Hemoglobin 16.3 g/dL      Hematocrit 46.5 %      MCV 86.0 fL      MCH 30.1 pg      MCHC 35.1 g/dL      RDW 14.9 (H) %      RDW-SD 46.0 fl      MPV 12.0 (H) fL      Platelets 130 (L) 10*3/mm3     POC Glucose Once [[id number]]  (Abnormal) Collected:  03/18/18 2117    Specimen:  Blood Updated:  03/18/18 2134     Glucose 366 (H) mg/dL     Narrative:       Meter: [id number] : [id number] Tom Owens RN VALIDATOR     POC Glucose Once [[id number]]  (Abnormal) Collected:  03/18/18 1638    Specimen:  Blood Updated:  03/18/18 1644     Glucose 279 (H) mg/dL     Narrative:       Meter: [id number] : [id number] Gurpreet Rice NURSING ASSISTANT    POC Glucose Once [[id number]]  (Abnormal) Collected:  03/18/18 1110    Specimen:  Blood Updated:  03/18/18 1116     Glucose 245 (H) mg/dL     Narrative:       Meter: [id number] : [id number] Gurpreet Rice NURSING ASSISTANT    Cortisol [[id number]]  (Normal) Collected:  03/18/18 0355    Specimen:  Blood Updated:  03/18/18 0917     Cortisol 10.98 mcg/dL     Narrative:       Cortisol Reference Ranges:    Cortisol 6AM - 10AM Range: 6.02-18.40 mcg/dl  Cortisol 4PM - 8PM Range: 2.68-10.50 mcg/dl  Critical AM/PM:    Less than 2 mcg/dl    POC Glucose Once [[id number]]  (Abnormal) Collected:  03/18/18 0729    Specimen:  Blood Updated:  03/18/18 0735     Glucose 200 (H) mg/dL     Narrative:       Meter: [id number] : [id number] Gurpreet Rice NURSING ASSISTANT              Radiology:  Imaging Results (last 24 hours)     ** No results found for the last 24 hours. **          Cardiology:  ECG/EMG Results (last 24 hours)     ** No results found for the last 24 hours. **          I have reviewed consult notes.    Assessment and Plan:      1.  Recent onset of diabetes mellitus with hyperglycemia home today    2.  Acute kidney injury chronic kidney disease has resolved and is back to baseline     3.  Hypokalemia.  Potassium is been ordered     4.  Hypertension well controlled home medications be continued     5.  Hyperlipidemia nothing acute continue statin     6.  COPD home inhalers we continued nothing acute     7.  Seizure disorder stable continue Keppra     8.  History of right hemispheric TIA continue aspirin Plavix     9.  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  History of mantle cell lymphoma     11.  Recurrent major depression continue  SSRI     12.  GERD stable

## 2018-03-19 NOTE — PLAN OF CARE
Problem: Patient Care Overview  Goal: Plan of Care Review  Outcome: Ongoing (interventions implemented as appropriate)   03/19/18 0459   Coping/Psychosocial   Plan of Care Reviewed With patient   Plan of Care Review   Progress improving       Problem: Fall Risk (Adult)  Goal: Absence of Fall  Outcome: Ongoing (interventions implemented as appropriate)   03/19/18 0459   Fall Risk (Adult)   Absence of Fall making progress toward outcome       Problem: Diabetes, Type 2 (Adult)  Goal: Signs and Symptoms of Listed Potential Problems Will be Absent, Minimized or Managed (Diabetes, Type 2)  Outcome: Ongoing (interventions implemented as appropriate)   03/19/18 0459   Goal/Outcome Evaluation   Problems Assessed (Type 2 Diabetes) hyperglycemia   Problems Present (Type 2 Diabetes) hyperglycemia       Problem: Skin Injury Risk (Adult)  Goal: Skin Health and Integrity  Outcome: Ongoing (interventions implemented as appropriate)   03/19/18 0459   Skin Injury Risk (Adult)   Skin Health and Integrity making progress toward outcome       Problem: Fluid Volume Excess (Adult)  Goal: Optimal Fluid Balance  Outcome: Ongoing (interventions implemented as appropriate)   03/19/18 0459   Fluid Volume Excess (Adult)   Optimal Fluid Balance making progress toward outcome       Problem: Renal Failure/Kidney Injury, Acute (Adult)  Goal: Signs and Symptoms of Listed Potential Problems Will be Absent, Minimized or Managed (Renal Failure/Kidney Injury, Acute)  Outcome: Ongoing (interventions implemented as appropriate)   03/19/18 0459   Goal/Outcome Evaluation   Problems Assessed (Acute Renal Failure/Kidney Injury) all   Problems Present (ARF/Kidney Injury) none

## 2018-03-19 NOTE — NURSING NOTE
Continued Stay Note  MAGDALENA Pollock     Patient Name: Esdras Ko  MRN: [id number]  Today's Date: 3/19/2018    Admit Date: 3/15/2018          Discharge Plan     Row Name 03/19/18 0950       Plan    Plan home    Plan Comments Rec'd call from Lori CHERRY requesting price check on new meds. Call placed to Criselda/Jay Choi who states total for Levemir, Tragenta and Glipizide = $16.70. Lori CHERRY updated, Message left with Beena/Dr Arias office informing of patient cost of meds. Will continue to follow.              Discharge Codes    No documentation.       Expected Discharge Date and Time     Expected Discharge Date Expected Discharge Time    Mar 19, 2018             Albino Manzanares RN

## 2018-03-20 NOTE — PAYOR COMM NOTE
"Lexie Ko (67 y.o. Male)     ATTN: NURSE REVIEWER  REF#[id number]  FAXING DISCHARGE INFO ON PENDING INPT REVIEW. PLEASE REPLY TO BERTRAND CAMPA AT FAX#[phone] OR PHONE#[phone]. THANK YOU.       Date of Birth Social Security Number Address Home Phone MRN    1950  [id number]  [address] [facility] [place] [address] [phone] [id number]    [employer]ah[employer] Marital Status          Church        Admission Date Admission Type Admitting Provider Attending Provider Department, Room/Bed    3/15/18 Elective Nitesh Arias MD  [facility] MED SURG, 1414/1    Discharge Date Discharge Disposition Discharge Destination        3/19/2018 Home or Self Care              Attending Provider:  (none)   Allergies:  Amoxicillin, Ceftin [Cefuroxime Axetil], Codeine, Flonase [Fluticasone], Pharbetol [Acetaminophen], Spiriva Handihaler [Tiotropium Bromide Monohydrate], Sulfa Antibiotics, Phenobarbital    Isolation:  None   Infection:  None   Code Status:  Prior    Ht:  172.7 cm (68\")   Wt:  139 kg (306 lb 14.4 oz)    Admission Cmt:  None   Principal Problem:  None                Active Insurance as of 3/15/2018     Primary Coverage     Payor Plan Insurance Group Employer/Plan Group    ANTH MEDICARE REPLACEMENT ANTH MEDICARE ADVANTAGE [id number]     Payor Plan Address Payor Plan Phone Number Effective From Effective To    [id number] [phone] 1/1/2018     [place] [address]       Subscriber Name Subscriber Birth Date Member ID       LEXIE KO 1950 [id number]                 Emergency Contacts      (Rel.) Home Phone Work Phone Mobile Phone    Charley Ko (Spouse) [phone] -- --               Discharge Summary      Nitesh Arias MD at 3/19/2018  7:51 AM            Lexie Ko  1950  [id number]        Discharge Summary    Date of Admission: 3/15/2018  Date of Discharge:  3/19/2018    Primary Discharge Diagnoses:   Uncontrolled diabetes with " profound hyperglycemia new onset  Acute kidney injury and chronic kidney disease stage III    Secondary Discharge Diagnoses:     1.  Hypertension.  2.  Hyperlipidemia.  3.  Impaired fasting glucose.  4.  COPD.  5.  History of partial complex seizures.  6.  Mantle cell lymphoma.  7.  Chronic venous stasis edema.   8.  Allergic rhinitis.  9.  Right hemispheric TIA 02/2014.   10. Remote history of head injury.   11. History of transitional cell carcinoma of the bladder, status post resection.  12. Osteoarthritis.   13. Hyperuricemia.   14. Chronic kidney disease, stage 3.        PCP  Patient Care Team:  Nitesh Arias MD as PCP - General (Family Medicine)  Beto Jama MD as Referring Physician (Hematology and Oncology)  Irving Restrepo MD as Consulting Physician (Hematology and Oncology)    Consults:   Consults     No orders found from 2/14/2018 to 3/16/2018.            History of Present Illness:  67-year-old white male with multiple medical problems who presented for a routine office follow-up yesterday was complaining of increasing fatigue, lack of energy, unintentional 10-15 pound weight loss, dizziness, and generalized malaise.  Patient has a known history of impaired fasting glucose and fasting blood sugars usually run between 120 and 1:30 routine blood work returned from his office visit with a blood sugar of 767.  He is being admitted for new onset diabetes    Hospital Course  After admission the hospital patient was started on every 2 hours Accu-Cheks and bolus IV insulin.  Patient initially did respond blood sugars did come down to the 400s but continue to be elevated over 400.  He was transferred to the ICU and started on a insulin drip.  He responded quite well within 24 hours blood sugars were all less than 150.  So depressed she discontinued patient was started on by mouth hypoglycemics and long acting Levemir.  Patient blood sugars improved but continue to be elevated.  He was  instructed on 1800-calorie consistent carbohydrate diet.  His wife is a diabetic and is confident she can manage his blood sugars at home.  His acute kidney injury resolved with hydration his home diuretics were resumed and his renal function returned to baseline.    Medications on discharge include glipizide 5 mg twice a day Tradjenta 5 mg daily and Levemir 60 units daily.       Operations and Procedures Performed:       No results found.    Last Lab Results:     Results from last 7 days  Lab Units 03/19/18  0404 03/17/18  0438   WBC 10*3/mm3 5.95 6.54   HEMOGLOBIN g/dL 16.3 15.7   HEMATOCRIT % 46.5 44.5   PLATELETS 10*3/mm3 130* 126*       Results from last 7 days  Lab Units 03/19/18  0404 03/18/18  0355 03/17/18  0438 03/16/18  0510 03/15/18  0618 03/15/18  0526   SODIUM mmol/L 136 136 133* 134* 128*  --    POTASSIUM mmol/L 3.4* 3.3* 3.2* 2.6* 3.9  --    CHLORIDE mmol/L 100 98 97* 91* 81*  --    CO2 mmol/L 26.6 27.8 25.6 30.7* 26.1  --    BUN mg/dL 17 18 26* 33* 36*  --    CREATININE mg/dL 1.01 1.08 1.20 1.38* 1.71*  --    CALCIUM mg/dL 8.9 9.0 8.5* 8.9 9.9  --    GLUCOSE mg/dL 255* 233* 376* 135* 675* 686*       Results from last 7 days  Lab Units 03/17/18  0438   MAGNESIUM mg/dL 2.3       Lab Results (last 24 hours)     Procedure Component Value Units Date/Time    POC Glucose Once [[id number]]  (Abnormal) Collected:  03/19/18 0722    Specimen:  Blood Updated:  03/19/18 0728     Glucose 222 (H) mg/dL     Narrative:       Meter: [id number] : [id number] Gurpreet Rice NURSING ASSISTANT    Basic Metabolic Panel [[id number]]  (Abnormal) Collected:  03/19/18 0404    Specimen:  Blood Updated:  03/19/18 0500     Glucose 255 (H) mg/dL      BUN 17 mg/dL      Creatinine 1.01 mg/dL      Sodium 136 mmol/L      Potassium 3.4 (L) mmol/L      Chloride 100 mmol/L      CO2 26.6 mmol/L      Calcium 8.9 mg/dL      eGFR Non African Amer 73 mL/min/1.73      BUN/Creatinine Ratio 16.8     Anion Gap 9.4 mmol/L     Narrative:        GFR Normal >60  Chronic Kidney Disease <60  Kidney Failure <15    CBC (No Diff) [[id number]]  (Abnormal) Collected:  03/19/18 0404    Specimen:  Blood Updated:  03/19/18 0446     WBC 5.95 10*3/mm3      RBC 5.41 10*6/mm3      Hemoglobin 16.3 g/dL      Hematocrit 46.5 %      MCV 86.0 fL      MCH 30.1 pg      MCHC 35.1 g/dL      RDW 14.9 (H) %      RDW-SD 46.0 fl      MPV 12.0 (H) fL      Platelets 130 (L) 10*3/mm3     POC Glucose Once [[id number]]  (Abnormal) Collected:  03/18/18 2117    Specimen:  Blood Updated:  03/18/18 2134     Glucose 366 (H) mg/dL     Narrative:       Meter: [id number] : [id number] Tom Owens RN VALIDATOR    POC Glucose Once [[id number]]  (Abnormal) Collected:  03/18/18 1638    Specimen:  Blood Updated:  03/18/18 1644     Glucose 279 (H) mg/dL     Narrative:       Meter: [id number] : [id number] Gurpreet Rice NURSING ASSISTANT    POC Glucose Once [[id number]]  (Abnormal) Collected:  03/18/18 1110    Specimen:  Blood Updated:  03/18/18 1116     Glucose 245 (H) mg/dL     Narrative:       Meter: [id number] : [id number] Gurpreet Rice NURSING ASSISTANT    Cortisol [[id number]]  (Normal) Collected:  03/18/18 0355    Specimen:  Blood Updated:  03/18/18 0917     Cortisol 10.98 mcg/dL     Narrative:       Cortisol Reference Ranges:    Cortisol 6AM - 10AM Range: 6.02-18.40 mcg/dl  Cortisol 4PM - 8PM Range: 2.68-10.50 mcg/dl  Critical AM/PM:    Less than 2 mcg/dl              PROCEDURES          Allergies:  is allergic to amoxicillin; ceftin [cefuroxime axetil]; codeine; flonase [fluticasone]; pharbetol [acetaminophen]; spiriva handihaler [tiotropium bromide monohydrate]; sulfa antibiotics; and phenobarbital.    Ab  reviewed    Discharge Medications:   Esdras Ko   Home Medication Instructions MK:[id number]    Printed on:03/19/18 2422   Medication Information                      ADVAIR DISKUS 250-50 MCG/DOSE DISKUS  Inhale 1 puff 2 (two) times a day. inhale 1 dose by mouth twice a  day             allopurinol (ZYLOPRIM) 300 MG tablet  Take 300 mg by mouth Every Morning. take 1 tablet by mouth daily             AMITIZA 24 MCG capsule  24 mcg 2 (Two) Times a Day As Needed.             amLODIPine (NORVASC) 10 MG tablet  Take 10 mg by mouth Every Morning.             aspirin 325 MG tablet  Take 325 mg by mouth Every Morning.             cetirizine (ZyrTEC) 10 MG tablet  Take 10 mg by mouth Every Morning.             clobetasol (TEMOVATE) 0.05 % cream  apply to affected area (RASH FREELY) twice a day             clopidogrel (PLAVIX) 75 MG tablet  Take 75 mg by mouth Every Morning.             D3-50 68268 UNITS capsule  Take 50,000 Units by mouth Every 7 (Seven) Days. On mondays             furosemide (LASIX) 40 MG tablet  Take 80 mg by mouth 2 (two) times a day. take 2 tablets by mouth twice a day             glipiZIDE (GLUCOTROL) 5 MG tablet  Take 1 tablet by mouth 2 (Two) Times a Day Before Meals.             INCRUSE ELLIPTA 62.5 MCG/INH aerosol powder   Inhale 1 puff Daily.             insulin detemir (LEVEMIR) 100 UNIT/ML injection  Inject 60 Units under the skin Daily.             levETIRAcetam (KEPPRA) 1000 MG tablet  Take 500 mg by mouth 2 (Two) Times a Day.             linagliptin (TRADJENTA) 5 MG tablet tablet  Take 1 tablet by mouth Daily.             metolazone (ZAROXOLYN) 2.5 MG tablet  Take 2.5 mg by mouth. Take on Monday, Wednesday & friday             metoprolol tartrate (LOPRESSOR) 50 MG tablet  Take 50 mg by mouth 2 (two) times a day.             O2 (OXYGEN)  Inhale 2 L/min Daily.             pantoprazole (PROTONIX) 40 MG EC tablet  Take 40 mg by mouth Every Morning. take 1 tablet by mouth once daily             potassium chloride (K-DUR,KLOR-CON) 20 MEQ CR tablet  Take 40 mEq by mouth 3 (Three) Times a Day. 3 TABS BID AND 2 TABS ONCE A DAY             ULORIC 80 MG tablet tablet  80 mg Daily.             VENTOLIN  (90 BASE) MCG/ACT inhaler  inhale 2 puffs by mouth four times  a day                   Condition on Discharge:  stable    Discharge Disposition  Home     Visiting Nurse:    No     Home PT/OT:  No     Home Safety Evaluation:  No     DME  none    Discharge Diet:      Dietary Orders     Start     Ordered    03/16/18 0711  Diet Regular; Cardiac, Consistent Carbohydrate  Diet Effective Now     Question Answer Comment   Diet Texture / Consistency Regular    Common Modifiers Cardiac    Common Modifiers Consistent Carbohydrate        03/16/18 0712          Activity at Discharge:  As tolerated      Follow-up Appointments:  With my office as instructed    Test Results Pending at Discharge       Nitesh Arias MD  03/19/18  7:52 AM                Electronically signed by Nitesh Arias MD at 3/19/2018  7:56 AM       Discharge Order     Start     Ordered    03/19/18 0732  Discharge patient  Once     Expected Discharge Date:  03/19/18    Discharge Disposition:  Home or Self Care        03/19/18 0733

## 2018-03-21 NOTE — NURSING NOTE
Case Management Discharge Note    Final Note: patient discharged home to self care.    Destination     No service coordination in this encounter.      Durable Medical Equipment     No service coordination in this encounter.      Dialysis/Infusion     No service coordination in this encounter.      Home Medical Care     No service coordination in this encounter.      Social Care     No service coordination in this encounter.             Final Discharge Disposition Code: 01 - home or self-care

## 2018-06-27 ENCOUNTER — PREP FOR SURGERY (OUTPATIENT)
Dept: OTHER | Facility: HOSPITAL | Age: 68
End: 2018-06-27

## 2018-06-27 ENCOUNTER — TELEPHONE (OUTPATIENT)
Dept: GASTROENTEROLOGY | Facility: CLINIC | Age: 68
End: 2018-06-27

## 2018-06-27 DIAGNOSIS — D12.6 ADENOMATOUS POLYP OF COLON, UNSPECIFIED PART OF COLON: Primary | ICD-10-CM

## 2018-06-27 NOTE — TELEPHONE ENCOUNTER
CALLED WIFE BACK.  SINCE HE DIABETIC, NEED TO RESCHEDULE TO MORNING APPT.  REASCHEDULED TO 08/17/2018 AT 9:45AM - ARRIVE 8:45AM.  WILL MAIL INSTRUCTIONS ONCE WE RECEIVE CLEARANCE FROM DR. RIBEIRO.

## 2018-06-27 NOTE — TELEPHONE ENCOUNTER
SPOKE WITH WIFE.  WAS SCHEDULED IN April, BUT HAD TO CANCEL.  NOW CALLING TO SCHEDULE.  SCHEDULED AT [facility] 07/27/2018 AT 3PM - ARRIVE AT 2PM.  ON [place], WILL CALL FOR CLEARANCE, DR. TINSLEY.

## 2018-06-28 PROBLEM — D12.6 ADENOMATOUS POLYP OF COLON: Status: ACTIVE | Noted: 2018-06-28

## 2018-08-16 ENCOUNTER — ANESTHESIA EVENT (OUTPATIENT)
Dept: PERIOP | Facility: HOSPITAL | Age: 68
End: 2018-08-16

## 2018-08-17 ENCOUNTER — HOSPITAL ENCOUNTER (OUTPATIENT)
Facility: HOSPITAL | Age: 68
Setting detail: HOSPITAL OUTPATIENT SURGERY
Discharge: HOME | End: 2018-08-17
Attending: INTERNAL MEDICINE | Admitting: NURSE ANESTHETIST, CERTIFIED REGISTERED

## 2018-08-17 ENCOUNTER — ANESTHESIA (OUTPATIENT)
Dept: PERIOP | Facility: HOSPITAL | Age: 68
End: 2018-08-17

## 2018-08-17 VITALS
BODY MASS INDEX: 42.68 KG/M2 | DIASTOLIC BLOOD PRESSURE: 81 MMHG | HEIGHT: 68 IN | WEIGHT: 281.6 LBS | HEART RATE: 61 BPM | SYSTOLIC BLOOD PRESSURE: 131 MMHG | TEMPERATURE: 97.5 F | RESPIRATION RATE: 18 BRPM

## 2018-08-17 LAB
GLUCOSE BLDC GLUCOMTR-MCNC: 115 MG/DL (ref 70–130)
POTASSIUM BLD-SCNC: 3.7 MMOL/L (ref 3.5–5.2)

## 2018-08-17 PROCEDURE — 93010 ELECTROCARDIOGRAM REPORT: CPT | Performed by: INTERNAL MEDICINE

## 2018-08-17 PROCEDURE — 84132 ASSAY OF SERUM POTASSIUM: CPT | Performed by: INTERNAL MEDICINE

## 2018-08-17 PROCEDURE — 82962 GLUCOSE BLOOD TEST: CPT

## 2018-08-17 PROCEDURE — 93005 ELECTROCARDIOGRAM TRACING: CPT | Performed by: NURSE ANESTHETIST, CERTIFIED REGISTERED

## 2018-08-17 RX ORDER — SODIUM CHLORIDE 0.9 % (FLUSH) 0.9 %
1-10 SYRINGE (ML) INJECTION AS NEEDED
Status: DISCONTINUED | OUTPATIENT
Start: 2018-08-17 | End: 2018-08-17 | Stop reason: HOSPADM

## 2018-08-17 RX ORDER — SODIUM CHLORIDE, SODIUM LACTATE, POTASSIUM CHLORIDE, CALCIUM CHLORIDE 600; 310; 30; 20 MG/100ML; MG/100ML; MG/100ML; MG/100ML
9 INJECTION, SOLUTION INTRAVENOUS CONTINUOUS PRN
Status: DISCONTINUED | OUTPATIENT
Start: 2018-08-17 | End: 2018-08-17 | Stop reason: HOSPADM

## 2018-08-17 RX ORDER — SODIUM CHLORIDE 9 MG/ML
40 INJECTION, SOLUTION INTRAVENOUS AS NEEDED
Status: DISCONTINUED | OUTPATIENT
Start: 2018-08-17 | End: 2018-08-17 | Stop reason: HOSPADM

## 2018-08-17 RX ORDER — MAGNESIUM HYDROXIDE 1200 MG/15ML
LIQUID ORAL AS NEEDED
Status: DISCONTINUED | OUTPATIENT
Start: 2018-08-17 | End: 2018-08-17 | Stop reason: HOSPADM

## 2018-08-17 RX ORDER — LIDOCAINE HYDROCHLORIDE 10 MG/ML
0.5 INJECTION, SOLUTION EPIDURAL; INFILTRATION; INTRACAUDAL; PERINEURAL ONCE AS NEEDED
Status: DISCONTINUED | OUTPATIENT
Start: 2018-08-17 | End: 2018-08-17 | Stop reason: HOSPADM

## 2018-08-17 NOTE — ANESTHESIA PREPROCEDURE EVALUATION
Anesthesia Evaluation     Patient summary reviewed and Nursing notes reviewed   no history of anesthetic complications:  NPO Solid Status: > 8 hours  NPO Liquid Status: > 8 hours           Airway   Mallampati: II  TM distance: >3 FB  Neck ROM: full  No difficulty expected  Dental      Pulmonary     breath sounds clear to auscultation  (+) a smoker (QUIT 15YRS AGO) Former, COPD, shortness of breath, sleep apnea (DOES NOT USE CPAP, 2L NC HOME O2 ALL THE TIME),   Cardiovascular   Exercise tolerance: poor (<4 METS)    ECG reviewed  Rhythm: regular  Rate: normal    (+) hypertension well controlled 2 medications or greater, past MI (SHOWS UP ON EKG PER PT) , WILKS, hyperlipidemia,   PVD: LE SWELLING     ROS comment: ECG 12 Lead   Order: [id number]   Status:  Final result   Visible to patient:  No (Not Released) Next appt:  08/21/2018 at 10:00 AM in Lab (LAB CHAIR [facility])   Narrative       RR Interval= 632 ms  MD Interval= 160 ms  QRSD Interval= 100 ms  QT Interval= 372 ms  QTc Interval= 468 ms  Heart Rate= 95 ms  P Axis= 24 deg  QRS Axis= -86 deg  T Wave Axis= 4 deg  I: 40 Axis= -22 deg  T: 40 Axis= 234 deg  ST Axis= 33 deg  SINUS RHYTHM  LEFT ANTERIOR FASCICULAR BLOCK  BORDERLINE R WAVE PROGRESSION, ANTERIOR LEADS  POOR QUALITY TRACING. REPEAT  Electronically Signed by:  Walter Chu ([facility]) 21-Aug-2017 15:40:24  Date and Time of Study: 2017-08-21 07:32:42              Neuro/Psych  (+) seizures (KEPPRA THIS AM) well controlled, CVA (MAY 2013, EFFECTED SPEECH AND LEFT SIDE ALL RESOLVED ), numbness ([place] DM NEUROPATHY ), psychiatric history Depression,     GI/Hepatic/Renal/Endo    (+) morbid obesity,  renal disease (WITH CHEMO TX ) ARF, diabetes mellitus type 2 well controlled,     Musculoskeletal     (+) back pain,   Abdominal   (+) obese,    Substance History   (+) alcohol use (QUIT ETOH ABOUT 20YRS AGO),      OB/GYN          Other   (+) arthritis   history of cancer (MANTEL CELL LYMPHOMA, BLADDER CA)  remission    ROS/Med Hx Other: RETINAL EDEMA                  Anesthesia Plan    ASA 3     MAC     intravenous induction   Anesthetic plan and risks discussed with patient.  Use of blood products discussed with patient  Consented to blood products.

## 2018-08-17 NOTE — H&P
Patient Care Team:  Nitesh Arias MD as PCP - General (Family Medicine)  Beto Jama MD as Referring Physician (Hematology and Oncology)  Irving Restrepo MD as Consulting Physician (Hematology and Oncology)    CHIEF COMPLAINT: Previous polyps    HISTORY OF PRESENT ILLNESS:    Last exam was 2013      Past Medical History:   Diagnosis Date   • Anemia    • Arthritis     BACK   • Bladder cancer (CMS/HCC) 2008   • Cerebrovascular accident (CMS/HCC)     Ischemic and TIAs; most recent 02/2014 which was a TIA.   • COPD (chronic obstructive pulmonary disease) (CMS/HCC)    • Diabetes mellitus (CMS/HCC)    • Heart attack    • History of blood transfusion    • Hyperlipidemia    • Hypertension    • Kidney failure     stage 3 kidney disease   • Left shoulder pain    • Lymphoma (CMS/HCC)     Mantle cell   • Lymphoma (CMS/HCC)     MANTLE CELL, HAD CHEMO  2010   • Pilonidal cyst     SCHEDULED FOR SX   • Psoriasis    • Seizure (CMS/HCC)     Following head trauma in 1970   • Skin lesions     SCARRING FROM SHINGLES, & BLISTERS FROM EDEMA   • Sleep apnea     SUPPOSED TO WEAR CPAP   • Stroke (CMS/HCC)    • Tobacco abuse      Past Surgical History:   Procedure Laterality Date   • BRONCHOSCOPY N/A 8/21/2017    Procedure: BRONCHOSCOPY with fluro and biopsy and lavage.;  Surgeon: Red Topete MD;  Location:  [facility] OR;  Service:    • PILONIDAL CYST / SINUS EXCISION     • PILONIDAL CYSTECTOMY N/A 11/30/2016    Procedure: PILONIDAL CYSTECTOMY;  Surgeon: Roe Davila MD;  Location:  [facility] OR;  Service:    • SINUS SURGERY     • SKIN LESION EXCISION      DR. DAVILA ABOUT 10 YEARS AGO     Family History   Problem Relation Age of Onset   • Heart defect Mother    • Pancreatic cancer Father 56   • Anemia Sister    • Hypertension Brother    • Heart disease Brother    • Mental illness Brother    • Other Brother         Splenectomy   • Cancer Cousin      Social History   Substance Use Topics   • Smoking status: Former Smoker      Packs/day: 3.00     Years: 40.00     Types: Cigarettes     Quit date: 2000   • Smokeless tobacco: Never Used      Comment: 80 pack year   • Alcohol use No      Comment: Heavy in past, none since around 2000     Prescriptions Prior to Admission   Medication Sig Dispense Refill Last Dose   • ADVAIR DISKUS 250-50 MCG/DOSE DISKUS Inhale 1 puff 2 (two) times a day. inhale 1 dose by mouth twice a day  0 8/17/2018 at 0800   • allopurinol (ZYLOPRIM) 300 MG tablet Take 300 mg by mouth Every Morning. take 1 tablet by mouth daily  0 8/17/2018 at 0800   • AMITIZA 24 MCG capsule 24 mcg 2 (Two) Times a Day As Needed.  0 8/16/2018 at 0800   • amLODIPine (NORVASC) 10 MG tablet Take 10 mg by mouth Every Morning.  0 8/17/2018 at 0800   • aspirin 325 MG tablet Take 325 mg by mouth Every Morning.   8/11/2018   • cetirizine (ZyrTEC) 10 MG tablet Take 10 mg by mouth Every Morning.   8/17/2018 at 0800   • clobetasol (TEMOVATE) 0.05 % cream apply to affected area (RASH FREELY) twice a day  0 8/16/2018 at 2100   • clopidogrel (PLAVIX) 75 MG tablet Take 75 mg by mouth Every Morning.  0 8/13/2018 at 0900   • D3-50 36742 UNITS capsule Take 50,000 Units by mouth Every 7 (Seven) Days. On mondays  0 8/13/2018 at 0900   • furosemide (LASIX) 40 MG tablet Take 80 mg by mouth 2 (two) times a day. take 2 tablets by mouth twice a day  0 8/16/2018 at 0900   • glipiZIDE (GLUCOTROL) 5 MG tablet Take 1 tablet by mouth 2 (Two) Times a Day Before Meals. 60 tablet 2 8/17/2018   • INCRUSE ELLIPTA 62.5 MCG/INH aerosol powder  Inhale 1 puff Daily.  0 3/14/2018 at 0700   • insulin detemir (LEVEMIR) 100 UNIT/ML injection Inject 60 Units under the skin Daily. 3 pen 3    • levETIRAcetam (KEPPRA) 1000 MG tablet Take 500 mg by mouth 2 (Two) Times a Day.  0 8/17/2018 at 0800   • linagliptin (TRADJENTA) 5 MG tablet tablet Take 1 tablet by mouth Daily. 30 tablet 3 8/16/2018 at 0900   • metolazone (ZAROXOLYN) 2.5 MG tablet Take 2.5 mg by mouth. Take on Monday,  "Wednesday & friday  0 8/15/2018 at 0800   • metoprolol tartrate (LOPRESSOR) 50 MG tablet Take 50 mg by mouth 2 (two) times a day.  0 8/17/2018 at 0800   • O2 (OXYGEN) Inhale 2 L/min Daily.   8/17/2018   • pantoprazole (PROTONIX) 40 MG EC tablet Take 40 mg by mouth Every Morning. take 1 tablet by mouth once daily  0 8/17/2018 at 0800   • potassium chloride (K-DUR,KLOR-CON) 20 MEQ CR tablet Take 40 mEq by mouth 3 (Three) Times a Day. 3 TABS BID AND 2 TABS ONCE A DAY  0 8/17/2018 at 0800   • ULORIC 80 MG tablet tablet 80 mg Daily.  0 8/17/2018 at 0900   • VENTOLIN  (90 BASE) MCG/ACT inhaler inhale 2 puffs by mouth four times a day  0 8/17/2018     Allergies:  Amoxicillin; Ceftin [cefuroxime axetil]; Codeine; Flonase [fluticasone]; Pharbetol [acetaminophen]; Spiriva handihaler [tiotropium bromide monohydrate]; Sulfa antibiotics; and Phenobarbital    REVIEW OF SYSTEMS:  Please see the above history of present illness for pertinent positives and negatives.  The remainder of the patient's systems have been reviewed and are negative.     Vital Signs  Temp:  [97.5 °F (36.4 °C)] 97.5 °F (36.4 °C)  Heart Rate:  [61] 61  Resp:  [18] 18  BP: (131)/(81) 131/81    Flowsheet Rows      First Filed Value   Admission Height  172.7 cm (68\") Documented at 08/16/2018 1449   Admission Weight  127 kg (280 lb) Documented at 08/16/2018 1449           Physical Exam:  Physical Exam   Constitutional: Patient appears well-developed and well-nourished and in no acute distress   HEENT:   Head: Normocephalic and atraumatic.   Eyes:  Pupils are equal, round, and reactive to light. EOM are intact. Sclera are anicteric and non-injected.  Mouth and Throat: Patient has moist mucous membranes. Oropharynx is clear of any erythema or exudate.     Neck: Neck supple. No JVD present. No thyromegaly present. No lymphadenopathy present.  Cardiovascular: Regular rate, regular rhythm, S1 normal and S2 normal.  Exam reveals no gallop and no friction rub.  " No murmur heard.  Pulmonary/Chest: Lungs are clear to auscultation bilaterally. No respiratory distress. No wheezes. No rhonchi. No rales.   Abdominal: Soft. Bowel sounds are normal. No distension and no mass. There is no hepatosplenomegaly. There is no tenderness.   Musculoskeletal: Normal Muscle tone  Extremities: No edema. Pulses are palpable in all 4 extremities.  Neurological: Patient is alert and oriented to person, place, and time. Cranial nerves II-XII are grossly intact with no focal deficits.  Skin: Skin is warm. No rash noted. Nails show no clubbing.  No cyanosis or erythema.     Results Review:    I reviewed the patient's new clinical results.  Lab Results (most recent)     Procedure Component Value Units Date/Time    Potassium [[id number]]  (Normal) Collected:  08/17/18 0906    Specimen:  Blood from Arm, Right Updated:  08/17/18 0926     Potassium 3.7 mmol/L     POC Glucose Once [[id number]]  (Normal) Collected:  08/17/18 0859    Specimen:  Blood Updated:  08/17/18 0915     Glucose 115 mg/dL     Narrative:       Meter: [id number] : [id number] Osmar gO G.RN          Imaging Results (most recent)     None        reviewed    ECG/EMG Results (most recent)     Procedure Component Value Units Date/Time    ECG 12 Lead [[id number]] Collected:  08/17/18 1029     Updated:  08/17/18 1031    Narrative:       RR Interval= 1017 ms  [place] Interval= 180 ms  QRSD Interval= 98 ms  QT Interval= 428 ms  QTc Interval= 424 ms  Heart Rate= 59 ms  P Axis= -14 deg  QRS Axis= -89 deg  T Wave Axis= -21 deg  I: 40 Axis= -55 deg  T: 40 Axis= 257 deg  ST Axis= 4 deg  SINUS RHYTHM  LEFT ANTERIOR FASCICULAR BLOCK  ABNRM R PROG, CONSIDER ASMI OR LEAD PLACEMENT  ABNORMAL T, CONSIDER ISCHEMIA, INFERIOR LEADS  Electronically Signed by:  Date and Time of Study: 2018-08-17 10:29:43        reviewed    Assessment/Plan     Personal history of colon polyps/ Colonoscopy    I discussed the patients findings and my recommendations with patient.      Lee Priest MD  08/17/18  10:56 AM    Time: 10 min prior to procedure.

## 2018-08-20 ENCOUNTER — TELEPHONE (OUTPATIENT)
Dept: GASTROENTEROLOGY | Facility: CLINIC | Age: 68
End: 2018-08-20

## 2018-08-20 NOTE — TELEPHONE ENCOUNTER
HAD TO CANCEL HIS PROCEDURE ON Friday, 08/17/2018 DUE TO EKG CHANGES.  CALLED AND LEFT MESSAGE ON HOME VOICE MAIL.

## 2018-08-20 NOTE — TELEPHONE ENCOUNTER
GOING TO CALL CARDIO TODAY FOR APPT.  SHE WILL CALL US BACK WITH THAT INFORMATIONS.  WILL RESCHEDULED AFTER APPOINTMENT.

## 2018-08-21 ENCOUNTER — OFFICE VISIT (OUTPATIENT)
Dept: ONCOLOGY | Facility: CLINIC | Age: 68
End: 2018-08-21

## 2018-08-21 ENCOUNTER — LAB (OUTPATIENT)
Dept: LAB | Facility: HOSPITAL | Age: 68
End: 2018-08-21

## 2018-08-21 VITALS
TEMPERATURE: 97.8 F | BODY MASS INDEX: 42.69 KG/M2 | RESPIRATION RATE: 18 BRPM | DIASTOLIC BLOOD PRESSURE: 77 MMHG | OXYGEN SATURATION: 92 % | HEART RATE: 64 BPM | HEIGHT: 68 IN | SYSTOLIC BLOOD PRESSURE: 114 MMHG | WEIGHT: 281.7 LBS

## 2018-08-21 DIAGNOSIS — C83.10 MANTLE CELL LYMPHOMA, UNSPECIFIED BODY REGION (HCC): ICD-10-CM

## 2018-08-21 DIAGNOSIS — C83.10 MANTLE CELL LYMPHOMA, UNSPECIFIED BODY REGION (HCC): Primary | ICD-10-CM

## 2018-08-21 LAB
ALBUMIN SERPL-MCNC: 4.6 G/DL (ref 3.5–5.2)
ALBUMIN/GLOB SERPL: 1.9 G/DL
ALP SERPL-CCNC: 121 U/L (ref 40–129)
ALT SERPL W P-5'-P-CCNC: 25 U/L (ref 5–41)
ANION GAP SERPL CALCULATED.3IONS-SCNC: 11 MMOL/L
AST SERPL-CCNC: 20 U/L (ref 5–40)
BASOPHILS # BLD AUTO: 0.09 10*3/MM3 (ref 0–0.2)
BASOPHILS NFR BLD AUTO: 0.9 % (ref 0–2)
BILIRUB SERPL-MCNC: 0.8 MG/DL (ref 0.2–1.2)
BUN BLD-MCNC: 14 MG/DL (ref 8–23)
BUN/CREAT SERPL: 10.5 (ref 7–25)
CALCIUM SPEC-SCNC: 10 MG/DL (ref 8.8–10.5)
CHLORIDE SERPL-SCNC: 101 MMOL/L (ref 98–107)
CO2 SERPL-SCNC: 32 MMOL/L (ref 22–29)
CREAT BLD-MCNC: 1.33 MG/DL (ref 0.76–1.27)
DEPRECATED RDW RBC AUTO: 47.1 FL (ref 37–54)
EOSINOPHIL # BLD AUTO: 0.48 10*3/MM3 (ref 0.1–0.3)
EOSINOPHIL NFR BLD AUTO: 4.7 % (ref 0–4)
ERYTHROCYTE [DISTWIDTH] IN BLOOD BY AUTOMATED COUNT: 15.9 % (ref 11.5–14.5)
GFR SERPL CREATININE-BSD FRML MDRD: 53 ML/MIN/1.73
GLOBULIN UR ELPH-MCNC: 2.4 GM/DL
GLUCOSE BLD-MCNC: 114 MG/DL (ref 65–99)
HCT VFR BLD AUTO: 54 % (ref 42–52)
HGB BLD-MCNC: 17.9 G/DL (ref 14–18)
IMM GRANULOCYTES # BLD: 0.1 10*3/MM3 (ref 0–0.03)
IMM GRANULOCYTES NFR BLD: 1 % (ref 0–0.5)
LDH SERPL-CCNC: 198 U/L (ref 135–225)
LYMPHOCYTES # BLD AUTO: 1.68 10*3/MM3 (ref 0.6–4.8)
LYMPHOCYTES NFR BLD AUTO: 16.4 % (ref 20–45)
MCH RBC QN AUTO: 28.7 PG (ref 27–31)
MCHC RBC AUTO-ENTMCNC: 33.1 G/DL (ref 31–37)
MCV RBC AUTO: 86.7 FL (ref 80–94)
MONOCYTES # BLD AUTO: 0.81 10*3/MM3 (ref 0–1)
MONOCYTES NFR BLD AUTO: 7.9 % (ref 3–8)
NEUTROPHILS # BLD AUTO: 7.07 10*3/MM3 (ref 1.5–8.3)
NEUTROPHILS NFR BLD AUTO: 69.1 % (ref 45–70)
NRBC BLD MANUAL-RTO: 0 /100 WBC (ref 0–0)
PLATELET # BLD AUTO: 179 10*3/MM3 (ref 140–500)
PMV BLD AUTO: 11 FL (ref 7.4–10.4)
POTASSIUM BLD-SCNC: 4.9 MMOL/L (ref 3.5–5.2)
PROT SERPL-MCNC: 7 G/DL (ref 6–8.5)
RBC # BLD AUTO: 6.23 10*6/MM3 (ref 4.7–6.1)
SODIUM BLD-SCNC: 144 MMOL/L (ref 136–145)
WBC NRBC COR # BLD: 10.23 10*3/MM3 (ref 4.8–10.8)

## 2018-08-21 PROCEDURE — 80053 COMPREHEN METABOLIC PANEL: CPT | Performed by: INTERNAL MEDICINE

## 2018-08-21 PROCEDURE — 85025 COMPLETE CBC W/AUTO DIFF WBC: CPT | Performed by: INTERNAL MEDICINE

## 2018-08-21 PROCEDURE — 36415 COLL VENOUS BLD VENIPUNCTURE: CPT | Performed by: INTERNAL MEDICINE

## 2018-08-21 PROCEDURE — 83615 LACTATE (LD) (LDH) ENZYME: CPT | Performed by: INTERNAL MEDICINE

## 2018-08-21 PROCEDURE — 99213 OFFICE O/P EST LOW 20 MIN: CPT | Performed by: INTERNAL MEDICINE

## 2018-08-21 RX ORDER — FLUOXETINE HYDROCHLORIDE 20 MG/1
20 CAPSULE ORAL DAILY
COMMUNITY

## 2018-08-21 NOTE — PROGRESS NOTES
Subjective   REASONS FOR FOLLOW-UP:   History of mantle cell lymphoma.       History of Present Illness    Mr. Ko is a 65-year-old man returning for followup of his history of mantle cell lymphoma previously treated with bendamustine/rituximab followed by maintenance Rituxan through the winter of 2013.   He has had no therapy or disease progression since that time.     The patient underwent a bronchoscopy in 2017 for evaluation of reticulonodular opacities in the lung, I reviewed the pathology which was negative for any malignancy.    The patient returned today for a 6 month review.  He has lost approximately 20 pounds over the past 6 months.  He has not necessarily been trying to lose weight.  The patient was diagnosed with uncontrolled diabetes in 2018 and has been started on medication so this may be the cause of his weight loss.  He denies lymphadenopathy, severe fatigue or night sweats.    PAST MEDICAL HISTORY:   1. Seizure disorder after a head trauma in .   2. Bladder cancer diagnosed in  for which he gets annual cystoscopy, currently with no evidence of disease.   3. Chronic obstructive pulmonary disease.   4. Psoriasis.   5. Hypertension.   6. Chronic renal insufficiency complicated by acute renal failure secondary to tumor lysis in 2011.   7. Ischemic cerebrovascular accidents and TIAs with the most recent event in 2014, which was a TIA.   8. History of tobacco abuse.       SOCIAL HISTORY: He is  and retired mostly from factory work. He quit smoking tobacco around  after 80 pack-year. He drank heavily in the past but none since around . Denies illicit drug use. He has had prior blood transfusions.     FAMILY HISTORY: His father had pancreatic cancer and  at age 56. Sister has anemia. Mother had heart disease. Brother has hypertension.       Review of Systems   Constitutional: Positive for unexpected weight change. Negative for activity change  "and fatigue.   Respiratory: Negative for shortness of breath.    Cardiovascular: Positive for leg swelling. Negative for chest pain and palpitations.   Gastrointestinal: Negative for abdominal distention, abdominal pain and blood in stool.   Musculoskeletal: Negative.    Skin: Negative for pallor and rash.   Neurological: Negative for weakness.   Hematological: Negative for adenopathy.          Medications:  The current medication list was reviewed in the EMR    ALLERGIES:    Allergies   Allergen Reactions   • Amoxicillin Hives   • Ceftin [Cefuroxime Axetil] Hives   • Codeine    • Flonase [Fluticasone] Hives   • Pharbetol [Acetaminophen] Hives   • Spiriva Handihaler [Tiotropium Bromide Monohydrate] Hives   • Sulfa Antibiotics Hives   • Phenobarbital Rash       Objective      Vitals:    08/21/18 1109   BP: 114/77   Pulse: 64   Resp: 18   Temp: 97.8 °F (36.6 °C)   TempSrc: Oral   SpO2: 92%   Weight: 128 kg (281 lb 11.2 oz)   Height: 172.7 cm (67.99\")   PainSc: 0-No pain     Current Status 8/21/2018   ECOG score 0       Physical Exam   Constitutional: He appears well-nourished.   Morbidly obese   Cardiovascular: Normal rate and regular rhythm.    Pulmonary/Chest: Effort normal. No respiratory distress.   Abdominal: Soft. He exhibits no mass.   Musculoskeletal: He exhibits edema.   Lymphadenopathy:     He has no cervical adenopathy.     He has no axillary adenopathy.        Right: No supraclavicular adenopathy present.        Left: No supraclavicular adenopathy present.   Skin: No erythema. No pallor.        Exam unchanged-8/21/18    RECENT LABS:  Hematology WBC   Date Value Ref Range Status   08/21/2018 10.23 4.80 - 10.80 10*3/mm3 Final     RBC   Date Value Ref Range Status   08/21/2018 6.23 (H) 4.70 - 6.10 10*6/mm3 Final     Hemoglobin   Date Value Ref Range Status   08/21/2018 17.9 14.0 - 18.0 g/dL Final     Hematocrit   Date Value Ref Range Status   08/21/2018 54.0 (H) 42.0 - 52.0 % Final     Platelets   Date Value " Ref Range Status   08/21/2018 179 140 - 500 10*3/mm3 Final     Lab Results   Component Value Date    GLUCOSE 114 (H) 08/21/2018    BUN 14 08/21/2018    CREATININE 1.33 (H) 08/21/2018    EGFRIFNONA 53 (L) 08/21/2018    EGFRIFAFRI  08/30/2016      Comment:      <15 Indicative of kidney failure.    BCR 10.5 08/21/2018    CO2 32.0 (H) 08/21/2018    CALCIUM 10.0 08/21/2018    ALBUMIN 4.60 08/21/2018    AST 20 08/21/2018    ALT 25 08/21/2018              Assessment/Plan     Mr. Ko is a 65-year-old man returning for followup of his history mantle cell lymphoma with bone marrow involvement at diagnosis. He was treated with bendamustine/rituximab followed by maintenance rituximab completed in the winter of 2013.     His most recent scans were performed in July 2017 with no lymphadenopathy or splenomegaly noted.    The patient has experienced weight loss over the past 6 months 20 pounds but probably related to treatment of his diabetes.  There is no lymphadenopathy noted on exam.  It is impossible to assess for splenomegaly or intra-abdominal lymphadenopathy secondary to his habitus.  He has no other significant B symptoms.  CBC is unremarkable.    At this time I recommended continued observation.  I will see him back in 6 months with exam and labs.  If he continues to lose weight, repeat CT scans may be in order.            8/21/2018      CC:

## 2018-08-30 ENCOUNTER — OFFICE VISIT (OUTPATIENT)
Dept: CARDIOLOGY | Facility: CLINIC | Age: 68
End: 2018-08-30

## 2018-08-30 VITALS
DIASTOLIC BLOOD PRESSURE: 70 MMHG | BODY MASS INDEX: 41.13 KG/M2 | WEIGHT: 271.4 LBS | SYSTOLIC BLOOD PRESSURE: 110 MMHG | HEIGHT: 68 IN | HEART RATE: 73 BPM

## 2018-08-30 DIAGNOSIS — N18.9 CHRONIC KIDNEY DISEASE, UNSPECIFIED CKD STAGE: ICD-10-CM

## 2018-08-30 DIAGNOSIS — R06.09 DYSPNEA ON EXERTION: ICD-10-CM

## 2018-08-30 DIAGNOSIS — J44.9 CHRONIC OBSTRUCTIVE PULMONARY DISEASE, UNSPECIFIED COPD TYPE (HCC): ICD-10-CM

## 2018-08-30 DIAGNOSIS — Z01.810 PREOPERATIVE CARDIOVASCULAR EXAMINATION: Primary | ICD-10-CM

## 2018-08-30 DIAGNOSIS — IMO0001 UNCONTROLLED TYPE 2 DIABETES MELLITUS WITHOUT COMPLICATION, WITHOUT LONG-TERM CURRENT USE OF INSULIN: ICD-10-CM

## 2018-08-30 PROCEDURE — 99204 OFFICE O/P NEW MOD 45 MIN: CPT | Performed by: INTERNAL MEDICINE

## 2018-08-30 PROCEDURE — 93000 ELECTROCARDIOGRAM COMPLETE: CPT | Performed by: INTERNAL MEDICINE

## 2018-08-30 RX ORDER — LORATADINE 10 MG/1
10 TABLET ORAL DAILY
COMMUNITY
End: 2021-04-09 | Stop reason: HOSPADM

## 2018-08-30 RX ORDER — ATORVASTATIN CALCIUM 20 MG/1
20 TABLET, FILM COATED ORAL DAILY
COMMUNITY

## 2018-08-30 NOTE — PROGRESS NOTES
Date of Office Visit: 2018  Encounter Provider: Becky Melo MD  Place of Service: [facility] CARDIOLOGY  Patient Name: Esdras Ko  :1950      Patient ID:  Esdras Ko is a 68 y.o. male is here for preoperative evaluation.           History of Present Illness    He has a past medical history of diabetes, COPD, stage III chronic kidney disease, hypertension, hyperlipidemia, anemia, gout, obstructive sleep apnea, history of TIA, history of seizures, history of myocardial infarction.  He is here today as a preoperative evaluation.    He is  and has 2 children.  He is retired.  He quit smoking .  He has one cup of coffee per day and uses no caffeine or alcohol.    There is heart disease in his mother, brother, father and strokes in his mother.  His brother has hypertension.    He has significant exertional dyspnea and is supposed to her oxygen all the time that he doesn't.  His TIA happened a year ago.  She does not use CPAP for his sleep apnea but does use oxygen.  He does have history of seizure disorder and is no longer seeing a neurologist.  He has had Mantle cell lymphoma in the past requiring chemotherapy.  He is also had bladder cancer.  He is morbidly obese.    He was going for a colonoscopy at [facility] was found to have an abnormal ECG.  He was sent here for further evaluation for that.  He has had an echocardiogram the past which was poor quality done 2016.  This showed normal ejection fraction but the cardiac valves were not well visualized.    He denies tachycardia, dizziness, syncope or falls.  He's had no vomiting blood or blood in stool.  He does have history of colonic polyps.  He does not exercise regularly.  He has no chest tightness.    Past Medical History:   Diagnosis Date   • Anemia    • Arthritis     BACK   • Bladder cancer (CMS/HCC)    • Cerebrovascular accident (CMS/HCC)     Ischemic and TIAs; most recent  02/2014 which was a TIA.   • Chronic kidney disease, stage 3    • COPD (chronic obstructive pulmonary disease) (CMS/HCC)    • Diabetes mellitus (CMS/HCC)    • Heart attack    • History of blood transfusion    • Hyperlipidemia    • Hypertension    • Hyperuricemia    • Kidney failure     stage 3 kidney disease   • Left shoulder pain    • Lymphoma (CMS/HCC)     MANTLE CELL, HAD CHEMO  2010   • Osteoarthritis    • Pilonidal cyst     SCHEDULED FOR SX   • Psoriasis    • Seizure (CMS/HCC)     Following head trauma in 1970   • Skin lesions     SCARRING FROM SHINGLES, & BLISTERS FROM EDEMA   • Sleep apnea     SUPPOSED TO WEAR CPAP   • Stroke (CMS/HCC)    • Tobacco abuse          Past Surgical History:   Procedure Laterality Date   • BRONCHOSCOPY N/A 8/21/2017    Procedure: BRONCHOSCOPY with fluro and biopsy and lavage.;  Surgeon: Red oTpete MD;  Location: [facility];  Service:    • PILONIDAL CYST / SINUS EXCISION     • PILONIDAL CYSTECTOMY N/A 11/30/2016    Procedure: PILONIDAL CYSTECTOMY;  Surgeon: Roe Davila MD;  Location: [facility];  Service:    • SINUS SURGERY     • SKIN LESION EXCISION      DR. DAVILA ABOUT 10 YEARS AGO       Current Outpatient Prescriptions on File Prior to Visit   Medication Sig Dispense Refill   • ADVAIR DISKUS 250-50 MCG/DOSE DISKUS Inhale 1 puff 2 (two) times a day. inhale 1 dose by mouth twice a day  0   • allopurinol (ZYLOPRIM) 300 MG tablet Take 300 mg by mouth Every Morning. take 1 tablet by mouth daily  0   • amLODIPine (NORVASC) 10 MG tablet Take 10 mg by mouth Every Morning.  0   • aspirin 325 MG tablet Take 325 mg by mouth Every Morning.     • cetirizine (ZyrTEC) 10 MG tablet Take 10 mg by mouth Every Morning.     • clopidogrel (PLAVIX) 75 MG tablet Take 75 mg by mouth Every Morning.  0   • D3-50 97923 UNITS capsule Take 50,000 Units by mouth Every 7 (Seven) Days. On mondays  0   • FLUoxetine (PROZAC) 20 MG capsule Take  by mouth.     • furosemide (LASIX) 40 MG tablet Take 80 mg by  mouth 2 (two) times a day. take 2 tablets by mouth twice a day  0   • levETIRAcetam (KEPPRA) 1000 MG tablet Take 500 mg by mouth 2 (Two) Times a Day.  0   • linagliptin (TRADJENTA) 5 MG tablet tablet Take 1 tablet by mouth Daily. 30 tablet 3   • metolazone (ZAROXOLYN) 2.5 MG tablet Take 2.5 mg by mouth. Take on Monday, Wednesday & friday  0   • metoprolol tartrate (LOPRESSOR) 50 MG tablet Take 50 mg by mouth 2 (two) times a day.  0   • O2 (OXYGEN) Inhale 2 L/min Daily.     • pantoprazole (PROTONIX) 40 MG EC tablet Take 40 mg by mouth Every Morning. take 1 tablet by mouth once daily  0   • potassium chloride (K-DUR,KLOR-CON) 20 MEQ CR tablet Take 40 mEq by mouth 3 (Three) Times a Day. 3 TABS BID AND 2 TABS ONCE A DAY  0   • ULORIC 80 MG tablet tablet 80 mg Daily.  0   • VENTOLIN  (90 BASE) MCG/ACT inhaler inhale 2 puffs by mouth four times a day  0   • AMITIZA 24 MCG capsule 24 mcg 2 (Two) Times a Day As Needed.  0   • clobetasol (TEMOVATE) 0.05 % cream apply to affected area (RASH FREELY) twice a day  0   • glipiZIDE (GLUCOTROL) 5 MG tablet Take 1 tablet by mouth 2 (Two) Times a Day Before Meals. 60 tablet 2   • INCRUSE ELLIPTA 62.5 MCG/INH aerosol powder  Inhale 1 puff Daily.  0   • insulin detemir (LEVEMIR) 100 UNIT/ML injection Inject 60 Units under the skin Daily. 3 pen 3     No current facility-administered medications on file prior to visit.        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   HENT: Negative for congestion.    Eyes: Negative for vision loss in left eye and vision loss in right eye.   Cardiovascular: Positive for dyspnea on exertion and leg swelling.   Respiratory: Positive for wheezing. Negative for cough, hemoptysis, shortness of breath, sleep disturbances due to breathing, snoring and sputum production.    Endocrine: Negative.    Hematologic/Lymphatic: Negative.    Skin: Positive for rash. Negative for poor wound healing.   Musculoskeletal: Negative for falls, gout, muscle cramps and myalgias.   Gastrointestinal: Negative for abdominal pain, diarrhea, dysphagia, hematemesis, melena, nausea and vomiting.   Neurological: Negative for excessive daytime sleepiness, dizziness, headaches, light-headedness, loss of balance, seizures and vertigo.   Psychiatric/Behavioral: Positive for depression. Negative for substance abuse. The patient is nervous/anxious.        Procedures    ECG 12 Lead  Date/Time: 8/30/2018 8:37 AM  Performed by: ADRIAN BEJARANO  Authorized by: ADRIAN BEJARANO   Comparison: compared with previous ECG   Similar to previous ECG  Rhythm: sinus rhythm  Conduction: incomplete RBBB and LAFB  T depression: V3  Other findings: PRWP  Clinical impression: abnormal ECG                Objective:      Vitals:    08/30/18 0818   BP: 110/70   BP Location: Left arm   Pulse: 73   Weight: 123 kg (271 lb 6.4 oz)   Height: 172.7 cm (68\")     Body mass index is 41.27 kg/m².    Physical Exam   Constitutional: He is oriented to person, place, and time. He appears well-developed and well-nourished. No distress.   HENT:   Head: Normocephalic and atraumatic.   Eyes: Conjunctivae are normal. No scleral icterus.   Neck: Neck supple. No JVD present. Carotid bruit is not present. No thyromegaly present.   Cardiovascular: Normal rate, regular rhythm, S1 normal, " S2 normal, normal heart sounds and intact distal pulses.   No extrasystoles are present. PMI is not displaced.  Exam reveals no gallop.    No murmur heard.  Pulses:       Carotid pulses are 2+ on the right side, and 2+ on the left side.       Radial pulses are 2+ on the right side, and 2+ on the left side.        Dorsalis pedis pulses are 2+ on the right side, and 2+ on the left side.        Posterior tibial pulses are 2+ on the right side, and 2+ on the left side.   Pulmonary/Chest: Effort normal and breath sounds normal. No respiratory distress. He has no wheezes. He has no rhonchi. He has no rales. He exhibits no tenderness.   Abdominal: Soft. Bowel sounds are normal. He exhibits no distension, no abdominal bruit and no mass. There is no tenderness.   Musculoskeletal: He exhibits no edema or deformity.   Lymphadenopathy:     He has no cervical adenopathy.   Neurological: He is alert and oriented to person, place, and time. No cranial nerve deficit.   Skin: Skin is warm and dry. No rash noted. He is not diaphoretic. No cyanosis. No pallor. Nails show no clubbing.   Psychiatric: He has a normal mood and affect. Judgment normal.   Vitals reviewed.      Lab Review:       Assessment:      Diagnosis Plan   1. Preoperative cardiovascular examination  Adult Transthoracic Echo Complete W/ Cont if Necessary Per Protocol    Stress Test With Myocardial Perfusion One Day   2. Chronic obstructive pulmonary disease, unspecified COPD type (CMS/HCC)     3. Uncontrolled type 2 diabetes mellitus without complication, without long-term current use of insulin (CMS/[facility])     4. Chronic kidney disease, unspecified CKD stage     5. Dyspnea on exertion  Adult Transthoracic Echo Complete W/ Cont if Necessary Per Protocol    Stress Test With Myocardial Perfusion One Day     1. Preoperative evaluation for colonoscopy.  Has abnormal ECG with dyspnea on exertion. Set up stress nuclear and echo.   2. COPD requiring oxygen which he only uses at  night.   3. CHIQUIS, uses nocturnal oxygen.   4. Hypertension, well controlled.   5. Hyperlipidemia.  6. H/o mantle cell lymphoma and bladder cancer.   7. DM  8. Morbid obesity.   9. H/o TIA  10. Seizure disorder.   11. CKD.      Plan:       See back in 1 year in [place], no med changes, testing set up for dyspnea on exertion.

## 2018-09-13 ENCOUNTER — HOSPITAL ENCOUNTER (OUTPATIENT)
Dept: CARDIOLOGY | Facility: HOSPITAL | Age: 68
Discharge: HOME | End: 2018-09-13
Attending: INTERNAL MEDICINE | Admitting: INTERNAL MEDICINE

## 2018-09-13 ENCOUNTER — TRANSCRIBE ORDERS (OUTPATIENT)
Dept: CARDIOLOGY | Facility: CLINIC | Age: 68
End: 2018-09-13

## 2018-09-13 ENCOUNTER — LAB (OUTPATIENT)
Dept: LAB | Facility: HOSPITAL | Age: 68
End: 2018-09-13
Attending: INTERNAL MEDICINE

## 2018-09-13 ENCOUNTER — HOSPITAL ENCOUNTER (OUTPATIENT)
Dept: CARDIOLOGY | Facility: HOSPITAL | Age: 68
Discharge: HOME | End: 2018-09-13
Attending: INTERNAL MEDICINE

## 2018-09-13 VITALS
OXYGEN SATURATION: 92 % | DIASTOLIC BLOOD PRESSURE: 86 MMHG | HEIGHT: 68 IN | HEART RATE: 73 BPM | WEIGHT: 271 LBS | SYSTOLIC BLOOD PRESSURE: 121 MMHG | BODY MASS INDEX: 41.07 KG/M2

## 2018-09-13 DIAGNOSIS — Z01.810 PREPROCEDURAL CARDIOVASCULAR EXAMINATION: ICD-10-CM

## 2018-09-13 DIAGNOSIS — R94.39 ABNORMAL NUCLEAR STRESS TEST: Primary | ICD-10-CM

## 2018-09-13 DIAGNOSIS — R94.39 ABNORMAL STRESS TEST: ICD-10-CM

## 2018-09-13 DIAGNOSIS — Z01.810 PREOPERATIVE CARDIOVASCULAR EXAMINATION: ICD-10-CM

## 2018-09-13 DIAGNOSIS — Z13.6 SCREENING FOR CARDIOVASCULAR CONDITION: Primary | ICD-10-CM

## 2018-09-13 DIAGNOSIS — Z13.6 SCREENING FOR CARDIOVASCULAR CONDITION: ICD-10-CM

## 2018-09-13 DIAGNOSIS — R06.09 DYSPNEA ON EXERTION: ICD-10-CM

## 2018-09-13 LAB
ANION GAP SERPL CALCULATED.3IONS-SCNC: 14.3 MMOL/L
ASCENDING AORTA: 3.1 CM
BASOPHILS # BLD AUTO: 0.06 10*3/MM3 (ref 0–0.2)
BASOPHILS NFR BLD AUTO: 0.5 % (ref 0–2)
BH CV ECHO MEAS - ACS: 1.9 CM
BH CV ECHO MEAS - AO MAX PG (FULL): 2.1 MMHG
BH CV ECHO MEAS - AO MAX PG: 5.2 MMHG
BH CV ECHO MEAS - AO MEAN PG (FULL): 1.2 MMHG
BH CV ECHO MEAS - AO MEAN PG: 3 MMHG
BH CV ECHO MEAS - AO ROOT AREA (BSA CORRECTED): 1.6
BH CV ECHO MEAS - AO ROOT AREA: 10.4 CM^2
BH CV ECHO MEAS - AO ROOT DIAM: 3.6 CM
BH CV ECHO MEAS - AO V2 MAX: 114 CM/SEC
BH CV ECHO MEAS - AO V2 MEAN: 78.8 CM/SEC
BH CV ECHO MEAS - AO V2 VTI: 21.1 CM
BH CV ECHO MEAS - BSA(HAYCOCK): 2.5 M^2
BH CV ECHO MEAS - BSA: 2.3 M^2
BH CV ECHO MEAS - BZI_BMI: 41.2 KILOGRAMS/M^2
BH CV ECHO MEAS - BZI_METRIC_HEIGHT: 172.7 CM
BH CV ECHO MEAS - BZI_METRIC_WEIGHT: 122.9 KG
BH CV ECHO MEAS - EDV(CUBED): 161.1 ML
BH CV ECHO MEAS - EDV(MOD-SP2): 49 ML
BH CV ECHO MEAS - EDV(MOD-SP4): 55 ML
BH CV ECHO MEAS - EDV(TEICH): 143.8 ML
BH CV ECHO MEAS - EF(CUBED): 75.6 %
BH CV ECHO MEAS - EF(MOD-BP): 57 %
BH CV ECHO MEAS - EF(MOD-SP2): 67.3 %
BH CV ECHO MEAS - EF(MOD-SP4): 56.4 %
BH CV ECHO MEAS - EF(TEICH): 67 %
BH CV ECHO MEAS - ESV(CUBED): 39.3 ML
BH CV ECHO MEAS - ESV(MOD-SP2): 16 ML
BH CV ECHO MEAS - ESV(MOD-SP4): 24 ML
BH CV ECHO MEAS - ESV(TEICH): 47.5 ML
BH CV ECHO MEAS - IVS/LVPW: 1
BH CV ECHO MEAS - IVSD: 1.2 CM
BH CV ECHO MEAS - LAT PEAK E' VEL: 6 CM/SEC
BH CV ECHO MEAS - LV DIASTOLIC VOL/BSA (35-75): 23.6 ML/M^2
BH CV ECHO MEAS - LV MASS(C)D: 264.7 GRAMS
BH CV ECHO MEAS - LV MASS(C)DI: 113.8 GRAMS/M^2
BH CV ECHO MEAS - LV MAX PG: 3.1 MMHG
BH CV ECHO MEAS - LV MEAN PG: 1.7 MMHG
BH CV ECHO MEAS - LV SYSTOLIC VOL/BSA (12-30): 10.3 ML/M^2
BH CV ECHO MEAS - LV V1 MAX: 88.3 CM/SEC
BH CV ECHO MEAS - LV V1 MEAN: 62.3 CM/SEC
BH CV ECHO MEAS - LV V1 VTI: 18 CM
BH CV ECHO MEAS - LVIDD: 5.4 CM
BH CV ECHO MEAS - LVIDS: 3.4 CM
BH CV ECHO MEAS - LVLD AP2: 6.5 CM
BH CV ECHO MEAS - LVLD AP4: 6.5 CM
BH CV ECHO MEAS - LVLS AP2: 5.5 CM
BH CV ECHO MEAS - LVLS AP4: 5.8 CM
BH CV ECHO MEAS - LVPWD: 1.2 CM
BH CV ECHO MEAS - MED PEAK E' VEL: 5 CM/SEC
BH CV ECHO MEAS - MV A DUR: 0.15 SEC
BH CV ECHO MEAS - MV A MAX VEL: 69.8 CM/SEC
BH CV ECHO MEAS - MV DEC SLOPE: 136.5 CM/SEC^2
BH CV ECHO MEAS - MV DEC TIME: 0.37 SEC
BH CV ECHO MEAS - MV E MAX VEL: 51.9 CM/SEC
BH CV ECHO MEAS - MV E/A: 0.74
BH CV ECHO MEAS - MV MAX PG: 3.7 MMHG
BH CV ECHO MEAS - MV MEAN PG: 1.4 MMHG
BH CV ECHO MEAS - MV P1/2T MAX VEL: 50.4 CM/SEC
BH CV ECHO MEAS - MV P1/2T: 108.3 MSEC
BH CV ECHO MEAS - MV V2 MAX: 96.4 CM/SEC
BH CV ECHO MEAS - MV V2 MEAN: 55.7 CM/SEC
BH CV ECHO MEAS - MV V2 VTI: 28.5 CM
BH CV ECHO MEAS - MVA P1/2T LCG: 4.4 CM^2
BH CV ECHO MEAS - MVA(P1/2T): 2 CM^2
BH CV ECHO MEAS - PA MAX PG (FULL): 1.9 MMHG
BH CV ECHO MEAS - PA MAX PG: 4.1 MMHG
BH CV ECHO MEAS - PA V2 MAX: 101.8 CM/SEC
BH CV ECHO MEAS - PULM A REVS DUR: 0.13 SEC
BH CV ECHO MEAS - PULM A REVS VEL: 43 CM/SEC
BH CV ECHO MEAS - PULM DIAS VEL: 34.6 CM/SEC
BH CV ECHO MEAS - PULM S/D: 1.2
BH CV ECHO MEAS - PULM SYS VEL: 40.8 CM/SEC
BH CV ECHO MEAS - PVA(V,A): 1.8 CM^2
BH CV ECHO MEAS - PVA(V,D): 1.8 CM^2
BH CV ECHO MEAS - RAP SYSTOLE: 3 MMHG
BH CV ECHO MEAS - RV MAX PG: 2.2 MMHG
BH CV ECHO MEAS - RV MEAN PG: 1 MMHG
BH CV ECHO MEAS - RV V1 MAX: 74.2 CM/SEC
BH CV ECHO MEAS - RV V1 MEAN: 46.4 CM/SEC
BH CV ECHO MEAS - RV V1 VTI: 15.7 CM
BH CV ECHO MEAS - RVOT AREA: 2.5 CM^2
BH CV ECHO MEAS - RVOT DIAM: 1.8 CM
BH CV ECHO MEAS - SI(AO): 94.6 ML/M^2
BH CV ECHO MEAS - SI(CUBED): 52.4 ML/M^2
BH CV ECHO MEAS - SI(MOD-SP2): 14.2 ML/M^2
BH CV ECHO MEAS - SI(MOD-SP4): 13.3 ML/M^2
BH CV ECHO MEAS - SI(TEICH): 41.4 ML/M^2
BH CV ECHO MEAS - SUP REN AO DIAM: 2.1 CM
BH CV ECHO MEAS - SV(AO): 219.9 ML
BH CV ECHO MEAS - SV(CUBED): 121.8 ML
BH CV ECHO MEAS - SV(MOD-SP2): 33 ML
BH CV ECHO MEAS - SV(MOD-SP4): 31 ML
BH CV ECHO MEAS - SV(RVOT): 39.7 ML
BH CV ECHO MEAS - SV(TEICH): 96.3 ML
BH CV ECHO MEAS - TAPSE (>1.6): 2.5 CM2
BH CV ECHO MEASUREMENTS AVERAGE E/E' RATIO: 9.44
BH CV NUCLEAR PRIOR STUDY: 2
BH CV STRESS BP STAGE 1: NORMAL
BH CV STRESS COMMENTS STAGE 1: NORMAL
BH CV STRESS DOSE REGADENOSON STAGE 1: 0.4
BH CV STRESS DURATION MIN STAGE 1: 0
BH CV STRESS DURATION SEC STAGE 1: 10
BH CV STRESS HR STAGE 1: 79
BH CV STRESS PROTOCOL 1: NORMAL
BH CV STRESS RECOVERY BP: NORMAL MMHG
BH CV STRESS RECOVERY HR: 72 BPM
BH CV STRESS STAGE 1: 1
BH CV XLRA - RV BASE: 2.6 CM
BH CV XLRA - TDI S': 10 CM/SEC
BUN BLD-MCNC: 24 MG/DL (ref 8–23)
BUN/CREAT SERPL: 18 (ref 7–25)
CALCIUM SPEC-SCNC: 9.6 MG/DL (ref 8.8–10.5)
CHLORIDE SERPL-SCNC: 97 MMOL/L (ref 98–107)
CO2 SERPL-SCNC: 28.7 MMOL/L (ref 22–29)
CREAT BLD-MCNC: 1.33 MG/DL (ref 0.76–1.27)
DEPRECATED RDW RBC AUTO: 45.6 FL (ref 37–54)
EOSINOPHIL # BLD AUTO: 0.34 10*3/MM3 (ref 0.1–0.3)
EOSINOPHIL NFR BLD AUTO: 3 % (ref 0–4)
ERYTHROCYTE [DISTWIDTH] IN BLOOD BY AUTOMATED COUNT: 14.9 % (ref 11.5–14.5)
GFR SERPL CREATININE-BSD FRML MDRD: 53 ML/MIN/1.73
GLUCOSE BLD-MCNC: 106 MG/DL (ref 65–99)
HCT VFR BLD AUTO: 51.7 % (ref 42–52)
HGB BLD-MCNC: 17.2 G/DL (ref 14–18)
IMM GRANULOCYTES # BLD: 0.09 10*3/MM3 (ref 0–0.03)
IMM GRANULOCYTES NFR BLD: 0.8 % (ref 0–0.5)
LEFT ATRIUM VOLUME INDEX: 10 ML/M2
LV EF NUC BP: 54 %
LYMPHOCYTES # BLD AUTO: 1.64 10*3/MM3 (ref 0.6–4.8)
LYMPHOCYTES NFR BLD AUTO: 14.3 % (ref 20–45)
MAXIMAL PREDICTED HEART RATE: 151 BPM
MCH RBC QN AUTO: 28.5 PG (ref 27–31)
MCHC RBC AUTO-ENTMCNC: 33.3 G/DL (ref 31–37)
MCV RBC AUTO: 85.6 FL (ref 80–94)
MONOCYTES # BLD AUTO: 0.99 10*3/MM3 (ref 0–1)
MONOCYTES NFR BLD AUTO: 8.6 % (ref 3–8)
NEUTROPHILS # BLD AUTO: 8.35 10*3/MM3 (ref 1.5–8.3)
NEUTROPHILS NFR BLD AUTO: 72.8 % (ref 45–70)
NRBC BLD MANUAL-RTO: 0 /100 WBC (ref 0–0)
PERCENT MAX PREDICTED HR: 52.32 %
PLATELET # BLD AUTO: 170 10*3/MM3 (ref 140–500)
PMV BLD AUTO: 10.9 FL (ref 7.4–10.4)
POTASSIUM BLD-SCNC: 3.3 MMOL/L (ref 3.5–5.2)
RBC # BLD AUTO: 6.04 10*6/MM3 (ref 4.7–6.1)
SINUS: 2.9 CM
SODIUM BLD-SCNC: 140 MMOL/L (ref 136–145)
STJ: 3.1 CM
STRESS BASELINE BP: NORMAL MMHG
STRESS BASELINE HR: 64 BPM
STRESS PERCENT HR: 62 %
STRESS POST EXERCISE DUR SEC: 10 SEC
STRESS POST PEAK BP: NORMAL MMHG
STRESS POST PEAK HR: 79 BPM
STRESS TARGET HR: 128 BPM
WBC NRBC COR # BLD: 11.47 10*3/MM3 (ref 4.8–10.8)

## 2018-09-13 PROCEDURE — 78492 MYOCRD IMG PET MLT RST&STRS: CPT

## 2018-09-13 PROCEDURE — 93018 CV STRESS TEST I&R ONLY: CPT | Performed by: INTERNAL MEDICINE

## 2018-09-13 PROCEDURE — 85025 COMPLETE CBC W/AUTO DIFF WBC: CPT

## 2018-09-13 PROCEDURE — 80048 BASIC METABOLIC PNL TOTAL CA: CPT

## 2018-09-13 PROCEDURE — 0 RUBIDIUM CHLORIDE: Performed by: INTERNAL MEDICINE

## 2018-09-13 PROCEDURE — 93306 TTE W/DOPPLER COMPLETE: CPT

## 2018-09-13 PROCEDURE — 25010000002 REGADENOSON 0.4 MG/5ML SOLUTION: Performed by: INTERNAL MEDICINE

## 2018-09-13 PROCEDURE — A9555 RB82 RUBIDIUM: HCPCS | Performed by: INTERNAL MEDICINE

## 2018-09-13 PROCEDURE — 93016 CV STRESS TEST SUPVJ ONLY: CPT | Performed by: INTERNAL MEDICINE

## 2018-09-13 PROCEDURE — 93306 TTE W/DOPPLER COMPLETE: CPT | Performed by: INTERNAL MEDICINE

## 2018-09-13 PROCEDURE — 78492 MYOCRD IMG PET MLT RST&STRS: CPT | Performed by: INTERNAL MEDICINE

## 2018-09-13 PROCEDURE — 93017 CV STRESS TEST TRACING ONLY: CPT

## 2018-09-13 PROCEDURE — 25010000002 PERFLUTREN (DEFINITY) 8.476 MG IN SODIUM CHLORIDE 0.9 % 10 ML INJECTION: Performed by: INTERNAL MEDICINE

## 2018-09-13 PROCEDURE — 36415 COLL VENOUS BLD VENIPUNCTURE: CPT

## 2018-09-13 RX ADMIN — RUBIDIUM CHLORIDE RB-82 1 DOSE: 150 INJECTION, SOLUTION INTRAVENOUS at 09:35

## 2018-09-13 RX ADMIN — PERFLUTREN 2 ML: 6.52 INJECTION, SUSPENSION INTRAVENOUS at 09:20

## 2018-09-13 RX ADMIN — REGADENOSON 0.4 MG: 0.08 INJECTION, SOLUTION INTRAVENOUS at 09:45

## 2018-09-13 RX ADMIN — RUBIDIUM CHLORIDE RB-82 1 DOSE: 150 INJECTION, SOLUTION INTRAVENOUS at 09:45

## 2018-09-17 ENCOUNTER — HOSPITAL ENCOUNTER (OUTPATIENT)
Facility: HOSPITAL | Age: 68
Setting detail: HOSPITAL OUTPATIENT SURGERY
Discharge: HOME | End: 2018-09-17
Attending: INTERNAL MEDICINE | Admitting: INTERNAL MEDICINE

## 2018-09-17 VITALS
SYSTOLIC BLOOD PRESSURE: 123 MMHG | HEART RATE: 50 BPM | HEIGHT: 67 IN | TEMPERATURE: 99 F | DIASTOLIC BLOOD PRESSURE: 68 MMHG | BODY MASS INDEX: 43.95 KG/M2 | WEIGHT: 280 LBS | RESPIRATION RATE: 18 BRPM | OXYGEN SATURATION: 93 %

## 2018-09-17 DIAGNOSIS — R94.39 ABNORMAL NUCLEAR STRESS TEST: ICD-10-CM

## 2018-09-17 LAB — GLUCOSE BLDC GLUCOMTR-MCNC: 100 MG/DL (ref 70–130)

## 2018-09-17 PROCEDURE — 99153 MOD SED SAME PHYS/QHP EA: CPT | Performed by: INTERNAL MEDICINE

## 2018-09-17 PROCEDURE — C1894 INTRO/SHEATH, NON-LASER: HCPCS | Performed by: INTERNAL MEDICINE

## 2018-09-17 PROCEDURE — 82962 GLUCOSE BLOOD TEST: CPT

## 2018-09-17 PROCEDURE — 93458 L HRT ARTERY/VENTRICLE ANGIO: CPT | Performed by: INTERNAL MEDICINE

## 2018-09-17 PROCEDURE — 25010000002 FENTANYL CITRATE (PF) 100 MCG/2ML SOLUTION: Performed by: INTERNAL MEDICINE

## 2018-09-17 PROCEDURE — 25010000002 HEPARIN (PORCINE) PER 1000 UNITS: Performed by: INTERNAL MEDICINE

## 2018-09-17 PROCEDURE — 25010000002 MIDAZOLAM PER 1 MG: Performed by: INTERNAL MEDICINE

## 2018-09-17 PROCEDURE — C1769 GUIDE WIRE: HCPCS | Performed by: INTERNAL MEDICINE

## 2018-09-17 PROCEDURE — 0 IOPAMIDOL PER 1 ML: Performed by: INTERNAL MEDICINE

## 2018-09-17 PROCEDURE — 99152 MOD SED SAME PHYS/QHP 5/>YRS: CPT | Performed by: INTERNAL MEDICINE

## 2018-09-17 RX ORDER — METOCLOPRAMIDE HYDROCHLORIDE 5 MG/ML
10 INJECTION INTRAMUSCULAR; INTRAVENOUS EVERY 6 HOURS PRN
Status: DISCONTINUED | OUTPATIENT
Start: 2018-09-17 | End: 2018-09-17 | Stop reason: HOSPADM

## 2018-09-17 RX ORDER — ONDANSETRON 2 MG/ML
4 INJECTION INTRAMUSCULAR; INTRAVENOUS EVERY 6 HOURS PRN
Status: DISCONTINUED | OUTPATIENT
Start: 2018-09-17 | End: 2018-09-17 | Stop reason: HOSPADM

## 2018-09-17 RX ORDER — MIDAZOLAM HYDROCHLORIDE 1 MG/ML
1 INJECTION INTRAMUSCULAR; INTRAVENOUS
Status: DISCONTINUED | OUTPATIENT
Start: 2018-09-17 | End: 2018-09-17 | Stop reason: HOSPADM

## 2018-09-17 RX ORDER — FENTANYL CITRATE 50 UG/ML
INJECTION, SOLUTION INTRAMUSCULAR; INTRAVENOUS AS NEEDED
Status: DISCONTINUED | OUTPATIENT
Start: 2018-09-17 | End: 2018-09-17 | Stop reason: HOSPADM

## 2018-09-17 RX ORDER — SODIUM CHLORIDE 0.9 % (FLUSH) 0.9 %
1-10 SYRINGE (ML) INJECTION AS NEEDED
Status: DISCONTINUED | OUTPATIENT
Start: 2018-09-17 | End: 2018-09-17 | Stop reason: HOSPADM

## 2018-09-17 RX ORDER — FENTANYL CITRATE 50 UG/ML
50 INJECTION, SOLUTION INTRAMUSCULAR; INTRAVENOUS
Status: DISCONTINUED | OUTPATIENT
Start: 2018-09-17 | End: 2018-09-17 | Stop reason: HOSPADM

## 2018-09-17 RX ORDER — PROMETHAZINE HYDROCHLORIDE 25 MG/ML
12.5 INJECTION, SOLUTION INTRAMUSCULAR; INTRAVENOUS EVERY 4 HOURS PRN
Status: DISCONTINUED | OUTPATIENT
Start: 2018-09-17 | End: 2018-09-17 | Stop reason: HOSPADM

## 2018-09-17 RX ORDER — LIDOCAINE HYDROCHLORIDE 10 MG/ML
0.1 INJECTION, SOLUTION EPIDURAL; INFILTRATION; INTRACAUDAL; PERINEURAL ONCE AS NEEDED
Status: DISCONTINUED | OUTPATIENT
Start: 2018-09-17 | End: 2018-09-17 | Stop reason: HOSPADM

## 2018-09-17 RX ORDER — SODIUM CHLORIDE 9 MG/ML
100 INJECTION, SOLUTION INTRAVENOUS CONTINUOUS
Status: DISCONTINUED | OUTPATIENT
Start: 2018-09-17 | End: 2018-09-17 | Stop reason: HOSPADM

## 2018-09-17 RX ORDER — NITROGLYCERIN 0.4 MG/1
0.4 TABLET SUBLINGUAL
Status: DISCONTINUED | OUTPATIENT
Start: 2018-09-17 | End: 2018-09-17 | Stop reason: HOSPADM

## 2018-09-17 RX ORDER — ALPRAZOLAM 0.25 MG/1
1 TABLET ORAL 2 TIMES DAILY PRN
Status: DISCONTINUED | OUTPATIENT
Start: 2018-09-17 | End: 2018-09-17 | Stop reason: HOSPADM

## 2018-09-17 RX ORDER — SODIUM CHLORIDE 9 MG/ML
75 INJECTION, SOLUTION INTRAVENOUS CONTINUOUS
Status: DISCONTINUED | OUTPATIENT
Start: 2018-09-17 | End: 2018-09-17 | Stop reason: HOSPADM

## 2018-09-17 RX ORDER — MIDAZOLAM HYDROCHLORIDE 1 MG/ML
INJECTION INTRAMUSCULAR; INTRAVENOUS AS NEEDED
Status: DISCONTINUED | OUTPATIENT
Start: 2018-09-17 | End: 2018-09-17 | Stop reason: HOSPADM

## 2018-09-17 RX ORDER — LIDOCAINE HYDROCHLORIDE 20 MG/ML
INJECTION, SOLUTION INFILTRATION; PERINEURAL AS NEEDED
Status: DISCONTINUED | OUTPATIENT
Start: 2018-09-17 | End: 2018-09-17 | Stop reason: HOSPADM

## 2018-09-17 RX ADMIN — SODIUM CHLORIDE 75 ML/HR: 9 INJECTION, SOLUTION INTRAVENOUS at 09:31

## 2018-09-17 NOTE — DISCHARGE INSTRUCTIONS
[facility]  [address] [place] [address]    Coronary Angiogram (Radial/Ulnar Approach) After Care    Refer to this sheet in the next few weeks. These instructions provide you with information on caring for yourself after your procedure. Your caregiver may also give you more specific instructions. Your treatment has been planned according to current medical practices, but problems sometimes occur. Call your caregiver if you have any problems or questions after your procedure.    Home Care Instructions:  · You may shower the day after the procedure. Remove the bandage (dressing) and gently wash the site with plain soap and water. Gently pat the site dry. You may apply a band aid daily for 2 days if desired.    · Do not apply powder or lotion to the site.  · Do not submerge the affected site in water for 3 to 5 days or until the site is completely healed.   · Do not lift, push or pull anything over 10 pounds for 2 days after your procedure.  · Inspect the site at least twice daily. You may notice some bruising at the site and it may be tender for 1 to 2 weeks.     · Increase your fluid intake for the next 2 days.    · Keep arm elevated for 24 hours. For the remainder of the day, keep your arm in “Pledge of Allegiance” position when up and about.     · You may drive 24 hours after the procedure unless otherwise instructed by your caregiver.  · Do not operate machinery or power tools for 24 hours.  · A responsible adult should be with you for the first 24 hours after you arrive home. Do not make any important legal decisions or sign legal papers for 24 hours.  Do not drink alcohol for 24 hours.    · Metformin or any medications containing Metformin should not be taken for 48 hours after your procedure.      Call Your Doctor if:   · You have unusual pain at the radial/ulnar (wrist) site.  · You have redness, warmth, swelling, or pain at the radial/ulnar (wrist) site.  · You have drainage (other  than a small amount of blood on the dressing).  · You have chills or a fever > 101.  · Your arm becomes pale or dark, cool, tingly, or numb.  · You have heavy bleeding from the site, hold pressure on the site for 20 minutes.  If the bleeding stops, apply a fresh bandage and call your cardiologist.  However, if you continue to have bleeding, call 911.            Moderate Conscious Sedation, Adult, Care After  Refer to this sheet in the next few weeks. These instructions provide you with information on caring for yourself after your procedure. Your health care provider may also give you more specific instructions. Your treatment has been planned according to current medical practices, but problems sometimes occur. Call your health care provider if you have any problems or questions after your procedure.  WHAT TO EXPECT AFTER THE PROCEDURE   After your procedure:  · You may feel sleepy, clumsy, and have poor balance for several hours.  · Vomiting may occur if you eat too soon after the procedure.  HOME CARE INSTRUCTIONS  · Do not participate in any activities where you could become injured for at least 24 hours. Do not:    Drive.    Swim.    Ride a bicycle.    Operate heavy machinery.    Cook.    Use power tools.    Climb ladders.    Work from a high place.  · Do not make important decisions or sign legal documents until you are improved.  · If you vomit, drink water, juice, or soup when you can drink without vomiting. Make sure you have little or no nausea before eating solid foods.  · Only take over-the-counter or prescription medicines for pain, discomfort, or fever as directed by your health care provider.  · Make sure you and your family fully understand everything about the medicines given to you, including what side effects may occur.  · You should not drink alcohol, take sleeping pills, or take medicines that cause drowsiness for at least 24 hours.  · If you smoke, do not smoke without supervision.  · If you  are feeling better, you may resume normal activities 24 hours after you were sedated.  · Keep all appointments with your health care provider.  · You should have a responsible adult stay with you for the first 24 hours post procedure.  SEEK MEDICAL CARE IF:  · Your skin is pale or bluish in color.  · You continue to feel nauseous or vomit.  · Your pain is getting worse and is not helped by medicine.  · You have bleeding or swelling.  · You are still sleepy or feeling clumsy after 24 hours.  SEEK IMMEDIATE MEDICAL CARE IF:  · You develop a rash.  · You have difficulty breathing.  · You develop any type of allergic problem.  · You have a fever.  MAKE SURE YOU:  · Understand these instructions.  · Will watch your condition.  · Will get help right away if you are not doing well or get worse.     This information is not intended to replace advice given to you by your health care provider. Make sure you discuss any questions you have with your health care provider.     Document Released: 10/08/2014 Document Revised: 01/08/2016 Document Reviewed: 10/08/2014  [employer] Interactive Patient Education ©2016 [employer]

## 2018-09-17 NOTE — INTERVAL H&P NOTE
H&P updated. The patient was examined and the following changes are noted:  abnormal stress test.  for cardiac catheterization today.

## 2018-09-17 NOTE — PERIOPERATIVE NURSING NOTE
"Right radial site C/D/I. Reminded patient to \"baby\" arm and not lift, pull, tug or push with right arm for the remainder of the evening. Verbalizes understanding.   "

## 2018-09-17 NOTE — INTERVAL H&P NOTE
H&P reviewed. The patient was examined and there are no changes to the H&P.  High risk stress test- atnerior-septal ischemia

## 2018-09-17 NOTE — H&P (VIEW-ONLY)
Date of Office Visit: 2018  Encounter Provider: Becky Melo MD  Place of Service: [facility] CARDIOLOGY  Patient Name: Esdras Ko  :1950      Patient ID:  Esdras Ko is a 68 y.o. male is here for preoperative evaluation.           History of Present Illness    He has a past medical history of diabetes, COPD, stage III chronic kidney disease, hypertension, hyperlipidemia, anemia, gout, obstructive sleep apnea, history of TIA, history of seizures, history of myocardial infarction.  He is here today as a preoperative evaluation.    He is  and has 2 children.  He is retired.  He quit smoking .  He has one cup of coffee per day and uses no caffeine or alcohol.    There is heart disease in his mother, brother, father and strokes in his mother.  His brother has hypertension.    He has significant exertional dyspnea and is supposed to her oxygen all the time that he doesn't.  His TIA happened a year ago.  She does not use CPAP for his sleep apnea but does use oxygen.  He does have history of seizure disorder and is no longer seeing a neurologist.  He has had Mantle cell lymphoma in the past requiring chemotherapy.  He is also had bladder cancer.  He is morbidly obese.    He was going for a colonoscopy at [facility] was found to have an abnormal ECG.  He was sent here for further evaluation for that.  He has had an echocardiogram the past which was poor quality done 2016.  This showed normal ejection fraction but the cardiac valves were not well visualized.    He denies tachycardia, dizziness, syncope or falls.  He's had no vomiting blood or blood in stool.  He does have history of colonic polyps.  He does not exercise regularly.  He has no chest tightness.    Past Medical History:   Diagnosis Date   • Anemia    • Arthritis     BACK   • Bladder cancer (CMS/HCC)    • Cerebrovascular accident (CMS/HCC)     Ischemic and TIAs; most recent  02/2014 which was a TIA.   • Chronic kidney disease, stage 3    • COPD (chronic obstructive pulmonary disease) (CMS/HCC)    • Diabetes mellitus (CMS/HCC)    • Heart attack    • History of blood transfusion    • Hyperlipidemia    • Hypertension    • Hyperuricemia    • Kidney failure     stage 3 kidney disease   • Left shoulder pain    • Lymphoma (CMS/HCC)     MANTLE CELL, HAD CHEMO  2010   • Osteoarthritis    • Pilonidal cyst     SCHEDULED FOR SX   • Psoriasis    • Seizure (CMS/HCC)     Following head trauma in 1970   • Skin lesions     SCARRING FROM SHINGLES, & BLISTERS FROM EDEMA   • Sleep apnea     SUPPOSED TO WEAR CPAP   • Stroke (CMS/HCC)    • Tobacco abuse          Past Surgical History:   Procedure Laterality Date   • BRONCHOSCOPY N/A 8/21/2017    Procedure: BRONCHOSCOPY with fluro and biopsy and lavage.;  Surgeon: Red Topete MD;  Location: [facility];  Service:    • PILONIDAL CYST / SINUS EXCISION     • PILONIDAL CYSTECTOMY N/A 11/30/2016    Procedure: PILONIDAL CYSTECTOMY;  Surgeon: Roe Davila MD;  Location: [facility];  Service:    • SINUS SURGERY     • SKIN LESION EXCISION      DR. DAVILA ABOUT 10 YEARS AGO       Current Outpatient Prescriptions on File Prior to Visit   Medication Sig Dispense Refill   • ADVAIR DISKUS 250-50 MCG/DOSE DISKUS Inhale 1 puff 2 (two) times a day. inhale 1 dose by mouth twice a day  0   • allopurinol (ZYLOPRIM) 300 MG tablet Take 300 mg by mouth Every Morning. take 1 tablet by mouth daily  0   • amLODIPine (NORVASC) 10 MG tablet Take 10 mg by mouth Every Morning.  0   • aspirin 325 MG tablet Take 325 mg by mouth Every Morning.     • cetirizine (ZyrTEC) 10 MG tablet Take 10 mg by mouth Every Morning.     • clopidogrel (PLAVIX) 75 MG tablet Take 75 mg by mouth Every Morning.  0   • D3-50 34561 UNITS capsule Take 50,000 Units by mouth Every 7 (Seven) Days. On mondays  0   • FLUoxetine (PROZAC) 20 MG capsule Take  by mouth.     • furosemide (LASIX) 40 MG tablet Take 80 mg by  mouth 2 (two) times a day. take 2 tablets by mouth twice a day  0   • levETIRAcetam (KEPPRA) 1000 MG tablet Take 500 mg by mouth 2 (Two) Times a Day.  0   • linagliptin (TRADJENTA) 5 MG tablet tablet Take 1 tablet by mouth Daily. 30 tablet 3   • metolazone (ZAROXOLYN) 2.5 MG tablet Take 2.5 mg by mouth. Take on Monday, Wednesday & friday  0   • metoprolol tartrate (LOPRESSOR) 50 MG tablet Take 50 mg by mouth 2 (two) times a day.  0   • O2 (OXYGEN) Inhale 2 L/min Daily.     • pantoprazole (PROTONIX) 40 MG EC tablet Take 40 mg by mouth Every Morning. take 1 tablet by mouth once daily  0   • potassium chloride (K-DUR,KLOR-CON) 20 MEQ CR tablet Take 40 mEq by mouth 3 (Three) Times a Day. 3 TABS BID AND 2 TABS ONCE A DAY  0   • ULORIC 80 MG tablet tablet 80 mg Daily.  0   • VENTOLIN  (90 BASE) MCG/ACT inhaler inhale 2 puffs by mouth four times a day  0   • AMITIZA 24 MCG capsule 24 mcg 2 (Two) Times a Day As Needed.  0   • clobetasol (TEMOVATE) 0.05 % cream apply to affected area (RASH FREELY) twice a day  0   • glipiZIDE (GLUCOTROL) 5 MG tablet Take 1 tablet by mouth 2 (Two) Times a Day Before Meals. 60 tablet 2   • INCRUSE ELLIPTA 62.5 MCG/INH aerosol powder  Inhale 1 puff Daily.  0   • insulin detemir (LEVEMIR) 100 UNIT/ML injection Inject 60 Units under the skin Daily. 3 pen 3     No current facility-administered medications on file prior to visit.        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   HENT: Negative for congestion.    Eyes: Negative for vision loss in left eye and vision loss in right eye.   Cardiovascular: Positive for dyspnea on exertion and leg swelling.   Respiratory: Positive for wheezing. Negative for cough, hemoptysis, shortness of breath, sleep disturbances due to breathing, snoring and sputum production.    Endocrine: Negative.    Hematologic/Lymphatic: Negative.    Skin: Positive for rash. Negative for poor wound healing.   Musculoskeletal: Negative for falls, gout, muscle cramps and myalgias.   Gastrointestinal: Negative for abdominal pain, diarrhea, dysphagia, hematemesis, melena, nausea and vomiting.   Neurological: Negative for excessive daytime sleepiness, dizziness, headaches, light-headedness, loss of balance, seizures and vertigo.   Psychiatric/Behavioral: Positive for depression. Negative for substance abuse. The patient is nervous/anxious.        Procedures    ECG 12 Lead  Date/Time: 8/30/2018 8:37 AM  Performed by: ADRIAN BEJARANO  Authorized by: ADRIAN BEJARANO   Comparison: compared with previous ECG   Similar to previous ECG  Rhythm: sinus rhythm  Conduction: incomplete RBBB and LAFB  T depression: V3  Other findings: PRWP  Clinical impression: abnormal ECG                Objective:      Vitals:    08/30/18 0818   BP: 110/70   BP Location: Left arm   Pulse: 73   Weight: 123 kg (271 lb 6.4 oz)   Height: 172.7 cm (68\")     Body mass index is 41.27 kg/m².    Physical Exam   Constitutional: He is oriented to person, place, and time. He appears well-developed and well-nourished. No distress.   HENT:   Head: Normocephalic and atraumatic.   Eyes: Conjunctivae are normal. No scleral icterus.   Neck: Neck supple. No JVD present. Carotid bruit is not present. No thyromegaly present.   Cardiovascular: Normal rate, regular rhythm, S1 normal, " S2 normal, normal heart sounds and intact distal pulses.   No extrasystoles are present. PMI is not displaced.  Exam reveals no gallop.    No murmur heard.  Pulses:       Carotid pulses are 2+ on the right side, and 2+ on the left side.       Radial pulses are 2+ on the right side, and 2+ on the left side.        Dorsalis pedis pulses are 2+ on the right side, and 2+ on the left side.        Posterior tibial pulses are 2+ on the right side, and 2+ on the left side.   Pulmonary/Chest: Effort normal and breath sounds normal. No respiratory distress. He has no wheezes. He has no rhonchi. He has no rales. He exhibits no tenderness.   Abdominal: Soft. Bowel sounds are normal. He exhibits no distension, no abdominal bruit and no mass. There is no tenderness.   Musculoskeletal: He exhibits no edema or deformity.   Lymphadenopathy:     He has no cervical adenopathy.   Neurological: He is alert and oriented to person, place, and time. No cranial nerve deficit.   Skin: Skin is warm and dry. No rash noted. He is not diaphoretic. No cyanosis. No pallor. Nails show no clubbing.   Psychiatric: He has a normal mood and affect. Judgment normal.   Vitals reviewed.      Lab Review:       Assessment:      Diagnosis Plan   1. Preoperative cardiovascular examination  Adult Transthoracic Echo Complete W/ Cont if Necessary Per Protocol    Stress Test With Myocardial Perfusion One Day   2. Chronic obstructive pulmonary disease, unspecified COPD type (CMS/HCC)     3. Uncontrolled type 2 diabetes mellitus without complication, without long-term current use of insulin (CMS/[facility])     4. Chronic kidney disease, unspecified CKD stage     5. Dyspnea on exertion  Adult Transthoracic Echo Complete W/ Cont if Necessary Per Protocol    Stress Test With Myocardial Perfusion One Day     1. Preoperative evaluation for colonoscopy.  Has abnormal ECG with dyspnea on exertion. Set up stress nuclear and echo.   2. COPD requiring oxygen which he only uses at  night.   3. CHIQUIS, uses nocturnal oxygen.   4. Hypertension, well controlled.   5. Hyperlipidemia.  6. H/o mantle cell lymphoma and bladder cancer.   7. DM  8. Morbid obesity.   9. H/o TIA  10. Seizure disorder.   11. CKD.      Plan:       See back in 1 year in [place], no med changes, testing set up for dyspnea on exertion.

## 2018-09-24 ENCOUNTER — TRANSCRIBE ORDERS (OUTPATIENT)
Dept: ADMINISTRATIVE | Facility: HOSPITAL | Age: 68
End: 2018-09-24

## 2018-09-24 DIAGNOSIS — R06.02 SOB (SHORTNESS OF BREATH): Primary | ICD-10-CM

## 2018-09-24 DIAGNOSIS — R09.02 HYPOXIA: ICD-10-CM

## 2018-09-27 ENCOUNTER — HOSPITAL ENCOUNTER (OUTPATIENT)
Dept: PULMONOLOGY | Facility: HOSPITAL | Age: 68
Discharge: HOME | End: 2018-09-27
Attending: FAMILY MEDICINE | Admitting: FAMILY MEDICINE

## 2018-09-27 DIAGNOSIS — R06.02 SOB (SHORTNESS OF BREATH): ICD-10-CM

## 2018-09-27 DIAGNOSIS — R09.02 HYPOXIA: ICD-10-CM

## 2018-09-27 PROCEDURE — 94618 PULMONARY STRESS TESTING: CPT

## 2018-09-27 NOTE — PROCEDURES
Exercise Oximetry    Patient Name:Esdras Ko   MRN: [id number]   Date: 09/27/18             ROOM AIR BASELINE   SpO2% 93   Heart Rate 89   Blood Pressure 117/81     EXERCISE ON ROOM AIR SpO2% EXERCISE ON O2 @ LPM SpO2%   1 MINUTE 93 1 MINUTE    2 MINUTES 90 2 MINUTES    3 MINUTES 90 3 MINUTES    4 MINUTES 91 4 MINUTES    5 MINUTES 91 5 MINUTES    6 MINUTES 92 6 MINUTES               Distance Walked  153.6 meters Distance Walked   Dyspnea (Nathalie Scale)  6 Dyspnea (Nathalie Scale)   Fatigue (Nathalie Scale)  7 Fatigue (Nathalie Scale)   SpO2% Post Exercise  92 SpO2% Post Exercise   HR Post Exercise  95 HR Post Exercise   Time to Recovery  2 minutes Time to Recovery     Comments: predicted 432 meters

## 2018-10-15 ENCOUNTER — OFFICE VISIT (OUTPATIENT)
Dept: CARDIOLOGY | Facility: CLINIC | Age: 68
End: 2018-10-15

## 2018-10-15 VITALS
HEART RATE: 63 BPM | WEIGHT: 284.2 LBS | BODY MASS INDEX: 43.07 KG/M2 | DIASTOLIC BLOOD PRESSURE: 84 MMHG | HEIGHT: 68 IN | SYSTOLIC BLOOD PRESSURE: 140 MMHG

## 2018-10-15 DIAGNOSIS — I25.10 LAD STENOSIS: Primary | ICD-10-CM

## 2018-10-15 DIAGNOSIS — R06.09 DYSPNEA ON EXERTION: ICD-10-CM

## 2018-10-15 DIAGNOSIS — N18.9 CHRONIC KIDNEY DISEASE, UNSPECIFIED CKD STAGE: ICD-10-CM

## 2018-10-15 DIAGNOSIS — I25.118 CORONARY ARTERY DISEASE OF NATIVE ARTERY OF NATIVE HEART WITH STABLE ANGINA PECTORIS (HCC): ICD-10-CM

## 2018-10-15 PROBLEM — IMO0002 UNCONTROLLED DIABETES MELLITUS: Status: RESOLVED | Noted: 2018-03-15 | Resolved: 2018-10-15

## 2018-10-15 PROBLEM — E11.59 TYPE 2 DIABETES MELLITUS WITH CIRCULATORY DISORDER, WITHOUT LONG-TERM CURRENT USE OF INSULIN (HCC): Status: ACTIVE | Noted: 2018-10-15

## 2018-10-15 PROCEDURE — 93000 ELECTROCARDIOGRAM COMPLETE: CPT | Performed by: INTERNAL MEDICINE

## 2018-10-15 PROCEDURE — 99215 OFFICE O/P EST HI 40 MIN: CPT | Performed by: INTERNAL MEDICINE

## 2018-10-15 RX ORDER — RANOLAZINE 500 MG/1
500 TABLET, EXTENDED RELEASE ORAL EVERY 12 HOURS SCHEDULED
Qty: 60 TABLET | Refills: 11 | Status: SHIPPED | OUTPATIENT
Start: 2018-10-15 | End: 2018-12-07

## 2018-10-15 RX ORDER — FUROSEMIDE 40 MG/1
40 TABLET ORAL 2 TIMES DAILY
Refills: 0 | COMMUNITY
Start: 2018-10-15 | End: 2021-04-09 | Stop reason: HOSPADM

## 2018-10-15 NOTE — PROGRESS NOTES
Date of Office Visit: 10/15/2018  Encounter Provider: eBcky Melo MD  Place of Service: [facility] CARDIOLOGY  Patient Name: Esdras Ko  :1950      Patient ID:  Esdras oK is a 68 y.o. male is here for  followup for CAD, LAD occlusion.  He has ongoing dyspnea on exertion.         History of Present Illness    He has a past medical history of diabetes, COPD, stage III chronic kidney disease, hypertension, hyperlipidemia, anemia, gout, obstructive sleep apnea, history of TIA, history of seizures, history of myocardial infarction.  He presented as a preoperative evaluation for colonoscopy.     He is  and has 2 children.  He is retired.  He quit smoking .  He has one cup of coffee per day and uses no caffeine or alcohol.     There is heart disease in his mother, brother, father and strokes in his mother.  His brother has hypertension.     He has significant exertional dyspnea but is not hypoxic.  He had a TIA happened in .  He does not use CPAP for his sleep apnea but does use oxygen.  He does have history of seizure disorder and is no longer seeing a neurologist.  He has had Mantle cell lymphoma in the past requiring chemotherapy.  He is also had bladder cancer.  He is morbidly obese.     He was going for a colonoscopy at [facility] was found to have an abnormal ECG.      He had an echo done 18 showing LVEF 57% grade 1 diastolic dysfunction and mild concentric left hypertrophy.  He also had a PET stress study showing large apical and septal ischemia.  He went on for a cardiac catheterization done 18 showing normal left main, occluded proximal LAD filling late with diagonals, small caliber circumflex with diffuse 50-60% stenosis, large caliber RCA with 40-50% mid vessel stenosis and 40-50% distal stenosis.  It does give collaterals to the LAD.    He has ongoing severe exertional dyspnea which is lifestyle limiting.  Just walking from the  bathroom to the exam room today, he was very short winded and that is about 15 feet.  He's also having fatigue.  He has no chest pain or pressure.  He doesn't feels heart racing or skipping.  He's had no dizziness or falls.  He just generally doesn't feel well. Any activity makes his dypsneic.     Past Medical History:   Diagnosis Date   • Anemia    • Arthritis     BACK   • Bladder cancer (CMS/HCC) 2008   • Bladder cancer (CMS/HCC)    • Cerebrovascular accident (CMS/HCC)     Ischemic and TIAs; most recent 02/2014 which was a TIA.   • Chronic kidney disease, stage 3 (CMS/HCC)     Dr. KATHERYN Lin   • COPD (chronic obstructive pulmonary disease) (CMS/HCC)    • Diabetes mellitus (CMS/HCC)    • Heart attack (CMS/HCC)    • History of blood transfusion    • Hyperlipidemia    • Hypertension    • Hyperuricemia    • Kidney failure     stage 3 kidney disease   • Left shoulder pain    • Lymphoma (CMS/HCC)     MANTLE CELL, HAD CHEMO  2010   • Lymphoma (CMS/HCC)    • Osteoarthritis    • Pilonidal cyst     SCHEDULED FOR SX   • Psoriasis    • Seizure (CMS/HCC)     Following head trauma in 1970   • Skin lesions     SCARRING FROM SHINGLES, & BLISTERS FROM EDEMA   • Sleep apnea     Intolerant of CPAP   • Stroke (CMS/HCC)    • Tobacco abuse          Past Surgical History:   Procedure Laterality Date   • BRONCHOSCOPY N/A 8/21/2017    Procedure: BRONCHOSCOPY with fluro and biopsy and lavage.;  Surgeon: Red Topete MD;  Location: [facility] OR;  Service:    • CARDIAC CATHETERIZATION N/A 9/17/2018    Procedure: Coronary angiography;  Surgeon: Kylie Victoria MD;  Location: [facility] INVASIVE LOCATION;  Service: Cardiovascular   • PILONIDAL CYST / SINUS EXCISION     • PILONIDAL CYSTECTOMY N/A 11/30/2016    Procedure: PILONIDAL CYSTECTOMY;  Surgeon: Roe Davila MD;  Location: [facility] OR;  Service:    • SINUS SURGERY     • SKIN LESION EXCISION      DR. DAVILA ABOUT 10 YEARS AGO       Current Outpatient Prescriptions on File Prior to Visit    Medication Sig Dispense Refill   • ADVAIR DISKUS 250-50 MCG/DOSE DISKUS Inhale 1 puff As Needed. inhale 1 dose by mouth twice a day  0   • allopurinol (ZYLOPRIM) 300 MG tablet Take 300 mg by mouth Every Morning. take 1 tablet by mouth daily  0   • AMITIZA 24 MCG capsule Take 24 mcg by mouth 2 (Two) Times a Day As Needed.  0   • amLODIPine (NORVASC) 10 MG tablet Take 10 mg by mouth Every Morning.  0   • aspirin 325 MG tablet Take 325 mg by mouth Every Morning.     • atorvastatin (LIPITOR) 20 MG tablet Take 20 mg by mouth Daily.     • cetirizine (ZyrTEC) 10 MG tablet Take 10 mg by mouth Every Morning.     • clobetasol (TEMOVATE) 0.05 % cream apply to affected area (RASH FREELY) twice a day  0   • clopidogrel (PLAVIX) 75 MG tablet Take 75 mg by mouth Every Morning.  0   • D3-50 75679 UNITS capsule Take 50,000 Units by mouth Every 7 (Seven) Days. On mondays  0   • FLUoxetine (PROZAC) 20 MG capsule Take 20 mg by mouth Daily.     • furosemide (LASIX) 40 MG tablet Take 80 mg by mouth 2 (two) times a day. take 2 tablets by mouth twice a day  0   • levETIRAcetam (KEPPRA) 1000 MG tablet Take 500 mg by mouth 2 (Two) Times a Day.  0   • linagliptin (TRADJENTA) 5 MG tablet tablet Take 1 tablet by mouth Daily. 30 tablet 3   • loratadine (CLARITIN) 10 MG tablet Take 10 mg by mouth Daily.     • metolazone (ZAROXOLYN) 2.5 MG tablet Take 2.5 mg by mouth See Admin Instructions. Take on Monday, Wednesday & friday  0   • metoprolol tartrate (LOPRESSOR) 50 MG tablet Take 50 mg by mouth 2 (two) times a day.  0   • O2 (OXYGEN) Inhale 2 L/min Daily.     • pantoprazole (PROTONIX) 40 MG EC tablet Take 40 mg by mouth Every Morning. take 1 tablet by mouth once daily  0   • potassium chloride (K-DUR,KLOR-CON) 20 MEQ CR tablet Take 40 mEq by mouth 4 (Four) Times a Day. 3 TABS BID AND 2 TABS ONCE A DAY  0   • ULORIC 80 MG tablet tablet Take 80 mg by mouth Daily.  0   • VENTOLIN  (90 BASE) MCG/ACT inhaler inhale 2 puffs by mouth four times  a day as needed  0     No current facility-administered medications on file prior to visit.        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   HENT: Negative for congestion.    Eyes: Negative for vision loss in left eye and vision loss in right eye.   Cardiovascular: Positive for dyspnea on exertion.   Respiratory: Negative.  Negative for cough, hemoptysis, shortness of breath, sleep disturbances due to breathing, snoring, sputum production and wheezing.    Endocrine: Negative.    Hematologic/Lymphatic: Negative.    Skin: Negative for poor wound healing and rash.   Musculoskeletal: Negative for falls, gout, muscle cramps and myalgias.   Gastrointestinal: Negative for abdominal pain, diarrhea, dysphagia, hematemesis, melena, nausea and vomiting.   Neurological: Negative for excessive daytime sleepiness, dizziness, headaches, light-headedness, loss of balance, seizures and vertigo.   Psychiatric/Behavioral: Negative for depression and substance abuse. The patient is not nervous/anxious.        Procedures    ECG 12 Lead  Date/Time: 10/15/2018 11:00 AM  Performed by: ADRIAN BEJARANO  Authorized by: ADRIAN BEJARANO   Comparison: compared with previous ECG   Similar to previous ECG  Rhythm: sinus rhythm  Conduction: LAFB  T depression: V3  QRS axis: left  Q waves: V2,  "V3 and V4  Clinical impression: abnormal ECG                Objective:      Vitals:    10/15/18 1028   BP: 140/84   BP Location: Right arm   Patient Position: Sitting   Pulse: 63   Weight: 129 kg (284 lb 3.2 oz)   Height: 172.7 cm (68\")     Body mass index is 43.21 kg/m².    Physical Exam   Constitutional: He is oriented to person, place, and time. He appears well-developed and well-nourished. No distress.   obesity   HENT:   Head: Normocephalic and atraumatic.   Eyes: Conjunctivae are normal. No scleral icterus.   Neck: Neck supple. No JVD present. Carotid bruit is not present. No thyromegaly present.   Cardiovascular: Normal rate, regular rhythm, S1 normal, S2 normal, normal heart sounds and intact distal pulses.   No extrasystoles are present. PMI is not displaced.  Exam reveals no gallop.    No murmur heard.  Pulses:       Carotid pulses are 2+ on the right side, and 2+ on the left side.       Radial pulses are 2+ on the right side, and 2+ on the left side.        Dorsalis pedis pulses are 2+ on the right side, and 2+ on the left side.        Posterior tibial pulses are 2+ on the right side, and 2+ on the left side.   Pulmonary/Chest: Effort normal and breath sounds normal. No respiratory distress. He has no wheezes. He has no rhonchi. He has no rales. He exhibits no tenderness.   Abdominal: Soft. Bowel sounds are normal. He exhibits no distension, no abdominal bruit and no mass. There is no tenderness.   Musculoskeletal: He exhibits no edema or deformity.   Lymphadenopathy:     He has no cervical adenopathy.   Neurological: He is alert and oriented to person, place, and time. No cranial nerve deficit.   Skin: Skin is warm and dry. No rash noted. He is not diaphoretic. No cyanosis. No pallor. Nails show no clubbing.   Psychiatric: He has a normal mood and affect. Judgment normal.   Vitals reviewed.      Lab Review:       Assessment:     No diagnosis found.       1. Proximal LAD occlusion with severe dyspnea on " exertion, set up  with Dr. Wheatley.   2. COPD requiring oxygen which he only uses at night.   3. CHIQUIS, uses nocturnal oxygen.   4. Hypertension, well controlled.   5. Hyperlipidemia.  6. H/o mantle cell lymphoma and bladder cancer.   7. DM  8. Morbid obesity.   9. H/o TIA  10. Seizure disorder.   11. CKD.      Plan:       Start ranexa 500mg bid, set up cath.  D/w patient and wife.

## 2018-10-17 ENCOUNTER — TRANSCRIBE ORDERS (OUTPATIENT)
Dept: CARDIOLOGY | Facility: CLINIC | Age: 68
End: 2018-10-17

## 2018-10-17 DIAGNOSIS — Z13.6 SCREENING FOR CARDIOVASCULAR CONDITION: Primary | ICD-10-CM

## 2018-10-17 DIAGNOSIS — Z01.810 PREPROCEDURAL CARDIOVASCULAR EXAMINATION: ICD-10-CM

## 2018-10-17 DIAGNOSIS — I25.10 LAD STENOSIS: ICD-10-CM

## 2018-10-17 PROBLEM — R06.09 DYSPNEA ON EXERTION: Status: ACTIVE | Noted: 2018-10-17

## 2018-10-26 ENCOUNTER — LAB (OUTPATIENT)
Dept: LAB | Facility: HOSPITAL | Age: 68
End: 2018-10-26
Attending: INTERNAL MEDICINE

## 2018-10-26 DIAGNOSIS — Z13.6 SCREENING FOR CARDIOVASCULAR CONDITION: ICD-10-CM

## 2018-10-26 DIAGNOSIS — I25.10 LAD STENOSIS: ICD-10-CM

## 2018-10-26 DIAGNOSIS — Z01.810 PREPROCEDURAL CARDIOVASCULAR EXAMINATION: ICD-10-CM

## 2018-10-26 LAB
ANION GAP SERPL CALCULATED.3IONS-SCNC: 12.1 MMOL/L
BASOPHILS # BLD AUTO: 0.06 10*3/MM3 (ref 0–0.2)
BASOPHILS NFR BLD AUTO: 0.5 % (ref 0–2)
BUN BLD-MCNC: 25 MG/DL (ref 8–23)
BUN/CREAT SERPL: 19.8 (ref 7–25)
CALCIUM SPEC-SCNC: 9.3 MG/DL (ref 8.8–10.5)
CHLORIDE SERPL-SCNC: 98 MMOL/L (ref 98–107)
CO2 SERPL-SCNC: 29.9 MMOL/L (ref 22–29)
CREAT BLD-MCNC: 1.26 MG/DL (ref 0.76–1.27)
DEPRECATED RDW RBC AUTO: 46 FL (ref 37–54)
EOSINOPHIL # BLD AUTO: 0.38 10*3/MM3 (ref 0.1–0.3)
EOSINOPHIL NFR BLD AUTO: 3.5 % (ref 0–4)
ERYTHROCYTE [DISTWIDTH] IN BLOOD BY AUTOMATED COUNT: 14.8 % (ref 11.5–14.5)
GFR SERPL CREATININE-BSD FRML MDRD: 57 ML/MIN/1.73
GLUCOSE BLD-MCNC: 107 MG/DL (ref 65–99)
HCT VFR BLD AUTO: 51.7 % (ref 42–52)
HGB BLD-MCNC: 17.3 G/DL (ref 14–18)
IMM GRANULOCYTES # BLD: 0.09 10*3/MM3 (ref 0–0.03)
IMM GRANULOCYTES NFR BLD: 0.8 % (ref 0–0.5)
LYMPHOCYTES # BLD AUTO: 1.37 10*3/MM3 (ref 0.6–4.8)
LYMPHOCYTES NFR BLD AUTO: 12.6 % (ref 20–45)
MCH RBC QN AUTO: 28.8 PG (ref 27–31)
MCHC RBC AUTO-ENTMCNC: 33.5 G/DL (ref 31–37)
MCV RBC AUTO: 86 FL (ref 80–94)
MONOCYTES # BLD AUTO: 0.94 10*3/MM3 (ref 0–1)
MONOCYTES NFR BLD AUTO: 8.6 % (ref 3–8)
NEUTROPHILS # BLD AUTO: 8.07 10*3/MM3 (ref 1.5–8.3)
NEUTROPHILS NFR BLD AUTO: 74 % (ref 45–70)
NRBC BLD MANUAL-RTO: 0 /100 WBC (ref 0–0)
PLATELET # BLD AUTO: 184 10*3/MM3 (ref 140–500)
PMV BLD AUTO: 10.5 FL (ref 7.4–10.4)
POTASSIUM BLD-SCNC: 3.6 MMOL/L (ref 3.5–5.2)
RBC # BLD AUTO: 6.01 10*6/MM3 (ref 4.7–6.1)
SODIUM BLD-SCNC: 140 MMOL/L (ref 136–145)
WBC NRBC COR # BLD: 10.91 10*3/MM3 (ref 4.8–10.8)

## 2018-10-26 PROCEDURE — 36415 COLL VENOUS BLD VENIPUNCTURE: CPT

## 2018-10-26 PROCEDURE — 80048 BASIC METABOLIC PNL TOTAL CA: CPT

## 2018-10-26 PROCEDURE — 85025 COMPLETE CBC W/AUTO DIFF WBC: CPT

## 2018-10-30 ENCOUNTER — HOSPITAL ENCOUNTER (OUTPATIENT)
Facility: HOSPITAL | Age: 68
Setting detail: HOSPITAL OUTPATIENT SURGERY
Discharge: HOME | End: 2018-10-30
Attending: INTERNAL MEDICINE | Admitting: INTERNAL MEDICINE

## 2018-10-30 VITALS
DIASTOLIC BLOOD PRESSURE: 85 MMHG | RESPIRATION RATE: 18 BRPM | HEART RATE: 63 BPM | WEIGHT: 280 LBS | HEIGHT: 68 IN | TEMPERATURE: 98.2 F | SYSTOLIC BLOOD PRESSURE: 144 MMHG | BODY MASS INDEX: 42.44 KG/M2 | OXYGEN SATURATION: 95 %

## 2018-10-30 DIAGNOSIS — R06.09 DYSPNEA ON EXERTION: ICD-10-CM

## 2018-10-30 DIAGNOSIS — I25.10 LAD STENOSIS: ICD-10-CM

## 2018-10-30 LAB
GLUCOSE BLDC GLUCOMTR-MCNC: 105 MG/DL (ref 70–130)
GLUCOSE BLDC GLUCOMTR-MCNC: 97 MG/DL (ref 70–130)

## 2018-10-30 PROCEDURE — C1894 INTRO/SHEATH, NON-LASER: HCPCS | Performed by: INTERNAL MEDICINE

## 2018-10-30 PROCEDURE — 82962 GLUCOSE BLOOD TEST: CPT

## 2018-10-30 PROCEDURE — C1769 GUIDE WIRE: HCPCS | Performed by: INTERNAL MEDICINE

## 2018-10-30 PROCEDURE — 92943 PRQ TRLUML REVSC CH OCC ANT: CPT | Performed by: INTERNAL MEDICINE

## 2018-10-30 PROCEDURE — C1874 STENT, COATED/COV W/DEL SYS: HCPCS | Performed by: INTERNAL MEDICINE

## 2018-10-30 PROCEDURE — C1887 CATHETER, GUIDING: HCPCS | Performed by: INTERNAL MEDICINE

## 2018-10-30 PROCEDURE — 85347 COAGULATION TIME ACTIVATED: CPT

## 2018-10-30 PROCEDURE — C9607 PERC D-E COR REVASC CHRO SIN: HCPCS | Performed by: INTERNAL MEDICINE

## 2018-10-30 PROCEDURE — 0 IOPAMIDOL PER 1 ML: Performed by: INTERNAL MEDICINE

## 2018-10-30 PROCEDURE — 99153 MOD SED SAME PHYS/QHP EA: CPT | Performed by: INTERNAL MEDICINE

## 2018-10-30 PROCEDURE — 25010000002 MIDAZOLAM PER 1 MG: Performed by: INTERNAL MEDICINE

## 2018-10-30 PROCEDURE — 99152 MOD SED SAME PHYS/QHP 5/>YRS: CPT | Performed by: INTERNAL MEDICINE

## 2018-10-30 PROCEDURE — C1725 CATH, TRANSLUMIN NON-LASER: HCPCS | Performed by: INTERNAL MEDICINE

## 2018-10-30 PROCEDURE — 25010000002 HEPARIN (PORCINE) PER 1000 UNITS: Performed by: INTERNAL MEDICINE

## 2018-10-30 PROCEDURE — 25010000002 FENTANYL CITRATE (PF) 100 MCG/2ML SOLUTION: Performed by: INTERNAL MEDICINE

## 2018-10-30 PROCEDURE — C1760 CLOSURE DEV, VASC: HCPCS | Performed by: INTERNAL MEDICINE

## 2018-10-30 DEVICE — XIENCE SIERRA™ EVEROLIMUS ELUTING CORONARY STENT SYSTEM 3.25 MM X 23 MM / RAPID-EXCHANGE
Type: IMPLANTABLE DEVICE | Status: FUNCTIONAL
Brand: XIENCE SIERRA™

## 2018-10-30 RX ORDER — MIDAZOLAM HYDROCHLORIDE 1 MG/ML
INJECTION INTRAMUSCULAR; INTRAVENOUS AS NEEDED
Status: DISCONTINUED | OUTPATIENT
Start: 2018-10-30 | End: 2018-10-30 | Stop reason: HOSPADM

## 2018-10-30 RX ORDER — SODIUM CHLORIDE 9 MG/ML
75 INJECTION, SOLUTION INTRAVENOUS CONTINUOUS
Status: DISCONTINUED | OUTPATIENT
Start: 2018-10-30 | End: 2018-10-30 | Stop reason: HOSPADM

## 2018-10-30 RX ORDER — SODIUM CHLORIDE 9 MG/ML
100 INJECTION, SOLUTION INTRAVENOUS CONTINUOUS
Status: DISCONTINUED | OUTPATIENT
Start: 2018-10-30 | End: 2018-10-30 | Stop reason: HOSPADM

## 2018-10-30 RX ORDER — SODIUM CHLORIDE 0.9 % (FLUSH) 0.9 %
3-10 SYRINGE (ML) INJECTION AS NEEDED
Status: CANCELLED | OUTPATIENT
Start: 2018-10-30

## 2018-10-30 RX ORDER — LIDOCAINE HYDROCHLORIDE 20 MG/ML
INJECTION, SOLUTION INFILTRATION; PERINEURAL AS NEEDED
Status: DISCONTINUED | OUTPATIENT
Start: 2018-10-30 | End: 2018-10-30 | Stop reason: HOSPADM

## 2018-10-30 RX ORDER — HEPARIN SODIUM 1000 [USP'U]/ML
INJECTION, SOLUTION INTRAVENOUS; SUBCUTANEOUS AS NEEDED
Status: DISCONTINUED | OUTPATIENT
Start: 2018-10-30 | End: 2018-10-30 | Stop reason: HOSPADM

## 2018-10-30 RX ORDER — SODIUM CHLORIDE 0.9 % (FLUSH) 0.9 %
3-10 SYRINGE (ML) INJECTION AS NEEDED
Status: DISCONTINUED | OUTPATIENT
Start: 2018-10-30 | End: 2018-10-30 | Stop reason: HOSPADM

## 2018-10-30 RX ORDER — NITROGLYCERIN 5 MG/ML
INJECTION, SOLUTION INTRAVENOUS AS NEEDED
Status: DISCONTINUED | OUTPATIENT
Start: 2018-10-30 | End: 2018-10-30 | Stop reason: HOSPADM

## 2018-10-30 RX ORDER — FENTANYL CITRATE 50 UG/ML
INJECTION, SOLUTION INTRAMUSCULAR; INTRAVENOUS AS NEEDED
Status: DISCONTINUED | OUTPATIENT
Start: 2018-10-30 | End: 2018-10-30 | Stop reason: HOSPADM

## 2018-10-30 RX ORDER — SODIUM CHLORIDE 0.9 % (FLUSH) 0.9 %
3 SYRINGE (ML) INJECTION EVERY 12 HOURS SCHEDULED
Status: CANCELLED | OUTPATIENT
Start: 2018-10-30

## 2018-10-30 RX ORDER — LIDOCAINE HYDROCHLORIDE 10 MG/ML
0.1 INJECTION, SOLUTION EPIDURAL; INFILTRATION; INTRACAUDAL; PERINEURAL ONCE AS NEEDED
Status: DISCONTINUED | OUTPATIENT
Start: 2018-10-30 | End: 2018-10-30 | Stop reason: HOSPADM

## 2018-10-30 RX ORDER — SODIUM CHLORIDE 0.9 % (FLUSH) 0.9 %
3 SYRINGE (ML) INJECTION EVERY 12 HOURS SCHEDULED
Status: DISCONTINUED | OUTPATIENT
Start: 2018-10-30 | End: 2018-10-30 | Stop reason: HOSPADM

## 2018-10-30 RX ADMIN — SODIUM CHLORIDE 100 ML/HR: 9 INJECTION, SOLUTION INTRAVENOUS at 15:58

## 2018-10-30 RX ADMIN — SODIUM CHLORIDE 75 ML/HR: 9 INJECTION, SOLUTION INTRAVENOUS at 09:28

## 2018-10-31 LAB
ACT BLD: 346 SECONDS (ref 82–152)
ACT BLD: 439 SECONDS (ref 82–152)

## 2018-11-02 DIAGNOSIS — I25.10 CORONARY ARTERY DISEASE INVOLVING NATIVE CORONARY ARTERY OF NATIVE HEART WITHOUT ANGINA PECTORIS: Primary | ICD-10-CM

## 2018-11-13 ENCOUNTER — TELEPHONE (OUTPATIENT)
Dept: CARDIOLOGY | Facility: CLINIC | Age: 68
End: 2018-11-13

## 2018-11-13 NOTE — TELEPHONE ENCOUNTER
Pt's wife called to report that since starting the Ranexa 500 mg bid, he is experiencing nausea and fatigue.  Pt had his cath on 10/30.  He was a little hard to get any additional information.  Pt has an appt with you on 12/07/18.

## 2018-11-13 NOTE — TELEPHONE ENCOUNTER
Informed pt's wife, she will discuss with pt and contact the office if he decides to stop or continue with the med.

## 2018-12-07 ENCOUNTER — OFFICE VISIT (OUTPATIENT)
Dept: CARDIOLOGY | Facility: CLINIC | Age: 68
End: 2018-12-07

## 2018-12-07 VITALS
WEIGHT: 279.4 LBS | BODY MASS INDEX: 42.34 KG/M2 | SYSTOLIC BLOOD PRESSURE: 108 MMHG | HEIGHT: 68 IN | DIASTOLIC BLOOD PRESSURE: 66 MMHG | HEART RATE: 63 BPM

## 2018-12-07 DIAGNOSIS — E11.59 TYPE 2 DIABETES MELLITUS WITH OTHER CIRCULATORY COMPLICATION, WITHOUT LONG-TERM CURRENT USE OF INSULIN (HCC): ICD-10-CM

## 2018-12-07 DIAGNOSIS — I25.10 LAD STENOSIS: ICD-10-CM

## 2018-12-07 DIAGNOSIS — I25.118 CORONARY ARTERY DISEASE OF NATIVE ARTERY OF NATIVE HEART WITH STABLE ANGINA PECTORIS (HCC): Primary | ICD-10-CM

## 2018-12-07 PROCEDURE — 93000 ELECTROCARDIOGRAM COMPLETE: CPT | Performed by: INTERNAL MEDICINE

## 2018-12-07 PROCEDURE — 99214 OFFICE O/P EST MOD 30 MIN: CPT | Performed by: INTERNAL MEDICINE

## 2018-12-07 RX ORDER — METOLAZONE 2.5 MG/1
2.5 TABLET ORAL SEE ADMIN INSTRUCTIONS
Refills: 0 | COMMUNITY
Start: 2018-12-07 | End: 2019-01-11

## 2019-01-11 ENCOUNTER — OFFICE VISIT (OUTPATIENT)
Dept: CARDIOLOGY | Facility: CLINIC | Age: 69
End: 2019-01-11

## 2019-01-11 VITALS
WEIGHT: 281 LBS | BODY MASS INDEX: 42.59 KG/M2 | DIASTOLIC BLOOD PRESSURE: 72 MMHG | HEART RATE: 66 BPM | HEIGHT: 68 IN | SYSTOLIC BLOOD PRESSURE: 116 MMHG

## 2019-01-11 DIAGNOSIS — N18.9 CHRONIC KIDNEY DISEASE, UNSPECIFIED CKD STAGE: ICD-10-CM

## 2019-01-11 DIAGNOSIS — J44.9 CHRONIC OBSTRUCTIVE PULMONARY DISEASE, UNSPECIFIED COPD TYPE (HCC): ICD-10-CM

## 2019-01-11 DIAGNOSIS — E11.59 TYPE 2 DIABETES MELLITUS WITH OTHER CIRCULATORY COMPLICATION, WITHOUT LONG-TERM CURRENT USE OF INSULIN (HCC): ICD-10-CM

## 2019-01-11 DIAGNOSIS — I25.118 CORONARY ARTERY DISEASE OF NATIVE ARTERY OF NATIVE HEART WITH STABLE ANGINA PECTORIS (HCC): Primary | ICD-10-CM

## 2019-01-11 PROCEDURE — 99214 OFFICE O/P EST MOD 30 MIN: CPT | Performed by: INTERNAL MEDICINE

## 2019-01-11 PROCEDURE — 93000 ELECTROCARDIOGRAM COMPLETE: CPT | Performed by: INTERNAL MEDICINE

## 2019-01-11 RX ORDER — AMLODIPINE BESYLATE 5 MG/1
2.5 TABLET ORAL EVERY MORNING
Qty: 90 TABLET | Refills: 3 | COMMUNITY
Start: 2019-01-11 | End: 2021-04-09 | Stop reason: HOSPADM

## 2019-01-11 NOTE — PROGRESS NOTES
Date of Office Visit: 2019  Encounter Provider: Becky Melo MD  Place of Service: [facility] CARDIOLOGY  Patient Name: Esdras Ko  :1950      Patient ID:  Esdras Ko is a 68 y.o. male is here for  followup for         History of Present Illness       He has a past medical history of diabetes, COPD, stage III chronic kidney disease, hypertension, hyperlipidemia, anemia, gout, obstructive sleep apnea, history of TIA, history of seizures, history of myocardial infarction.         He is  and has 2 children.  He is retired.  He quit smoking .  He has one cup of coffee per day and uses no caffeine or alcohol.     There is heart disease in his mother, brother, father and strokes in his mother.  His brother has hypertension.     He had a TIA happened in .  He does not use CPAP for his sleep apnea but does use oxygen.  He does have history of seizure disorder and is no longer seeing a neurologist.  He has had Mantle cell lymphoma in the past requiring chemotherapy.  He is also had bladder cancer.  He is morbidly obese.     He was going for a colonoscopy at [facility] was found to have an abnormal ECG.       He had an echo done 18 showing LVEF 57% grade 1 diastolic dysfunction and mild concentric left hypertrophy.  He also had a PET stress study showing large apical and septal ischemia.  He went on for a cardiac catheterization done 18 showing normal left main, occluded proximal LAD filling late with diagonals, small caliber circumflex with diffuse 50-60% stenosis, large caliber RCA with 40-50% mid vessel stenosis and 40-50% distal stenosis.  It does give collaterals to the LAD.    He had a PET stress done 18 showing a large-sized, moderate-to-severe area of ischemia located in the apex and septal wall.  His echo showed mild LVH with LVEF 57%.      On 10/30/18, he received a 3.25 x 23 mm drug-eluting stent to the proximal  LAD.    He has no chest pain or nausea.  He's having some mild dizziness and lightheadedness.  His blood pressure is slightly low but he also has retinal edema been getting shots for this.  He has some loss of vision in his right eye.  He doesn't feels heart racing or skipping.  He has no orthopnea or PND.  He has no lower extremity edema.  His diuretics been very effective.  He is coming off of oxygen.     Past Medical History:   Diagnosis Date   • Anemia    • Arthritis     BACK   • Bladder cancer (CMS/HCC) 2008   • Bladder cancer (CMS/HCC)    • Cerebrovascular accident (CMS/HCC)     Ischemic and TIAs; most recent 02/2014 which was a TIA.   • Chronic kidney disease, stage 3 (CMS/HCC)     Dr. KATHERYN Lin   • COPD (chronic obstructive pulmonary disease) (CMS/HCC)    • Diabetes mellitus (CMS/HCC)    • Heart attack (CMS/HCC)    • History of blood transfusion    • Hyperlipidemia    • Hypertension    • Hyperuricemia    • Kidney failure     stage 3 kidney disease   • Left shoulder pain    • Lymphoma (CMS/HCC)     MANTLE CELL, HAD CHEMO  2010   • Lymphoma (CMS/HCC)    • Osteoarthritis    • Pilonidal cyst     SCHEDULED FOR SX   • Psoriasis    • Seizure (CMS/HCC)     Following head trauma in 1970   • Skin lesions     SCARRING FROM SHINGLES, & BLISTERS FROM EDEMA   • Sleep apnea     Intolerant of CPAP   • Stroke (CMS/HCC)    • Tobacco abuse          Past Surgical History:   Procedure Laterality Date   • BRONCHOSCOPY N/A 8/21/2017    Procedure: BRONCHOSCOPY with fluro and biopsy and lavage.;  Surgeon: Red Topete MD;  Location: [facility];  Service:    • CARDIAC CATHETERIZATION N/A 9/17/2018    Procedure: Coronary angiography;  Surgeon: Kylie Victoria MD;  Location: [facility] CATH INVASIVE LOCATION;  Service: Cardiovascular   • CARDIAC CATHETERIZATION N/A 10/30/2018    Procedure: Chronic Total Occlussion;  Surgeon: Roe Wheatley MD;  Location: [facility] CATH INVASIVE LOCATION;  Service: Cardiovascular   • CARDIAC CATHETERIZATION N/A  10/30/2018    Procedure: Stent AURA coronary;  Surgeon: Roe Wheatley MD;  Location: [facility] CATH INVASIVE LOCATION;  Service: Cardiovascular   • PILONIDAL CYSTECTOMY N/A 11/30/2016    Procedure: PILONIDAL CYSTECTOMY;  Surgeon: Roe Davila MD;  Location:  [facility] OR;  Service:    • SINUS SURGERY     • SKIN LESION EXCISION      DR. DAVILA ABOUT 10 YEARS AGO       Current Outpatient Medications on File Prior to Visit   Medication Sig Dispense Refill   • ADVAIR DISKUS 250-50 MCG/DOSE DISKUS Inhale 1 puff As Needed. inhale 1 dose by mouth twice a day  0   • allopurinol (ZYLOPRIM) 300 MG tablet Take 300 mg by mouth Every Morning. take 1 tablet by mouth daily  0   • AMITIZA 24 MCG capsule Take 24 mcg by mouth 2 (Two) Times a Day As Needed.  0   • amLODIPine (NORVASC) 10 MG tablet Take 10 mg by mouth Every Morning.  0   • aspirin 81 MG tablet Take 1 tablet by mouth Every Morning. 90 tablet 3   • atorvastatin (LIPITOR) 20 MG tablet Take 20 mg by mouth Daily.     • cetirizine (ZyrTEC) 10 MG tablet Take 10 mg by mouth Every Morning.     • clobetasol (TEMOVATE) 0.05 % cream apply to affected area (RASH FREELY) twice a day  0   • clopidogrel (PLAVIX) 75 MG tablet Take 75 mg by mouth Every Morning.  0   • D3-50 42582 UNITS capsule Take 50,000 Units by mouth Every 7 (Seven) Days. On mondays  0   • FLUoxetine (PROZAC) 20 MG capsule Take 20 mg by mouth Daily.     • furosemide (LASIX) 40 MG tablet Take 80 mg by mouth 2 (Two) Times a Day. take 2 tablets by mouth twice a day  0   • levETIRAcetam (KEPPRA) 1000 MG tablet Take 500 mg by mouth 2 (Two) Times a Day.  0   • linagliptin (TRADJENTA) 5 MG tablet tablet Take 1 tablet by mouth Daily. 30 tablet 3   • loratadine (CLARITIN) 10 MG tablet Take 10 mg by mouth Daily.     • metOLazone (ZAROXOLYN) 2.5 MG tablet Take 1 tablet by mouth See Admin Instructions. Take on Monday, thursday  0   • metoprolol tartrate (LOPRESSOR) 50 MG tablet Take 50 mg by mouth 2 (two) times a day.  0   • O2 (OXYGEN)  Inhale 2 L/min Daily.     • pantoprazole (PROTONIX) 40 MG EC tablet Take 40 mg by mouth Every Morning. take 1 tablet by mouth once daily  0   • potassium chloride (K-DUR,KLOR-CON) 20 MEQ CR tablet Take 40 mEq by mouth 4 (Four) Times a Day. 3 TABS BID AND 2 TABS ONCE A DAY  0   • ULORIC 80 MG tablet tablet Take 80 mg by mouth Daily.  0   • VENTOLIN  (90 BASE) MCG/ACT inhaler inhale 2 puffs by mouth four times a day as needed  0     No current facility-administered medications on file prior to visit.        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      Employer: RETIRED   Tobacco Use   • Smoking status: Former Smoker     Packs/day: 3.00     Years: 50.00     Pack years: 150.00     Types: Cigarettes     Last attempt to quit: 2000     Years since quittin.0   • Smokeless tobacco: Never Used   • Tobacco comment: 80 pack year   Substance and Sexual Activity   • Alcohol use: No     Comment: Heavy in past, none since around    • Drug use: No   • Sexual activity: Defer   Other Topics Concern   • Not on file   Social History Narrative   • Not on file           Review of Systems   Constitution: Negative.   HENT: Negative for congestion.    Eyes: Negative for vision loss in left eye and vision loss in right eye.   Respiratory: Negative.  Negative for cough, hemoptysis, shortness of breath, sleep disturbances due to breathing, snoring, sputum production and wheezing.    Endocrine: Negative.    Hematologic/Lymphatic: Negative.    Skin: Negative for poor  "wound healing and rash.   Musculoskeletal: Negative for falls, gout, muscle cramps and myalgias.   Gastrointestinal: Negative for abdominal pain, diarrhea, dysphagia, hematemesis, melena, nausea and vomiting.   Neurological: Negative for excessive daytime sleepiness, dizziness, headaches, light-headedness, loss of balance, seizures and vertigo.   Psychiatric/Behavioral: Negative for depression and substance abuse. The patient is not nervous/anxious.        Procedures    ECG 12 Lead  Date/Time: 1/11/2019 9:30 AM  Performed by: Becky Melo MD  Authorized by: Becky Melo MD   Comparison: compared with previous ECG   Similar to previous ECG  Rhythm: sinus rhythm  Conduction: LAFB  Q waves: V2, V3, V4, V5 and V6  Clinical impression: abnormal ECG                Objective:      Vitals:    01/11/19 0920   BP: 116/72   BP Location: Right arm   Patient Position: Sitting   Pulse: 66   Weight: 127 kg (281 lb)   Height: 172.7 cm (68\")     Body mass index is 42.73 kg/m².    Physical Exam   Constitutional: He is oriented to person, place, and time. He appears well-developed and well-nourished. No distress.   HENT:   Head: Normocephalic and atraumatic.   Eyes: Conjunctivae are normal. No scleral icterus.   Neck: Neck supple. No JVD present. Carotid bruit is not present. No thyromegaly present.   Cardiovascular: Normal rate, regular rhythm, S1 normal, S2 normal, normal heart sounds and intact distal pulses.  No extrasystoles are present. PMI is not displaced. Exam reveals no gallop.   No murmur heard.  Pulses:       Carotid pulses are 2+ on the right side, and 2+ on the left side.       Radial pulses are 2+ on the right side, and 2+ on the left side.        Dorsalis pedis pulses are 2+ on the right side, and 2+ on the left side.        Posterior tibial pulses are 2+ on the right side, and 2+ on the left side.   Pulmonary/Chest: Effort normal and breath sounds normal. No respiratory distress. He has no wheezes. " He has no rhonchi. He has no rales. He exhibits no tenderness.   Abdominal: Soft. Bowel sounds are normal. He exhibits no distension, no abdominal bruit and no mass. There is no tenderness.   Musculoskeletal: He exhibits no edema or deformity.   Lymphadenopathy:     He has no cervical adenopathy.   Neurological: He is alert and oriented to person, place, and time. No cranial nerve deficit.   Skin: Skin is warm and dry. No rash noted. He is not diaphoretic. No cyanosis. No pallor. Nails show no clubbing.   Psychiatric: He has a normal mood and affect. Judgment normal.   Vitals reviewed.      Lab Review:       Assessment:      Diagnosis Plan   1. Coronary artery disease of native artery of native heart with stable angina pectoris (CMS/[facility])     2. Type 2 diabetes mellitus with other circulatory complication, without long-term current use of insulin (CMS/[facility])     3. Chronic obstructive pulmonary disease, unspecified COPD type (CMS/[facility])     4. Chronic kidney disease, unspecified CKD stage       1. Proximal LAD occlusion with severe dyspnea on exertion, s/p LAD stent 10/2018 for  with Dr. Wheatley. no angina.  plavix for 1 year.    2. COPD requiring oxygen which he only uses at night.   3. CHIQUIS, uses nocturnal oxygen. Is off daytime oxygen.   4. Hypertension, well controlled.   5. Hyperlipidemia. On atorvastatin.   6. H/o mantle cell lymphoma and bladder cancer.   7. DM  8. Morbid obesity.   9. H/o TIA  10. Seizure disorder.   11. CKD.           Plan:       F/u with jan in 3 months, stop zaroxolyn and decrease amlodipine to 5mg daily for low BP and dizziness. Watch for volume overload.     Coronary Artery Disease  Assessment  • The patient has no angina    Subjective - Objective  • There has been a previous stent procedure using AURA  • Current antiplatelet therapy includes aspirin 81 mg and clopidogrel 75 mg

## 2019-02-19 ENCOUNTER — OFFICE VISIT (OUTPATIENT)
Dept: ONCOLOGY | Facility: CLINIC | Age: 69
End: 2019-02-19

## 2019-02-19 ENCOUNTER — LAB (OUTPATIENT)
Dept: LAB | Facility: HOSPITAL | Age: 69
End: 2019-02-19

## 2019-02-19 VITALS
HEIGHT: 68 IN | BODY MASS INDEX: 42.95 KG/M2 | WEIGHT: 283.4 LBS | TEMPERATURE: 97.7 F | RESPIRATION RATE: 14 BRPM | HEART RATE: 71 BPM | DIASTOLIC BLOOD PRESSURE: 80 MMHG | SYSTOLIC BLOOD PRESSURE: 128 MMHG | OXYGEN SATURATION: 94 %

## 2019-02-19 DIAGNOSIS — D69.6 THROMBOCYTOPENIA (HCC): ICD-10-CM

## 2019-02-19 DIAGNOSIS — C83.10 MANTLE CELL LYMPHOMA, UNSPECIFIED BODY REGION (HCC): ICD-10-CM

## 2019-02-19 DIAGNOSIS — C83.10 MANTLE CELL LYMPHOMA, UNSPECIFIED BODY REGION (HCC): Primary | ICD-10-CM

## 2019-02-19 LAB
ALBUMIN SERPL-MCNC: 4.1 G/DL (ref 3.5–5.2)
ALBUMIN/GLOB SERPL: 1.8 G/DL
ALP SERPL-CCNC: 107 U/L (ref 40–129)
ALT SERPL W P-5'-P-CCNC: 22 U/L (ref 5–41)
ANION GAP SERPL CALCULATED.3IONS-SCNC: 7.6 MMOL/L
AST SERPL-CCNC: 19 U/L (ref 5–40)
BASOPHILS # BLD AUTO: 0.04 10*3/MM3 (ref 0–0.2)
BASOPHILS NFR BLD AUTO: 0.5 % (ref 0–1.5)
BILIRUB SERPL-MCNC: 0.6 MG/DL (ref 0.2–1.2)
BUN BLD-MCNC: 15 MG/DL (ref 8–23)
BUN/CREAT SERPL: 13.8 (ref 7–25)
CALCIUM SPEC-SCNC: 9.3 MG/DL (ref 8.8–10.5)
CHLORIDE SERPL-SCNC: 102 MMOL/L (ref 98–107)
CO2 SERPL-SCNC: 31.4 MMOL/L (ref 22–29)
CREAT BLD-MCNC: 1.09 MG/DL (ref 0.76–1.27)
DEPRECATED RDW RBC AUTO: 46.6 FL (ref 37–54)
EOSINOPHIL # BLD AUTO: 0.28 10*3/MM3 (ref 0–0.4)
EOSINOPHIL NFR BLD AUTO: 3.7 % (ref 0.3–6.2)
ERYTHROCYTE [DISTWIDTH] IN BLOOD BY AUTOMATED COUNT: 14.6 % (ref 12.3–15.4)
GFR SERPL CREATININE-BSD FRML MDRD: 67 ML/MIN/1.73
GLOBULIN UR ELPH-MCNC: 2.3 GM/DL
GLUCOSE BLD-MCNC: 108 MG/DL (ref 65–99)
HCT VFR BLD AUTO: 52 % (ref 37.5–51)
HGB BLD-MCNC: 16.9 G/DL (ref 13–17.7)
IMM GRANULOCYTES # BLD AUTO: 0.05 10*3/MM3 (ref 0–0.05)
IMM GRANULOCYTES NFR BLD AUTO: 0.7 % (ref 0–0.5)
LDH SERPL-CCNC: 178 U/L (ref 135–225)
LYMPHOCYTES # BLD AUTO: 1.04 10*3/MM3 (ref 0.7–3.1)
LYMPHOCYTES NFR BLD AUTO: 13.6 % (ref 19.6–45.3)
MCH RBC QN AUTO: 28.4 PG (ref 26.6–33)
MCHC RBC AUTO-ENTMCNC: 32.5 G/DL (ref 31.5–35.7)
MCV RBC AUTO: 87.4 FL (ref 79–97)
MONOCYTES # BLD AUTO: 0.85 10*3/MM3 (ref 0.1–0.9)
MONOCYTES NFR BLD AUTO: 11.1 % (ref 5–12)
NEUTROPHILS # BLD AUTO: 5.4 10*3/MM3 (ref 1.4–7)
NEUTROPHILS NFR BLD AUTO: 70.4 % (ref 42.7–76)
NRBC BLD AUTO-RTO: 0 /100 WBC (ref 0–0)
PLATELET # BLD AUTO: 130 10*3/MM3 (ref 140–450)
PMV BLD AUTO: 10.9 FL (ref 6–12)
POTASSIUM BLD-SCNC: 4.7 MMOL/L (ref 3.5–5.2)
PROT SERPL-MCNC: 6.4 G/DL (ref 6–8.5)
RBC # BLD AUTO: 5.95 10*6/MM3 (ref 4.14–5.8)
SODIUM BLD-SCNC: 141 MMOL/L (ref 136–145)
WBC NRBC COR # BLD: 7.66 10*3/MM3 (ref 3.4–10.8)

## 2019-02-19 PROCEDURE — 85025 COMPLETE CBC W/AUTO DIFF WBC: CPT | Performed by: INTERNAL MEDICINE

## 2019-02-19 PROCEDURE — 83615 LACTATE (LD) (LDH) ENZYME: CPT | Performed by: INTERNAL MEDICINE

## 2019-02-19 PROCEDURE — 36415 COLL VENOUS BLD VENIPUNCTURE: CPT | Performed by: INTERNAL MEDICINE

## 2019-02-19 PROCEDURE — 99213 OFFICE O/P EST LOW 20 MIN: CPT | Performed by: INTERNAL MEDICINE

## 2019-02-19 PROCEDURE — 80053 COMPREHEN METABOLIC PANEL: CPT | Performed by: INTERNAL MEDICINE

## 2019-02-19 NOTE — PROGRESS NOTES
Subjective   REASONS FOR FOLLOW-UP:   History of mantle cell lymphoma.       History of Present Illness    Mr. Ko is a 68-year-old man returning for followup of his history of mantle cell lymphoma previously treated with bendamustine/rituximab followed by maintenance Rituxan through the winter of 2013.   He has had no therapy or disease progression since that time.     He returned today for a six-month follow-up and review.  He denies unusual weight loss, night sweats or lymphadenopathy.  His wife states that he underwent cardiac catheterization with stent placement since his last visit.    PAST MEDICAL HISTORY:   1. Seizure disorder after a head trauma in .   2. Bladder cancer diagnosed in  for which he gets annual cystoscopy, currently with no evidence of disease.   3. Chronic obstructive pulmonary disease.   4. Psoriasis.   5. Hypertension.   6. Chronic renal insufficiency complicated by acute renal failure secondary to tumor lysis in 2011.   7. Ischemic cerebrovascular accidents and TIAs with the most recent event in 2014, which was a TIA.   8. History of tobacco abuse.   9. Coronary artery disease        SOCIAL HISTORY: He is  and retired mostly from factory work. He quit smoking tobacco around  after 80 pack-year. He drank heavily in the past but none since around . Denies illicit drug use. He has had prior blood transfusions.     FAMILY HISTORY: His father had pancreatic cancer and  at age 56. Sister has anemia. Mother had heart disease. Brother has hypertension.       Review of Systems   Constitutional: Positive for fatigue. Negative for activity change and unexpected weight change.   Respiratory: Negative for shortness of breath.    Cardiovascular: Positive for leg swelling. Negative for chest pain and palpitations.   Gastrointestinal: Negative for abdominal distention, abdominal pain and blood in stool.   Musculoskeletal: Negative.    Skin: Negative for  "pallor and rash.   Neurological: Negative for weakness.   Hematological: Negative for adenopathy.   Psychiatric/Behavioral: Negative.           Medications:  The current medication list was reviewed in the EMR    ALLERGIES:    Allergies   Allergen Reactions   • Amoxicillin Hives   • Ceftin [Cefuroxime Axetil] Hives   • Codeine    • Flonase [Fluticasone] Hives   • Pharbetol [Acetaminophen] Hives   • Spiriva Handihaler [Tiotropium Bromide Monohydrate] Hives   • Sulfa Antibiotics Hives   • Phenobarbital Rash       Objective      Vitals:    02/19/19 1025   BP: 128/80   Pulse: 71   Resp: 14   Temp: 97.7 °F (36.5 °C)   TempSrc: Oral   SpO2: 94%   Weight: 129 kg (283 lb 6.4 oz)   Height: 172.7 cm (67.99\")   PainSc:   2   PainLoc: Abdomen  Comment: Pain comes and goes.     Current Status 2/19/2019   ECOG score 1       Physical Exam   Constitutional: He appears well-nourished.   Morbidly obese   Cardiovascular: Normal rate and regular rhythm.   Pulmonary/Chest: Effort normal. No respiratory distress.   Abdominal: Soft. He exhibits no mass.   Musculoskeletal: He exhibits edema.   Lymphadenopathy:     He has no cervical adenopathy.     He has no axillary adenopathy.        Right: No supraclavicular adenopathy present.        Left: No supraclavicular adenopathy present.   Skin: No erythema. No pallor.        Exam unchanged-2/19/19    RECENT LABS:  Hematology WBC   Date Value Ref Range Status   02/19/2019 7.66 3.40 - 10.80 10*3/mm3 Final     RBC   Date Value Ref Range Status   02/19/2019 5.95 (H) 4.14 - 5.80 10*6/mm3 Final     Hemoglobin   Date Value Ref Range Status   02/19/2019 16.9 13.0 - 17.7 g/dL Final     Hematocrit   Date Value Ref Range Status   02/19/2019 52.0 (H) 37.5 - 51.0 % Final     Platelets   Date Value Ref Range Status   02/19/2019 130 (L) 140 - 450 10*3/mm3 Final     Lab Results   Component Value Date    GLUCOSE 107 (H) 10/26/2018    BUN 25 (H) 10/26/2018    CREATININE 1.26 10/26/2018    EGFRIFNONA 57 (L) " 10/26/2018    EGFRIFAFRI  08/30/2016      Comment:      <15 Indicative of kidney failure.    BCR 19.8 10/26/2018    CO2 29.9 (H) 10/26/2018    CALCIUM 9.3 10/26/2018    ALBUMIN 4.60 08/21/2018    AST 20 08/21/2018    ALT 25 08/21/2018              Assessment/Plan     Mr. Ko is a 65-year-old man returning for followup of his history mantle cell lymphoma with bone marrow involvement at diagnosis. He was treated with bendamustine/rituximab followed by maintenance rituximab completed in the winter of 2013.     His most recent scans were performed in July 2017 with no lymphadenopathy or splenomegaly noted.    In return today, the patient had no concerning signs or symptoms of recurrent lymphoma.  Continued observation recommended.  Six-month follow-up requested with labs.    The patient has mild, intermittent thrombocytopenia.  Platelet count today stable 1 30,000 with no symptoms.  Platelet count is adequate to continue antiplatelet medication.            2/19/2019      CC:

## 2019-04-12 NOTE — PROGRESS NOTES
Subjective:     Encounter Date:04/15/2019      Patient ID: Esdras Ko is a 68 y.o. male.    Chief Complaint: CAD  History of Present Illness   He is a new patient to me and I have reviewed his past medical records.  He is a patient of Dr. Melo.  History brought forward for continuity:    He has a past medical history of diabetes, COPD, stage III chronic kidney disease, hypertension, hyperlipidemia, anemia, gout, obstructive sleep apnea, history of TIA, history of seizures, history of myocardial infarction.        He is  and has 2 children.  He is retired.  He quit smoking 2000.  He has one cup of coffee per day and uses no caffeine or alcohol.     There is heart disease in his mother, brother, father and strokes in his mother.  His brother has hypertension.     He had a TIA happened in 2017.  He does not use CPAP for his sleep apnea but does use oxygen.  He does have history of seizure disorder and is no longer seeing a neurologist.  He has had Mantle cell lymphoma in the past requiring chemotherapy.  He is also had bladder cancer.  He is morbidly obese.     He was going for a colonoscopy at [facility] was found to have an abnormal ECG.       He had an echo done 9/13/18 showing LVEF 57% grade 1 diastolic dysfunction and mild concentric left hypertrophy.  He also had a PET stress study showing large apical and septal ischemia.  He went on for a cardiac catheterization done 9/17/18 showing normal left main, occluded proximal LAD filling late with diagonals, small caliber circumflex with diffuse 50-60% stenosis, large caliber RCA with 40-50% mid vessel stenosis and 40-50% distal stenosis.  It does give collaterals to the LAD.     He had a PET stress done 9/13/18 showing a large-sized, moderate-to-severe area of ischemia located in the apex and septal wall.  His echo showed mild LVH with LVEF 57%.      On 10/30/18, he received a 3.25 x 23 mm drug-eluting stent to the proximal LAD.    He  presents today, 4/15/19, for a 3 month evaluation of his cardiac status.  He denies any palpitations, chest pain or chest tightness.  He will get short of breath with minimal activity.  He denies any edema.  He states he has episodes of lightheadedness on and off with positional changes, this is not new.  He states he has no energy is very weak and fatigued.  He denies any leg pain, numbness or tingling in his upper or lower extremities.  He states that he does snore and is had a sleep study in the past that was positive for CHIQUIS.  He is unable to use the CPAP machine.  He states that he is up and down all night and only sleeps for 30 minutes to a couple hours at a time.  He is no longer on oxygen at night.  He states that he had an overnight pulse oximetry test approximately 2 months ago and the oxygen was no longer required.  He had gotten confused about his aspirin dose and thought he was supposed to stop it.  He was taking 325 mg once a day and was instructed to change that to 81 mg a day but he did not.  He has continued to take his Plavix daily.    The following portions of the patient's history were reviewed and updated as appropriate: allergies, current medications, past family history, past medical history, past social history, past surgical history and problem list.    Review of Systems   Constitution: Negative for weakness and malaise/fatigue.   HENT: Negative for congestion, hoarse voice and sore throat.    Eyes: Negative for blurred vision, double vision, photophobia, vision loss in left eye and vision loss in right eye.   Cardiovascular: Negative for chest pain, dyspnea on exertion, irregular heartbeat, leg swelling, near-syncope, orthopnea, palpitations, paroxysmal nocturnal dyspnea and syncope.   Respiratory: Negative for cough, hemoptysis, shortness of breath, sleep disturbances due to breathing, snoring, sputum production and wheezing.    Endocrine: Negative.    Hematologic/Lymphatic: Does not  bruise/bleed easily.   Skin: Negative for color change, dry skin, poor wound healing and rash.   Musculoskeletal: Negative for back pain, falls, gout, joint pain, joint swelling, muscle cramps and muscle weakness.   Gastrointestinal: Negative for abdominal pain, constipation, diarrhea, dysphagia, melena, nausea and vomiting.   Neurological: Negative for excessive daytime sleepiness, dizziness, headaches, light-headedness, loss of balance, numbness, paresthesias, seizures and vertigo.   Psychiatric/Behavioral: Negative for depression and substance abuse. The patient is not nervous/anxious.        ECG 12 Lead  Date/Time: 4/15/2019 11:19 AM  Performed by: Ekta Mayorga APRN  Authorized by: Ekta Mayorga APRN   Comparison: compared with previous ECG from 1/11/2019  Similar to previous ECG  Rhythm: sinus rhythm  Rate: normal  Conduction: left anterior fascicular block  ST Elevation: I, aVL, V2, V3, V4, V5 and V6  QRS axis: left    Clinical impression: abnormal EKG  Comments: D/W                Objective:         Physical Exam   Constitutional: He is oriented to person, place, and time. Vital signs are normal. Obese  HENT:   Head: Normocephalic and atraumatic.   Right Ear: Hearing normal.   Left Ear: Hearing normal.   Eyes: Conjunctivae and lids are normal.   Neck: Normal range of motion. Neck supple. No JVD present. Carotid bruit is not present. No thyromegaly present.   Cardiovascular: Normal rate, regular rhythm, S1 normal, S2 normal, normal heart sounds and intact distal pulses.  PMI is not displaced.  Exam reveals no gallop.    No murmur heard.  Pulses:       Carotid pulses are 2+ on the right side, and 2+ on the left side.       Radial pulses are 2+ on the right side, and 2+ on the left side.        Dorsalis pedis pulses are 2+ on the right side, and 2+ on the left side.        Posterior tibial pulses are 2+ on the right side, and 2+ on the left side.   Pulmonary/Chest: Effort normal and breath sounds normal. No  "respiratory distress. He has no wheezes. He has no rhonchi. He has no rales. He exhibits no tenderness.   Abdominal: Soft. Normal appearance and bowel sounds are normal. He exhibits no distension, no abdominal bruit and no mass. There is no tenderness.   Musculoskeletal: Normal range of motion.   Exhibits no  deformity   Mild LE edema  Lymphadenopathy:     He has no cervical adenopathy.   Neurological: He is alert and oriented to person, place, and time. No cranial nerve deficit. Coordination and gait normal.   Oriented to person, place and time.   Skin: Skin is warm, dry and intact. No rash noted. He is not diaphoretic. No cyanosis. Nails show no clubbing.   Psychiatric: He has a normal mood and affect. His speech is normal and behavior is normal. Judgment and thought content normal. Cognition and memory are normal.     Vitals:    04/15/19 0855 04/15/19 0922   BP: 142/82 122/82   BP Location: Right arm Right arm   Patient Position:  Sitting   Pulse: 70    SpO2: 93%    Weight: 132 kg (291 lb)    Height: 170.2 cm (67\")            Lab Review:       Assessment:          Diagnosis Plan   1. Coronary artery disease of native artery of native heart with stable angina pectoris (CMS/[facility])     2. Type 2 diabetes mellitus with other circulatory complication, without long-term current use of insulin (CMS/[facility])     3. Chronic obstructive pulmonary disease, unspecified COPD type (CMS/[facility])     4. Morbid obesity with BMI of 45.0-49.9, adult (CMS/[facility])            Plan:       1.  CAD - no angina.  Proximal LAD occlusion with severe dyspnea on exertion, s/p LAD stent 10/18 for  with Dr. Wheatley.  Start 81 mg ASA, plavix for 1 year.  Coronary Artery Disease  Assessment  • The patient has no angina    Plan  • Lifestyle modifications discussed include adhering to a heart healthy diet and medication compliance  • Start 81 mg ASA    Subjective - Objective  • There has been a previous stent procedure using AURA  • Current antiplatelet therapy " includes clopidogrel 75 mg      2.  DM type II - treated    3.  COPD - home oxygen has been discontinued for about 2 months.  Wife states he had a normal overnight pulse oximetry test.     4.  Morbid obesity     5.  CHIQUIS - non compliant with CPAP.  Up and down all night, no energy, chronic fatigue, no longer has home 02.  Follow up with pulmonology/sleep medicine    6.  HTN - controlled    7.  Hyperlipidemia - on lipitor  8.  H/o mantle cell lymphoma and bladder cancer.   9.  H/o TIA  10. Seizure disorder.  11.  CKD.       RTO in 3-4 months with RM    EDDIE Dominique        Current Outpatient Medications:   •  ADVAIR DISKUS 250-50 MCG/DOSE DISKUS, Inhale 1 puff As Needed. inhale 1 dose by mouth twice a day, Disp: , Rfl: 0  •  allopurinol (ZYLOPRIM) 300 MG tablet, Take 300 mg by mouth Every Morning. take 1 tablet by mouth daily, Disp: , Rfl: 0  •  AMITIZA 24 MCG capsule, Take 24 mcg by mouth 2 (Two) Times a Day As Needed., Disp: , Rfl: 0  •  amLODIPine (NORVASC) 5 MG tablet, Take 2.5 mg by mouth Every Morning., Disp: 90 tablet, Rfl: 3  •  atorvastatin (LIPITOR) 20 MG tablet, Take 20 mg by mouth Daily., Disp: , Rfl:   •  cetirizine (ZyrTEC) 10 MG tablet, Take 10 mg by mouth Every Morning., Disp: , Rfl:   •  clobetasol (TEMOVATE) 0.05 % cream, apply to affected area (RASH FREELY) twice a day, Disp: , Rfl: 0  •  clopidogrel (PLAVIX) 75 MG tablet, Take 75 mg by mouth Every Morning., Disp: , Rfl: 0  •  FLUoxetine (PROZAC) 20 MG capsule, Take 20 mg by mouth Daily., Disp: , Rfl:   •  furosemide (LASIX) 40 MG tablet, Take 40 mg by mouth 2 (Two) Times a Day. take 2 tablets by mouth twice a day, Disp: , Rfl: 0  •  levETIRAcetam (KEPPRA) 1000 MG tablet, Take 500 mg by mouth 2 (Two) Times a Day., Disp: , Rfl: 0  •  linagliptin (TRADJENTA) 5 MG tablet tablet, Take 1 tablet by mouth Daily. (Patient taking differently: Take 5 mg by mouth Daily. 1/2 tab daily), Disp: 30 tablet, Rfl: 3  •  loratadine (CLARITIN) 10 MG tablet, Take 10  mg by mouth Daily., Disp: , Rfl:   •  metoprolol tartrate (LOPRESSOR) 50 MG tablet, Take 50 mg by mouth 2 (two) times a day., Disp: , Rfl: 0  •  pantoprazole (PROTONIX) 40 MG EC tablet, Take 40 mg by mouth Every Morning. take 1 tablet by mouth once daily, Disp: , Rfl: 0  •  potassium chloride (K-DUR,KLOR-CON) 20 MEQ CR tablet, Take 40 mEq by mouth 4 (Four) Times a Day. 3 TABS BID AND 2 TABS ONCE A DAY, Disp: , Rfl: 0  •  ULORIC 80 MG tablet tablet, Take 80 mg by mouth Daily., Disp: , Rfl: 0  •  VENTOLIN  (90 BASE) MCG/ACT inhaler, inhale 2 puffs by mouth four times a day as needed, Disp: , Rfl: 0  •  aspirin 81 MG tablet, Take 1 tablet by mouth Every Morning., Disp: 90 tablet, Rfl: 3  •  D3-50 31973 UNITS capsule, Take 50,000 Units by mouth Every 7 (Seven) Days. On mondays, Disp: , Rfl: 0  •  O2 (OXYGEN), Inhale 2 L/min Daily., Disp: , Rfl:

## 2019-04-15 ENCOUNTER — OFFICE VISIT (OUTPATIENT)
Dept: CARDIOLOGY | Facility: CLINIC | Age: 69
End: 2019-04-15

## 2019-04-15 VITALS
OXYGEN SATURATION: 93 % | HEIGHT: 67 IN | DIASTOLIC BLOOD PRESSURE: 82 MMHG | WEIGHT: 291 LBS | HEART RATE: 70 BPM | SYSTOLIC BLOOD PRESSURE: 122 MMHG | BODY MASS INDEX: 45.67 KG/M2

## 2019-04-15 DIAGNOSIS — E66.01 MORBID OBESITY WITH BMI OF 45.0-49.9, ADULT (HCC): ICD-10-CM

## 2019-04-15 DIAGNOSIS — E11.59 TYPE 2 DIABETES MELLITUS WITH OTHER CIRCULATORY COMPLICATION, WITHOUT LONG-TERM CURRENT USE OF INSULIN (HCC): ICD-10-CM

## 2019-04-15 DIAGNOSIS — J44.9 CHRONIC OBSTRUCTIVE PULMONARY DISEASE, UNSPECIFIED COPD TYPE (HCC): ICD-10-CM

## 2019-04-15 DIAGNOSIS — I25.118 CORONARY ARTERY DISEASE OF NATIVE ARTERY OF NATIVE HEART WITH STABLE ANGINA PECTORIS (HCC): Primary | ICD-10-CM

## 2019-04-15 PROCEDURE — 99214 OFFICE O/P EST MOD 30 MIN: CPT | Performed by: NURSE PRACTITIONER

## 2019-04-15 PROCEDURE — 93000 ELECTROCARDIOGRAM COMPLETE: CPT | Performed by: NURSE PRACTITIONER

## 2019-07-16 ENCOUNTER — TELEPHONE (OUTPATIENT)
Dept: GASTROENTEROLOGY | Facility: CLINIC | Age: 69
End: 2019-07-16

## 2019-07-16 ENCOUNTER — TELEPHONE (OUTPATIENT)
Dept: CARDIOLOGY | Facility: CLINIC | Age: 69
End: 2019-07-16

## 2019-07-16 NOTE — TELEPHONE ENCOUNTER
WIFE CALLED TO SCHEDULE COLONOSCOPY.  SCHEDULED [place] 10/09/2019 AT 1:15PM - ARRIVE 12:15PM.  WILL MAIL INSTRUCTIONS ONCE WE RECEIVE CLEARANCE FROM DR. Ch.

## 2019-07-16 NOTE — TELEPHONE ENCOUNTER
07/16/19  4:32 PM  Esdras Ko  1950  Home Phone [phone]       Esdras Ko will be having  Colonoscopy  with Dr Priest on 10/9/2019. They are calling for cardiac clearance.     Pt is on Plavix and Asa. Does pt need to hold his blood thinner and how long?.....Please advise      Thanks  Gifty FREY

## 2019-08-20 ENCOUNTER — HOSPITAL ENCOUNTER (OUTPATIENT)
Dept: ULTRASOUND IMAGING | Facility: HOSPITAL | Age: 69
Discharge: HOME | End: 2019-08-20
Admitting: INTERNAL MEDICINE

## 2019-08-20 ENCOUNTER — OFFICE VISIT (OUTPATIENT)
Dept: ONCOLOGY | Facility: CLINIC | Age: 69
End: 2019-08-20

## 2019-08-20 ENCOUNTER — LAB (OUTPATIENT)
Dept: LAB | Facility: HOSPITAL | Age: 69
End: 2019-08-20

## 2019-08-20 VITALS
WEIGHT: 296.5 LBS | HEART RATE: 70 BPM | TEMPERATURE: 98.9 F | BODY MASS INDEX: 46.54 KG/M2 | RESPIRATION RATE: 16 BRPM | DIASTOLIC BLOOD PRESSURE: 81 MMHG | OXYGEN SATURATION: 92 % | SYSTOLIC BLOOD PRESSURE: 125 MMHG | HEIGHT: 67 IN

## 2019-08-20 DIAGNOSIS — M79.89 LEFT LEG SWELLING: ICD-10-CM

## 2019-08-20 DIAGNOSIS — C83.10 MANTLE CELL LYMPHOMA, UNSPECIFIED BODY REGION (HCC): ICD-10-CM

## 2019-08-20 DIAGNOSIS — C83.10 MANTLE CELL LYMPHOMA, UNSPECIFIED BODY REGION (HCC): Primary | ICD-10-CM

## 2019-08-20 DIAGNOSIS — D69.6 THROMBOCYTOPENIA (HCC): ICD-10-CM

## 2019-08-20 LAB
ALBUMIN SERPL-MCNC: 4.3 G/DL (ref 3.5–5.2)
ALBUMIN/GLOB SERPL: 1.7 G/DL
ALP SERPL-CCNC: 138 U/L (ref 39–117)
ALT SERPL W P-5'-P-CCNC: 22 U/L (ref 1–41)
ANION GAP SERPL CALCULATED.3IONS-SCNC: 10.4 MMOL/L (ref 5–15)
AST SERPL-CCNC: 18 U/L (ref 1–40)
BASOPHILS # BLD AUTO: 0.04 10*3/MM3 (ref 0–0.2)
BASOPHILS NFR BLD AUTO: 0.5 % (ref 0–1.5)
BILIRUB SERPL-MCNC: 0.9 MG/DL (ref 0.2–1.2)
BUN BLD-MCNC: 21 MG/DL (ref 8–23)
BUN/CREAT SERPL: 16 (ref 7–25)
CALCIUM SPEC-SCNC: 9.4 MG/DL (ref 8.6–10.5)
CHLORIDE SERPL-SCNC: 102 MMOL/L (ref 98–107)
CO2 SERPL-SCNC: 29.6 MMOL/L (ref 22–29)
CREAT BLD-MCNC: 1.31 MG/DL (ref 0.76–1.27)
DEPRECATED RDW RBC AUTO: 46.3 FL (ref 37–54)
EOSINOPHIL # BLD AUTO: 0.3 10*3/MM3 (ref 0–0.4)
EOSINOPHIL NFR BLD AUTO: 3.9 % (ref 0.3–6.2)
ERYTHROCYTE [DISTWIDTH] IN BLOOD BY AUTOMATED COUNT: 14.6 % (ref 12.3–15.4)
GFR SERPL CREATININE-BSD FRML MDRD: 54 ML/MIN/1.73
GLOBULIN UR ELPH-MCNC: 2.6 GM/DL
GLUCOSE BLD-MCNC: 104 MG/DL (ref 65–99)
HCT VFR BLD AUTO: 51.9 % (ref 37.5–51)
HGB BLD-MCNC: 17.1 G/DL (ref 13–17.7)
IMM GRANULOCYTES # BLD AUTO: 0.07 10*3/MM3 (ref 0–0.05)
IMM GRANULOCYTES NFR BLD AUTO: 0.9 % (ref 0–0.5)
LDH SERPL-CCNC: 214 U/L (ref 135–225)
LYMPHOCYTES # BLD AUTO: 1.2 10*3/MM3 (ref 0.7–3.1)
LYMPHOCYTES NFR BLD AUTO: 15.4 % (ref 19.6–45.3)
MCH RBC QN AUTO: 29.3 PG (ref 26.6–33)
MCHC RBC AUTO-ENTMCNC: 32.9 G/DL (ref 31.5–35.7)
MCV RBC AUTO: 89 FL (ref 79–97)
MONOCYTES # BLD AUTO: 0.72 10*3/MM3 (ref 0.1–0.9)
MONOCYTES NFR BLD AUTO: 9.3 % (ref 5–12)
NEUTROPHILS # BLD AUTO: 5.45 10*3/MM3 (ref 1.7–7)
NEUTROPHILS NFR BLD AUTO: 70 % (ref 42.7–76)
NRBC BLD AUTO-RTO: 0 /100 WBC (ref 0–0.2)
PLATELET # BLD AUTO: 137 10*3/MM3 (ref 140–450)
PMV BLD AUTO: 10.6 FL (ref 6–12)
POTASSIUM BLD-SCNC: 4.3 MMOL/L (ref 3.5–5.2)
PROT SERPL-MCNC: 6.9 G/DL (ref 6–8.5)
RBC # BLD AUTO: 5.83 10*6/MM3 (ref 4.14–5.8)
SODIUM BLD-SCNC: 142 MMOL/L (ref 136–145)
WBC NRBC COR # BLD: 7.78 10*3/MM3 (ref 3.4–10.8)

## 2019-08-20 PROCEDURE — 83615 LACTATE (LD) (LDH) ENZYME: CPT

## 2019-08-20 PROCEDURE — 93971 EXTREMITY STUDY: CPT

## 2019-08-20 PROCEDURE — 85025 COMPLETE CBC W/AUTO DIFF WBC: CPT

## 2019-08-20 PROCEDURE — 36415 COLL VENOUS BLD VENIPUNCTURE: CPT

## 2019-08-20 PROCEDURE — 80053 COMPREHEN METABOLIC PANEL: CPT

## 2019-08-20 PROCEDURE — 99214 OFFICE O/P EST MOD 30 MIN: CPT | Performed by: INTERNAL MEDICINE

## 2019-08-20 NOTE — PROGRESS NOTES
Subjective   REASONS FOR FOLLOW-UP:   History of mantle cell lymphoma.       History of Present Illness    Mr. Ko is a 68-year-old man returning for followup of his history of mantle cell lymphoma previously treated with bendamustine/rituximab followed by maintenance Rituxan through the winter of 2013.   He has had no therapy or disease progression since that time.     He returned today for a six-month follow-up and review.  He denies unusual weight loss, night sweats or progression of lymphadenopathy.  The patient recently experienced a fall with injury to the left lower extremity resulting in swelling and bruising.  He complains of mild shortness of breath but no chest pain.  He feels shortness of breath is heat related.    PAST MEDICAL HISTORY:   1. Seizure disorder after a head trauma in .   2. Bladder cancer diagnosed in  for which he gets annual cystoscopy, currently with no evidence of disease.   3. Chronic obstructive pulmonary disease.   4. Psoriasis.   5. Hypertension.   6. Chronic renal insufficiency complicated by acute renal failure secondary to tumor lysis in 2011.   7. Ischemic cerebrovascular accidents and TIAs with the most recent event in 2014, which was a TIA.   8. History of tobacco abuse.   9. Coronary artery disease        SOCIAL HISTORY: He is  and retired mostly from factory work. He quit smoking tobacco around  after 80 pack-year. He drank heavily in the past but none since around . Denies illicit drug use. He has had prior blood transfusions.     FAMILY HISTORY: His father had pancreatic cancer and  at age 56. Sister has anemia. Mother had heart disease. Brother has hypertension.       Review of Systems   Constitutional: Positive for fatigue. Negative for activity change and unexpected weight change.   Respiratory: Positive for chest tightness and shortness of breath.    Cardiovascular: Positive for leg swelling. Negative for chest pain and  "palpitations.   Gastrointestinal: Negative for abdominal distention, abdominal pain and blood in stool.   Musculoskeletal: Negative.    Skin: Negative for pallor and rash.   Neurological: Negative for weakness.   Hematological: Negative for adenopathy.   Psychiatric/Behavioral: Negative.           Medications:  The current medication list was reviewed in the EMR    ALLERGIES:    Allergies   Allergen Reactions   • Amoxicillin Hives   • Ceftin [Cefuroxime Axetil] Hives   • Codeine    • Flonase [Fluticasone] Hives   • Pharbetol [Acetaminophen] Hives   • Spiriva Handihaler [Tiotropium Bromide Monohydrate] Hives   • Sulfa Antibiotics Hives   • Phenobarbital Rash       Objective      Vitals:    08/20/19 0907   BP: 125/81   Pulse: 70   Resp: 16   Temp: 98.9 °F (37.2 °C)   TempSrc: Oral   SpO2: 92%   Weight: 134 kg (296 lb 8 oz)   Height: 170.2 cm (67.01\")   PainSc:   8   PainLoc: Generalized  Comment: all over pain     Current Status 8/20/2019   ECOG score 1       Physical Exam   Constitutional: He appears well-nourished.   Morbidly obese   Cardiovascular: Normal rate and regular rhythm.   Pulmonary/Chest: Effort normal. No respiratory distress.   Abdominal: Soft. He exhibits no mass.   Musculoskeletal: He exhibits edema (Left lower extremity more swollen than the right).   Lymphadenopathy:     He has no cervical adenopathy.     He has no axillary adenopathy.        Right: No supraclavicular adenopathy present.        Left: No supraclavicular adenopathy present.   Skin: No erythema. No pallor.   Bruising along the left leg            RECENT LABS:  Hematology WBC   Date Value Ref Range Status   08/20/2019 7.78 3.40 - 10.80 10*3/mm3 Final     RBC   Date Value Ref Range Status   08/20/2019 5.83 (H) 4.14 - 5.80 10*6/mm3 Final     Hemoglobin   Date Value Ref Range Status   08/20/2019 17.1 13.0 - 17.7 g/dL Final     Hematocrit   Date Value Ref Range Status   08/20/2019 51.9 (H) 37.5 - 51.0 % Final     Platelets   Date Value Ref " Range Status   08/20/2019 137 (L) 140 - 450 10*3/mm3 Final     Lab Results   Component Value Date    GLUCOSE 104 (H) 08/20/2019    BUN 21 08/20/2019    CREATININE 1.31 (H) 08/20/2019    EGFRIFNONA 54 (L) 08/20/2019    EGFRIFAFRI  08/30/2016      Comment:      <15 Indicative of kidney failure.    BCR 16.0 08/20/2019    CO2 29.6 (H) 08/20/2019    CALCIUM 9.4 08/20/2019    ALBUMIN 4.30 08/20/2019    AST 18 08/20/2019    ALT 22 08/20/2019              Assessment/Plan     Mr. Ko is a 65-year-old man returning for followup of his history mantle cell lymphoma with bone marrow involvement at diagnosis. He was treated with bendamustine/rituximab followed by maintenance rituximab completed in the winter of 2013.     His most recent scans were performed in July 2017 with no lymphadenopathy or splenomegaly noted.    In return today, the patient had no concerning signs or symptoms of recurrent lymphoma.  Continued observation recommended.  Six-month follow-up requested with labs.    The patient has mild, intermittent thrombocytopenia.  Platelet count today stable 137,000 with no symptoms.  Platelet count is adequate to continue antiplatelet medication.    The patient experienced a recent fall with injury to the left lower extremity which is bruised and swollen today.  I sent him for Doppler ultrasound to rule out DVT.            8/20/2019      CC:

## 2019-08-21 ENCOUNTER — TELEPHONE (OUTPATIENT)
Dept: ONCOLOGY | Facility: CLINIC | Age: 69
End: 2019-08-21

## 2019-08-21 NOTE — TELEPHONE ENCOUNTER
Called Mr. Ko per Dr. Restrepo. I talked to his wife, Charley, and let her know that his ultrasound was negative for clot. She acknowledged understanding.    ----- Message from Irving Restrepo MD sent at 8/20/2019  6:06 PM EDT -----  Let him know ultrasound was negative for clot.  ----- Message -----  From: Interface, Rad Results [place] In  Sent: 8/20/2019   2:03 PM  To: Irving Restrepo MD

## 2019-08-30 ENCOUNTER — OFFICE VISIT (OUTPATIENT)
Dept: CARDIOLOGY | Facility: CLINIC | Age: 69
End: 2019-08-30

## 2019-08-30 VITALS
BODY MASS INDEX: 44.88 KG/M2 | HEIGHT: 68 IN | WEIGHT: 296.1 LBS | SYSTOLIC BLOOD PRESSURE: 122 MMHG | DIASTOLIC BLOOD PRESSURE: 84 MMHG | HEART RATE: 70 BPM

## 2019-08-30 DIAGNOSIS — I25.118 CORONARY ARTERY DISEASE OF NATIVE ARTERY OF NATIVE HEART WITH STABLE ANGINA PECTORIS (HCC): Primary | ICD-10-CM

## 2019-08-30 DIAGNOSIS — R07.2 PRECORDIAL PAIN: ICD-10-CM

## 2019-08-30 DIAGNOSIS — N18.9 CHRONIC KIDNEY DISEASE, UNSPECIFIED CKD STAGE: ICD-10-CM

## 2019-08-30 PROCEDURE — 99214 OFFICE O/P EST MOD 30 MIN: CPT | Performed by: INTERNAL MEDICINE

## 2019-08-30 PROCEDURE — 93000 ELECTROCARDIOGRAM COMPLETE: CPT | Performed by: INTERNAL MEDICINE

## 2019-08-30 RX ORDER — ISOSORBIDE MONONITRATE 30 MG/1
30 TABLET, EXTENDED RELEASE ORAL EVERY MORNING
Qty: 90 TABLET | Refills: 3 | Status: SHIPPED | OUTPATIENT
Start: 2019-08-30 | End: 2021-04-09 | Stop reason: HOSPADM

## 2019-08-30 NOTE — PROGRESS NOTES
Date of Office Visit: 2019  Encounter Provider: Becky Melo MD  Place of Service: [facility] CARDIOLOGY  Patient Name: Esdras Ko  :1950      Patient ID:  Esdras Ko is a 69 y.o. male is here for  followup for CAD, s/p stent.         History of Present Illness       He has a past medical history of diabetes, COPD, stage III chronic kidney disease, hypertension, hyperlipidemia, anemia, gout, obstructive sleep apnea, history of TIA, history of seizures, history of myocardial infarction.          He is  and has 2 children.  He is retired.  He quit smoking .  He has one cup of coffee per day and uses no caffeine or alcohol.     There is heart disease in his mother, brother, father and strokes in his mother.  His brother has hypertension.     He had a TIA happened in .  He does not use CPAP for his sleep apnea but does use oxygen.  He does have history of seizure disorder and is no longer seeing a neurologist.  He has had Mantle cell lymphoma in the past requiring chemotherapy.  He is also had bladder cancer.  He is morbidly obese.     [facility].       He had an echo done 18 showing LVEF 57% grade 1 diastolic dysfunction and mild concentric left hypertrophy.  He also had a PET stress study showing large apical and septal ischemia.  He went on for a cardiac catheterization done 18 showing normal left main, occluded proximal LAD filling late with diagonals, small caliber circumflex with diffuse 50-60% stenosis, large caliber RCA with 40-50% mid vessel stenosis and 40-50% distal stenosis.  It does give collaterals to the LAD.     He had a PET stress done 18 showing a large-sized, moderate-to-severe area of ischemia located in the apex and septal wall.  His echo showed mild LVH with LVEF 57%.      On 10/30/18, he received a 3.25 x 23 mm drug-eluting stent to  the proximal LAD.    labs on 8/20/2019 show normal CMP except for glucose at 104, creatinine 1.31, normal CBC.     He has had some exertional chest pain with showering and taking out the trash.  He is also very short winded.  He is chest pain-free and not short winded at rest.  He does not feel his heart racing or skipping.  He has some mild dizziness but is having a lot of allergy issues.  The dizziness usually happens when he is just sitting head upright.  He has not had falls or syncope.  He has severe back pain and so is very sedentary because of this.  He has been taking his medications as directed.  His wife Charley says that he is having memory issues.  Is to have a colonoscopy with Dr. Priest in October.    Lipids done 7/3/2019 show total cholesterol 111, triglycerides 141, HDL 34, VLDL 28 and LDL 49.    Past Medical History:   Diagnosis Date   • Anemia    • Arthritis     BACK   • Bladder cancer (CMS/HCC) 2008   • Bladder cancer (CMS/HCC)    • Cerebrovascular accident (CMS/HCC)     Ischemic and TIAs; most recent 02/2014 which was a TIA.   • Chronic kidney disease, stage 3 (CMS/HCC)     Dr. KATHERYN Lin   • COPD (chronic obstructive pulmonary disease) (CMS/HCC)    • Diabetes mellitus (CMS/HCC)    • Heart attack (CMS/HCC)    • History of blood transfusion    • Hyperlipidemia    • Hypertension    • Hyperuricemia    • Kidney failure     stage 3 kidney disease   • Left shoulder pain    • Lymphoma (CMS/HCC)     MANTLE CELL, HAD CHEMO  2010   • Lymphoma (CMS/HCC)    • Osteoarthritis    • Pilonidal cyst     SCHEDULED FOR SX   • Psoriasis    • Seizure (CMS/HCC)     Following head trauma in 1970   • Skin lesions     SCARRING FROM SHINGLES, & BLISTERS FROM EDEMA   • Sleep apnea     Intolerant of CPAP   • Stroke (CMS/HCC)    • Tobacco abuse          Past Surgical History:   Procedure Laterality Date   • BRONCHOSCOPY N/A 8/21/2017    Procedure: BRONCHOSCOPY with fluro and biopsy and lavage.;  Surgeon: Red Topete MD;   Location: [facility] OR;  Service:    • CARDIAC CATHETERIZATION N/A 9/17/2018    Procedure: Coronary angiography;  Surgeon: Kylie Victoria MD;  Location: [facility] CATH INVASIVE LOCATION;  Service: Cardiovascular   • CARDIAC CATHETERIZATION N/A 10/30/2018    Procedure: Chronic Total Occlussion;  Surgeon: Roe Wheatley MD;  Location:  [facility] CATH INVASIVE LOCATION;  Service: Cardiovascular   • CARDIAC CATHETERIZATION N/A 10/30/2018    Procedure: Stent AURA coronary;  Surgeon: Roe Wheatley MD;  [facility];  Service: Cardiovascular   • PILONIDAL CYSTECTOMY N/A 11/30/2016    Procedure: PILONIDAL CYSTECTOMY;  Surgeon: Roe Davila MD;  Location: [facility] OR;  Service:    • SINUS SURGERY     • SKIN LESION EXCISION      DR. DAVILA ABOUT 10 YEARS AGO       Current Outpatient Medications on File Prior to Visit   Medication Sig Dispense Refill   • ADVAIR DISKUS 250-50 MCG/DOSE DISKUS Inhale 1 puff As Needed. inhale 1 dose by mouth twice a day  0   • allopurinol (ZYLOPRIM) 300 MG tablet Take 300 mg by mouth Every Morning. take 1 tablet by mouth daily  0   • AMITIZA 24 MCG capsule Take 24 mcg by mouth 2 (Two) Times a Day As Needed.  0   • amLODIPine (NORVASC) 5 MG tablet Take 2.5 mg by mouth Every Morning. 90 tablet 3   • aspirin 81 MG tablet Take 1 tablet by mouth Every Morning. 90 tablet 3   • atorvastatin (LIPITOR) 20 MG tablet Take 20 mg by mouth Daily.     • cetirizine (ZyrTEC) 10 MG tablet Take 10 mg by mouth Every Morning.     • clobetasol (TEMOVATE) 0.05 % cream apply to affected area (RASH FREELY) twice a day  0   • clopidogrel (PLAVIX) 75 MG tablet Take 75 mg by mouth Every Morning.  0   • D3-50 16581 UNITS capsule Take 50,000 Units by mouth Every 7 (Seven) Days. On mondays  0   • FLUoxetine (PROZAC) 20 MG capsule Take 20 mg by mouth Daily.     • furosemide (LASIX) 40 MG tablet Take 40 mg by mouth 2 (Two) Times a Day. take 2 tablets by mouth twice a day  0   • levETIRAcetam (KEPPRA) 1000 MG tablet Take  500 mg by mouth 2 (Two) Times a Day.  0   • linagliptin (TRADJENTA) 5 MG tablet tablet Take 1 tablet by mouth Daily. (Patient taking differently: Take 5 mg by mouth Daily. 1/2 tab daily) 30 tablet 3   • loratadine (CLARITIN) 10 MG tablet Take 10 mg by mouth Daily.     • metoprolol tartrate (LOPRESSOR) 50 MG tablet Take 50 mg by mouth 2 (two) times a day.  0   • O2 (OXYGEN) Inhale 2 L/min Daily.     • pantoprazole (PROTONIX) 40 MG EC tablet Take 40 mg by mouth Every Morning. take 1 tablet by mouth once daily  0   • potassium chloride (K-DUR,KLOR-CON) 20 MEQ CR tablet Take 40 mEq by mouth 4 (Four) Times a Day. 3 TABS BID AND 2 TABS ONCE A DAY  0   • ULORIC 80 MG tablet tablet Take 80 mg by mouth Daily.  0   • VENTOLIN  (90 BASE) MCG/ACT inhaler inhale 2 puffs by mouth four times a day as needed  0     No current facility-administered medications on file prior to visit.        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      Employer: RETIRED   Tobacco Use   • Smoking status: Former Smoker     Packs/day: 3.00     Years: 50.00     Pack years: 150.00     Types: Cigarettes     Last attempt to quit: 2000     Years since quittin.6   • Smokeless tobacco: Never Used   • Tobacco comment: 80 pack year   Substance and Sexual Activity   • Alcohol use: No     Comment: Heavy in past, none since around    • Drug use: No   • Sexual activity: Defer           Review of Systems   Constitution: Negative.   HENT: Negative for congestion.    Eyes: Negative for vision loss in left eye and vision loss in right eye.   Respiratory: Negative.  Negative for cough, hemoptysis, shortness of breath, sleep disturbances due to breathing, snoring, sputum production and wheezing.    Endocrine: Negative.   "  Hematologic/Lymphatic: Negative.    Skin: Negative for poor wound healing and rash.   Musculoskeletal: Negative for falls, gout, muscle cramps and myalgias.   Gastrointestinal: Negative for abdominal pain, diarrhea, dysphagia, hematemesis, melena, nausea and vomiting.   Neurological: Negative for excessive daytime sleepiness, dizziness, headaches, light-headedness, loss of balance, seizures and vertigo.   Psychiatric/Behavioral: Negative for depression and substance abuse. The patient is not nervous/anxious.        Procedures    ECG 12 Lead  Date/Time: 8/30/2019 9:40 AM  Performed by: Becky Melo MD  Authorized by: Becky Melo MD   Comparison: compared with previous ECG   Similar to previous ECG  Rhythm: sinus rhythm  Conduction: left anterior fascicular block  Q waves: V2, V3, V4 and V5      Clinical impression: abnormal EKG                Objective:      Vitals:    08/30/19 0927   BP: 122/84   BP Location: Right arm   Patient Position: Sitting   Pulse: 70   Weight: 134 kg (296 lb 1.6 oz)   Height: 172.7 cm (68\")     Body mass index is 45.02 kg/m².    Physical Exam   Constitutional: He is oriented to person, place, and time. He appears well-developed and well-nourished. No distress.   HENT:   Head: Normocephalic and atraumatic.   Eyes: Conjunctivae are normal. No scleral icterus.   Neck: Neck supple. No JVD present. Carotid bruit is not present. No thyromegaly present.   Cardiovascular: Normal rate, regular rhythm, S1 normal, S2 normal, normal heart sounds and intact distal pulses.  No extrasystoles are present. PMI is not displaced. Exam reveals no gallop.   No murmur heard.  Pulses:       Carotid pulses are 2+ on the right side, and 2+ on the left side.       Radial pulses are 2+ on the right side, and 2+ on the left side.        Dorsalis pedis pulses are 2+ on the right side, and 2+ on the left side.        Posterior tibial pulses are 2+ on the right side, and 2+ on the left side. "   Pulmonary/Chest: Effort normal and breath sounds normal. No respiratory distress. He has no wheezes. He has no rhonchi. He has no rales. He exhibits no tenderness.   Abdominal: Soft. Bowel sounds are normal. He exhibits no distension, no abdominal bruit and no mass. There is no tenderness.   Musculoskeletal: He exhibits no edema or deformity.   Lymphadenopathy:     He has no cervical adenopathy.   Neurological: He is alert and oriented to person, place, and time. No cranial nerve deficit.   Skin: Skin is warm and dry. No rash noted. He is not diaphoretic. No cyanosis. No pallor. Nails show no clubbing.   Psychiatric: He has a normal mood and affect. Judgment normal.   Vitals reviewed.      Lab Review:       Assessment:      Diagnosis Plan   1. Coronary artery disease of native artery of native heart with stable angina pectoris (CMS/HCC)  Adult Transthoracic Echo Complete W/ Cont if Necessary Per Protocol    Stress Test With Myocardial Perfusion Two Day   2. Chronic kidney disease, unspecified CKD stage     3. Precordial pain  Adult Transthoracic Echo Complete W/ Cont if Necessary Per Protocol    Stress Test With Myocardial Perfusion Two Day     1. Proximal LAD occlusion with severe dyspnea on exertion, s/p LAD stent 10/2018 for  with Dr. Wheatley. having anginal chest pain.  Set up testing.   2. COPD requiring oxygen which he only uses at night.   3. CHIQUIS, uses nocturnal oxygen. Is off daytime oxygen.   4. Hypertension, well controlled.   5. Hyperlipidemia. On atorvastatin.   6. H/o mantle cell lymphoma and bladder cancer.   7. DM  8. Morbid obesity.   9. H/o TIA - now memory issues.   10. Seizure disorder.   11. CKD.   12. Severe back pain, limiting mobility.      Plan:       See clover in 3 months, add imdur 30mg daily for chest pain, set up stress and echo.     Coronary Artery Disease  Assessment  • The patient has CCS class III - angina with activities of daily living    Plan  • Lifestyle modifications discussed  include medication compliance and regular monitoring of cholesterol and blood pressure    Subjective - Objective  • There has been a previous stent procedure using AURA  • Current antiplatelet therapy includes aspirin 81 mg and clopidogrel 75 mg

## 2019-09-12 ENCOUNTER — PREP FOR SURGERY (OUTPATIENT)
Dept: OTHER | Facility: HOSPITAL | Age: 69
End: 2019-09-12

## 2019-09-12 DIAGNOSIS — Z86.010 PERSONAL HISTORY OF COLONIC POLYPS: Primary | ICD-10-CM

## 2019-09-16 PROBLEM — Z86.0100 PERSONAL HISTORY OF COLONIC POLYPS: Status: ACTIVE | Noted: 2019-09-16

## 2019-09-16 PROBLEM — Z86.010 PERSONAL HISTORY OF COLONIC POLYPS: Status: ACTIVE | Noted: 2019-09-16

## 2019-09-25 ENCOUNTER — HOSPITAL ENCOUNTER (OUTPATIENT)
Dept: NUCLEAR MEDICINE | Facility: HOSPITAL | Age: 69
Discharge: HOME | End: 2019-09-25

## 2019-09-25 ENCOUNTER — APPOINTMENT (OUTPATIENT)
Dept: CARDIOLOGY | Facility: HOSPITAL | Age: 69
End: 2019-09-25

## 2019-09-25 ENCOUNTER — APPOINTMENT (OUTPATIENT)
Dept: NUCLEAR MEDICINE | Facility: HOSPITAL | Age: 69
End: 2019-09-25

## 2019-09-25 DIAGNOSIS — R07.2 PRECORDIAL PAIN: ICD-10-CM

## 2019-09-25 DIAGNOSIS — I25.118 CORONARY ARTERY DISEASE OF NATIVE ARTERY OF NATIVE HEART WITH STABLE ANGINA PECTORIS (HCC): ICD-10-CM

## 2019-09-25 PROCEDURE — 78452 HT MUSCLE IMAGE SPECT MULT: CPT

## 2019-09-25 PROCEDURE — 93016 CV STRESS TEST SUPVJ ONLY: CPT | Performed by: INTERNAL MEDICINE

## 2019-09-25 PROCEDURE — 93018 CV STRESS TEST I&R ONLY: CPT | Performed by: INTERNAL MEDICINE

## 2019-09-25 PROCEDURE — 78452 HT MUSCLE IMAGE SPECT MULT: CPT | Performed by: INTERNAL MEDICINE

## 2019-09-25 PROCEDURE — 0 TECHNETIUM SESTAMIBI: Performed by: INTERNAL MEDICINE

## 2019-09-25 PROCEDURE — A9500 TC99M SESTAMIBI: HCPCS | Performed by: INTERNAL MEDICINE

## 2019-09-25 PROCEDURE — 93017 CV STRESS TEST TRACING ONLY: CPT

## 2019-09-25 RX ADMIN — TECHNETIUM TC 99M SESTAMIBI 1 DOSE: 1 INJECTION INTRAVENOUS at 08:24

## 2019-09-26 ENCOUNTER — HOSPITAL ENCOUNTER (OUTPATIENT)
Dept: CARDIOLOGY | Facility: HOSPITAL | Age: 69
Discharge: HOME | End: 2019-09-26
Admitting: INTERNAL MEDICINE

## 2019-09-26 ENCOUNTER — HOSPITAL ENCOUNTER (OUTPATIENT)
Dept: NUCLEAR MEDICINE | Facility: HOSPITAL | Age: 69
Discharge: HOME | End: 2019-09-26

## 2019-09-26 ENCOUNTER — HOSPITAL ENCOUNTER (OUTPATIENT)
Dept: CARDIOLOGY | Facility: HOSPITAL | Age: 69
Discharge: HOME | End: 2019-09-26

## 2019-09-26 VITALS
DIASTOLIC BLOOD PRESSURE: 79 MMHG | SYSTOLIC BLOOD PRESSURE: 149 MMHG | HEIGHT: 68 IN | HEART RATE: 99 BPM | WEIGHT: 296 LBS | BODY MASS INDEX: 44.86 KG/M2 | OXYGEN SATURATION: 93 %

## 2019-09-26 DIAGNOSIS — R07.2 PRECORDIAL PAIN: ICD-10-CM

## 2019-09-26 DIAGNOSIS — I25.118 CORONARY ARTERY DISEASE OF NATIVE ARTERY OF NATIVE HEART WITH STABLE ANGINA PECTORIS (HCC): ICD-10-CM

## 2019-09-26 LAB
AORTIC DIMENSIONLESS INDEX: 0.9 (DI)
BH CV ECHO MEAS - ACS: 2 CM
BH CV ECHO MEAS - AO MAX PG (FULL): 1.1 MMHG
BH CV ECHO MEAS - AO MAX PG: 6.5 MMHG
BH CV ECHO MEAS - AO MEAN PG (FULL): 1 MMHG
BH CV ECHO MEAS - AO MEAN PG: 3 MMHG
BH CV ECHO MEAS - AO ROOT AREA (BSA CORRECTED): 1.4
BH CV ECHO MEAS - AO ROOT AREA: 8.6 CM^2
BH CV ECHO MEAS - AO ROOT DIAM: 3.3 CM
BH CV ECHO MEAS - AO V2 MAX: 127 CM/SEC
BH CV ECHO MEAS - AO V2 MEAN: 76.7 CM/SEC
BH CV ECHO MEAS - AO V2 VTI: 22.5 CM
BH CV ECHO MEAS - AVA(I,A): 2.9 CM^2
BH CV ECHO MEAS - AVA(I,D): 2.9 CM^2
BH CV ECHO MEAS - AVA(V,A): 3.2 CM^2
BH CV ECHO MEAS - AVA(V,D): 3.2 CM^2
BH CV ECHO MEAS - BSA(HAYCOCK): 2.6 M^2
BH CV ECHO MEAS - BSA: 2.4 M^2
BH CV ECHO MEAS - BZI_BMI: 45 KILOGRAMS/M^2
BH CV ECHO MEAS - BZI_METRIC_HEIGHT: 172.7 CM
BH CV ECHO MEAS - BZI_METRIC_WEIGHT: 134.3 KG
BH CV ECHO MEAS - EDV(CUBED): 91.7 ML
BH CV ECHO MEAS - EDV(MOD-SP2): 104 ML
BH CV ECHO MEAS - EDV(MOD-SP4): 101 ML
BH CV ECHO MEAS - EDV(TEICH): 92.9 ML
BH CV ECHO MEAS - EF(CUBED): 76.3 %
BH CV ECHO MEAS - EF(MOD-BP): 63 %
BH CV ECHO MEAS - EF(MOD-SP2): 64.7 %
BH CV ECHO MEAS - EF(MOD-SP4): 59.5 %
BH CV ECHO MEAS - EF(TEICH): 68.5 %
BH CV ECHO MEAS - ESV(CUBED): 21.7 ML
BH CV ECHO MEAS - ESV(MOD-SP2): 36.7 ML
BH CV ECHO MEAS - ESV(MOD-SP4): 40.9 ML
BH CV ECHO MEAS - ESV(TEICH): 29.3 ML
BH CV ECHO MEAS - FS: 38.1 %
BH CV ECHO MEAS - IVS/LVPW: 1.1
BH CV ECHO MEAS - IVSD: 1.2 CM
BH CV ECHO MEAS - LA DIMENSION: 2.9 CM
BH CV ECHO MEAS - LA/AO: 0.88
BH CV ECHO MEAS - LAT PEAK E' VEL: 8 CM/SEC
BH CV ECHO MEAS - LV DIASTOLIC VOL/BSA (35-75): 41.8 ML/M^2
BH CV ECHO MEAS - LV MASS(C)D: 176.8 GRAMS
BH CV ECHO MEAS - LV MASS(C)DI: 73.2 GRAMS/M^2
BH CV ECHO MEAS - LV MAX PG: 5.4 MMHG
BH CV ECHO MEAS - LV MEAN PG: 2 MMHG
BH CV ECHO MEAS - LV SYSTOLIC VOL/BSA (12-30): 16.9 ML/M^2
BH CV ECHO MEAS - LV V1 MAX: 116 CM/SEC
BH CV ECHO MEAS - LV V1 MEAN: 66.2 CM/SEC
BH CV ECHO MEAS - LV V1 VTI: 19 CM
BH CV ECHO MEAS - LVIDD: 4.5 CM
BH CV ECHO MEAS - LVIDS: 2.8 CM
BH CV ECHO MEAS - LVLD AP2: 8.7 CM
BH CV ECHO MEAS - LVLD AP4: 8.2 CM
BH CV ECHO MEAS - LVLS AP2: 7.2 CM
BH CV ECHO MEAS - LVLS AP4: 7.3 CM
BH CV ECHO MEAS - LVOT AREA (M): 3.5 CM^2
BH CV ECHO MEAS - LVOT AREA: 3.5 CM^2
BH CV ECHO MEAS - LVOT DIAM: 2.1 CM
BH CV ECHO MEAS - LVPWD: 1 CM
BH CV ECHO MEAS - MED PEAK E' VEL: 6 CM/SEC
BH CV ECHO MEAS - MV A DUR: 0.17 SEC
BH CV ECHO MEAS - MV A MAX VEL: 117 CM/SEC
BH CV ECHO MEAS - MV DEC SLOPE: 467 CM/SEC^2
BH CV ECHO MEAS - MV DEC TIME: 140 SEC
BH CV ECHO MEAS - MV E MAX VEL: 83.4 CM/SEC
BH CV ECHO MEAS - MV E/A: 0.71
BH CV ECHO MEAS - MV MAX PG: 6.5 MMHG
BH CV ECHO MEAS - MV MEAN PG: 3 MMHG
BH CV ECHO MEAS - MV P1/2T MAX VEL: 80.7 CM/SEC
BH CV ECHO MEAS - MV P1/2T: 50.6 MSEC
BH CV ECHO MEAS - MV V2 MAX: 127 CM/SEC
BH CV ECHO MEAS - MV V2 MEAN: 72.5 CM/SEC
BH CV ECHO MEAS - MV V2 VTI: 23.5 CM
BH CV ECHO MEAS - MVA P1/2T LCG: 2.7 CM^2
BH CV ECHO MEAS - MVA(P1/2T): 4.3 CM^2
BH CV ECHO MEAS - MVA(VTI): 2.8 CM^2
BH CV ECHO MEAS - PA ACC TIME: 0.1 SEC
BH CV ECHO MEAS - PA MAX PG (FULL): 2.6 MMHG
BH CV ECHO MEAS - PA MAX PG: 4.8 MMHG
BH CV ECHO MEAS - PA PR(ACCEL): 36.3 MMHG
BH CV ECHO MEAS - PA V2 MAX: 110 CM/SEC
BH CV ECHO MEAS - PULM A REVS DUR: 0.16 SEC
BH CV ECHO MEAS - PULM A REVS VEL: 48.8 CM/SEC
BH CV ECHO MEAS - PULM DIAS VEL: 54 CM/SEC
BH CV ECHO MEAS - PULM S/D: 1.5
BH CV ECHO MEAS - PULM SYS VEL: 80.2 CM/SEC
BH CV ECHO MEAS - PVA(V,A): 1.7 CM^2
BH CV ECHO MEAS - PVA(V,D): 1.7 CM^2
BH CV ECHO MEAS - QP/QS: 0.58
BH CV ECHO MEAS - RV MAX PG: 2.2 MMHG
BH CV ECHO MEAS - RV MEAN PG: 1 MMHG
BH CV ECHO MEAS - RV V1 MAX: 74.6 CM/SEC
BH CV ECHO MEAS - RV V1 MEAN: 49.8 CM/SEC
BH CV ECHO MEAS - RV V1 VTI: 15 CM
BH CV ECHO MEAS - RVOT AREA: 2.5 CM^2
BH CV ECHO MEAS - RVOT DIAM: 1.8 CM
BH CV ECHO MEAS - SI(AO): 79.7 ML/M^2
BH CV ECHO MEAS - SI(CUBED): 29 ML/M^2
BH CV ECHO MEAS - SI(LVOT): 27.3 ML/M^2
BH CV ECHO MEAS - SI(MOD-SP2): 27.9 ML/M^2
BH CV ECHO MEAS - SI(MOD-SP4): 24.9 ML/M^2
BH CV ECHO MEAS - SI(TEICH): 26.4 ML/M^2
BH CV ECHO MEAS - SV(AO): 192.4 ML
BH CV ECHO MEAS - SV(CUBED): 70 ML
BH CV ECHO MEAS - SV(LVOT): 65.8 ML
BH CV ECHO MEAS - SV(MOD-SP2): 67.3 ML
BH CV ECHO MEAS - SV(MOD-SP4): 60.1 ML
BH CV ECHO MEAS - SV(RVOT): 38.2 ML
BH CV ECHO MEAS - SV(TEICH): 63.6 ML
BH CV ECHO MEAS - TAPSE (>1.6): 2.4 CM
BH CV ECHO MEASUREMENTS AVERAGE E/E' RATIO: 11.91
BH CV NUCLEAR PRIOR STUDY: 3
BH CV STRESS BP STAGE 1: NORMAL
BH CV STRESS COMMENTS STAGE 1: NORMAL
BH CV STRESS DOSE REGADENOSON STAGE 1: 0.4
BH CV STRESS DURATION MIN STAGE 1: 0
BH CV STRESS DURATION SEC STAGE 1: 30
BH CV STRESS HR STAGE 1: 103
BH CV STRESS O2 STAGE 1: 93
BH CV STRESS PROTOCOL 1: NORMAL
BH CV STRESS RECOVERY BP: NORMAL MMHG
BH CV STRESS RECOVERY HR: 113 BPM
BH CV STRESS RECOVERY O2: 94 %
BH CV STRESS STAGE 1: 1
BH CV VAS BP RIGHT ARM: NORMAL MMHG
BH CV XLRA - RV BASE: 3.5 CM
BH CV XLRA - TDI S': 17 CM/SEC
LEFT ATRIUM VOLUME INDEX: 17 ML/M2
LEFT ATRIUM VOLUME: 37 CM3
LV EF 2D ECHO EST: 63 %
LV EF NUC BP: 79 %
MAXIMAL PREDICTED HEART RATE: 150 BPM
MAXIMAL PREDICTED HEART RATE: 150 BPM
PERCENT MAX PREDICTED HR: 83.33 %
STRESS BASELINE BP: NORMAL MMHG
STRESS BASELINE HR: 99 BPM
STRESS O2 SAT REST: 93 %
STRESS PERCENT HR: 98 %
STRESS POST ESTIMATED WORKLOAD: 1 METS
STRESS POST EXERCISE DUR SEC: 30 SEC
STRESS POST O2 SAT PEAK: 97 %
STRESS POST PEAK BP: NORMAL MMHG
STRESS POST PEAK HR: 125 BPM
STRESS TARGET HR: 128 BPM
STRESS TARGET HR: 128 BPM

## 2019-09-26 PROCEDURE — A9500 TC99M SESTAMIBI: HCPCS | Performed by: INTERNAL MEDICINE

## 2019-09-26 PROCEDURE — 93306 TTE W/DOPPLER COMPLETE: CPT | Performed by: INTERNAL MEDICINE

## 2019-09-26 PROCEDURE — 25010000002 REGADENOSON 0.4 MG/5ML SOLUTION: Performed by: INTERNAL MEDICINE

## 2019-09-26 PROCEDURE — 0 TECHNETIUM SESTAMIBI: Performed by: INTERNAL MEDICINE

## 2019-09-26 PROCEDURE — 93306 TTE W/DOPPLER COMPLETE: CPT

## 2019-09-26 PROCEDURE — 25010000002 PERFLUTREN (DEFINITY) 8.476 MG IN SODIUM CHLORIDE 0.9 % 10 ML INJECTION: Performed by: INTERNAL MEDICINE

## 2019-09-26 RX ADMIN — REGADENOSON 0.4 MG: 0.08 INJECTION, SOLUTION INTRAVENOUS at 08:50

## 2019-09-26 RX ADMIN — PERFLUTREN 2 ML: 6.52 INJECTION, SUSPENSION INTRAVENOUS at 10:45

## 2019-09-26 RX ADMIN — TECHNETIUM TC 99M SESTAMIBI 1 DOSE: 1 INJECTION INTRAVENOUS at 08:50

## 2019-10-08 ENCOUNTER — ANESTHESIA EVENT (OUTPATIENT)
Dept: PERIOP | Facility: HOSPITAL | Age: 69
End: 2019-10-08

## 2019-10-09 ENCOUNTER — ANESTHESIA (OUTPATIENT)
Dept: PERIOP | Facility: HOSPITAL | Age: 69
End: 2019-10-09

## 2019-10-09 ENCOUNTER — HOSPITAL ENCOUNTER (OUTPATIENT)
Facility: HOSPITAL | Age: 69
Setting detail: HOSPITAL OUTPATIENT SURGERY
Discharge: HOME | End: 2019-10-09
Attending: INTERNAL MEDICINE | Admitting: INTERNAL MEDICINE

## 2019-10-09 VITALS
BODY MASS INDEX: 45.44 KG/M2 | WEIGHT: 299.8 LBS | DIASTOLIC BLOOD PRESSURE: 79 MMHG | HEART RATE: 60 BPM | OXYGEN SATURATION: 93 % | HEIGHT: 68 IN | TEMPERATURE: 97.9 F | RESPIRATION RATE: 18 BRPM | SYSTOLIC BLOOD PRESSURE: 128 MMHG

## 2019-10-09 DIAGNOSIS — Z86.010 PERSONAL HISTORY OF COLONIC POLYPS: ICD-10-CM

## 2019-10-09 LAB
GLUCOSE BLDC GLUCOMTR-MCNC: 93 MG/DL (ref 70–130)
POTASSIUM BLD-SCNC: 4.7 MMOL/L (ref 3.5–5.2)

## 2019-10-09 PROCEDURE — 25010000002 PROPOFOL 10 MG/ML EMULSION: Performed by: NURSE ANESTHETIST, CERTIFIED REGISTERED

## 2019-10-09 PROCEDURE — 82962 GLUCOSE BLOOD TEST: CPT

## 2019-10-09 PROCEDURE — 45385 COLONOSCOPY W/LESION REMOVAL: CPT | Performed by: INTERNAL MEDICINE

## 2019-10-09 PROCEDURE — 45380 COLONOSCOPY AND BIOPSY: CPT | Performed by: INTERNAL MEDICINE

## 2019-10-09 PROCEDURE — 84132 ASSAY OF SERUM POTASSIUM: CPT | Performed by: NURSE ANESTHETIST, CERTIFIED REGISTERED

## 2019-10-09 PROCEDURE — 88305 TISSUE EXAM BY PATHOLOGIST: CPT | Performed by: INTERNAL MEDICINE

## 2019-10-09 RX ORDER — SODIUM CHLORIDE 0.9 % (FLUSH) 0.9 %
1-10 SYRINGE (ML) INJECTION AS NEEDED
Status: DISCONTINUED | OUTPATIENT
Start: 2019-10-09 | End: 2019-10-09 | Stop reason: HOSPADM

## 2019-10-09 RX ORDER — LIDOCAINE HYDROCHLORIDE 10 MG/ML
0.5 INJECTION, SOLUTION EPIDURAL; INFILTRATION; INTRACAUDAL; PERINEURAL ONCE AS NEEDED
Status: COMPLETED | OUTPATIENT
Start: 2019-10-09 | End: 2019-10-09

## 2019-10-09 RX ORDER — SODIUM CHLORIDE, SODIUM LACTATE, POTASSIUM CHLORIDE, CALCIUM CHLORIDE 600; 310; 30; 20 MG/100ML; MG/100ML; MG/100ML; MG/100ML
100 INJECTION, SOLUTION INTRAVENOUS CONTINUOUS
Status: DISCONTINUED | OUTPATIENT
Start: 2019-10-09 | End: 2019-10-09 | Stop reason: HOSPADM

## 2019-10-09 RX ORDER — PROPOFOL 10 MG/ML
VIAL (ML) INTRAVENOUS AS NEEDED
Status: DISCONTINUED | OUTPATIENT
Start: 2019-10-09 | End: 2019-10-09 | Stop reason: SURG

## 2019-10-09 RX ORDER — SODIUM CHLORIDE, SODIUM LACTATE, POTASSIUM CHLORIDE, CALCIUM CHLORIDE 600; 310; 30; 20 MG/100ML; MG/100ML; MG/100ML; MG/100ML
9 INJECTION, SOLUTION INTRAVENOUS CONTINUOUS
Status: DISCONTINUED | OUTPATIENT
Start: 2019-10-09 | End: 2019-10-09 | Stop reason: HOSPADM

## 2019-10-09 RX ORDER — MAGNESIUM HYDROXIDE 1200 MG/15ML
LIQUID ORAL AS NEEDED
Status: DISCONTINUED | OUTPATIENT
Start: 2019-10-09 | End: 2019-10-09 | Stop reason: HOSPADM

## 2019-10-09 RX ORDER — ONDANSETRON 2 MG/ML
4 INJECTION INTRAMUSCULAR; INTRAVENOUS ONCE AS NEEDED
Status: DISCONTINUED | OUTPATIENT
Start: 2019-10-09 | End: 2019-10-09 | Stop reason: HOSPADM

## 2019-10-09 RX ORDER — SODIUM CHLORIDE 0.9 % (FLUSH) 0.9 %
3 SYRINGE (ML) INJECTION EVERY 12 HOURS SCHEDULED
Status: DISCONTINUED | OUTPATIENT
Start: 2019-10-09 | End: 2019-10-09 | Stop reason: HOSPADM

## 2019-10-09 RX ORDER — LIDOCAINE HYDROCHLORIDE 20 MG/ML
INJECTION, SOLUTION INFILTRATION; PERINEURAL AS NEEDED
Status: DISCONTINUED | OUTPATIENT
Start: 2019-10-09 | End: 2019-10-09 | Stop reason: SURG

## 2019-10-09 RX ORDER — SODIUM CHLORIDE 9 MG/ML
40 INJECTION, SOLUTION INTRAVENOUS AS NEEDED
Status: DISCONTINUED | OUTPATIENT
Start: 2019-10-09 | End: 2019-10-09 | Stop reason: HOSPADM

## 2019-10-09 RX ADMIN — PROPOFOL 40 MG: 10 INJECTION, EMULSION INTRAVENOUS at 13:01

## 2019-10-09 RX ADMIN — LIDOCAINE HYDROCHLORIDE 0.1 ML: 10 INJECTION, SOLUTION EPIDURAL; INFILTRATION; INTRACAUDAL; PERINEURAL at 12:48

## 2019-10-09 RX ADMIN — SODIUM CHLORIDE, POTASSIUM CHLORIDE, SODIUM LACTATE AND CALCIUM CHLORIDE 100 ML/HR: 600; 310; 30; 20 INJECTION, SOLUTION INTRAVENOUS at 13:35

## 2019-10-09 RX ADMIN — PROPOFOL 40 MG: 10 INJECTION, EMULSION INTRAVENOUS at 13:13

## 2019-10-09 RX ADMIN — PROPOFOL 40 MG: 10 INJECTION, EMULSION INTRAVENOUS at 13:19

## 2019-10-09 RX ADMIN — PROPOFOL 40 MG: 10 INJECTION, EMULSION INTRAVENOUS at 13:07

## 2019-10-09 RX ADMIN — PROPOFOL 40 MG: 10 INJECTION, EMULSION INTRAVENOUS at 13:04

## 2019-10-09 RX ADMIN — PROPOFOL 40 MG: 10 INJECTION, EMULSION INTRAVENOUS at 13:16

## 2019-10-09 RX ADMIN — SODIUM CHLORIDE, POTASSIUM CHLORIDE, SODIUM LACTATE AND CALCIUM CHLORIDE 9 ML/HR: 600; 310; 30; 20 INJECTION, SOLUTION INTRAVENOUS at 12:48

## 2019-10-09 RX ADMIN — PROPOFOL 40 MG: 10 INJECTION, EMULSION INTRAVENOUS at 13:10

## 2019-10-09 RX ADMIN — PROPOFOL 40 MG: 10 INJECTION, EMULSION INTRAVENOUS at 13:23

## 2019-10-09 RX ADMIN — LIDOCAINE HYDROCHLORIDE 100 MG: 20 INJECTION, SOLUTION INFILTRATION; PERINEURAL at 12:58

## 2019-10-09 RX ADMIN — PROPOFOL 50 MG: 10 INJECTION, EMULSION INTRAVENOUS at 12:58

## 2019-10-09 NOTE — H&P
Patient Care Team:  Nitesh Arias MD as PCP - General (Family Medicine)  Beto Jama MD as Referring Physician (Hematology and Oncology)  Irving Restrepo MD as Consulting Physician (Hematology and Oncology)    CHIEF COMPLAINT: Previous Polyps    HISTORY OF PRESENT ILLNESS:    Last exam 2014      Past Medical History:   Diagnosis Date   • Anemia    • Arthritis     BACK   • Bladder cancer (CMS/HCC) 2008   • Bladder cancer (CMS/HCC)    • Cerebrovascular accident (CMS/HCC)     Ischemic and TIAs; most recent 02/2014 which was a TIA.   • Chronic kidney disease, stage 3 (CMS/HCC)     Dr. KATHERYN Lin   • COPD (chronic obstructive pulmonary disease) (CMS/HCC)    • Diabetes mellitus (CMS/HCC)    • Heart attack (CMS/HCC)    • History of blood transfusion    • Hyperlipidemia    • Hypertension    • Hyperuricemia    • Kidney failure     stage 3 kidney disease   • Left shoulder pain    • Lymphoma (CMS/HCC)     MANTLE CELL, HAD CHEMO  2010   • Lymphoma (CMS/HCC)    • Osteoarthritis    • Pilonidal cyst     SCHEDULED FOR SX   • Psoriasis    • Seizure (CMS/HCC)     Following head trauma in 1970   • Skin lesions     SCARRING FROM SHINGLES, & BLISTERS FROM EDEMA   • Sleep apnea     Intolerant of CPAP   • Stroke (CMS/HCC)    • Tobacco abuse      Past Surgical History:   Procedure Laterality Date   • BRONCHOSCOPY N/A 8/21/2017    Procedure: BRONCHOSCOPY with fluro and biopsy and lavage.;  Surgeon: Red Topete MD;  Location: [facility] OR;  Service:    • CARDIAC CATHETERIZATION N/A 9/17/2018    Procedure: Coronary angiography;  Surgeon: Kylie Victoria MD;  Location: [facility] CATH INVASIVE LOCATION;  Service: Cardiovascular   • CARDIAC CATHETERIZATION N/A 10/30/2018    Procedure: Chronic Total Occlussion;  Surgeon: Roe Wheatley MD;  [facility] INVAS[facility];  Service: Cardiovascular   • CARDIAC CATHETERIZATION N/A 10/30/2018    Procedure: Stent AURA coronary;  Surgeon: Roe Wheatley MD;  Location: [facility] CATH  INVASIVE LOCATION;  Service: Cardiovascular   • PILONIDAL CYSTECTOMY N/A 2016    Procedure: PILONIDAL CYSTECTOMY;  Surgeon: Roe Davila MD;  Location: [facility] OR;  Service:    • SINUS SURGERY     • SKIN LESION EXCISION      DR. DAVILA ABOUT 10 YEARS AGO     Family History   Problem Relation Age of Onset   • Heart defect Mother    • Pancreatic cancer Father 56   • Anemia Sister    • Hypertension Brother    • Heart disease Brother    • Mental illness Brother    • Other Brother         Splenectomy   • Cancer Cousin      Social History     Tobacco Use   • Smoking status: Former Smoker     Packs/day: 3.00     Years: 50.00     Pack years: 150.00     Types: Cigarettes     Last attempt to quit: 2000     Years since quittin.7   • Smokeless tobacco: Never Used   • Tobacco comment: 80 pack year   Substance Use Topics   • Alcohol use: No     Comment: Heavy in past, none since around    • Drug use: No     Medications Prior to Admission   Medication Sig Dispense Refill Last Dose   • ADVAIR DISKUS 250-50 MCG/DOSE DISKUS Inhale 1 puff As Needed. inhale 1 dose by mouth twice a day  0 10/9/2019 at Unknown time   • allopurinol (ZYLOPRIM) 300 MG tablet Take 300 mg by mouth Every Morning. take 1 tablet by mouth daily  0 10/9/2019 at Unknown time   • AMITIZA 24 MCG capsule Take 24 mcg by mouth 2 (Two) Times a Day As Needed.  0 10/8/2019 at Unknown time   • amLODIPine (NORVASC) 5 MG tablet Take 2.5 mg by mouth Every Morning. 90 tablet 3 10/9/2019 at Unknown time   • atorvastatin (LIPITOR) 20 MG tablet Take 20 mg by mouth Daily.   10/8/2019 at Unknown time   • cetirizine (ZyrTEC) 10 MG tablet Take 10 mg by mouth Every Morning.   10/9/2019 at Unknown time   • FLUoxetine (PROZAC) 20 MG capsule Take 20 mg by mouth Daily.   10/9/2019 at Unknown time   • furosemide (LASIX) 40 MG tablet Take 40 mg by mouth 2 (Two) Times a Day. take 2 tablets by mouth twice a day  0 10/8/2019 at Unknown time   • isosorbide mononitrate (IMDUR) 30  MG 24 hr tablet Take 1 tablet by mouth Every Morning. 90 tablet 3 10/9/2019 at Unknown time   • linagliptin (TRADJENTA) 5 MG tablet tablet Take 1 tablet by mouth Daily. (Patient taking differently: Take 5 mg by mouth Daily. 1/2 tab daily) 30 tablet 3 10/8/2019 at Unknown time   • metoprolol tartrate (LOPRESSOR) 50 MG tablet Take 50 mg by mouth 2 (two) times a day.  0 10/9/2019 at 0400   • pantoprazole (PROTONIX) 40 MG EC tablet Take 40 mg by mouth Every Morning. take 1 tablet by mouth once daily  0 10/9/2019 at Unknown time   • potassium chloride (K-DUR,KLOR-CON) 20 MEQ CR tablet Take 40 mEq by mouth 4 (Four) Times a Day. 3 TABS BID AND 2 TABS ONCE A DAY  0 10/9/2019 at Unknown time   • aspirin 81 MG tablet Take 1 tablet by mouth Every Morning. 90 tablet 3 10/9/2019   • clobetasol (TEMOVATE) 0.05 % cream apply to affected area (RASH FREELY) twice a day  0 Taking   • clopidogrel (PLAVIX) 75 MG tablet Take 75 mg by mouth Every Morning.  0 10/4/2019   • D3-50 34900 UNITS capsule Take 50,000 Units by mouth Every 7 (Seven) Days. On mondays  0 10/7/2019   • levETIRAcetam (KEPPRA) 1000 MG tablet Take 500 mg by mouth 2 (Two) Times a Day.  0 Taking   • loratadine (CLARITIN) 10 MG tablet Take 10 mg by mouth Daily.   Taking   • O2 (OXYGEN) Inhale 2 L/min Daily.   Taking   • ULORIC 80 MG tablet tablet Take 80 mg by mouth Daily.  0 Taking   • VENTOLIN  (90 BASE) MCG/ACT inhaler inhale 2 puffs by mouth four times a day as needed  0 Taking     Allergies:  Amoxicillin; Ceftin [cefuroxime axetil]; Codeine; Flonase [fluticasone]; Pharbetol [acetaminophen]; Spiriva handihaler [tiotropium bromide monohydrate]; Sulfa antibiotics; and Phenobarbital    REVIEW OF SYSTEMS:  Please see the above history of present illness for pertinent positives and negatives.  The remainder of the patient's systems have been reviewed and are negative.     Vital Signs  Temp:  [97.8 °F (36.6 °C)] 97.8 °F (36.6 °C)  Heart Rate:  [66] 66  Resp:  [20]  "20  BP: (131)/(89) 131/89    Flowsheet Rows      First Filed Value   Admission Height  172.7 cm (67.99\") Documented at 10/09/2019 1159   Admission Weight  136 kg (299 lb 12.8 oz) Documented at 10/09/2019 1159           Physical Exam:  Physical Exam   Constitutional: Patient appears well-developed and well-nourished and in no acute distress   HEENT:   Head: Normocephalic and atraumatic.   Eyes:  Pupils are equal, round, and reactive to light. EOM are intact. Sclerae are anicteric and non-injected.  Mouth and Throat: Patient has moist mucous membranes. Oropharynx is clear of any erythema or exudate.     Neck: Neck supple. No JVD present. No thyromegaly present. No lymphadenopathy present.  Cardiovascular: Regular rate, regular rhythm, S1 normal and S2 normal.  Exam reveals no gallop and no friction rub.  No murmur heard.  Pulmonary/Chest: Lungs are clear to auscultation bilaterally. No respiratory distress. No wheezes. No rhonchi. No rales.   Abdominal: Soft. Bowel sounds are normal. No distension and no mass. There is no hepatosplenomegaly. There is no tenderness.   Musculoskeletal: Normal Muscle tone  Extremities: No edema. Pulses are palpable in all 4 extremities.  Neurological: Patient is alert and oriented to person, place, and time. Cranial nerves II-XII are grossly intact with no focal deficits.  Skin: Skin is warm. No rash noted. Nails show no clubbing.  No cyanosis or erythema.    Debilities/Disabilities Identified: None  Emotional Behavior: Appropriate     Results Review:    I reviewed the patient's new clinical results.  Lab Results (most recent)     Procedure Component Value Units Date/Time    Potassium [[id number]] Collected:  10/09/19 1244    Specimen:  Blood Updated:  10/09/19 1245          Imaging Results (most recent)     None        reviewed    ECG/EMG Results (most recent)     None        reviewed    Assessment/Plan     Personal History of Colon Cancer / Colonoscopy    I discussed the patients " findings and my recommendations with patient.     Lee Priest MD  10/09/19  12:54 PM    Time: 10 min prior to procedure.

## 2019-10-09 NOTE — OP NOTE
COLONOSCOPY  Procedure Report    Patient Name:  Esdras Ko  YOB: 1950    Date of Surgery:  10/9/2019     Indications:  Previous POlyps    Pre-op Diagnosis:   Personal history of colonic polyps [Z86.010]    Post-Op Diagnosis Codes:     * Personal history of colonic polyps [Z86.010]     * Colon polyp [K63.5]     * Diverticulosis large intestine w/o perforation or abscess w/o bleeding [K57.30]         Procedure/CPT® Codes:      Procedure(s):  COLONOSCOPY with polypectomy    Staff:  Surgeon(s):  Lee Priest MD         Anesthesia: Monitored Anesthesia Care    Estimated Blood Loss: none    Specimens:   ID Type Source Tests Collected by Time   A : transverse polyps X4 Polyp Large Intestine, Transverse Colon TISSUE PATHOLOGY EXAM Lee Priest MD 10/9/2019 1312   B : sigmoid polyps X 3 Polyp Large Intestine, Sigmoid Colon TISSUE PATHOLOGY EXAM Lee Priest MD 10/9/2019 1323       Implants:    Nothing was implanted during the procedure      Description of Procedure: After informed consent was signed, he was brought to the endoscopy suite and placed in the left lateral decubitus position and given his IV sedation. Rectal exam revealed no external lesions, normal anal tone, and mildly enlarged prostate. The scope was introduced into the rectum and advanced under direct visualization with ease through the rectum, sigmoid colon, and descending colon, past scattered diverticular openings, through the transverse colon as well, to and around the hepatic flexure, and reduced in the ascending colon. It would not pass the ileocecal valve. The scope was reduced. Using abdominal splinting the scope could be advanced with looping into the cecum. The cecum was fairly well prepped. The residual bowel contents were liquid and could be aspirated. The cecum was normal appearing. The ileocecal valve was identified but not intubated and the scope was withdrawn examining the  ascending colon in circumferential fashion.  As the scope was withdrawn there were no mucosal lesions noted through the cecum, ascending colon, or hepatic flexure. However, in the transverse colon there were 4 polyps noted. They ranged in size from 5 to 9 mm. One was removed by cold snare and the others with cold biopsy forceps and sent together as transverse colon polyps x4. The scope was withdrawn around the splenic flexure into the descending colon and into the sigmoid colon. Within the sigmoid colon there were 3 polyps noted ranging in size from 4 mm to 7 mm. All removed by cold biopsy forceps and sent together as sigmoid colon polyps x3. The scope was withdrawn into the rectum and retroflexed revealing an intact dentate line and no further mucosal lesions. The scope was deretroflexed and withdrawn. The patient tolerated the procedure very well.             Findings: Colon to Cecum Good Prep  Diverticulosis  Polyps (7) Cold Snare/Biopsy      Complications: None    Recommendations: Results to be called; Repeat in 3 years      Lee Priest MD     Date: 10/9/2019  Time: 1:36 PM

## 2019-10-09 NOTE — BRIEF OP NOTE
COLONOSCOPY  Progress Note    Esdras Ko  10/9/2019    Pre-op Diagnosis:   Personal history of colonic polyps [Z86.010]       Post-Op Diagnosis Codes:     * Personal history of colonic polyps [Z86.010]     * Colon polyp [K63.5]     * Diverticulosis large intestine w/o perforation or abscess w/o bleeding [K57.30]    Procedure/CPT® Codes:      Procedure(s):  COLONOSCOPY with polypectomy    Surgeon(s):  Lee Priest MD    Anesthesia: Monitored Anesthesia Care    Staff:   Circulator: Lenora Fenton RN  Scrub Person: Rubia Crenshaw    Estimated Blood Loss: none    Urine Voided: * No values recorded between 10/9/2019 12:54 PM and 10/9/2019  1:34 PM *    Specimens:                Specimens     ID Source Type Tests Collected By Collected At Frozen?      A Large Intestine, Transverse Colon Polyp · TISSUE PATHOLOGY EXAM   Lee Priest MD 10/9/19 1312 No     Description: transverse polyps X4    B Large Intestine, Sigmoid Colon Polyp · TISSUE PATHOLOGY EXAM   Lee Priest MD 10/9/19 1323 No     Description: sigmoid polyps X 3                Drains:      Findings: Colon to Cecum Good Prep  Diverticulosis  Polyps (7) Cold Snare/Biopsy    Complications: None      Lee Priest MD     Date: 10/9/2019  Time: 1:34 PM

## 2019-10-09 NOTE — ANESTHESIA POSTPROCEDURE EVALUATION
Patient: Esdras Ko    Procedure Summary     Date:  10/09/19 Room / Location:  [facility] ENDOSCOPY 1 /  [facility]    Anesthesia Start:  1254 Anesthesia Stop:  1332    Procedure:  COLONOSCOPY with polypectomy (N/A ) Diagnosis:       Personal history of colonic polyps      Colon polyp      Diverticulosis large intestine w/o perforation or abscess w/o bleeding      (Personal history of colonic polyps [Z86.010])    Surgeon:  Lee Priest MD Provider:  Alesia Broussard CRNA    Anesthesia Type:  MAC ASA Status:  3          Anesthesia Type: MAC  Last vitals  BP   106/75 (10/09/19 1335)   Temp   97.9 °F (36.6 °C) (10/09/19 1335)   Pulse   68 (10/09/19 1335)   Resp   16 (10/09/19 1335)     SpO2   94 % (10/09/19 1335)     Post Anesthesia Care and Evaluation    Patient location during evaluation: bedside  Patient participation: complete - patient participated  Level of consciousness: awake  Pain score: 0  Pain management: adequate  Airway patency: patent  Anesthetic complications: No anesthetic complications  PONV Status: none  Cardiovascular status: acceptable  Respiratory status: acceptable  Hydration status: acceptable

## 2019-10-09 NOTE — ANESTHESIA PREPROCEDURE EVALUATION
Anesthesia Evaluation     Patient summary reviewed and Nursing notes reviewed   no history of anesthetic complications:  NPO Solid Status: > 8 hours  NPO Liquid Status: > 8 hours           Airway   Mallampati: II  TM distance: >3 FB  Neck ROM: full  No difficulty expected  Dental    (+) lower dentures and upper dentures    Pulmonary     breath sounds clear to auscultation  (+) a smoker (QUIT 20 YRS AGO) Former, COPD (Inhaler daily), shortness of breath, sleep apnea (DOES NOT USE CPAP),   Cardiovascular   Exercise tolerance: poor (<4 METS)    ECG reviewed  Rhythm: regular  Rate: normal    (+) hypertension well controlled 2 medications or greater, past MI (25+ years ago)  >12 months, CAD, cardiac stents (Placed 2 years ago) WILKS, hyperlipidemia,   PVD: LE SWELLING     ROS comment: EKG 8/30/19:  Comparison: compared with previous ECG   Similar to previous ECG  Rhythm: sinus rhythm  Conduction: left anterior fascicular block  Q waves: V2, V3, V4 and V5      Clinical impression: abnormal EKG    ECHO 8/26/19:  Interpretation Summary     · Estimated EF = 63%.  · Left ventricular systolic function is normal.  · Left ventricular diastolic dysfunction (grade I) consistent with impaired relaxation.    STRESS TEST 9/26/19:  Interpretation Summary     · Myocardial perfusion imaging indicates a normal myocardial perfusion study with no evidence of ischemia.  · Impressions are consistent with a low risk study.    CARDIAC CATH 10/30/18:  SUMMARY: Successful AURA of the mid-LAD     Neuro/Psych  (+) seizures (KEPPRA THIS AM) well controlled, CVA (MAY 2013), numbness ([place]ON[place] FEET DM NEUROPATHY ), psychiatric history Depression and Anxiety,     GI/Hepatic/Renal/Endo    (+) morbid obesity,  renal disease (WITH CHEMO TX ) CRI, diabetes mellitus type 2 well controlled,     Musculoskeletal     (+) back pain,   Abdominal   (+) obese,    Substance History   (+) alcohol use (QUIT ETOH ABOUT 20YRS AGO),      OB/GYN          Other   (+) blood  dyscrasia, arthritis   history of cancer (MANTEL CELL LYMPHOMA, BLADDER CA) remission    ROS/Med Hx Other: RETINAL EDEMA    Plavix last on 10/4/19                  Anesthesia Plan    ASA 3     MAC     intravenous induction   Anesthetic plan, all risks, benefits, and alternatives have been provided, discussed and informed consent has been obtained with: patient.  Use of blood products discussed with patient  Consented to blood products.

## 2019-10-11 LAB
CYTO UR: NORMAL
LAB AP CASE REPORT: NORMAL
PATH REPORT.FINAL DX SPEC: NORMAL
PATH REPORT.GROSS SPEC: NORMAL

## 2020-03-03 ENCOUNTER — OFFICE VISIT (OUTPATIENT)
Dept: ONCOLOGY | Facility: CLINIC | Age: 70
End: 2020-03-03

## 2020-03-03 ENCOUNTER — LAB (OUTPATIENT)
Dept: LAB | Facility: HOSPITAL | Age: 70
End: 2020-03-03

## 2020-03-03 VITALS
HEIGHT: 68 IN | BODY MASS INDEX: 46.07 KG/M2 | TEMPERATURE: 98.1 F | WEIGHT: 304 LBS | RESPIRATION RATE: 16 BRPM | OXYGEN SATURATION: 94 % | HEART RATE: 72 BPM | SYSTOLIC BLOOD PRESSURE: 129 MMHG | DIASTOLIC BLOOD PRESSURE: 76 MMHG

## 2020-03-03 DIAGNOSIS — C83.10 MANTLE CELL LYMPHOMA, UNSPECIFIED BODY REGION (HCC): Primary | ICD-10-CM

## 2020-03-03 DIAGNOSIS — D69.6 THROMBOCYTOPENIA (HCC): ICD-10-CM

## 2020-03-03 DIAGNOSIS — D75.1 POLYCYTHEMIA: ICD-10-CM

## 2020-03-03 LAB
ALBUMIN SERPL-MCNC: 4.3 G/DL (ref 3.5–5.2)
ALBUMIN/GLOB SERPL: 1.7 G/DL
ALP SERPL-CCNC: 133 U/L (ref 39–117)
ALT SERPL W P-5'-P-CCNC: 23 U/L (ref 1–41)
ANION GAP SERPL CALCULATED.3IONS-SCNC: 11.4 MMOL/L (ref 5–15)
AST SERPL-CCNC: 21 U/L (ref 1–40)
BASOPHILS # BLD AUTO: 0.04 10*3/MM3 (ref 0–0.2)
BASOPHILS NFR BLD AUTO: 0.5 % (ref 0–1.5)
BILIRUB SERPL-MCNC: 0.8 MG/DL (ref 0.2–1.2)
BUN BLD-MCNC: 19 MG/DL (ref 8–23)
BUN/CREAT SERPL: 17.3 (ref 7–25)
CALCIUM SPEC-SCNC: 9.4 MG/DL (ref 8.6–10.5)
CHLORIDE SERPL-SCNC: 104 MMOL/L (ref 98–107)
CO2 SERPL-SCNC: 25.6 MMOL/L (ref 22–29)
CREAT BLD-MCNC: 1.1 MG/DL (ref 0.76–1.27)
DEPRECATED RDW RBC AUTO: 48.2 FL (ref 37–54)
EOSINOPHIL # BLD AUTO: 0.26 10*3/MM3 (ref 0–0.4)
EOSINOPHIL NFR BLD AUTO: 3.4 % (ref 0.3–6.2)
ERYTHROCYTE [DISTWIDTH] IN BLOOD BY AUTOMATED COUNT: 16.3 % (ref 12.3–15.4)
GFR SERPL CREATININE-BSD FRML MDRD: 66 ML/MIN/1.73
GLOBULIN UR ELPH-MCNC: 2.5 GM/DL
GLUCOSE BLD-MCNC: 103 MG/DL (ref 65–99)
HCT VFR BLD AUTO: 54 % (ref 37.5–51)
HGB BLD-MCNC: 17.4 G/DL (ref 13–17.7)
IMM GRANULOCYTES # BLD AUTO: 0.05 10*3/MM3 (ref 0–0.05)
IMM GRANULOCYTES NFR BLD AUTO: 0.7 % (ref 0–0.5)
LDH SERPL-CCNC: 193 U/L (ref 135–225)
LYMPHOCYTES # BLD AUTO: 1.37 10*3/MM3 (ref 0.7–3.1)
LYMPHOCYTES NFR BLD AUTO: 18.2 % (ref 19.6–45.3)
MCH RBC QN AUTO: 27.8 PG (ref 26.6–33)
MCHC RBC AUTO-ENTMCNC: 32.2 G/DL (ref 31.5–35.7)
MCV RBC AUTO: 86.4 FL (ref 79–97)
MONOCYTES # BLD AUTO: 0.56 10*3/MM3 (ref 0.1–0.9)
MONOCYTES NFR BLD AUTO: 7.4 % (ref 5–12)
NEUTROPHILS # BLD AUTO: 5.26 10*3/MM3 (ref 1.7–7)
NEUTROPHILS NFR BLD AUTO: 69.8 % (ref 42.7–76)
PLATELET # BLD AUTO: 153 10*3/MM3 (ref 140–450)
PMV BLD AUTO: 10.7 FL (ref 6–12)
POTASSIUM BLD-SCNC: 4.6 MMOL/L (ref 3.5–5.2)
PROT SERPL-MCNC: 6.8 G/DL (ref 6–8.5)
RBC # BLD AUTO: 6.25 10*6/MM3 (ref 4.14–5.8)
SODIUM BLD-SCNC: 141 MMOL/L (ref 136–145)
WBC NRBC COR # BLD: 7.54 10*3/MM3 (ref 3.4–10.8)

## 2020-03-03 PROCEDURE — 85025 COMPLETE CBC W/AUTO DIFF WBC: CPT

## 2020-03-03 PROCEDURE — 80053 COMPREHEN METABOLIC PANEL: CPT

## 2020-03-03 PROCEDURE — 36415 COLL VENOUS BLD VENIPUNCTURE: CPT

## 2020-03-03 PROCEDURE — 99213 OFFICE O/P EST LOW 20 MIN: CPT | Performed by: INTERNAL MEDICINE

## 2020-03-03 PROCEDURE — 83615 LACTATE (LD) (LDH) ENZYME: CPT

## 2020-03-03 NOTE — PROGRESS NOTES
Subjective   REASONS FOR FOLLOW-UP:   History of mantle cell lymphoma.       History of Present Illness    Mr. Ko is a 69-year-old man returning for followup of his history of mantle cell lymphoma previously treated with bendamustine/rituximab followed by maintenance Rituxan through the winter of 2013.   He has had no therapy or disease progression since that time.     He returned today for a six-month follow-up and review.  He denies weight loss, progressive lymphadenopathy, fevers or chills.  He denies loss of appetite.    PAST MEDICAL HISTORY:   1. Seizure disorder after a head trauma in .   2. Bladder cancer diagnosed in  for which he gets annual cystoscopy, currently with no evidence of disease.   3. Chronic obstructive pulmonary disease.   4. Psoriasis.   5. Hypertension.   6. Chronic renal insufficiency complicated by acute renal failure secondary to tumor lysis in 2011.   7. Ischemic cerebrovascular accidents and TIAs with the most recent event in 2014, which was a TIA.   8. History of tobacco abuse.   9. Coronary artery disease        SOCIAL HISTORY: He is  and retired mostly from factory work. He quit smoking tobacco around  after 80 pack-year. He drank heavily in the past but none since around . Denies illicit drug use. He has had prior blood transfusions.     FAMILY HISTORY: His father had pancreatic cancer and  at age 56. Sister has anemia. Mother had heart disease. Brother has hypertension.       Review of Systems   Constitutional: Positive for fatigue. Negative for activity change and unexpected weight change.   Respiratory: Positive for shortness of breath. Negative for chest tightness.    Cardiovascular: Positive for leg swelling. Negative for chest pain and palpitations.   Gastrointestinal: Negative for abdominal distention, abdominal pain and blood in stool.   Musculoskeletal: Negative.    Skin: Negative for pallor and rash.   Neurological:  "Negative for weakness.   Hematological: Negative for adenopathy.   Psychiatric/Behavioral: Negative.           Medications:  The current medication list was reviewed in the EMR    ALLERGIES:    Allergies   Allergen Reactions   • Amoxicillin Hives   • Ceftin [Cefuroxime Axetil] Hives   • Codeine    • Flonase [Fluticasone] Hives   • Pharbetol [Acetaminophen] Hives   • Spiriva Handihaler [Tiotropium Bromide Monohydrate] Hives   • Sulfa Antibiotics Hives   • Phenobarbital Rash       Objective      Vitals:    03/03/20 1014   BP: 129/76   Pulse: 72   Resp: 16   Temp: 98.1 °F (36.7 °C)   TempSrc: Oral   SpO2: 94%   Weight: (!) 138 kg (304 lb)   Height: 172.7 cm (67.99\")   PainSc: 0-No pain     Current Status 3/3/2020   ECOG score 0       Physical Exam   Constitutional: He appears well-nourished.   Morbidly obese   Cardiovascular: Normal rate and regular rhythm.   Pulmonary/Chest: Effort normal. No respiratory distress.   Abdominal: Soft. He exhibits no mass.   Musculoskeletal: He exhibits edema (Left lower extremity more swollen than the right).   Lymphadenopathy:     He has no cervical adenopathy.     He has no axillary adenopathy.        Right: No supraclavicular adenopathy present.        Left: No supraclavicular adenopathy present.   Skin: No erythema. No pallor.            RECENT LABS:  Hematology WBC   Date Value Ref Range Status   03/03/2020 7.54 3.40 - 10.80 10*3/mm3 Final     RBC   Date Value Ref Range Status   03/03/2020 6.25 (H) 4.14 - 5.80 10*6/mm3 Final     Hemoglobin   Date Value Ref Range Status   03/03/2020 17.4 13.0 - 17.7 g/dL Final     Hematocrit   Date Value Ref Range Status   03/03/2020 54.0 (H) 37.5 - 51.0 % Final     Platelets   Date Value Ref Range Status   03/03/2020 153 140 - 450 10*3/mm3 Final     Lab Results   Component Value Date    GLUCOSE 104 (H) 08/20/2019    BUN 21 08/20/2019    CREATININE 1.31 (H) 08/20/2019    EGFRIFNONA 54 (L) 08/20/2019    EGFRIFAFRI  08/30/2016      Comment:      <15 " Indicative of kidney failure.    BCR 16.0 08/20/2019    CO2 29.6 (H) 08/20/2019    CALCIUM 9.4 08/20/2019    ALBUMIN 4.30 08/20/2019    AST 18 08/20/2019    ALT 22 08/20/2019              Assessment/Plan     1.   history mantle cell lymphoma with bone marrow involvement at diagnosis. He was treated with bendamustine/rituximab followed by maintenance rituximab completed in the winter of 2013.     His most recent scans were performed in July 2017 with no lymphadenopathy or splenomegaly noted.    In return today, the patient had no concerning signs or symptoms of recurrent lymphoma.  Continued observation recommended.  Six-month follow-up requested with labs.  Imaging planned only if the patient develops symptoms of progressive disease.    2.  Intermittent thrombocytopenia: Platelet count is normal today    3.  Mild polycythemia: I suspect this is related to the patient's obesity, sleep apnea and probable hypoventilation syndrome.  We will continue to monitor this.  Repeat CBC 6 months                3/3/2020      CC:

## 2020-08-13 ENCOUNTER — LAB (OUTPATIENT)
Dept: LAB | Facility: HOSPITAL | Age: 70
End: 2020-08-13

## 2020-08-13 ENCOUNTER — OFFICE VISIT (OUTPATIENT)
Dept: ONCOLOGY | Facility: CLINIC | Age: 70
End: 2020-08-13

## 2020-08-13 VITALS
TEMPERATURE: 98 F | OXYGEN SATURATION: 93 % | DIASTOLIC BLOOD PRESSURE: 86 MMHG | HEART RATE: 72 BPM | SYSTOLIC BLOOD PRESSURE: 131 MMHG | WEIGHT: 305.3 LBS | BODY MASS INDEX: 46.43 KG/M2

## 2020-08-13 DIAGNOSIS — C83.10 MANTLE CELL LYMPHOMA, UNSPECIFIED BODY REGION (HCC): ICD-10-CM

## 2020-08-13 LAB
ALBUMIN SERPL-MCNC: 4.2 G/DL (ref 3.5–5.2)
ALBUMIN/GLOB SERPL: 2 G/DL
ALP SERPL-CCNC: 140 U/L (ref 39–117)
ALT SERPL W P-5'-P-CCNC: 22 U/L (ref 1–41)
ANION GAP SERPL CALCULATED.3IONS-SCNC: 12.2 MMOL/L (ref 5–15)
AST SERPL-CCNC: 21 U/L (ref 1–40)
BASOPHILS # BLD AUTO: 0.03 10*3/MM3 (ref 0–0.2)
BASOPHILS NFR BLD AUTO: 0.4 % (ref 0–1.5)
BILIRUB SERPL-MCNC: 0.6 MG/DL (ref 0–1.2)
BUN SERPL-MCNC: 20 MG/DL (ref 8–23)
BUN/CREAT SERPL: 16.9 (ref 7–25)
CALCIUM SPEC-SCNC: 9.3 MG/DL (ref 8.6–10.5)
CHLORIDE SERPL-SCNC: 104 MMOL/L (ref 98–107)
CO2 SERPL-SCNC: 24.8 MMOL/L (ref 22–29)
CREAT SERPL-MCNC: 1.18 MG/DL (ref 0.76–1.27)
DEPRECATED RDW RBC AUTO: 47.2 FL (ref 37–54)
EOSINOPHIL # BLD AUTO: 0.28 10*3/MM3 (ref 0–0.4)
EOSINOPHIL NFR BLD AUTO: 3.7 % (ref 0.3–6.2)
ERYTHROCYTE [DISTWIDTH] IN BLOOD BY AUTOMATED COUNT: 14.4 % (ref 12.3–15.4)
GFR SERPL CREATININE-BSD FRML MDRD: 61 ML/MIN/1.73
GLOBULIN UR ELPH-MCNC: 2.1 GM/DL
GLUCOSE SERPL-MCNC: 104 MG/DL (ref 65–99)
HCT VFR BLD AUTO: 50.1 % (ref 37.5–51)
HGB BLD-MCNC: 16.1 G/DL (ref 13–17.7)
IMM GRANULOCYTES # BLD AUTO: 0.03 10*3/MM3 (ref 0–0.05)
IMM GRANULOCYTES NFR BLD AUTO: 0.4 % (ref 0–0.5)
LDH SERPL-CCNC: 217 U/L (ref 135–225)
LYMPHOCYTES # BLD AUTO: 1.26 10*3/MM3 (ref 0.7–3.1)
LYMPHOCYTES NFR BLD AUTO: 16.8 % (ref 19.6–45.3)
MCH RBC QN AUTO: 28.3 PG (ref 26.6–33)
MCHC RBC AUTO-ENTMCNC: 32.1 G/DL (ref 31.5–35.7)
MCV RBC AUTO: 88 FL (ref 79–97)
MONOCYTES # BLD AUTO: 0.71 10*3/MM3 (ref 0.1–0.9)
MONOCYTES NFR BLD AUTO: 9.5 % (ref 5–12)
NEUTROPHILS NFR BLD AUTO: 5.2 10*3/MM3 (ref 1.7–7)
NEUTROPHILS NFR BLD AUTO: 69.2 % (ref 42.7–76)
PLATELET # BLD AUTO: 150 10*3/MM3 (ref 140–450)
PMV BLD AUTO: 11 FL (ref 6–12)
POTASSIUM SERPL-SCNC: 3.9 MMOL/L (ref 3.5–5.2)
PROT SERPL-MCNC: 6.3 G/DL (ref 6–8.5)
RBC # BLD AUTO: 5.69 10*6/MM3 (ref 4.14–5.8)
SODIUM SERPL-SCNC: 141 MMOL/L (ref 136–145)
WBC # BLD AUTO: 7.51 10*3/MM3 (ref 3.4–10.8)

## 2020-08-13 PROCEDURE — 36415 COLL VENOUS BLD VENIPUNCTURE: CPT | Performed by: NURSE PRACTITIONER

## 2020-08-13 PROCEDURE — 83615 LACTATE (LD) (LDH) ENZYME: CPT | Performed by: INTERNAL MEDICINE

## 2020-08-13 PROCEDURE — 85025 COMPLETE CBC W/AUTO DIFF WBC: CPT | Performed by: INTERNAL MEDICINE

## 2020-08-13 PROCEDURE — 99213 OFFICE O/P EST LOW 20 MIN: CPT | Performed by: NURSE PRACTITIONER

## 2020-08-13 PROCEDURE — 80053 COMPREHEN METABOLIC PANEL: CPT | Performed by: INTERNAL MEDICINE

## 2020-08-13 NOTE — PROGRESS NOTES
Subjective   REASONS FOR FOLLOW-UP:   History of mantle cell lymphoma.       History of Present Illness    Mr. Ko is a 69-year-old man returning for followup of his history of mantle cell lymphoma previously treated with bendamustine/rituximab followed by maintenance Rituxan through the winter of 2013.   He has had no therapy or disease progression since that time.     Patient returns today for six-month follow-up.  He denies recent fevers or infection.  He has actually had weight gain recently and not weight loss.  He denies any known lymphadenopathy.    Patient states he recently was placed on oxygen though is not wearing it today and satting 93% on room air.  He followed up with urology today with a scope for his history of bladder cancer.  He reports there were no concerns.      PAST MEDICAL HISTORY:   1. Seizure disorder after a head trauma in .   2. Bladder cancer diagnosed in  for which he gets annual cystoscopy, currently with no evidence of disease.   3. Chronic obstructive pulmonary disease.   4. Psoriasis.   5. Hypertension.   6. Chronic renal insufficiency complicated by acute renal failure secondary to tumor lysis in 2011.   7. Ischemic cerebrovascular accidents and TIAs with the most recent event in 2014, which was a TIA.   8. History of tobacco abuse.   9. Coronary artery disease        SOCIAL HISTORY: He is  and retired mostly from factory work. He quit smoking tobacco around  after 80 pack-year. He drank heavily in the past but none since around . Denies illicit drug use. He has had prior blood transfusions.     FAMILY HISTORY: His father had pancreatic cancer and  at age 56. Sister has anemia. Mother had heart disease. Brother has hypertension.       Review of Systems   Constitutional: Positive for fatigue. Negative for activity change and unexpected weight change.   Respiratory: Positive for shortness of breath. Negative for chest tightness.     Cardiovascular: Positive for leg swelling. Negative for chest pain and palpitations.   Gastrointestinal: Negative for abdominal distention, abdominal pain and blood in stool.   Musculoskeletal: Negative.    Skin: Negative for pallor and rash.   Neurological: Negative for weakness.   Hematological: Negative for adenopathy.   Psychiatric/Behavioral: Negative.    Review of systems is unchanged from previous as of 8/13/2020      Medications:  The current medication list was reviewed in the EMR    ALLERGIES:    Allergies   Allergen Reactions   • Amoxicillin Hives   • Ceftin [Cefuroxime Axetil] Hives   • Codeine    • Flonase [Fluticasone] Hives   • Pharbetol [Acetaminophen] Hives   • Spiriva Handihaler [Tiotropium Bromide Monohydrate] Hives   • Sulfa Antibiotics Hives   • Phenobarbital Rash       Objective      Vitals:    08/13/20 1342   BP: 131/86   Pulse: 72   Temp: 98 °F (36.7 °C)   SpO2: 93%   Weight: (!) 138 kg (305 lb 4.8 oz)   PainSc: 0-No pain     Current Status 8/13/2020   ECOG score 3       Physical Exam   Constitutional: He appears well-nourished.   Morbidly obese   HENT:   Head: Normocephalic and atraumatic.   Eyes: Conjunctivae are normal. No scleral icterus.   Cardiovascular: Normal rate and regular rhythm.   Pulmonary/Chest: Effort normal. No respiratory distress.   Abdominal: Soft. He exhibits no mass.   Musculoskeletal: He exhibits edema (BLE chronic).   Lymphadenopathy:     He has no cervical adenopathy.     He has no axillary adenopathy.        Right: No inguinal and no supraclavicular adenopathy present.        Left: No inguinal and no supraclavicular adenopathy present.   Exam limited due to body habitus   Skin: No erythema. No pallor.   Vitals reviewed.           RECENT LABS:  Hematology WBC   Date Value Ref Range Status   08/13/2020 7.51 3.40 - 10.80 10*3/mm3 Final     RBC   Date Value Ref Range Status   08/13/2020 5.69 4.14 - 5.80 10*6/mm3 Final     Hemoglobin   Date Value Ref Range Status    08/13/2020 16.1 13.0 - 17.7 g/dL Final     Hematocrit   Date Value Ref Range Status   08/13/2020 50.1 37.5 - 51.0 % Final     Platelets   Date Value Ref Range Status   08/13/2020 150 140 - 450 10*3/mm3 Final     Lab Results   Component Value Date    GLUCOSE 104 (H) 08/13/2020    BUN 20 08/13/2020    CREATININE 1.18 08/13/2020    EGFRIFNONA 61 08/13/2020    EGFRIFAFRI  08/30/2016      Comment:      <15 Indicative of kidney failure.    BCR 16.9 08/13/2020    CO2 24.8 08/13/2020    CALCIUM 9.3 08/13/2020    ALBUMIN 4.20 08/13/2020    AST 21 08/13/2020    ALT 22 08/13/2020              Assessment/Plan     1.   history mantle cell lymphoma with bone marrow involvement at diagnosis. He was treated with bendamustine/rituximab followed by maintenance rituximab completed in the winter of 2013.     His most recent scans were performed in July 2017 with no lymphadenopathy or splenomegaly noted.    Patient returns today for six-month follow-up.  He has no change in appetite, has actually been gaining weight which we discussed he should focus on cutting back, and known new lymphadenopathy on exam.  We will continue observation.  He will follow-up in 6 months with Dr. Restrepo and labs.  Imaging planned only if the patient develops symptoms of progressive disease.      2.  Intermittent thrombocytopenia: Platelet count remains normal    3.  Mild polycythemia: I suspect this is related to the patient's obesity, sleep apnea and probable hypoventilation syndrome.  Hematocrit improved to 50 today from 54.  Continue to monitor.               Beronica Cervantes, EDDIE    8/13/2020

## 2020-08-24 ENCOUNTER — TRANSCRIBE ORDERS (OUTPATIENT)
Dept: ADMINISTRATIVE | Facility: HOSPITAL | Age: 70
End: 2020-08-24

## 2020-08-24 DIAGNOSIS — J84.9 ILD (INTERSTITIAL LUNG DISEASE) (HCC): Primary | ICD-10-CM

## 2020-09-23 ENCOUNTER — APPOINTMENT (OUTPATIENT)
Dept: CT IMAGING | Facility: HOSPITAL | Age: 70
End: 2020-09-23

## 2020-11-18 ENCOUNTER — TRANSCRIBE ORDERS (OUTPATIENT)
Dept: ADMINISTRATIVE | Facility: HOSPITAL | Age: 70
End: 2020-11-18

## 2020-11-18 ENCOUNTER — HOSPITAL ENCOUNTER (OUTPATIENT)
Dept: GENERAL RADIOLOGY | Facility: HOSPITAL | Age: 70
Discharge: HOME | End: 2020-11-18
Admitting: FAMILY MEDICINE

## 2020-11-18 DIAGNOSIS — M79.671 FOOT PAIN, RIGHT: ICD-10-CM

## 2020-11-18 DIAGNOSIS — M79.671 FOOT PAIN, RIGHT: Primary | ICD-10-CM

## 2020-11-18 PROCEDURE — 73630 X-RAY EXAM OF FOOT: CPT

## 2021-01-12 ENCOUNTER — APPOINTMENT (OUTPATIENT)
Dept: CT IMAGING | Facility: HOSPITAL | Age: 71
End: 2021-01-12

## 2021-03-02 ENCOUNTER — TELEPHONE (OUTPATIENT)
Dept: ONCOLOGY | Facility: CLINIC | Age: 71
End: 2021-03-02

## 2021-03-02 NOTE — TELEPHONE ENCOUNTER
Caller: MARLO EDDY    Relationship to patient: WIFE    Best call back number: [phone]    CALLING TO CANCEL PT'S APPT ON 03/03. PT'S WIFE IS CURRENTLY IN REHAB AND PT HAS NO WAY TO HIS APPT. THEY WILL CALL BACK TO RESCHEDULE

## 2021-03-03 ENCOUNTER — APPOINTMENT (OUTPATIENT)
Dept: LAB | Facility: HOSPITAL | Age: 71
End: 2021-03-03

## 2021-04-02 ENCOUNTER — APPOINTMENT (OUTPATIENT)
Dept: CARDIOLOGY | Facility: HOSPITAL | Age: 71
End: 2021-04-02

## 2021-04-02 ENCOUNTER — APPOINTMENT (OUTPATIENT)
Dept: GENERAL RADIOLOGY | Facility: HOSPITAL | Age: 71
End: 2021-04-02

## 2021-04-02 ENCOUNTER — HOSPITAL ENCOUNTER (INPATIENT)
Facility: HOSPITAL | Age: 71
LOS: 7 days | Discharge: HOME HEALTH | End: 2021-04-09
Attending: FAMILY MEDICINE | Admitting: HOSPITALIST

## 2021-04-02 DIAGNOSIS — E66.01 MORBID OBESITY WITH BMI OF 50.0-59.9, ADULT (HCC): Primary | ICD-10-CM

## 2021-04-02 PROBLEM — I50.9 CHF (CONGESTIVE HEART FAILURE): Status: ACTIVE | Noted: 2021-04-02

## 2021-04-02 LAB
ALBUMIN SERPL-MCNC: 4.1 G/DL (ref 3.5–5.2)
ALBUMIN/GLOB SERPL: 1.8 G/DL
ALP SERPL-CCNC: 152 U/L (ref 39–117)
ALT SERPL W P-5'-P-CCNC: 17 U/L (ref 1–41)
ANION GAP SERPL CALCULATED.3IONS-SCNC: 9.4 MMOL/L (ref 5–15)
AORTIC DIMENSIONLESS INDEX: 0.6 (DI)
AST SERPL-CCNC: 16 U/L (ref 1–40)
BH CV ECHO MEAS - AO MAX PG: 5 MMHG
BH CV ECHO MEAS - AO MEAN PG (FULL): 1 MMHG
BH CV ECHO MEAS - AO MEAN PG: 2 MMHG
BH CV ECHO MEAS - AO V2 MAX: 112 CM/SEC
BH CV ECHO MEAS - AO V2 MEAN: 68.4 CM/SEC
BH CV ECHO MEAS - AO V2 VTI: 20.2 CM
BH CV ECHO MEAS - AVA(I,A): 2.5 CM^2
BH CV ECHO MEAS - AVA(I,D): 2.5 CM^2
BH CV ECHO MEAS - BSA(HAYCOCK): 2.7 M^2
BH CV ECHO MEAS - BSA: 2.5 M^2
BH CV ECHO MEAS - BZI_BMI: 51.7 KILOGRAMS/M^2
BH CV ECHO MEAS - BZI_METRIC_HEIGHT: 170.2 CM
BH CV ECHO MEAS - BZI_METRIC_WEIGHT: 149.7 KG
BH CV ECHO MEAS - EDV(CUBED): 103.8 ML
BH CV ECHO MEAS - EDV(MOD-SP2): 68.9 ML
BH CV ECHO MEAS - EDV(MOD-SP4): 96 ML
BH CV ECHO MEAS - EDV(TEICH): 102.4 ML
BH CV ECHO MEAS - EF(CUBED): 66.9 %
BH CV ECHO MEAS - EF(MOD-BP): 63.6 %
BH CV ECHO MEAS - EF(MOD-SP2): 62.7 %
BH CV ECHO MEAS - EF(MOD-SP4): 63.5 %
BH CV ECHO MEAS - EF(TEICH): 58.5 %
BH CV ECHO MEAS - ESV(CUBED): 34.3 ML
BH CV ECHO MEAS - ESV(MOD-SP2): 25.7 ML
BH CV ECHO MEAS - ESV(MOD-SP4): 35 ML
BH CV ECHO MEAS - ESV(TEICH): 42.5 ML
BH CV ECHO MEAS - FS: 30.9 %
BH CV ECHO MEAS - IVS/LVPW: 1
BH CV ECHO MEAS - IVSD: 1.3 CM
BH CV ECHO MEAS - LAT PEAK E' VEL: 7 CM/SEC
BH CV ECHO MEAS - LV DIASTOLIC VOL/BSA (35-75): 38.4 ML/M^2
BH CV ECHO MEAS - LV MASS(C)D: 231.9 GRAMS
BH CV ECHO MEAS - LV MASS(C)DI: 92.7 GRAMS/M^2
BH CV ECHO MEAS - LV MAX PG: 1.9 MMHG
BH CV ECHO MEAS - LV MEAN PG: 1 MMHG
BH CV ECHO MEAS - LV SYSTOLIC VOL/BSA (12-30): 14 ML/M^2
BH CV ECHO MEAS - LV V1 MAX: 68 CM/SEC
BH CV ECHO MEAS - LV V1 MEAN: 43.6 CM/SEC
BH CV ECHO MEAS - LV V1 VTI: 13.2 CM
BH CV ECHO MEAS - LVIDD: 4.7 CM
BH CV ECHO MEAS - LVIDS: 3.3 CM
BH CV ECHO MEAS - LVLD AP2: 7 CM
BH CV ECHO MEAS - LVLD AP4: 7.6 CM
BH CV ECHO MEAS - LVLS AP2: 6.6 CM
BH CV ECHO MEAS - LVLS AP4: 7 CM
BH CV ECHO MEAS - LVOT AREA (M): 3.8 CM^2
BH CV ECHO MEAS - LVOT AREA: 3.8 CM^2
BH CV ECHO MEAS - LVOT DIAM: 2.2 CM
BH CV ECHO MEAS - LVPWD: 1.3 CM
BH CV ECHO MEAS - MED PEAK E' VEL: 4.4 CM/SEC
BH CV ECHO MEAS - MV A MAX VEL: 102 CM/SEC
BH CV ECHO MEAS - MV DEC SLOPE: 343 CM/SEC^2
BH CV ECHO MEAS - MV DEC TIME: 261 SEC
BH CV ECHO MEAS - MV E MAX VEL: 62.1 CM/SEC
BH CV ECHO MEAS - MV E/A: 0.61
BH CV ECHO MEAS - MV MEAN PG: 2 MMHG
BH CV ECHO MEAS - MV P1/2T MAX VEL: 78.7 CM/SEC
BH CV ECHO MEAS - MV P1/2T: 67.2 MSEC
BH CV ECHO MEAS - MV V2 MEAN: 64.7 CM/SEC
BH CV ECHO MEAS - MV V2 VTI: 22 CM
BH CV ECHO MEAS - MVA P1/2T LCG: 2.8 CM^2
BH CV ECHO MEAS - MVA(P1/2T): 3.3 CM^2
BH CV ECHO MEAS - MVA(VTI): 2.3 CM^2
BH CV ECHO MEAS - RAP SYSTOLE: 8 MMHG
BH CV ECHO MEAS - SI(CUBED): 27.8 ML/M^2
BH CV ECHO MEAS - SI(LVOT): 20.1 ML/M^2
BH CV ECHO MEAS - SI(MOD-SP2): 17.3 ML/M^2
BH CV ECHO MEAS - SI(MOD-SP4): 24.4 ML/M^2
BH CV ECHO MEAS - SI(TEICH): 23.9 ML/M^2
BH CV ECHO MEAS - SV(CUBED): 69.5 ML
BH CV ECHO MEAS - SV(LVOT): 50.2 ML
BH CV ECHO MEAS - SV(MOD-SP2): 43.2 ML
BH CV ECHO MEAS - SV(MOD-SP4): 61 ML
BH CV ECHO MEAS - SV(TEICH): 59.8 ML
BH CV ECHO MEAS - TAPSE (>1.6): 3.5 CM
BH CV ECHO MEASUREMENTS AVERAGE E/E' RATIO: 10.89
BH CV XLRA - TDI S': 24 CM/SEC
BILIRUB SERPL-MCNC: 0.7 MG/DL (ref 0–1.2)
BUN SERPL-MCNC: 17 MG/DL (ref 8–23)
BUN/CREAT SERPL: 15.6 (ref 7–25)
CALCIUM SPEC-SCNC: 8.8 MG/DL (ref 8.6–10.5)
CHLORIDE SERPL-SCNC: 105 MMOL/L (ref 98–107)
CO2 SERPL-SCNC: 26.6 MMOL/L (ref 22–29)
CREAT SERPL-MCNC: 1.09 MG/DL (ref 0.76–1.27)
DEPRECATED RDW RBC AUTO: 47.6 FL (ref 37–54)
ERYTHROCYTE [DISTWIDTH] IN BLOOD BY AUTOMATED COUNT: 15.7 % (ref 12.3–15.4)
GFR SERPL CREATININE-BSD FRML MDRD: 67 ML/MIN/1.73
GLOBULIN UR ELPH-MCNC: 2.3 GM/DL
GLUCOSE SERPL-MCNC: 136 MG/DL (ref 65–99)
HCT VFR BLD AUTO: 49.5 % (ref 37.5–51)
HGB BLD-MCNC: 15.8 G/DL (ref 13–17.7)
LEFT ATRIUM VOLUME INDEX: 25 ML/M2
MCH RBC QN AUTO: 27.6 PG (ref 26.6–33)
MCHC RBC AUTO-ENTMCNC: 31.9 G/DL (ref 31.5–35.7)
MCV RBC AUTO: 86.5 FL (ref 79–97)
NT-PROBNP SERPL-MCNC: 105 PG/ML (ref 0–900)
PLATELET # BLD AUTO: 148 10*3/MM3 (ref 140–450)
PMV BLD AUTO: 10.3 FL (ref 6–12)
POTASSIUM SERPL-SCNC: 4.5 MMOL/L (ref 3.5–5.2)
PROT SERPL-MCNC: 6.4 G/DL (ref 6–8.5)
QT INTERVAL: 379 MS
QT INTERVAL: 413 MS
RBC # BLD AUTO: 5.72 10*6/MM3 (ref 4.14–5.8)
SARS-COV-2 RNA PNL SPEC NAA+PROBE: NOT DETECTED
SINUS: 3.1 CM
SODIUM SERPL-SCNC: 141 MMOL/L (ref 136–145)
TROPONIN T SERPL-MCNC: <0.01 NG/ML (ref 0–0.03)
TSH SERPL DL<=0.05 MIU/L-ACNC: 3.63 UIU/ML (ref 0.27–4.2)
WBC # BLD AUTO: 7.93 10*3/MM3 (ref 3.4–10.8)

## 2021-04-02 PROCEDURE — 93306 TTE W/DOPPLER COMPLETE: CPT | Performed by: INTERNAL MEDICINE

## 2021-04-02 PROCEDURE — 87635 SARS-COV-2 COVID-19 AMP PRB: CPT | Performed by: FAMILY MEDICINE

## 2021-04-02 PROCEDURE — 80053 COMPREHEN METABOLIC PANEL: CPT | Performed by: FAMILY MEDICINE

## 2021-04-02 PROCEDURE — 94799 UNLISTED PULMONARY SVC/PX: CPT

## 2021-04-02 PROCEDURE — 84443 ASSAY THYROID STIM HORMONE: CPT | Performed by: FAMILY MEDICINE

## 2021-04-02 PROCEDURE — 93306 TTE W/DOPPLER COMPLETE: CPT

## 2021-04-02 PROCEDURE — 93005 ELECTROCARDIOGRAM TRACING: CPT | Performed by: FAMILY MEDICINE

## 2021-04-02 PROCEDURE — 25010000002 ENOXAPARIN PER 10 MG: Performed by: FAMILY MEDICINE

## 2021-04-02 PROCEDURE — 83880 ASSAY OF NATRIURETIC PEPTIDE: CPT | Performed by: FAMILY MEDICINE

## 2021-04-02 PROCEDURE — 84484 ASSAY OF TROPONIN QUANT: CPT | Performed by: FAMILY MEDICINE

## 2021-04-02 PROCEDURE — 85027 COMPLETE CBC AUTOMATED: CPT | Performed by: FAMILY MEDICINE

## 2021-04-02 PROCEDURE — 93010 ELECTROCARDIOGRAM REPORT: CPT | Performed by: INTERNAL MEDICINE

## 2021-04-02 PROCEDURE — 25010000002 PERFLUTREN (DEFINITY) 8.476 MG IN SODIUM CHLORIDE (PF) 0.9 % 10 ML INJECTION: Performed by: FAMILY MEDICINE

## 2021-04-02 PROCEDURE — 71046 X-RAY EXAM CHEST 2 VIEWS: CPT

## 2021-04-02 RX ORDER — BUMETANIDE 0.25 MG/ML
2 INJECTION INTRAMUSCULAR; INTRAVENOUS ONCE
Status: COMPLETED | OUTPATIENT
Start: 2021-04-02 | End: 2021-04-02

## 2021-04-02 RX ORDER — SODIUM CHLORIDE 0.9 % (FLUSH) 0.9 %
10 SYRINGE (ML) INJECTION EVERY 12 HOURS SCHEDULED
Status: DISCONTINUED | OUTPATIENT
Start: 2021-04-02 | End: 2021-04-09 | Stop reason: HOSPADM

## 2021-04-02 RX ORDER — SODIUM CHLORIDE 9 MG/ML
40 INJECTION, SOLUTION INTRAVENOUS AS NEEDED
Status: DISCONTINUED | OUTPATIENT
Start: 2021-04-02 | End: 2021-04-09 | Stop reason: HOSPADM

## 2021-04-02 RX ORDER — POTASSIUM CHLORIDE 20 MEQ/1
40 TABLET, EXTENDED RELEASE ORAL 4 TIMES DAILY
Status: DISCONTINUED | OUTPATIENT
Start: 2021-04-02 | End: 2021-04-05

## 2021-04-02 RX ORDER — ARFORMOTEROL TARTRATE 15 UG/2ML
15 SOLUTION RESPIRATORY (INHALATION)
Status: DISCONTINUED | OUTPATIENT
Start: 2021-04-02 | End: 2021-04-09 | Stop reason: HOSPADM

## 2021-04-02 RX ORDER — ACETAMINOPHEN 650 MG/1
650 SUPPOSITORY RECTAL EVERY 4 HOURS PRN
Status: DISCONTINUED | OUTPATIENT
Start: 2021-04-02 | End: 2021-04-09 | Stop reason: HOSPADM

## 2021-04-02 RX ORDER — ASPIRIN 81 MG/1
81 TABLET ORAL DAILY
Status: DISCONTINUED | OUTPATIENT
Start: 2021-04-02 | End: 2021-04-09 | Stop reason: HOSPADM

## 2021-04-02 RX ORDER — CHOLECALCIFEROL (VITAMIN D3) 125 MCG
5 CAPSULE ORAL NIGHTLY PRN
Status: DISCONTINUED | OUTPATIENT
Start: 2021-04-02 | End: 2021-04-09 | Stop reason: HOSPADM

## 2021-04-02 RX ORDER — CLOPIDOGREL BISULFATE 75 MG/1
75 TABLET ORAL EVERY MORNING
Status: DISCONTINUED | OUTPATIENT
Start: 2021-04-03 | End: 2021-04-09 | Stop reason: HOSPADM

## 2021-04-02 RX ORDER — FLUOXETINE HYDROCHLORIDE 20 MG/1
20 CAPSULE ORAL DAILY
Status: DISCONTINUED | OUTPATIENT
Start: 2021-04-02 | End: 2021-04-09 | Stop reason: HOSPADM

## 2021-04-02 RX ORDER — NITROGLYCERIN 0.4 MG/1
0.4 TABLET SUBLINGUAL
Status: DISCONTINUED | OUTPATIENT
Start: 2021-04-02 | End: 2021-04-09 | Stop reason: HOSPADM

## 2021-04-02 RX ORDER — AMLODIPINE BESYLATE 2.5 MG/1
2.5 TABLET ORAL EVERY MORNING
Status: DISCONTINUED | OUTPATIENT
Start: 2021-04-03 | End: 2021-04-05

## 2021-04-02 RX ORDER — LEVETIRACETAM 500 MG/1
500 TABLET ORAL EVERY 12 HOURS SCHEDULED
Status: DISCONTINUED | OUTPATIENT
Start: 2021-04-02 | End: 2021-04-09 | Stop reason: HOSPADM

## 2021-04-02 RX ORDER — ATORVASTATIN CALCIUM 20 MG/1
20 TABLET, FILM COATED ORAL DAILY
Status: DISCONTINUED | OUTPATIENT
Start: 2021-04-02 | End: 2021-04-09 | Stop reason: HOSPADM

## 2021-04-02 RX ORDER — IPRATROPIUM BROMIDE AND ALBUTEROL SULFATE 2.5; .5 MG/3ML; MG/3ML
3 SOLUTION RESPIRATORY (INHALATION)
Status: DISCONTINUED | OUTPATIENT
Start: 2021-04-02 | End: 2021-04-06

## 2021-04-02 RX ORDER — METOPROLOL TARTRATE 50 MG/1
50 TABLET, FILM COATED ORAL EVERY 12 HOURS SCHEDULED
Status: DISCONTINUED | OUTPATIENT
Start: 2021-04-02 | End: 2021-04-09 | Stop reason: HOSPADM

## 2021-04-02 RX ORDER — ALLOPURINOL 300 MG/1
300 TABLET ORAL EVERY MORNING
Status: DISCONTINUED | OUTPATIENT
Start: 2021-04-03 | End: 2021-04-09 | Stop reason: HOSPADM

## 2021-04-02 RX ORDER — CETIRIZINE HYDROCHLORIDE 10 MG/1
10 TABLET ORAL EVERY MORNING
Status: DISCONTINUED | OUTPATIENT
Start: 2021-04-03 | End: 2021-04-09 | Stop reason: HOSPADM

## 2021-04-02 RX ORDER — HYDROCODONE BITARTRATE AND ACETAMINOPHEN 5; 325 MG/1; MG/1
1 TABLET ORAL EVERY 6 HOURS PRN
Status: DISCONTINUED | OUTPATIENT
Start: 2021-04-02 | End: 2021-04-04 | Stop reason: SDUPTHER

## 2021-04-02 RX ORDER — LUBIPROSTONE 24 UG/1
24 CAPSULE ORAL
Status: DISCONTINUED | OUTPATIENT
Start: 2021-04-03 | End: 2021-04-09 | Stop reason: HOSPADM

## 2021-04-02 RX ORDER — ACETAMINOPHEN 325 MG/1
650 TABLET ORAL EVERY 4 HOURS PRN
Status: DISCONTINUED | OUTPATIENT
Start: 2021-04-02 | End: 2021-04-09 | Stop reason: HOSPADM

## 2021-04-02 RX ORDER — PANTOPRAZOLE SODIUM 40 MG/1
40 TABLET, DELAYED RELEASE ORAL EVERY MORNING
Status: DISCONTINUED | OUTPATIENT
Start: 2021-04-03 | End: 2021-04-09 | Stop reason: HOSPADM

## 2021-04-02 RX ORDER — ONDANSETRON 2 MG/ML
4 INJECTION INTRAMUSCULAR; INTRAVENOUS EVERY 6 HOURS PRN
Status: DISCONTINUED | OUTPATIENT
Start: 2021-04-02 | End: 2021-04-09 | Stop reason: HOSPADM

## 2021-04-02 RX ORDER — ONDANSETRON 4 MG/1
4 TABLET, FILM COATED ORAL EVERY 6 HOURS PRN
Status: DISCONTINUED | OUTPATIENT
Start: 2021-04-02 | End: 2021-04-09 | Stop reason: HOSPADM

## 2021-04-02 RX ORDER — SODIUM CHLORIDE 0.9 % (FLUSH) 0.9 %
1-10 SYRINGE (ML) INJECTION AS NEEDED
Status: DISCONTINUED | OUTPATIENT
Start: 2021-04-02 | End: 2021-04-09 | Stop reason: HOSPADM

## 2021-04-02 RX ORDER — BUMETANIDE 0.25 MG/ML
2 INJECTION INTRAMUSCULAR; INTRAVENOUS EVERY 8 HOURS
Status: DISCONTINUED | OUTPATIENT
Start: 2021-04-02 | End: 2021-04-03

## 2021-04-02 RX ORDER — ACETAMINOPHEN 160 MG/5ML
650 SOLUTION ORAL EVERY 4 HOURS PRN
Status: DISCONTINUED | OUTPATIENT
Start: 2021-04-02 | End: 2021-04-09 | Stop reason: HOSPADM

## 2021-04-02 RX ORDER — ISOSORBIDE MONONITRATE 30 MG/1
30 TABLET, EXTENDED RELEASE ORAL EVERY MORNING
Status: DISCONTINUED | OUTPATIENT
Start: 2021-04-03 | End: 2021-04-09 | Stop reason: HOSPADM

## 2021-04-02 RX ADMIN — ARFORMOTEROL TARTRATE 15 MCG: 15 SOLUTION RESPIRATORY (INHALATION) at 19:28

## 2021-04-02 RX ADMIN — IPRATROPIUM BROMIDE AND ALBUTEROL SULFATE 3 ML: .5; 3 SOLUTION RESPIRATORY (INHALATION) at 15:47

## 2021-04-02 RX ADMIN — ACETAMINOPHEN 650 MG: 325 TABLET ORAL at 17:30

## 2021-04-02 RX ADMIN — LEVETIRACETAM 500 MG: 500 TABLET ORAL at 20:08

## 2021-04-02 RX ADMIN — IPRATROPIUM BROMIDE AND ALBUTEROL SULFATE 3 ML: .5; 3 SOLUTION RESPIRATORY (INHALATION) at 19:23

## 2021-04-02 RX ADMIN — ENOXAPARIN SODIUM 40 MG: 40 INJECTION SUBCUTANEOUS at 12:37

## 2021-04-02 RX ADMIN — BUMETANIDE 2 MG: 0.25 INJECTION INTRAMUSCULAR; INTRAVENOUS at 20:07

## 2021-04-02 RX ADMIN — POTASSIUM CHLORIDE 40 MEQ: 1500 TABLET, EXTENDED RELEASE ORAL at 17:39

## 2021-04-02 RX ADMIN — HYDROCODONE BITARTRATE AND ACETAMINOPHEN 1 TABLET: 5; 325 TABLET ORAL at 23:18

## 2021-04-02 RX ADMIN — ARFORMOTEROL TARTRATE 15 MCG: 15 SOLUTION RESPIRATORY (INHALATION) at 13:11

## 2021-04-02 RX ADMIN — SODIUM CHLORIDE 4 ML: 9 INJECTION INTRAMUSCULAR; INTRAVENOUS; SUBCUTANEOUS at 15:40

## 2021-04-02 RX ADMIN — METOPROLOL TARTRATE 50 MG: 50 TABLET, FILM COATED ORAL at 20:08

## 2021-04-02 RX ADMIN — BUMETANIDE 2 MG: 0.25 INJECTION INTRAMUSCULAR; INTRAVENOUS at 12:37

## 2021-04-02 RX ADMIN — SODIUM CHLORIDE, PRESERVATIVE FREE 10 ML: 5 INJECTION INTRAVENOUS at 20:08

## 2021-04-02 RX ADMIN — SODIUM CHLORIDE, PRESERVATIVE FREE 10 ML: 5 INJECTION INTRAVENOUS at 12:39

## 2021-04-02 RX ADMIN — POTASSIUM CHLORIDE 40 MEQ: 1500 TABLET, EXTENDED RELEASE ORAL at 20:08

## 2021-04-02 NOTE — PLAN OF CARE
Goal Outcome Evaluation:  Plan of Care Reviewed With: patient  Progress: no change  Outcome Summary: Direct admit from MD office. [place] to room, call light and discuss all care. Pt up to chair, ambulates with assist. Pt states he has a walker at home but does not use it. Walker to bedside and education provided and pt encouraged to use. Bariatric bed placed in room. Tolerating reg diet. Telemetry noted NSR. Oxygen at 2L/NC. Denies SOA at this time. IV Bumex given. Urinal within reach and instructed to used for I&O measurements. Pt verb understanding all care. All questions answered.

## 2021-04-02 NOTE — PROGRESS NOTES
"Adult Nutrition  Assessment/PES    Patient Name:  Esdras Ko  YOB: 1950  MRN: [id number]  Admit Date:  4/2/2021    Assessment Date:  4/2/2021    Comments:  Edu packet provided on CHF/NA.   Will cont to follow and monitor.     Reason for Assessment     Row Name 04/02/21 1512          Reason for Assessment    Reason For Assessment  diagnosis/disease state CHF     Diagnosis  cardiac disease CHF hx CKD, lyphoma, Dm , CAD, obese         Nutrition/Diet History     Row Name 04/02/21 1512          Nutrition/Diet History    Typical Food/Fluid Intake  Pt with CNA getting in [facility]. NKFA. Denies issue chew/swallowing despite appears missing teeth, states can chew meat/chicken etc fine. Reports wife usually cooks but has been sick and in rehab for past 3 to 4 months. He has relied on cold cuts and frozen meals. Denies using salt. Denies DM says he doesn't have anymore. Pt says \" Do I have CHF?Reports taking water pills 4 times per day at home. Pt denies hx CHF         Anthropometrics     Row Name 04/02/21 1515 04/02/21 1045       Anthropometrics    Height  --  170.2 cm (67\")    Weight  -- 330.8#  (!) 150 kg (330 lb 12.8 oz)       Ideal Body Weight (IBW)    Ideal Body Weight (IBW) (kg)  --  68.1    % Ideal Body Weight  --  220.34       Body Mass Index (BMI)    BMI (kg/m2)  --  51.92        Labs/Tests/Procedures/Meds     Row Name 04/02/21 1515          Labs/Procedures/Meds    Lab Results Reviewed  reviewed     Lab Results Comments  glu 136        Diagnostic Tests/Procedures    Diagnostic Test/Procedure Reviewed  reviewed        Medications    Pertinent Medications Reviewed  reviewed     Pertinent Medications Comments  bumex         Physical Findings     Row Name 04/02/21 1516          Physical Findings    Overall Physical Appearance  obese         Estimated/Assessed Needs     Row Name 04/02/21 1516 04/02/21 1045       Calculation Measurements    Height  --  170.2 cm (67\")       Estimated/Assessed Needs    " Additional Documentation  Calorie Requirements (Group);Fluid Requirements (Group);Protein Requirements (Group)  --       Calorie Requirements    Estimated Calorie Need Method  kcal/kg  --    Estimated Calorie Requirement Comment  1681 kcal ( 25 kcal/kg IBW BMI > 50) 189 gm CHO, 45% kcal  --       Protein Requirements    Est Protein Requirement Amount (gms/kg)  -- 101 gm pro ( 1.5 gm/kg IBW)  --       Fluid Requirements    Estimated Fluid Requirement Method  other (see comments) 0159-1426 ml CHF guidelines  --        Nutrition Prescription Ordered     Row Name 04/02/21 1517          Nutrition Prescription PO    Common Modifiers  Cardiac         Evaluation of Received Nutrient/Fluid Intake     Row Name 04/02/21 1518          Fluid Intake Evaluation    Oral Fluid (mL)  240 insufficient data        PO Evaluation    Number of Days PO Intake Evaluated  Insufficient Data     Number of Meals  1     % PO Intake  100               Problem/Interventions:  Problem 1     Row Name 04/02/21 1519          Nutrition Diagnoses Problem 1    Problem 1  Knowledge Deficit     Etiology (related to)  Medical Diagnosis     Signs/Symptoms (evidenced by)  Potential Information Deficit         Problem 2     Row Name 04/02/21 1519          Nutrition Diagnoses Problem 2    Problem 2  Overweight/Obesity     Etiology (related to)  Factors Affecting Nutrition;Medical Diagnosis     Signs/Symptoms (evidenced by)  Report/Observation;BMI     BMI  Greater than 40             Intervention Goal     Row Name 04/02/21 1521          Intervention Goal    General  Meet nutritional needs for age/condition;Provide information regarding MNT for treatment/condition     PO  Establish PO;PO intake (%)     PO Intake %  50 % or greater     Weight  Appropriate weight loss bumex         Nutrition Intervention     Row Name 04/02/21 1521          Nutrition Intervention    RD/Tech Action  Follow Tx progress           Education/Evaluation     Row Name 04/02/21 1521           Education    Education  Education topics;Advised regarding habits/behavior;Provided education regarding;Education not appropriate at this time Pt not ready for edu, seems to suprised he may have CHF ( despite taking 4 water pills per day).     Provided education regarding  Diet rationale;Avoidance/improvement of symptoms     Education Topics  Na+;CHF CHF Sheet, Seasoning Your Food & Low Na Diet provided w RD contact     Advised Regarding Habits/Behavior  Seasoning food;Food choices        Monitor/Evaluation    Monitor  Per protocol;I&O;PO intake;Pertinent labs;Weight;Symptoms     Education Follow-up  Other (comment) packet left at bedside           Electronically signed by:  Talia Palmer RD  04/02/21 15:23 EDT

## 2021-04-02 NOTE — H&P
HISTORY AND PHYSICAL      Patient Care Team:  Nitesh Arias MD as PCP - General (Family Medicine)  Irving Restrepo MD as Consulting Physician (Hematology and Oncology)  Nitesh Arias MD as Referring Physician (Family Medicine)    CHIEF COMPLAINT: Shortness of breath, lower extremity edema, weight gain    HISTORY OF PRESENT ILLNESS:    70-year-old white male with history of hypertension COPD chronic kidney stage III presents the office with a 3-week history of progressive shortness of breath. Is also noticed his lower extremities become more swollen than normal and been weeping at times. He has had some orthopnea no PND no chest pain no fever chest congestion cough sputum production. Patient states he has been compliant with his diuretic but pharmacy refill shows he has not filled it since December 2020. It was noted he had 25 pound weight gain since his last visit December 2020.    Past Medical History:   Diagnosis Date   • Anemia    • Arthritis     BACK   • Bladder cancer (CMS/HCC) 2008   • Bladder cancer (CMS/HCC)    • Cerebrovascular accident (CMS/HCC)     Ischemic and TIAs; most recent 02/2014 which was a TIA.   • Chronic kidney disease, stage 3 (CMS/HCC)     Dr. KATHERYN Lin   • Colon polyp    • COPD (chronic obstructive pulmonary disease) (CMS/HCC)    • Diabetes mellitus (CMS/HCC)    • Heart attack (CMS/HCC)    • History of blood transfusion    • Hyperlipidemia    • Hypertension    • Hyperuricemia    • Kidney failure     stage 3 kidney disease   • Left shoulder pain    • Lymphoma (CMS/HCC)     MANTLE CELL, HAD CHEMO  2010   • Lymphoma (CMS/HCC)    • Osteoarthritis    • Pilonidal cyst     SCHEDULED FOR SX   • Psoriasis    • Seizure (CMS/HCC)     Following head trauma in 1970   • Skin lesions     SCARRING FROM SHINGLES, & BLISTERS FROM EDEMA   • Sleep apnea     Intolerant of CPAP   • Stroke (CMS/HCC)    • Tobacco abuse      Past Surgical History:   Procedure Laterality Date   •  BRONCHOSCOPY N/A 2017    Procedure: BRONCHOSCOPY with fluro and biopsy and lavage.;  Surgeon: Red Topete MD;  Location: [facility] OR;  Service:    • CARDIAC CATHETERIZATION N/A 2018    Procedure: Coronary angiography;  Surgeon: Kylie Victoria MD;  Location:  [facility];  Service: Cardiovascular   • CARDIAC CATHETERIZATION N/A 10/30/2018    Procedure: Chronic Total Occlussion;  Surgeon: Roe Wheatley MD;  Location:  [facility];  Service: Cardiovascular   • CARDIAC CATHETERIZATION N/A 10/30/2018    Procedure: Stent AURA coronary;  Surgeon: Roe Wheatley MD;  Location:  [facility];  Service: Cardiovascular   • COLONOSCOPY N/A 10/9/2019    Procedure: COLONOSCOPY with polypectomy;  Surgeon: Lee Priest MD;  Location: [facility] OR;  Service: Gastroenterology   • PILONIDAL CYSTECTOMY N/A 2016    Procedure: PILONIDAL CYSTECTOMY;  Surgeon: Roe Davila MD;  Location: [facility] OR;  Service:    • SINUS SURGERY     • SKIN LESION EXCISION      DR. DAVILA ABOUT 10 YEARS AGO     Family History   Problem Relation Age of Onset   • Heart defect Mother    • Pancreatic cancer Father 56   • Anemia Sister    • Hypertension Brother    • Heart disease Brother    • Mental illness Brother    • Other Brother         Splenectomy   • Cancer Cousin      Social History     Tobacco Use   • Smoking status: Former Smoker     Packs/day: 3.00     Years: 50.00     Pack years: 150.00     Types: Cigarettes     Quit date:      Years since quittin.2   • Smokeless tobacco: Never Used   • Tobacco comment: 80 pack year   Substance Use Topics   • Alcohol use: No     Comment: Heavy in past, none since around    • Drug use: No     Medications Prior to Admission   Medication Sig Dispense Refill Last Dose   • allopurinol (ZYLOPRIM) 300 MG tablet Take 300 mg by mouth Every Morning. take 1 tablet by mouth daily  0 2021 at Unknown time   • AMITIZA 24 MCG capsule Take 24 mcg  by mouth 2 (Two) Times a Day As Needed.  0 Patient Taking Differently at Unknown time   • amLODIPine (NORVASC) 5 MG tablet Take 2.5 mg by mouth Every Morning. 90 tablet 3 4/1/2021 at Unknown time   • aspirin 81 MG tablet Take 1 tablet by mouth Every Morning. 90 tablet 3 4/1/2021 at Unknown time   • atorvastatin (LIPITOR) 20 MG tablet Take 20 mg by mouth Daily.   4/1/2021 at Unknown time   • cetirizine (ZyrTEC) 10 MG tablet Take 10 mg by mouth Every Morning.   4/1/2021 at Unknown time   • clobetasol (TEMOVATE) 0.05 % cream apply to affected area (RASH FREELY) twice a day  0 Patient Taking Differently at Unknown time   • clopidogrel (PLAVIX) 75 MG tablet Take 75 mg by mouth Every Morning.  0 4/1/2021 at Unknown time   • FLUoxetine (PROZAC) 20 MG capsule Take 20 mg by mouth Daily.   4/1/2021 at Unknown time   • furosemide (LASIX) 40 MG tablet Take 40 mg by mouth 2 (Two) Times a Day. take 2 tablets by mouth twice a day  0 4/1/2021 at Unknown time   • levETIRAcetam (KEPPRA) 1000 MG tablet Take 500 mg by mouth 2 (Two) Times a Day.  0 4/1/2021 at Unknown time   • metoprolol tartrate (LOPRESSOR) 50 MG tablet Take 50 mg by mouth 2 (two) times a day.  0 4/1/2021 at Unknown time   • O2 (OXYGEN) Inhale 2 L/min Daily.   4/2/2021 at Unknown time   • pantoprazole (PROTONIX) 40 MG EC tablet Take 40 mg by mouth Every Morning. take 1 tablet by mouth once daily  0 4/1/2021 at Unknown time   • potassium chloride (K-DUR,KLOR-CON) 20 MEQ CR tablet Take 40 mEq by mouth 4 (Four) Times a Day. 3 TABS BID AND 2 TABS ONCE A DAY  0 4/1/2021 at Unknown time   • isosorbide mononitrate (IMDUR) 30 MG 24 hr tablet Take 1 tablet by mouth Every Morning. (Patient not taking: Reported on 4/2/2021) 90 tablet 3 Not Taking at Unknown time   • loratadine (CLARITIN) 10 MG tablet Take 10 mg by mouth Daily.        Allergies:  Amoxicillin, Ceftin [cefuroxime axetil], Codeine, Flonase [fluticasone], Pharbetol [acetaminophen], Spiriva handihaler [tiotropium  "bromide monohydrate], Sulfa antibiotics, and Phenobarbital     Review of Systems   Constitutional: Positive for fatigue. Negative for activity change and appetite change.   HENT: Negative for congestion.    Respiratory: Positive for shortness of breath. Negative for cough, chest tightness and wheezing.    Cardiovascular: Positive for leg swelling. Negative for chest pain.   Gastrointestinal: Positive for abdominal distention. Negative for abdominal pain, diarrhea, nausea and vomiting.   Endocrine: Negative for polyphagia and polyuria.   Genitourinary: Negative for frequency.   Musculoskeletal: Positive for arthralgias, back pain and gait problem.   Skin: Negative for rash.   Neurological: Positive for weakness. Negative for light-headedness.   Hematological: Does not bruise/bleed easily.   Psychiatric/Behavioral: Negative for agitation and behavioral problems.       Vital Signs  Temp:  [97.2 °F (36.2 °C)] 97.2 °F (36.2 °C)  Heart Rate:  [76] 76  Resp:  [22] 22  BP: (147)/(80) 147/80  Oxygen Therapy  SpO2: 91 %  Pulse Oximetry Type: Intermittent  Device (Oxygen Therapy): room air}  Body mass index is 51.81 kg/m².  Flowsheet Rows      First Filed Value   Admission Height  170.2 cm (67\") Documented at 04/02/2021 1045   Admission Weight  (!) 150 kg (330 lb 12.8 oz) Documented at 04/02/2021 1045                 Physical Exam  Vitals and nursing note reviewed.   Constitutional:       General: He is not in acute distress.     Appearance: He is well-developed. He is morbidly obese. He is not ill-appearing or toxic-appearing.   HENT:      Head: Normocephalic.   Eyes:      Conjunctiva/sclera: Conjunctivae normal.   Neck:      Thyroid: No thyromegaly.      Vascular: No JVD.   Cardiovascular:      Rate and Rhythm: Normal rate and regular rhythm.      Heart sounds: Normal heart sounds. No murmur heard.        Comments: Some mild redness noted anterior to both legs. Several open areas in the back with some weeping  Pulmonary:     "  Effort: Pulmonary effort is normal. No respiratory distress.      Breath sounds: Normal breath sounds. No wheezing or rales.   Abdominal:      General: Abdomen is protuberant. Bowel sounds are normal. There is no distension.      Palpations: Abdomen is soft.      Tenderness: There is no abdominal tenderness. There is no guarding.   Musculoskeletal:      Cervical back: Normal range of motion.      Right lower leg: 3+ Edema present.      Left lower leg: 3+ Edema present.   Skin:     General: Skin is warm and dry.      Findings: No rash.   Neurological:      Mental Status: He is alert.          Debilities/Disabilities Identified: None    Emotional Behavior: Appropriate    Results Review:    I reviewed the patient's new clinical results.  Lab Results (most recent)     Procedure Component Value Units Date/Time    BNP [[id number]]  (Normal) Collected: 04/02/21 1054    Specimen: Blood Updated: 04/02/21 1147     proBNP 105.0 pg/mL     Narrative:      Among patients with dyspnea, NT-proBNP is highly sensitive for the detection of acute congestive heart failure. In addition NT-proBNP of <300 pg/ml effectively rules out acute congestive heart failure with 99% negative predictive value.    Results may be falsely decreased if patient taking Biotin.      Troponin [[id number]]  (Normal) Collected: 04/02/21 1054    Specimen: Blood Updated: 04/02/21 1146     Troponin T <0.010 ng/mL     Narrative:      Troponin T Reference Range:  <= 0.03 ng/mL-   Negative for AMI  >0.03 ng/mL-     Abnormal for myocardial necrosis.  Clinicians would have to utilize clinical acumen, EKG, Troponin and serial changes to determine if it is an Acute Myocardial Infarction or myocardial injury due to an underlying chronic condition.       Results may be falsely decreased if patient taking Biotin.      TSH [[id number]]  (Normal) Collected: 04/02/21 1054    Specimen: Blood Updated: 04/02/21 1146     TSH 3.630 uIU/mL     Comprehensive Metabolic Panel [[id number]]   (Abnormal) Collected: 04/02/21 1054    Specimen: Blood Updated: 04/02/21 1135     Glucose 136 mg/dL      BUN 17 mg/dL      Creatinine 1.09 mg/dL      Sodium 141 mmol/L      Potassium 4.5 mmol/L      Chloride 105 mmol/L      CO2 26.6 mmol/L      Calcium 8.8 mg/dL      Total Protein 6.4 g/dL      Albumin 4.10 g/dL      ALT (SGPT) 17 U/L      AST (SGOT) 16 U/L      Alkaline Phosphatase 152 U/L      Total Bilirubin 0.7 mg/dL      eGFR Non African Amer 67 mL/min/1.73      Globulin 2.3 gm/dL      A/G Ratio 1.8 g/dL      BUN/Creatinine Ratio 15.6     Anion Gap 9.4 mmol/L     Narrative:      GFR Normal >60  Chronic Kidney Disease <60  Kidney Failure <15      CBC (No Diff) [[id number]]  (Abnormal) Collected: 04/02/21 1054    Specimen: Blood Updated: 04/02/21 1101     WBC 7.93 10*3/mm3      RBC 5.72 10*6/mm3      Hemoglobin 15.8 g/dL      Hematocrit 49.5 %      MCV 86.5 fL      MCH 27.6 pg      MCHC 31.9 g/dL      RDW 15.7 %      RDW-SD 47.6 fl      MPV 10.3 fL      Platelets 148 10*3/mm3           Imaging Results (Most Recent)     Procedure Component Value Units Date/Time    XR Chest 2 View [[id number]] Collected: 04/02/21 1220     Updated: 04/02/21 1223    Narrative:      PA AND LATERAL CHEST, 4/2/2021 12:05 PM     HISTORY:  chf   bilateral feet swelling for a few days     COMPARISON:  06/16/2017     TECHNIQUE:  PA and lateral upright chest series.     FINDINGS:  Heart size and pulmonary vascularity are normal. The lungs are expanded  and clear. No visible pulmonary infiltrate or pleural effusion.           Impression:      Negative chest.     This report was finalized on 4/2/2021 12:21 PM by Dr. Hernando Fuller MD.           reviewed    ECG/EMG Results (most recent)     Procedure Component Value Units Date/Time    ECG 12 Lead [[id number]] Collected: 04/02/21 1116     Updated: 04/02/21 1228     QT Interval 413 ms     Narrative:      HEART RATE= 70  bpm  RR Interval= 864  ms  RI Interval= 168  ms  P Horizontal Axis= 33  deg  P  Front Axis= -19  deg  QRSD Interval= 101  ms  QT Interval= 413  ms  QRS Axis= -79  deg  T Wave Axis= 1  deg  - ABNORMAL ECG -  Sinus rhythm  Left anterior fascicular block  POOR R WAVE PROGRESSION  Non-specific STT wave change  NO SIGNIFICANT CHANGE FROM PREVIOUS ECG  Electronically Signed By: Yo Tang ([facility]) 02-Apr-2021 12:28:00  Date and Time of Study: 2021-04-02 11:16:00        reviewed    Assessment/Plan       1. Chronic acute on chronic diastolic congestive heart failure decompensated due to likely noncompliance with diuretics will start him on IV diuretics check echo monitor      2. COPD no exacerbation continue with duo nebs and monitor    3. Adult-onset diabetes mellitus controlled Accu-Chek sliding scale insulin as needed    4. Hypertension well controlled continue with home medications      5. Chronic kidney disease stage III at baseline we will monitor with use of IV diuretics    6. Emergency disease with chronic pain supportive care    7. GERD nothing acute continue PPI    8. Coronary disease status post AURA placement to mid LAD nothing acute home medication be continued\    9. Morbid obesity likely compounding patient shortness of breath and exercise intolerance deconditioning    10. DVT prophylaxis Lovenox        I discussed the patients findings and my recommendations with patient and wife    Nitesh Arias MD  04/02/21  13:31 EDT    Much of this encounter note is an electronic transcription/translation of spoken language to printed text using Dragon Software

## 2021-04-03 LAB
ANION GAP SERPL CALCULATED.3IONS-SCNC: 11.7 MMOL/L (ref 5–15)
BUN SERPL-MCNC: 19 MG/DL (ref 8–23)
BUN/CREAT SERPL: 16 (ref 7–25)
CALCIUM SPEC-SCNC: 8.8 MG/DL (ref 8.6–10.5)
CHLORIDE SERPL-SCNC: 105 MMOL/L (ref 98–107)
CO2 SERPL-SCNC: 24.3 MMOL/L (ref 22–29)
CREAT SERPL-MCNC: 1.19 MG/DL (ref 0.76–1.27)
GFR SERPL CREATININE-BSD FRML MDRD: 60 ML/MIN/1.73
GLUCOSE SERPL-MCNC: 104 MG/DL (ref 65–99)
POTASSIUM SERPL-SCNC: 4.7 MMOL/L (ref 3.5–5.2)
SODIUM SERPL-SCNC: 141 MMOL/L (ref 136–145)

## 2021-04-03 PROCEDURE — 25010000002 ENOXAPARIN PER 10 MG: Performed by: FAMILY MEDICINE

## 2021-04-03 PROCEDURE — 94799 UNLISTED PULMONARY SVC/PX: CPT

## 2021-04-03 PROCEDURE — 80048 BASIC METABOLIC PNL TOTAL CA: CPT | Performed by: FAMILY MEDICINE

## 2021-04-03 RX ORDER — BUMETANIDE 0.25 MG/ML
2 INJECTION INTRAMUSCULAR; INTRAVENOUS ONCE
Status: COMPLETED | OUTPATIENT
Start: 2021-04-03 | End: 2021-04-03

## 2021-04-03 RX ORDER — HYDROCODONE BITARTRATE AND ACETAMINOPHEN 5; 325 MG/1; MG/1
1 TABLET ORAL EVERY 4 HOURS PRN
Status: DISCONTINUED | OUTPATIENT
Start: 2021-04-03 | End: 2021-04-09 | Stop reason: HOSPADM

## 2021-04-03 RX ADMIN — SODIUM CHLORIDE, PRESERVATIVE FREE 10 ML: 5 INJECTION INTRAVENOUS at 08:25

## 2021-04-03 RX ADMIN — ARFORMOTEROL TARTRATE 15 MCG: 15 SOLUTION RESPIRATORY (INHALATION) at 07:33

## 2021-04-03 RX ADMIN — POTASSIUM CHLORIDE 40 MEQ: 1500 TABLET, EXTENDED RELEASE ORAL at 22:08

## 2021-04-03 RX ADMIN — BUMETANIDE 2 MG: 0.25 INJECTION INTRAMUSCULAR; INTRAVENOUS at 13:20

## 2021-04-03 RX ADMIN — IPRATROPIUM BROMIDE AND ALBUTEROL SULFATE 3 ML: .5; 3 SOLUTION RESPIRATORY (INHALATION) at 07:27

## 2021-04-03 RX ADMIN — PANTOPRAZOLE SODIUM 40 MG: 40 TABLET, DELAYED RELEASE ORAL at 06:19

## 2021-04-03 RX ADMIN — LEVETIRACETAM 500 MG: 500 TABLET ORAL at 08:24

## 2021-04-03 RX ADMIN — POTASSIUM CHLORIDE 40 MEQ: 1500 TABLET, EXTENDED RELEASE ORAL at 08:24

## 2021-04-03 RX ADMIN — BUMETANIDE 2 MG: 0.25 INJECTION INTRAMUSCULAR; INTRAVENOUS at 05:29

## 2021-04-03 RX ADMIN — METOPROLOL TARTRATE 50 MG: 50 TABLET, FILM COATED ORAL at 22:09

## 2021-04-03 RX ADMIN — AMLODIPINE BESYLATE 2.5 MG: 2.5 TABLET ORAL at 06:19

## 2021-04-03 RX ADMIN — FLUOXETINE 20 MG: 20 CAPSULE ORAL at 08:24

## 2021-04-03 RX ADMIN — ENOXAPARIN SODIUM 40 MG: 40 INJECTION SUBCUTANEOUS at 11:30

## 2021-04-03 RX ADMIN — HYDROCODONE BITARTRATE AND ACETAMINOPHEN 1 TABLET: 5; 325 TABLET ORAL at 05:29

## 2021-04-03 RX ADMIN — POTASSIUM CHLORIDE 40 MEQ: 1500 TABLET, EXTENDED RELEASE ORAL at 11:31

## 2021-04-03 RX ADMIN — HYDROCODONE BITARTRATE AND ACETAMINOPHEN 1 TABLET: 5; 325 TABLET ORAL at 18:13

## 2021-04-03 RX ADMIN — BUMETANIDE 0.5 MG/HR: 0.25 INJECTION INTRAMUSCULAR; INTRAVENOUS at 13:20

## 2021-04-03 RX ADMIN — POTASSIUM CHLORIDE 40 MEQ: 1500 TABLET, EXTENDED RELEASE ORAL at 17:33

## 2021-04-03 RX ADMIN — CETIRIZINE HYDROCHLORIDE 10 MG: 10 TABLET, FILM COATED ORAL at 06:19

## 2021-04-03 RX ADMIN — SODIUM CHLORIDE, PRESERVATIVE FREE 10 ML: 5 INJECTION INTRAVENOUS at 22:09

## 2021-04-03 RX ADMIN — LUBIPROSTONE 24 MCG: 24 CAPSULE, GELATIN COATED ORAL at 08:24

## 2021-04-03 RX ADMIN — ASPIRIN 81 MG: 81 TABLET, COATED ORAL at 08:24

## 2021-04-03 RX ADMIN — IPRATROPIUM BROMIDE AND ALBUTEROL SULFATE 3 ML: .5; 3 SOLUTION RESPIRATORY (INHALATION) at 15:33

## 2021-04-03 RX ADMIN — ISOSORBIDE MONONITRATE 30 MG: 30 TABLET, EXTENDED RELEASE ORAL at 06:19

## 2021-04-03 RX ADMIN — HYDROCODONE BITARTRATE AND ACETAMINOPHEN 1 TABLET: 5; 325 TABLET ORAL at 13:37

## 2021-04-03 RX ADMIN — ATORVASTATIN CALCIUM 20 MG: 20 TABLET, FILM COATED ORAL at 08:24

## 2021-04-03 RX ADMIN — ALLOPURINOL 300 MG: 300 TABLET ORAL at 06:19

## 2021-04-03 RX ADMIN — CLOPIDOGREL BISULFATE 75 MG: 75 TABLET ORAL at 06:19

## 2021-04-03 RX ADMIN — METOPROLOL TARTRATE 50 MG: 50 TABLET, FILM COATED ORAL at 08:24

## 2021-04-03 RX ADMIN — IPRATROPIUM BROMIDE AND ALBUTEROL SULFATE 3 ML: .5; 3 SOLUTION RESPIRATORY (INHALATION) at 11:36

## 2021-04-03 RX ADMIN — ARFORMOTEROL TARTRATE 15 MCG: 15 SOLUTION RESPIRATORY (INHALATION) at 19:37

## 2021-04-03 RX ADMIN — LEVETIRACETAM 500 MG: 500 TABLET ORAL at 22:08

## 2021-04-03 RX ADMIN — IPRATROPIUM BROMIDE AND ALBUTEROL SULFATE 3 ML: .5; 3 SOLUTION RESPIRATORY (INHALATION) at 19:32

## 2021-04-03 NOTE — PLAN OF CARE
Problem: Adult Inpatient Plan of Care  Goal: Plan of Care Review  Flowsheets (Taken 4/3/2021 7654)  Plan of Care Reviewed With: patient   Goal Outcome Evaluation:  Plan of Care Reviewed With: patient

## 2021-04-03 NOTE — PROGRESS NOTES
"Daily Progress Note:      Chief complaint: Shortness of breath, acute diastolic congestive heart failure, chronic kidney disease, diabetes, COPD    Subjective: Shortness of breath is minimally improved. He still complains of back pain lower extremity edema not much better     LOS: 1 day     Vital Signs  Temp:  [96.4 °F (35.8 °C)-98.3 °F (36.8 °C)] 96.4 °F (35.8 °C)  Heart Rate:  [65-95] 69  Resp:  [16-20] 16  BP: (112-128)/(62-78) 114/64  Oxygen Therapy  SpO2: 94 %  Pulse Oximetry Type: Intermittent  Device (Oxygen Therapy): nasal cannula  Flow (L/min): 2}  Body mass index is 52.41 kg/m².  Flowsheet Rows      First Filed Value   Admission Height  170.2 cm (67\") Documented at 04/02/2021 1045   Admission Weight  (!) 150 kg (330 lb 12.8 oz) Documented at 04/02/2021 1045                   Documented weights    04/02/21 1045 04/02/21 1539 04/03/21 0519 04/03/21 1154   Weight: (!) 150 kg (330 lb 12.8 oz) (!) 150 kg (330 lb) (!) 151 kg (333 lb) (!) 152 kg (334 lb 9.6 oz)           Patient Vitals for the past 24 hrs:   BP Temp Temp src Pulse Resp SpO2 Height Weight   04/03/21 1154 -- -- -- -- -- -- -- (!) 152 kg (334 lb 9.6 oz)   04/03/21 1136 -- -- -- 69 16 94 % -- --   04/03/21 1057 114/64 96.4 °F (35.8 °C) Oral 69 16 93 % -- --   04/03/21 0824 128/78 -- -- 71 -- -- -- --   04/03/21 0736 -- -- -- 68 16 -- -- --   04/03/21 0733 -- -- -- 66 20 95 % -- --   04/03/21 0728 -- -- -- 65 20 94 % -- --   04/03/21 0519 123/77 98.3 °F (36.8 °C) Oral 69 19 96 % -- (!) 151 kg (333 lb)   04/02/21 2319 119/65 -- -- 74 20 94 % -- --   04/02/21 2019 122/62 97.8 °F (36.6 °C) Oral 80 20 93 % -- --   04/02/21 1937 -- -- -- 90 20 -- -- --   04/02/21 1928 -- -- -- 92 20 -- -- --   04/02/21 1927 -- -- -- 91 20 -- -- --   04/02/21 1923 -- -- -- 87 20 97 % -- --   04/02/21 1554 -- -- -- 80 20 94 % -- --   04/02/21 1549 112/76 97.5 °F (36.4 °C) Oral 80 20 94 % -- --   04/02/21 1547 -- -- -- 95 20 94 % -- --   04/02/21 1539 -- -- -- -- -- -- 170.2 " "cm (67\") (!) 150 kg (330 lb)   04/02/21 1316 -- -- -- 68 20 94 % -- --   04/02/21 1311 -- -- -- 68 20 94 % -- --       (!) 152 kg (334 lb 9.6 oz)      Intake/Output Summary (Last 24 hours) at 4/3/2021 1229  Last data filed at 4/3/2021 1154  Gross per 24 hour   Intake 1440 ml   Output 2100 ml   Net -660 ml       Review of Systems   Constitutional: Positive for fatigue. Negative for activity change and appetite change.   HENT: Negative for congestion.    Respiratory: Positive for shortness of breath. Negative for cough, chest tightness and wheezing.    Cardiovascular: Positive for leg swelling. Negative for chest pain.   Gastrointestinal: Negative for abdominal distention, abdominal pain, diarrhea, nausea and vomiting.   Endocrine: Negative for polyphagia and polyuria.   Genitourinary: Negative for frequency.   Musculoskeletal: Positive for arthralgias, back pain and gait problem.   Skin: Positive for color change and wound.   Neurological: Negative for light-headedness.   Hematological: Does not bruise/bleed easily.   Psychiatric/Behavioral: Negative for agitation and behavioral problems.       Physical Exam  Vitals and nursing note reviewed.   Constitutional:       General: He is not in acute distress.     Appearance: He is well-developed. He is morbidly obese.   HENT:      Head: Normocephalic.   Eyes:      Conjunctiva/sclera: Conjunctivae normal.   Neck:      Thyroid: No thyromegaly.      Vascular: No JVD.   Cardiovascular:      Rate and Rhythm: Normal rate and regular rhythm.      Heart sounds: Normal heart sounds. No murmur heard.     Pulmonary:      Effort: Pulmonary effort is normal. No respiratory distress.      Breath sounds: Decreased air movement present. No wheezing or rales.   Abdominal:      General: Bowel sounds are normal. There is no distension.      Palpations: Abdomen is soft.      Tenderness: There is no abdominal tenderness. There is no guarding.   Musculoskeletal:      Cervical back: Normal range " of motion.      Right lower leg: 3+ Edema present.      Left lower leg: 3+ Edema present.   Skin:     General: Skin is warm and dry.      Findings: No rash.      Comments: Multiple scabbed areas in both lower extremities some superficial blisters healing   Neurological:      Mental Status: He is alert.         Medication Review:   I have reviewed the patient's current medication list  Scheduled Meds:allopurinol, 300 mg, Oral, QAM  amLODIPine, 2.5 mg, Oral, QAM  arformoterol, 15 mcg, Nebulization, BID - RT  aspirin, 81 mg, Oral, Daily  atorvastatin, 20 mg, Oral, Daily  bumetanide, 2 mg, Intravenous, Q8H  cetirizine, 10 mg, Oral, QAM  clopidogrel, 75 mg, Oral, QAM  enoxaparin, 40 mg, Subcutaneous, Q24H  FLUoxetine, 20 mg, Oral, Daily  ipratropium-albuterol, 3 mL, Nebulization, 4x Daily - RT  isosorbide mononitrate, 30 mg, Oral, QAM  levETIRAcetam, 500 mg, Oral, Q12H  lubiprostone, 24 mcg, Oral, Daily With Breakfast  metoprolol tartrate, 50 mg, Oral, Q12H  O2, 2 L/min, Inhalation, Daily  pantoprazole, 40 mg, Oral, QAM  potassium chloride, 40 mEq, Oral, 4x Daily  sodium chloride, 10 mL, Intravenous, Q12H      Continuous Infusions:   PRN Meds:.•  acetaminophen **OR** acetaminophen **OR** acetaminophen  •  HYDROcodone-acetaminophen  •  melatonin  •  nitroglycerin  •  ondansetron **OR** ondansetron  •  sodium chloride  •  sodium chloride      Labs:  Results from last 7 days   Lab Units 04/02/21  1054   WBC 10*3/mm3 7.93   HEMOGLOBIN g/dL 15.8   HEMATOCRIT % 49.5   PLATELETS 10*3/mm3 148     Results from last 7 days   Lab Units 04/03/21  0446 04/02/21  1054   SODIUM mmol/L 141 141   POTASSIUM mmol/L 4.7 4.5   CHLORIDE mmol/L 105 105   CO2 mmol/L 24.3 26.6   BUN mg/dL 19 17   CREATININE mg/dL 1.19 1.09   CALCIUM mg/dL 8.8 8.8   BILIRUBIN mg/dL  --  0.7   ALK PHOS U/L  --  152*   ALT (SGPT) U/L  --  17   AST (SGOT) U/L  --  16   GLUCOSE mg/dL 104* 136*           Results from last 7 days   Lab Units 04/02/21  1054   AST (SGOT)  U/L 16   ALT (SGPT) U/L 17   PLATELETS 10*3/mm3 148         Lab Results (last 24 hours)     Procedure Component Value Units Date/Time    Basic Metabolic Panel [[id number]]  (Abnormal) Collected: 04/03/21 0446    Specimen: Blood Updated: 04/03/21 0554     Glucose 104 mg/dL      BUN 19 mg/dL      Creatinine 1.19 mg/dL      Sodium 141 mmol/L      Potassium 4.7 mmol/L      Chloride 105 mmol/L      CO2 24.3 mmol/L      Calcium 8.8 mg/dL      eGFR Non African Amer 60 mL/min/1.73      BUN/Creatinine Ratio 16.0     Anion Gap 11.7 mmol/L     Narrative:      GFR Normal >60  Chronic Kidney Disease <60  Kidney Failure <15          Results from last 7 days   Lab Units 04/02/21  1054   TROPONIN T ng/mL <0.010     Results from last 7 days   Lab Units 04/02/21  1054   TSH uIU/mL 3.630     Results from last 7 days   Lab Units 04/02/21  1054   PROBNP pg/mL 105.0                         No results found for: POCGLU                      Radiology:  Imaging Results (Last 24 Hours)     ** No results found for the last 24 hours. **          Cardiology:  ECG/EMG Results (last 24 hours)     ** No results found for the last 24 hours. **          I have reviewed recent labs results and consult notes.  Parts of this note may have been copied and pasted but patient was examined and interviewed by me today    Assessment and Plan:  1. Chronic acute on chronic diastolic congestive heart failure decompensated poor response to IV diuretics will try Bumex drip cardiogram reviewed        2. COPD no exacerbation continue with duo nebs and monitor     3. Adult-onset diabetes mellitus controlled Accu-Chek sliding scale insulin as needed     4. Hypertension well controlled continue with home medications        5. Chronic kidney disease stage III at baseline we will monitor with use of IV diuretics     6. Lumbar degenerative disc disease patient with increasing back pain try K pad and dressed low-dose narcotic analgesics    7. GERD nothing acute continue  PPI     8. Coronary disease status post AURA placement to mid LAD nothing acute home medication be continued\     9. Morbid obesity likely compounding patient shortness of breath and exercise intolerance deconditioning     10. DVT prophylaxis Lovenox            Much of this encounter note is an electronic transcription/translation of spoken language to printed text using Dragon Software

## 2021-04-03 NOTE — NURSING NOTE
Discharge Planning Assessment  [facility]     Patient Name: Esdras Ko  MRN: [id number]  Today's Date: 4/3/2021    Admit Date: 4/2/2021    Discharge Needs Assessment     Row Name 04/03/21 1004       Living Environment    Lives With  spouse    Name(s) of Who Lives With Patient  Wife Charley    Current Living Arrangements  home/apartment/condo    Primary Care Provided by  self    Provides Primary Care For  no one, unable/limited ability to care for self    Family Caregiver if Needed  spouse    Family Caregiver Names  Charley    Quality of Family Relationships  helpful;involved;supportive    Able to Return to Prior Arrangements  yes       Resource/Environmental Concerns    Resource/Environmental Concerns  none       Transition Planning    Patient/Family Anticipates Transition to  home with family    Transportation Anticipated  family or friend will provide       Discharge Needs Assessment    Readmission Within the Last 30 Days  no previous admission in last 30 days    Equipment Currently Used at Home  respiratory supplies;oxygen    Concerns to be Addressed  denies needs/concerns at this time;discharge planning    Anticipated Changes Related to Illness  none    Equipment Needed After Discharge  nebulizer    Provided Post Acute Provider List?  N/A    N/A Provider List Comment  No needs at this time        Discharge Plan     Row Name 04/03/21 1006       Plan    Plan  Home when stable    Patient/Family in Agreement with Plan  yes    Plan Comments  Spoke with Mr Ko at bedside.  He and his wife live in a home in [place].  Facesheet verified.  He wears oxygen at home - continuous at 2 L per minute per nasal cannula provided by [employer].  He states his wife does the driving.  His pharmacy is [facility] in [place].  There are no issues with paying for his prescriptions.  He does not have an advanced directive and has no interest in such.  Plan is home when stable.  Will continue to follow        Continued Care and  Services - Admitted Since 4/2/2021    Coordination has not been started for this encounter.         Demographic Summary     Row Name 04/03/21 1003       General Information    Arrived From  home    Referral Source  admission list    Reason for Consult  discharge planning    Preferred Language  English     Used During This Interaction  no       Contact Information    Permission Granted to Share Info With          Functional Status    No documentation.       Psychosocial    No documentation.       Abuse/Neglect    No documentation.       Legal    No documentation.       Substance Abuse    No documentation.       Patient Forms    No documentation.           Melanie Galicia RN

## 2021-04-03 NOTE — PLAN OF CARE
Goal Outcome Evaluation:  Plan of Care Reviewed With: patient  Progress: improving  Outcome Summary: Patient up to chair and ambulated in room today. Encouraged to elevate legs when sitting in chair. Bariatric bed in room. Telemetry notes NSR. 2L of O2 to maintain sats. Tolerating regular diet. Voiding in urinal or potty hat. Continue IV bumex. Start bumex drip. Po pain medicine providing adequate pain control for intermittent back pain. No complaints of shortness of breath. No complaints of nausea.

## 2021-04-04 LAB
ANION GAP SERPL CALCULATED.3IONS-SCNC: 11.6 MMOL/L (ref 5–15)
BUN SERPL-MCNC: 20 MG/DL (ref 8–23)
BUN/CREAT SERPL: 17.1 (ref 7–25)
CALCIUM SPEC-SCNC: 8.6 MG/DL (ref 8.6–10.5)
CHLORIDE SERPL-SCNC: 102 MMOL/L (ref 98–107)
CO2 SERPL-SCNC: 25.4 MMOL/L (ref 22–29)
CREAT SERPL-MCNC: 1.17 MG/DL (ref 0.76–1.27)
GFR SERPL CREATININE-BSD FRML MDRD: 61 ML/MIN/1.73
GLUCOSE BLDC GLUCOMTR-MCNC: 136 MG/DL (ref 70–130)
GLUCOSE SERPL-MCNC: 120 MG/DL (ref 65–99)
POTASSIUM SERPL-SCNC: 4.2 MMOL/L (ref 3.5–5.2)
SODIUM SERPL-SCNC: 139 MMOL/L (ref 136–145)

## 2021-04-04 PROCEDURE — 94799 UNLISTED PULMONARY SVC/PX: CPT

## 2021-04-04 PROCEDURE — 97161 PT EVAL LOW COMPLEX 20 MIN: CPT

## 2021-04-04 PROCEDURE — 82962 GLUCOSE BLOOD TEST: CPT

## 2021-04-04 PROCEDURE — 25010000002 ENOXAPARIN PER 10 MG: Performed by: FAMILY MEDICINE

## 2021-04-04 PROCEDURE — 80048 BASIC METABOLIC PNL TOTAL CA: CPT | Performed by: FAMILY MEDICINE

## 2021-04-04 RX ORDER — DIPHENHYDRAMINE HCL 25 MG
12.5 TABLET ORAL EVERY 4 HOURS PRN
Status: DISCONTINUED | OUTPATIENT
Start: 2021-04-04 | End: 2021-04-05 | Stop reason: CLARIF

## 2021-04-04 RX ORDER — PETROLATUM 42 G/100G
OINTMENT TOPICAL
Status: DISCONTINUED | OUTPATIENT
Start: 2021-04-04 | End: 2021-04-09 | Stop reason: HOSPADM

## 2021-04-04 RX ORDER — DIPHENHYDRAMINE HCL 25 MG
12.5 TABLET ORAL EVERY 6 HOURS PRN
Status: DISCONTINUED | OUTPATIENT
Start: 2021-04-04 | End: 2021-04-04

## 2021-04-04 RX ADMIN — Medication 5 MG: at 21:06

## 2021-04-04 RX ADMIN — LEVETIRACETAM 500 MG: 500 TABLET ORAL at 08:42

## 2021-04-04 RX ADMIN — CETIRIZINE HYDROCHLORIDE 10 MG: 10 TABLET, FILM COATED ORAL at 08:41

## 2021-04-04 RX ADMIN — ARFORMOTEROL TARTRATE 15 MCG: 15 SOLUTION RESPIRATORY (INHALATION) at 08:24

## 2021-04-04 RX ADMIN — SODIUM CHLORIDE, PRESERVATIVE FREE 10 ML: 5 INJECTION INTRAVENOUS at 08:43

## 2021-04-04 RX ADMIN — POTASSIUM CHLORIDE 40 MEQ: 1500 TABLET, EXTENDED RELEASE ORAL at 12:46

## 2021-04-04 RX ADMIN — METOPROLOL TARTRATE 50 MG: 50 TABLET, FILM COATED ORAL at 21:05

## 2021-04-04 RX ADMIN — ALLOPURINOL 300 MG: 300 TABLET ORAL at 08:42

## 2021-04-04 RX ADMIN — FLUOXETINE 20 MG: 20 CAPSULE ORAL at 08:41

## 2021-04-04 RX ADMIN — HYDROCODONE BITARTRATE AND ACETAMINOPHEN 1 TABLET: 5; 325 TABLET ORAL at 08:50

## 2021-04-04 RX ADMIN — IPRATROPIUM BROMIDE AND ALBUTEROL SULFATE 3 ML: .5; 3 SOLUTION RESPIRATORY (INHALATION) at 15:38

## 2021-04-04 RX ADMIN — HYDROCODONE BITARTRATE AND ACETAMINOPHEN 1 TABLET: 5; 325 TABLET ORAL at 01:24

## 2021-04-04 RX ADMIN — HYDROCODONE BITARTRATE AND ACETAMINOPHEN 1 TABLET: 5; 325 TABLET ORAL at 16:07

## 2021-04-04 RX ADMIN — LEVETIRACETAM 500 MG: 500 TABLET ORAL at 21:05

## 2021-04-04 RX ADMIN — ISOSORBIDE MONONITRATE 30 MG: 30 TABLET, EXTENDED RELEASE ORAL at 08:42

## 2021-04-04 RX ADMIN — Medication 5 MG: at 01:38

## 2021-04-04 RX ADMIN — POTASSIUM CHLORIDE 40 MEQ: 1500 TABLET, EXTENDED RELEASE ORAL at 08:41

## 2021-04-04 RX ADMIN — PANTOPRAZOLE SODIUM 40 MG: 40 TABLET, DELAYED RELEASE ORAL at 08:40

## 2021-04-04 RX ADMIN — ENOXAPARIN SODIUM 40 MG: 40 INJECTION SUBCUTANEOUS at 12:46

## 2021-04-04 RX ADMIN — CLOPIDOGREL BISULFATE 75 MG: 75 TABLET ORAL at 08:41

## 2021-04-04 RX ADMIN — SODIUM CHLORIDE, PRESERVATIVE FREE 10 ML: 5 INJECTION INTRAVENOUS at 21:07

## 2021-04-04 RX ADMIN — IPRATROPIUM BROMIDE AND ALBUTEROL SULFATE 3 ML: .5; 3 SOLUTION RESPIRATORY (INHALATION) at 08:16

## 2021-04-04 RX ADMIN — POTASSIUM CHLORIDE 40 MEQ: 1500 TABLET, EXTENDED RELEASE ORAL at 18:04

## 2021-04-04 RX ADMIN — ASPIRIN 81 MG: 81 TABLET, COATED ORAL at 08:42

## 2021-04-04 RX ADMIN — HYDROCODONE BITARTRATE AND ACETAMINOPHEN 1 TABLET: 5; 325 TABLET ORAL at 21:06

## 2021-04-04 RX ADMIN — LUBIPROSTONE 24 MCG: 24 CAPSULE, GELATIN COATED ORAL at 08:41

## 2021-04-04 RX ADMIN — BUMETANIDE 0.5 MG/HR: 0.25 INJECTION INTRAMUSCULAR; INTRAVENOUS at 16:07

## 2021-04-04 RX ADMIN — PETROLATUM: 42 OINTMENT TOPICAL at 18:23

## 2021-04-04 RX ADMIN — POTASSIUM CHLORIDE 40 MEQ: 1500 TABLET, EXTENDED RELEASE ORAL at 21:05

## 2021-04-04 RX ADMIN — ATORVASTATIN CALCIUM 20 MG: 20 TABLET, FILM COATED ORAL at 08:42

## 2021-04-04 RX ADMIN — AMLODIPINE BESYLATE 2.5 MG: 2.5 TABLET ORAL at 08:42

## 2021-04-04 RX ADMIN — METOPROLOL TARTRATE 50 MG: 50 TABLET, FILM COATED ORAL at 08:41

## 2021-04-04 RX ADMIN — IPRATROPIUM BROMIDE AND ALBUTEROL SULFATE 3 ML: .5; 3 SOLUTION RESPIRATORY (INHALATION) at 12:35

## 2021-04-04 NOTE — PROGRESS NOTES
"Daily Progress Note:      Chief complaint: Shortness of breath, acute diastolic congestive heart failure, chronic kidney disease, diabetes, COPD    Subjective: Shortness of breath is improved back pain is improved to get up with therapies this morning.  Better diuresis with starting her Bumex drip yesterday     LOS: 2 days     Vital Signs  Temp:  [96.8 °F (36 °C)-97.8 °F (36.6 °C)] 97.7 °F (36.5 °C)  Heart Rate:  [] 98  Resp:  [18-22] 20  BP: (114-144)/(61-81) 144/81  Oxygen Therapy  SpO2: 93 %  Pulse Oximetry Type: Intermittent  Device (Oxygen Therapy): nasal cannula  Flow (L/min): 2}  Body mass index is 51.9 kg/m².  Flowsheet Rows      First Filed Value   Admission Height  170.2 cm (67\") Documented at 04/02/2021 1045   Admission Weight  (!) 150 kg (330 lb 12.8 oz) Documented at 04/02/2021 1045                   Documented weights    04/02/21 1045 04/02/21 1539 04/03/21 0519 04/03/21 1154   Weight: (!) 150 kg (330 lb 12.8 oz) (!) 150 kg (330 lb) (!) 151 kg (333 lb) (!) 152 kg (334 lb 9.6 oz)    04/04/21 0545   Weight: (!) 150 kg (331 lb 6.4 oz)           Patient Vitals for the past 24 hrs:   BP Temp Temp src Pulse Resp SpO2 Weight   04/04/21 1239 -- -- -- 98 20 -- --   04/04/21 1235 -- -- -- 102 20 93 % --   04/04/21 1144 144/81 97.7 °F (36.5 °C) Oral 99 22 92 % --   04/04/21 0829 -- -- -- 88 -- 94 % --   04/04/21 0824 -- -- -- 84 20 94 % --   04/04/21 0816 -- -- -- 86 20 94 % --   04/04/21 0545 131/75 97.8 °F (36.6 °C) Oral 90 20 94 % (!) 150 kg (331 lb 6.4 oz)   04/03/21 1944 -- -- -- 96 20 -- --   04/03/21 1937 -- -- -- 98 20 -- --   04/03/21 1936 -- -- -- 98 20 -- --   04/03/21 1932 -- -- -- 97 20 94 % --   04/03/21 1931 142/76 97.1 °F (36.2 °C) Oral 98 20 94 % --   04/03/21 1540 -- -- -- 80 20 95 % --   04/03/21 1533 -- -- -- 79 20 95 % --   04/03/21 1505 114/65 96.8 °F (36 °C) Oral 78 18 94 % --   04/03/21 1320 122/61 -- -- 77 -- -- --       (!) 150 kg (331 lb 6.4 oz)      Intake/Output Summary (Last 24 " hours) at 4/4/2021 1241  Last data filed at 4/4/2021 1144  Gross per 24 hour   Intake 1345 ml   Output 4275 ml   Net -2930 ml       Review of Systems   Constitutional: Positive for fatigue. Negative for activity change and appetite change.   HENT: Negative for congestion.    Respiratory: Positive for shortness of breath. Negative for cough, chest tightness and wheezing.    Cardiovascular: Positive for leg swelling. Negative for chest pain.   Gastrointestinal: Negative for abdominal distention, abdominal pain, diarrhea, nausea and vomiting.   Endocrine: Negative for polyphagia and polyuria.   Genitourinary: Negative for frequency.   Musculoskeletal: Positive for arthralgias, back pain and gait problem.   Skin: Positive for color change and wound.   Neurological: Negative for light-headedness.   Hematological: Does not bruise/bleed easily.   Psychiatric/Behavioral: Negative for agitation and behavioral problems.       Physical Exam  Vitals and nursing note reviewed.   Constitutional:       General: He is not in acute distress.     Appearance: He is well-developed. He is morbidly obese.   HENT:      Head: Normocephalic.   Eyes:      Conjunctiva/sclera: Conjunctivae normal.   Neck:      Thyroid: No thyromegaly.      Vascular: No JVD.   Cardiovascular:      Rate and Rhythm: Normal rate and regular rhythm.      Heart sounds: Normal heart sounds. No murmur heard.     Pulmonary:      Effort: Pulmonary effort is normal. No respiratory distress.      Breath sounds: Decreased air movement present. No wheezing or rales.   Abdominal:      General: Bowel sounds are normal. There is no distension.      Palpations: Abdomen is soft.      Tenderness: There is no abdominal tenderness. There is no guarding.   Musculoskeletal:      Cervical back: Normal range of motion.      Right lower leg: 3+ Edema present.      Left lower leg: 3+ Edema present.   Skin:     General: Skin is warm and dry.      Findings: No rash.      Comments: Multiple  scabbed areas in both lower extremities some superficial blisters healing   Neurological:      Mental Status: He is alert.         Medication Review:   I have reviewed the patient's current medication list  Scheduled Meds:allopurinol, 300 mg, Oral, QAM  amLODIPine, 2.5 mg, Oral, QAM  arformoterol, 15 mcg, Nebulization, BID - RT  aspirin, 81 mg, Oral, Daily  atorvastatin, 20 mg, Oral, Daily  cetirizine, 10 mg, Oral, QAM  clopidogrel, 75 mg, Oral, QAM  enoxaparin, 40 mg, Subcutaneous, Q24H  FLUoxetine, 20 mg, Oral, Daily  hydrophor, , Topical, Q24H  ipratropium-albuterol, 3 mL, Nebulization, 4x Daily - RT  isosorbide mononitrate, 30 mg, Oral, QAM  levETIRAcetam, 500 mg, Oral, Q12H  lubiprostone, 24 mcg, Oral, Daily With Breakfast  metoprolol tartrate, 50 mg, Oral, Q12H  O2, 2 L/min, Inhalation, Daily  pantoprazole, 40 mg, Oral, QAM  potassium chloride, 40 mEq, Oral, 4x Daily  sodium chloride, 10 mL, Intravenous, Q12H      Continuous Infusions:bumetanide, 0.5 mg/hr, Last Rate: 0.5 mg/hr (04/03/21 1320)      PRN Meds:.•  acetaminophen **OR** acetaminophen **OR** acetaminophen  •  HYDROcodone-acetaminophen  •  melatonin  •  nitroglycerin  •  ondansetron **OR** ondansetron  •  sodium chloride  •  sodium chloride      Labs:  Results from last 7 days   Lab Units 04/02/21  1054   WBC 10*3/mm3 7.93   HEMOGLOBIN g/dL 15.8   HEMATOCRIT % 49.5   PLATELETS 10*3/mm3 148     Results from last 7 days   Lab Units 04/04/21  0446 04/03/21  0446 04/02/21  1054   SODIUM mmol/L 139 141 141   POTASSIUM mmol/L 4.2 4.7 4.5   CHLORIDE mmol/L 102 105 105   CO2 mmol/L 25.4 24.3 26.6   BUN mg/dL 20 19 17   CREATININE mg/dL 1.17 1.19 1.09   CALCIUM mg/dL 8.6 8.8 8.8   BILIRUBIN mg/dL  --   --  0.7   ALK PHOS U/L  --   --  152*   ALT (SGPT) U/L  --   --  17   AST (SGOT) U/L  --   --  16   GLUCOSE mg/dL 120* 104* 136*           Results from last 7 days   Lab Units 04/02/21  1054   AST (SGOT) U/L 16   ALT (SGPT) U/L 17   PLATELETS 10*3/mm3 148          Lab Results (last 24 hours)     Procedure Component Value Units Date/Time    Basic Metabolic Panel [[id number]]  (Abnormal) Collected: 04/04/21 0446    Specimen: Blood Updated: 04/04/21 0527     Glucose 120 mg/dL      BUN 20 mg/dL      Creatinine 1.17 mg/dL      Sodium 139 mmol/L      Potassium 4.2 mmol/L      Chloride 102 mmol/L      CO2 25.4 mmol/L      Calcium 8.6 mg/dL      eGFR Non African Amer 61 mL/min/1.73      BUN/Creatinine Ratio 17.1     Anion Gap 11.6 mmol/L     Narrative:      GFR Normal >60  Chronic Kidney Disease <60  Kidney Failure <15          Results from last 7 days   Lab Units 04/02/21  1054   TROPONIN T ng/mL <0.010     Results from last 7 days   Lab Units 04/02/21  1054   TSH uIU/mL 3.630     Results from last 7 days   Lab Units 04/02/21  1054   PROBNP pg/mL 105.0                         No results found for: POCGLU                      Radiology:  Imaging Results (Last 24 Hours)     ** No results found for the last 24 hours. **          Cardiology:  ECG/EMG Results (last 24 hours)     ** No results found for the last 24 hours. **          I have reviewed recent labs results and consult notes.  Parts of this note may have been copied and pasted but patient was examined and interviewed by me today    Assessment and Plan:  1. acute on chronic diastolic congestive heart failure decompensated better response to Bumex drip will continue to monitor monitor renal functions carefully        2. COPD no exacerbation continue with duo nebs and monitor     3. Adult-onset diabetes mellitus controlled Accu-Chek sliding scale insulin as needed     4. Hypertension well controlled continue with home medications        5. Chronic kidney disease stage III at baseline we will monitor with use of IV diuretics     6. Lumbar degenerative disc disease patient with increasing back pain try K pad and dressed low-dose narcotic analgesics    7. GERD nothing acute continue PPI     8. Coronary disease status post AURA  placement to mid LAD nothing acute home medication be continued\     9. Morbid obesity likely compounding patient shortness of breath and exercise intolerance deconditioning     10.   Lower extremity blisters/venous stasis changes symptomatic treatment    11.  DVT prophylaxis Lovenox            Much of this encounter note is an electronic transcription/translation of spoken language to printed text using Dragon Software

## 2021-04-04 NOTE — NURSING NOTE
Patient states he was unable to make it to the restroom this morning due to the IV pole and urinated on the floor.  Refuses to use the urinal and bedside commode at this time. This resulted in one unmeasured occurrence of urine. Patient re-educated on importance of measuring output and calling out for assistance.

## 2021-04-04 NOTE — THERAPY EVALUATION
Patient Name: Esdras Ko  : 1950    MRN: [id number]                              Today's Date: 2021       Admit Date: 2021    Visit Dx: No diagnosis found.  Patient Active Problem List   Diagnosis   • Mantle cell lymphoma (CMS/HCC)   • Chronic obstructive pulmonary disease (CMS/HCC)   • Chronic kidney disease   • Seizure (CMS/HCC)   • Other long term (current) drug therapy   • Lymphadenopathy   • Disease of lung   • Pancytopenia (CMS/HCC)   • History of neoplasm of bladder   • Splenomegaly   • Adenomatous polyp of colon   • Preoperative cardiovascular examination   • Abnormal nuclear stress test   • Coronary artery disease of native artery of native heart with stable angina pectoris (CMS/HCC)   • Type 2 diabetes mellitus with circulatory disorder, without long-term current use of insulin (CMS/HCC)   • Dyspnea on exertion   • LAD stenosis   • Thrombocytopenia (CMS/HCC)   • Morbid obesity with BMI of 45.0-49.9, adult (CMS/HCC)   • Left leg swelling   • Personal history of colonic polyps   • Polycythemia   • CHF (congestive heart failure) (CMS/HCC)     Past Medical History:   Diagnosis Date   • Anemia    • Arthritis     BACK   • Bladder cancer (CMS/HCC)    • Bladder cancer (CMS/HCC)    • Cerebrovascular accident (CMS/HCC)     Ischemic and TIAs; most recent 2014 which was a TIA.   • Chronic kidney disease, stage 3 (CMS/HCC)     Dr. KATHERYN Lin   • Colon polyp    • COPD (chronic obstructive pulmonary disease) (CMS/HCC)    • Diabetes mellitus (CMS/HCC)    • Heart attack (CMS/HCC)    • History of blood transfusion    • Hyperlipidemia    • Hypertension    • Hyperuricemia    • Kidney failure     stage 3 kidney disease   • Left shoulder pain    • Lymphoma (CMS/HCC)     MANTLE CELL, HAD CHEMO     • Lymphoma (CMS/HCC)    • Osteoarthritis    • Pilonidal cyst     SCHEDULED FOR SX   • Psoriasis    • Seizure (CMS/HCC)     Following head trauma in 1970   • Skin lesions     SCARRING FROM SHINGLES, &  BLISTERS FROM EDEMA   • Sleep apnea     Intolerant of CPAP   • Stroke (CMS/HCC)    • Tobacco abuse      Past Surgical History:   Procedure Laterality Date   • BRONCHOSCOPY N/A 8/21/2017    Procedure: BRONCHOSCOPY with fluro and biopsy and lavage.;  Surgeon: Red Topete MD;  Location: [facility] OR;  Service:    • CARDIAC CATHETERIZATION N/A 9/17/2018    Procedure: Coronary angiography;  Surgeon: Kylie Victoria MD;  Location:  [facility];  Service: Cardiovascular   • CARDIAC CATHETERIZATION N/A 10/30/2018    Procedure: Chronic Total Occlussion;  Surgeon: Roe Wheatlye MD;  Location:  [facility];  Service: Cardiovascular   • CARDIAC CATHETERIZATION N/A 10/30/2018    Procedure: Stent AURA coronary;  Surgeon: Roe Wheatley MD;  Location:  [facility];  Service: Cardiovascular   • COLONOSCOPY N/A 10/9/2019    Procedure: COLONOSCOPY with polypectomy;  Surgeon: Lee Priest MD;  Location: [facility] OR;  Service: Gastroenterology   • PILONIDAL CYSTECTOMY N/A 11/30/2016    Procedure: PILONIDAL CYSTECTOMY;  Surgeon: Roe Davila MD;  Location: [facility] OR;  Service:    • SINUS SURGERY     • SKIN LESION EXCISION      DR. DAVILA ABOUT 10 YEARS AGO     General Information     Row Name 04/04/21 0936          Physical Therapy Time and Intention    Document Type  evaluation  -     Mode of Treatment  physical therapy  -     Row Name 04/04/21 0936          General Information    Patient Profile Reviewed  yes  -JW     Prior Level of Function  independent:;all household mobility  -     Existing Precautions/Restrictions  fall;oxygen therapy device and L/min  -     Barriers to Rehab  previous functional deficit  -     Row Name 04/04/21 0936          Living Environment    Lives With  spouse  -     Row Name 04/04/21 0936          Home Main Entrance    Number of Stairs, Main Entrance  none  -       User Key  (r) = Recorded By, (t) = Taken By, (c) = Cosigned By     Initials Name Provider Type     Anu Pierre, PT Physical Therapist        Mobility     Row Name 04/04/21 0937          Bed Mobility    Bed Mobility  -- deferred- up in chair  -[facility] Name 04/04/21 0937          Sit-Stand Transfer    Sit-Stand [place] (Transfers)  contact guard  -     Assistive Device (Sit-Stand Transfers)  -- pt refuses use of device  -[facility] Name 04/04/21 0937          Gait/Stairs (Locomotion)    [place] Level (Gait)  contact guard;verbal cues  -     Assistive Device (Gait)  -- pt refuses use of device  -     Distance in Feet (Gait)  45  -     Bilateral Gait Deviations  forward flexed posture  -     Comment (Gait/Stairs)  pt refuses use of device for mobility.  pt requires verbal cues for safety during direction changes.  no balance loss noted  -       User Key  (r) = Recorded By, (t) = Taken By, (c) = Cosigned By    Initials Name Provider Type     Anu Pierre, PT Physical Therapist        Obj/Interventions     [facility] Name 04/04/21 0939          Range of Motion Comprehensive    Comment, General Range of Motion  LE ROM limited by soft tissue approximation  -[facility] Name 04/04/21 0939          Strength Comprehensive (MMT)    Comment, General Manual Muscle Testing (MMT) Assessment  strength functional within activity, not formally assessed  -[facility] Name 04/04/21 0939          Balance    Comment, Balance  pt with no balance loss during mobility, noted increased lateral sway during mobility  -       User Key  (r) = Recorded By, (t) = Taken By, (c) = Cosigned By    Initials Name Provider Type     Anu Pierre, PT Physical Therapist        Goals/Plan     [facility] Name 04/04/21 0951          Transfer Goal 1 (PT)    Activity/Assistive Device (Transfer Goal 1, PT)  transfers, all  -     [place] Level/Cues Needed (Transfer Goal 1, PT)  supervision required  -     Time Frame (Transfer Goal 1, PT)  2 days  -     Progress/Outcome (Transfer Goal 1, PT)   goal ongoing  -     Row Name 04/04/21 0951          Gait Training Goal 1 (PT)    Activity/Assistive Device (Gait Training Goal 1, PT)  gait (walking locomotion);assistive device use  -     [place] Level (Gait Training Goal 1, PT)  supervision required  -     Distance (Gait Training Goal 1, PT)  75  -     Time Frame (Gait Training Goal 1, PT)  2 days  -     Progress/Outcome (Gait Training Goal 1, PT)  goal ongoing  -       User Key  (r) = Recorded By, (t) = Taken By, (c) = Cosigned By    Initials Name Provider Type    Anu Beck, PT Physical Therapist        Clinical Impression     Row Name 04/04/21 0940          Pain    Additional Documentation  -- c/o chronic back pain, does not rate  -     Row Name 04/04/21 0942 04/04/21 0940       Plan of Care Review    Plan of Care Reviewed With  --  patient  -    Outcome Summary  Physical therapy evaluation complete.  patient reports at baseline he ambulates household distance without use of device.  patient performs sit to/from stand with CGA and ambulates 45 feet without use of device with CGA and cues for safety during direction changes.  Patient with increased lateral sway during mobility, however no balance loss occurs.  Patient reports he is near baseline level of function, stating overall mobility is more limited at home.  Will follow up with patient in AM if patient remains in hospital.  Discussed use of rolling walker for mobility, however patient continues to decline.  -  Physical therapy evaluation complete.  Patient reports at baseline, he ambulates household distances without use of device.   Patient performs sit to/from stand with CGA and ambulates  -    Row Name 04/04/21 0942          Therapy Assessment/Plan (PT)    Patient/Family Therapy Goals Statement (PT)  go home  -     Rehab Potential (PT)  good, to achieve stated therapy goals  -     Criteria for Skilled Interventions Met (PT)  yes;meets criteria  -     Predicted  Duration of Therapy Intervention (PT)  2 days  -     Row Name 04/04/21 0942          Positioning and Restraints    Pre-Treatment Position  sitting in chair/recliner  -     Post Treatment Position  chair  -JW     In Chair  reclined;call light within reach;encouraged to call for assist  -       User Key  (r) = Recorded By, (t) = Taken By, (c) = Cosigned By    Initials Name Provider Type    Anu Beck PT Physical Therapist        Outcome Measures     Row Name 04/04/21 0952          How much help from another person do you currently need...    Turning from your back to your side while in flat bed without using bedrails?  3  -JW     Moving from lying on back to sitting on the side of a flat bed without bedrails?  3  -JW     Moving to and from a bed to a chair (including a wheelchair)?  3  -JW     Standing up from a chair using your arms (e.g., wheelchair, bedside chair)?  3  -JW     Climbing 3-5 steps with a railing?  2  -JW     To walk in hospital room?  3  -     AM-PAC 6 Clicks Score (PT)  17  -     Row Name 04/04/21 0952          Functional Assessment    Outcome Measure Options  AM-PAC 6 Clicks Basic Mobility (PT)  -       User Key  (r) = Recorded By, (t) = Taken By, (c) = Cosigned By    Initials Name Provider Type    Anu Beck PT Physical Therapist        Physical Therapy Education                 Title: PT OT SLP Therapies (In Progress)     Topic: Physical Therapy (In Progress)     Point: Mobility training (Done)     Learning Progress Summary           Patient Acceptance, E,TB, VU by  at 4/4/2021 0953                   Point: Home exercise program (Not Started)     Learner Progress:  Not documented in this visit.                      User Key     Initials Effective Dates Name Provider Type Discipline     04/03/18 -  Anu Pierre PT Physical Therapist PT              PT Recommendation and Plan  Planned Therapy Interventions (PT): balance training, bed mobility training, gait  training, strengthening, transfer training  Plan of Care Reviewed With: patient  Outcome Summary: Physical therapy evaluation complete.  patient reports at baseline he ambulates household distance without use of device.  patient performs sit to/from stand with CGA and ambulates 45 feet without use of device with CGA and cues for safety during direction changes.  Patient with increased lateral sway during mobility, however no balance loss occurs.  Patient reports he is near baseline level of function, stating overall mobility is more limited at home.  Will follow up with patient in AM if patient remains in hospital.  Discussed use of rolling walker for mobility, however patient continues to decline.     Time Calculation:   PT Charges     Row Name 04/04/21 0953             Time Calculation    Start Time  0914  -EVER      PT Received On  04/04/21  -EVER      PT - Next Appointment  04/05/21  -EVER        User Key  (r) = Recorded By, (t) = Taken By, (c) = Cosigned By    Initials Name Provider Type    Anu Beck, SUDHA Physical Therapist        Therapy Charges for Today     Code Description Service Date Service Provider Modifiers Qty    [id number]  PT EVAL LOW COMPLEXITY 2 4/4/2021 Anu Pierre, PT GP 1    [id number]  PT THER SUPP EA 15 MIN 4/4/2021 Anu Pierre, PT GP 1          PT G-Codes  Outcome Measure Options: AM-PAC 6 Clicks Basic Mobility (PT)  AM-PAC 6 Clicks Score (PT): 17    Anu Pierre PT  4/4/2021

## 2021-04-04 NOTE — PLAN OF CARE
Goal Outcome Evaluation:  Plan of Care Reviewed With: patient  Progress: improving  Outcome Summary: Pt in chair, says he can't get in bed. ambulates to bathroom. continues bumex drip, pain well controlled. will continue to monitor

## 2021-04-04 NOTE — PLAN OF CARE
Goal Outcome Evaluation:  Plan of Care Reviewed With: patient     Outcome Summary: Physical therapy evaluation complete.  patient reports at baseline he ambulates household distance without use of device.  patient performs sit to/from stand with CGA and ambulates 45 feet without use of device with CGA and cues for safety during direction changes.  Patient with increased lateral sway during mobility, however no balance loss occurs.  Patient reports he is near baseline level of function, stating overall mobility is more limited at home.  Will follow up with patient in AM if patient remains in hospital.  Discussed use of rolling walker for mobility, however patient continues to decline.

## 2021-04-04 NOTE — PLAN OF CARE
Goal Outcome Evaluation:     Progress: improving  Outcome Summary: patient ambulating well, vss. bumex drip contined. diuresing well- output recorded. pain medicine given as needed. lovenox continued

## 2021-04-05 LAB
ANION GAP SERPL CALCULATED.3IONS-SCNC: 11 MMOL/L (ref 5–15)
BUN SERPL-MCNC: 22 MG/DL (ref 8–23)
BUN/CREAT SERPL: 17.6 (ref 7–25)
CALCIUM SPEC-SCNC: 9.2 MG/DL (ref 8.6–10.5)
CHLORIDE SERPL-SCNC: 103 MMOL/L (ref 98–107)
CO2 SERPL-SCNC: 27 MMOL/L (ref 22–29)
CREAT SERPL-MCNC: 1.25 MG/DL (ref 0.76–1.27)
DEPRECATED RDW RBC AUTO: 48.3 FL (ref 37–54)
ERYTHROCYTE [DISTWIDTH] IN BLOOD BY AUTOMATED COUNT: 15.5 % (ref 12.3–15.4)
GFR SERPL CREATININE-BSD FRML MDRD: 57 ML/MIN/1.73
GLUCOSE BLDC GLUCOMTR-MCNC: 129 MG/DL (ref 70–130)
GLUCOSE BLDC GLUCOMTR-MCNC: 133 MG/DL (ref 70–130)
GLUCOSE SERPL-MCNC: 113 MG/DL (ref 65–99)
HCT VFR BLD AUTO: 45.4 % (ref 37.5–51)
HGB BLD-MCNC: 14.9 G/DL (ref 13–17.7)
MCH RBC QN AUTO: 28.3 PG (ref 26.6–33)
MCHC RBC AUTO-ENTMCNC: 32.8 G/DL (ref 31.5–35.7)
MCV RBC AUTO: 86.1 FL (ref 79–97)
PLATELET # BLD AUTO: 141 10*3/MM3 (ref 140–450)
PMV BLD AUTO: 10.5 FL (ref 6–12)
POTASSIUM SERPL-SCNC: 4.2 MMOL/L (ref 3.5–5.2)
RBC # BLD AUTO: 5.27 10*6/MM3 (ref 4.14–5.8)
SODIUM SERPL-SCNC: 141 MMOL/L (ref 136–145)
WBC # BLD AUTO: 7.19 10*3/MM3 (ref 3.4–10.8)

## 2021-04-05 PROCEDURE — 94799 UNLISTED PULMONARY SVC/PX: CPT

## 2021-04-05 PROCEDURE — 80048 BASIC METABOLIC PNL TOTAL CA: CPT | Performed by: FAMILY MEDICINE

## 2021-04-05 PROCEDURE — 25010000002 ENOXAPARIN PER 10 MG: Performed by: FAMILY MEDICINE

## 2021-04-05 PROCEDURE — 82962 GLUCOSE BLOOD TEST: CPT

## 2021-04-05 PROCEDURE — 85027 COMPLETE CBC AUTOMATED: CPT | Performed by: FAMILY MEDICINE

## 2021-04-05 RX ORDER — FUROSEMIDE 40 MG/1
40 TABLET ORAL
Status: DISCONTINUED | OUTPATIENT
Start: 2021-04-05 | End: 2021-04-06

## 2021-04-05 RX ORDER — DIPHENHYDRAMINE HCL 12.5MG/5ML
12.5 LIQUID (ML) ORAL EVERY 4 HOURS PRN
Status: DISCONTINUED | OUTPATIENT
Start: 2021-04-05 | End: 2021-04-09 | Stop reason: HOSPADM

## 2021-04-05 RX ADMIN — AMLODIPINE BESYLATE 2.5 MG: 2.5 TABLET ORAL at 06:27

## 2021-04-05 RX ADMIN — HYDROCODONE BITARTRATE AND ACETAMINOPHEN 1 TABLET: 5; 325 TABLET ORAL at 11:26

## 2021-04-05 RX ADMIN — METOPROLOL TARTRATE 50 MG: 50 TABLET, FILM COATED ORAL at 20:48

## 2021-04-05 RX ADMIN — IPRATROPIUM BROMIDE AND ALBUTEROL SULFATE 3 ML: .5; 3 SOLUTION RESPIRATORY (INHALATION) at 16:17

## 2021-04-05 RX ADMIN — ATORVASTATIN CALCIUM 20 MG: 20 TABLET, FILM COATED ORAL at 08:57

## 2021-04-05 RX ADMIN — ASPIRIN 81 MG: 81 TABLET, COATED ORAL at 08:57

## 2021-04-05 RX ADMIN — LEVETIRACETAM 500 MG: 500 TABLET ORAL at 20:48

## 2021-04-05 RX ADMIN — ENOXAPARIN SODIUM 40 MG: 40 INJECTION SUBCUTANEOUS at 11:25

## 2021-04-05 RX ADMIN — LEVETIRACETAM 500 MG: 500 TABLET ORAL at 08:57

## 2021-04-05 RX ADMIN — SODIUM CHLORIDE, PRESERVATIVE FREE 10 ML: 5 INJECTION INTRAVENOUS at 20:48

## 2021-04-05 RX ADMIN — PETROLATUM 1 APPLICATION: 42 OINTMENT TOPICAL at 08:57

## 2021-04-05 RX ADMIN — IPRATROPIUM BROMIDE AND ALBUTEROL SULFATE 3 ML: .5; 3 SOLUTION RESPIRATORY (INHALATION) at 11:33

## 2021-04-05 RX ADMIN — DIPHENHYDRAMINE HYDROCHLORIDE 12.5 MG: 12.5 SOLUTION ORAL at 17:51

## 2021-04-05 RX ADMIN — Medication 5 MG: at 21:28

## 2021-04-05 RX ADMIN — SODIUM CHLORIDE, PRESERVATIVE FREE 10 ML: 5 INJECTION INTRAVENOUS at 08:58

## 2021-04-05 RX ADMIN — HYDROCODONE BITARTRATE AND ACETAMINOPHEN 1 TABLET: 5; 325 TABLET ORAL at 16:44

## 2021-04-05 RX ADMIN — PANTOPRAZOLE SODIUM 40 MG: 40 TABLET, DELAYED RELEASE ORAL at 06:27

## 2021-04-05 RX ADMIN — IPRATROPIUM BROMIDE AND ALBUTEROL SULFATE 3 ML: .5; 3 SOLUTION RESPIRATORY (INHALATION) at 19:58

## 2021-04-05 RX ADMIN — CLOPIDOGREL BISULFATE 75 MG: 75 TABLET ORAL at 06:27

## 2021-04-05 RX ADMIN — METOPROLOL TARTRATE 50 MG: 50 TABLET, FILM COATED ORAL at 08:57

## 2021-04-05 RX ADMIN — HYDROCODONE BITARTRATE AND ACETAMINOPHEN 1 TABLET: 5; 325 TABLET ORAL at 21:23

## 2021-04-05 RX ADMIN — ARFORMOTEROL TARTRATE 15 MCG: 15 SOLUTION RESPIRATORY (INHALATION) at 07:15

## 2021-04-05 RX ADMIN — FUROSEMIDE 40 MG: 40 TABLET ORAL at 17:51

## 2021-04-05 RX ADMIN — FUROSEMIDE 40 MG: 40 TABLET ORAL at 08:57

## 2021-04-05 RX ADMIN — ARFORMOTEROL TARTRATE 15 MCG: 15 SOLUTION RESPIRATORY (INHALATION) at 21:22

## 2021-04-05 RX ADMIN — HYDROCODONE BITARTRATE AND ACETAMINOPHEN 1 TABLET: 5; 325 TABLET ORAL at 04:04

## 2021-04-05 RX ADMIN — CETIRIZINE HYDROCHLORIDE 10 MG: 10 TABLET, FILM COATED ORAL at 06:26

## 2021-04-05 RX ADMIN — LUBIPROSTONE 24 MCG: 24 CAPSULE, GELATIN COATED ORAL at 08:57

## 2021-04-05 RX ADMIN — ALLOPURINOL 300 MG: 300 TABLET ORAL at 06:27

## 2021-04-05 RX ADMIN — FLUOXETINE 20 MG: 20 CAPSULE ORAL at 08:57

## 2021-04-05 RX ADMIN — ISOSORBIDE MONONITRATE 30 MG: 30 TABLET, EXTENDED RELEASE ORAL at 06:26

## 2021-04-05 RX ADMIN — IPRATROPIUM BROMIDE AND ALBUTEROL SULFATE 3 ML: .5; 3 SOLUTION RESPIRATORY (INHALATION) at 07:10

## 2021-04-05 NOTE — PLAN OF CARE
Goal Outcome Evaluation:  Plan of Care Reviewed With: patient  Progress: improving  Outcome Summary: vss. iv bumex dc'd, changed to PO lasix. ambulates to bathroom independently. po pain medication given prn for complaint of chronic back pain.

## 2021-04-05 NOTE — PROGRESS NOTES
"Daily Progress Note:      Chief complaint: Shortness of breath, acute diastolic congestive heart failure, chronic kidney disease, diabetes, COPD    Subjective: Shortness of breath is minimally improved. He still complains of back pain lower extremity edema not much better     LOS: 3 days     Vital Signs  Temp:  [97.7 °F (36.5 °C)-98.4 °F (36.9 °C)] 98.4 °F (36.9 °C)  Heart Rate:  [] 95  Resp:  [20-22] 20  BP: (121-144)/(63-81) 132/63  Oxygen Therapy  SpO2: 96 %  Pulse Oximetry Type: Continuous  Device (Oxygen Therapy): nasal cannula  Flow (L/min): 2}  Body mass index is 51.84 kg/m².  Flowsheet Rows      First Filed Value   Admission Height  170.2 cm (67\") Documented at 04/02/2021 1045   Admission Weight  (!) 150 kg (330 lb 12.8 oz) Documented at 04/02/2021 1045                   Documented weights    04/02/21 1045 04/02/21 1539 04/03/21 0519 04/03/21 1154   Weight: (!) 150 kg (330 lb 12.8 oz) (!) 150 kg (330 lb) (!) 151 kg (333 lb) (!) 152 kg (334 lb 9.6 oz)    04/04/21 0545 04/05/21 0600 04/05/21 0726   Weight: (!) 150 kg (331 lb 6.4 oz) (!) 150 kg (331 lb) (!) 150 kg (331 lb)           Patient Vitals for the past 24 hrs:   BP Temp Temp src Pulse Resp SpO2 Weight   04/05/21 0726 -- -- -- -- -- -- (!) 150 kg (331 lb)   04/05/21 0724 -- -- -- 95 20 -- --   04/05/21 0718 -- -- -- 95 20 96 % --   04/05/21 0712 -- -- -- 93 20 96 % --   04/05/21 0600 132/63 98.4 °F (36.9 °C) Oral 88 20 90 % (!) 150 kg (331 lb)   04/04/21 1933 141/73 98.3 °F (36.8 °C) Oral 90 20 93 % --   04/04/21 1548 -- -- -- 84 20 93 % --   04/04/21 1539 121/78 98.2 °F (36.8 °C) Oral 83 20 92 % --   04/04/21 1239 -- -- -- 98 20 -- --   04/04/21 1235 -- -- -- 102 20 93 % --   04/04/21 1144 144/81 97.7 °F (36.5 °C) Oral 99 22 92 % --   04/04/21 0829 -- -- -- 88 -- 94 % --   04/04/21 0824 -- -- -- 84 20 94 % --   04/04/21 0816 -- -- -- 86 20 94 % --       (!) 150 kg (331 lb)      Intake/Output Summary (Last 24 hours) at 4/5/2021 0729  Last data " filed at 2021 0600  Gross per 24 hour   Intake 1320 ml   Output 3600 ml   Net -2280 ml       Review of Systems   Constitutional: Negative for activity change, appetite change and fatigue.   HENT: Negative for congestion.    Respiratory: Negative for cough, chest tightness, shortness of breath and wheezing.    Cardiovascular: Positive for leg swelling. Negative for chest pain.   Gastrointestinal: Negative for abdominal distention, abdominal pain, diarrhea, nausea and vomiting.   Endocrine: Negative for polyphagia and polyuria.   Genitourinary: Negative for frequency.   Musculoskeletal: Positive for arthralgias and back pain. Negative for gait problem.   Skin: Positive for color change and wound.   Neurological: Negative for light-headedness.   Hematological: Does not bruise/bleed easily.   Psychiatric/Behavioral: Negative for agitation and behavioral problems.       Physical Exam  Vitals and nursing note reviewed.   Constitutional:       General: He is not in acute distress.     Appearance: He is well-developed. He is morbidly obese.   HENT:      Head: Normocephalic.   Eyes:      Conjunctiva/sclera: Conjunctivae normal.   Neck:      Thyroid: No thyromegaly.      Vascular: No JVD.   Cardiovascular:      Rate and Rhythm: Normal rate and regular rhythm.      Heart sounds: Normal heart sounds. No murmur heard.     Pulmonary:      Effort: Pulmonary effort is normal. No respiratory distress.      Breath sounds: Decreased air movement present. No wheezing or rales.   Abdominal:      General: Bowel sounds are normal. There is no distension.      Palpations: Abdomen is soft.      Tenderness: There is no abdominal tenderness. There is no guarding.   Musculoskeletal:      Cervical back: Normal range of motion.      Right lower le+ Edema present.      Left lower le+ Edema present.   Skin:     General: Skin is warm and dry.      Findings: No rash.      Comments: Multiple scabbed areas in both lower extremities some  superficial blisters healing   Neurological:      Mental Status: He is alert.         Medication Review:   I have reviewed the patient's current medication list  Scheduled Meds:allopurinol, 300 mg, Oral, QAM  amLODIPine, 2.5 mg, Oral, QAM  arformoterol, 15 mcg, Nebulization, BID - RT  aspirin, 81 mg, Oral, Daily  atorvastatin, 20 mg, Oral, Daily  cetirizine, 10 mg, Oral, QAM  clopidogrel, 75 mg, Oral, QAM  enoxaparin, 40 mg, Subcutaneous, Q24H  FLUoxetine, 20 mg, Oral, Daily  hydrophor, , Topical, Q24H  ipratropium-albuterol, 3 mL, Nebulization, 4x Daily - RT  isosorbide mononitrate, 30 mg, Oral, QAM  levETIRAcetam, 500 mg, Oral, Q12H  lubiprostone, 24 mcg, Oral, Daily With Breakfast  metoprolol tartrate, 50 mg, Oral, Q12H  O2, 2 L/min, Inhalation, Daily  pantoprazole, 40 mg, Oral, QAM  potassium chloride, 40 mEq, Oral, 4x Daily  sodium chloride, 10 mL, Intravenous, Q12H      Continuous Infusions:bumetanide, 0.5 mg/hr, Last Rate: 0.5 mg/hr (04/04/21 1607)      PRN Meds:.•  acetaminophen **OR** acetaminophen **OR** acetaminophen  •  diphenhydrAMINE  •  HYDROcodone-acetaminophen  •  melatonin  •  nitroglycerin  •  ondansetron **OR** ondansetron  •  sodium chloride  •  sodium chloride      Labs:  Results from last 7 days   Lab Units 04/05/21  0433 04/02/21  1054   WBC 10*3/mm3 7.19 7.93   HEMOGLOBIN g/dL 14.9 15.8   HEMATOCRIT % 45.4 49.5   PLATELETS 10*3/mm3 141 148     Results from last 7 days   Lab Units 04/05/21  0433 04/04/21  0446 04/03/21  0446 04/02/21  1054   SODIUM mmol/L 141 139 141 141   POTASSIUM mmol/L 4.2 4.2 4.7 4.5   CHLORIDE mmol/L 103 102 105 105   CO2 mmol/L 27.0 25.4 24.3 26.6   BUN mg/dL 22 20 19 17   CREATININE mg/dL 1.25 1.17 1.19 1.09   CALCIUM mg/dL 9.2 8.6 8.8 8.8   BILIRUBIN mg/dL  --   --   --  0.7   ALK PHOS U/L  --   --   --  152*   ALT (SGPT) U/L  --   --   --  17   AST (SGOT) U/L  --   --   --  16   GLUCOSE mg/dL 113* 120* 104* 136*           Results from last 7 days   Lab Units  04/05/21  0433 04/02/21  1054   AST (SGOT) U/L  --  16   ALT (SGPT) U/L  --  17   PLATELETS 10*3/mm3 141 148         Lab Results (last 24 hours)     Procedure Component Value Units Date/Time    Basic Metabolic Panel [[id number]]  (Abnormal) Collected: 04/05/21 0433    Specimen: Blood Updated: 04/05/21 0508     Glucose 113 mg/dL      BUN 22 mg/dL      Creatinine 1.25 mg/dL      Sodium 141 mmol/L      Potassium 4.2 mmol/L      Chloride 103 mmol/L      CO2 27.0 mmol/L      Calcium 9.2 mg/dL      eGFR Non African Amer 57 mL/min/1.73      BUN/Creatinine Ratio 17.6     Anion Gap 11.0 mmol/L     Narrative:      GFR Normal >60  Chronic Kidney Disease <60  Kidney Failure <15      CBC (No Diff) [[id number]]  (Abnormal) Collected: 04/05/21 0433    Specimen: Blood Updated: 04/05/21 0441     WBC 7.19 10*3/mm3      RBC 5.27 10*6/mm3      Hemoglobin 14.9 g/dL      Hematocrit 45.4 %      MCV 86.1 fL      MCH 28.3 pg      MCHC 32.8 g/dL      RDW 15.5 %      RDW-SD 48.3 fl      MPV 10.5 fL      Platelets 141 10*3/mm3     POC Glucose Once [[id number]]  (Abnormal) Collected: 04/04/21 1635    Specimen: Blood Updated: 04/04/21 1641     Glucose 136 mg/dL         Results from last 7 days   Lab Units 04/02/21  1054   TROPONIN T ng/mL <0.010     Results from last 7 days   Lab Units 04/02/21  1054   TSH uIU/mL 3.630     Results from last 7 days   Lab Units 04/02/21  1054   PROBNP pg/mL 105.0                         Glucose   Date/Time Value Ref Range Status   04/04/2021 1635 136 (H) 70 - 130 mg/dL Final                         Radiology:  Imaging Results (Last 24 Hours)     ** No results found for the last 24 hours. **          Cardiology:  ECG/EMG Results (last 24 hours)     ** No results found for the last 24 hours. **          I have reviewed recent labs results and consult notes.  Parts of this note may have been copied and pasted but patient was examined and interviewed by me today    Assessment and Plan:  1. Chronic acute on chronic  diastolic congestive heart failure improving will change to p.o. diuretics still with areas of blistering in the lower extremities     2. COPD no exacerbation continue with duo nebs and monitor     3. Adult-onset diabetes mellitus controlled Accu-Chek sliding scale insulin as needed     4. Hypertension well controlled continue with home medications        5. Chronic kidney disease stage III at baseline we will monitor with use of IV diuretics     6. Lumbar degenerative disc disease patient with increasing back pain try K pad and dressed low-dose narcotic analgesics    7. GERD nothing acute continue PPI     8. Coronary disease status post AURA placement to mid LAD nothing acute home medication be continued\     9. Morbid obesity likely compounding patient shortness of breath and exercise intolerance deconditioning     10. DVT prophylaxis Lovenox            Much of this encounter note is an electronic transcription/translation of spoken language to printed text using Dragon Software

## 2021-04-05 NOTE — NURSING NOTE
Continued Stay Note  MAGDALENA Pollock     Patient Name: Esdras Ko  MRN: [id number]  Today's Date: 4/5/2021    Admit Date: 4/2/2021    Discharge Plan     Row Name 04/05/21 1158       Plan    Plan  Home    Plan Comments  I spoke with the patient with his permission.  He was sitting up in the chair.  We discussed his discharge plan.  Pt will go home at discharge and he is agreeable to that plan.  CM will continue to follow.        Discharge Codes    No documentation.             Gifty Becker RN

## 2021-04-05 NOTE — PROGRESS NOTES
Adult Nutrition  Assessment/PES    Patient Name:  Esdras Ko  YOB: 1950  MRN: [id number]  Admit Date:  4/2/2021    Assessment Date:  4/5/2021    Comments:  Encourage cont good po intake.   Will cont to follow and monitor. No questions about diet today. Encourage compliance for home.     Reason for Assessment     Row Name 04/05/21 1351          Reason for Assessment    Reason For Assessment  follow-up protocol     Diagnosis  cardiac disease CHF hx CKD DM Obese         Nutrition/Diet History     Row Name 04/05/21 1352          Nutrition/Diet History    Typical Food/Fluid Intake  Spoke w pt during breakfast said was fabulous. Pt with Rn. Voices no other concerns or needs. At 2nd visit pt asleep in chair.         Anthropometrics     Row Name 04/05/21 1353 04/05/21 0726       Anthropometrics    Weight  -- 331#  (!) 150 kg (331 lb)       Body Mass Index (BMI)    BMI Assessment  BMI 40 or greater: obesity grade III  --    Row Name 04/05/21 0600          Anthropometrics    Weight  (!) 150 kg (331 lb)         Labs/Tests/Procedures/Meds     Row Name 04/05/21 1353          Labs/Procedures/Meds    Lab Results Reviewed  reviewed        Diagnostic Tests/Procedures    Diagnostic Test/Procedure Reviewed  reviewed        Medications    Pertinent Medications Reviewed  reviewed     Pertinent Medications Comments  lasix         Physical Findings     Row Name 04/05/21 1353          Physical Findings    Skin  other (see comments) leg blisters           Nutrition Prescription Ordered     Row Name 04/05/21 1353          Nutrition Prescription PO    Common Modifiers  Cardiac         Evaluation of Received Nutrient/Fluid Intake     Row Name 04/05/21 1353          Fluid Intake Evaluation    Oral Fluid (mL)  -- 1080 ml x 3, 72%        PO Evaluation    Number of Meals  8     % PO Intake  97               Problem/Interventions:  Problem 1     Row Name 04/05/21 1354          Nutrition Diagnoses Problem 1    Problem 1   Knowledge Deficit     Etiology (related to)  Medical Diagnosis     Cardiac  CHF     Signs/Symptoms (evidenced by)  Potential Information Deficit         Problem 2     Row Name 04/05/21 1354          Nutrition Diagnoses Problem 2    Problem 2  Overweight/Obesity     Etiology (related to)  Medical Diagnosis;Factors Affecting Nutrition     Signs/Symptoms (evidenced by)  BMI     BMI  Greater than 40             Intervention Goal     Row Name 04/05/21 1355          Intervention Goal    General  Meet nutritional needs for age/condition;Disease management/therapy     PO  Maintain intake;PO intake (%)     PO Intake %  75 % or greater     Weight  Appropriate weight loss lasix noted         Nutrition Intervention     Row Name 04/05/21 1355          Nutrition Intervention    RD/Tech Action  Follow Tx progress           Education/Evaluation     Row Name 04/05/21 1351          Education    Education  Other (comment);Previous education by RD/LD encourage compliance with Low Na diet for home.        Monitor/Evaluation    Monitor  Per protocol;I&O;PO intake;Pertinent labs;Weight;Symptoms           Electronically signed by:  Talia Palmer RD  04/05/21 13:56 EDT

## 2021-04-05 NOTE — SIGNIFICANT NOTE
"   04/05/21 0925   OTHER   Discipline physical therapist   Rehab Time/Intention   Session Not Performed patient/family declined treatment  (pt reports being up ambulating independently in the room.  declines PT at this time stating \"I am doing fine, I will call if I need you.\")     "

## 2021-04-05 NOTE — NURSING NOTE
Pt verbally abusive to RN's regarding putting aquafor on legs. Pt says he called out for this to be done and never called. RN was in pt room when supposedly calling to nurses station. Pt very manipulative of time. Complaining that he can never get any help. More than 3-4 hours has been spent in Pt's room addressing personal requests.

## 2021-04-05 NOTE — SIGNIFICANT NOTE
04/05/21 1100   OTHER   Discipline physical therapist   Rehab Time/Intention   Session Not Performed   (returned to attempt therapy.  pt up ambulating in room without device, denies concerns at this time.  will follow up with patient in AM for any further therapy needs.)

## 2021-04-05 NOTE — PLAN OF CARE
Goal Outcome Evaluation:  Plan of Care Reviewed With: patient  Progress: improving  Outcome Summary: VSS; continues Bumex drip; output recorded. Pt requested benedryl, meds not available. Pt was very manipulative, argumentative, and aggressive toward staff; Verbally abusive. Requests assistance and then argues with staff regarding his decisions.

## 2021-04-05 NOTE — NURSING NOTE
Pt very argument garima to RN. Pt asks for suggestions and yells at the staff that he can't do what was asked. Pt ambulates to bathroom independently, Numerous RN's have asked him to please unplug the cord from wall so as not to get cord twisted around the IV poll. He told RN that she must be new that he's been doing just fine. [place], pt ambulated to Bathroom without unplugging cord and pulled out his IV. Again Pt stated he needed more help and was mad because we couldn't stay in there and take care of him. RN stated she was not going to argue with him that she was just going to care for him as if any other patient.

## 2021-04-06 ENCOUNTER — APPOINTMENT (OUTPATIENT)
Dept: CT IMAGING | Facility: HOSPITAL | Age: 71
End: 2021-04-06

## 2021-04-06 ENCOUNTER — APPOINTMENT (OUTPATIENT)
Dept: VACCINE CLINIC | Facility: HOSPITAL | Age: 71
End: 2021-04-06

## 2021-04-06 PROBLEM — Z01.810 PREOPERATIVE CARDIOVASCULAR EXAMINATION: Status: RESOLVED | Noted: 2018-08-30 | Resolved: 2021-04-06

## 2021-04-06 PROBLEM — D75.1 POLYCYTHEMIA: Status: RESOLVED | Noted: 2020-03-03 | Resolved: 2021-04-06

## 2021-04-06 PROBLEM — E66.01 MORBID OBESITY WITH BMI OF 50.0-59.9, ADULT (HCC): Status: ACTIVE | Noted: 2021-04-06

## 2021-04-06 PROBLEM — I50.812 CHRONIC RIGHT-SIDED CONGESTIVE HEART FAILURE (HCC): Status: ACTIVE | Noted: 2021-04-06

## 2021-04-06 PROBLEM — I25.10 CORONARY ARTERY DISEASE INVOLVING NATIVE CORONARY ARTERY OF NATIVE HEART WITHOUT ANGINA PECTORIS: Status: ACTIVE | Noted: 2018-10-15

## 2021-04-06 PROBLEM — M79.89 LEFT LEG SWELLING: Status: RESOLVED | Noted: 2019-08-20 | Resolved: 2021-04-06

## 2021-04-06 PROBLEM — D69.6 THROMBOCYTOPENIA (HCC): Status: RESOLVED | Noted: 2019-02-19 | Resolved: 2021-04-06

## 2021-04-06 LAB
ANION GAP SERPL CALCULATED.3IONS-SCNC: 8.6 MMOL/L (ref 5–15)
BUN SERPL-MCNC: 24 MG/DL (ref 8–23)
BUN/CREAT SERPL: 18.9 (ref 7–25)
CALCIUM SPEC-SCNC: 8.8 MG/DL (ref 8.6–10.5)
CHLORIDE SERPL-SCNC: 102 MMOL/L (ref 98–107)
CO2 SERPL-SCNC: 29.4 MMOL/L (ref 22–29)
CREAT SERPL-MCNC: 1.27 MG/DL (ref 0.76–1.27)
GFR SERPL CREATININE-BSD FRML MDRD: 56 ML/MIN/1.73
GLUCOSE BLDC GLUCOMTR-MCNC: 134 MG/DL (ref 70–130)
GLUCOSE BLDC GLUCOMTR-MCNC: 138 MG/DL (ref 70–130)
GLUCOSE SERPL-MCNC: 141 MG/DL (ref 65–99)
POTASSIUM SERPL-SCNC: 3.7 MMOL/L (ref 3.5–5.2)
SODIUM SERPL-SCNC: 140 MMOL/L (ref 136–145)

## 2021-04-06 PROCEDURE — 80048 BASIC METABOLIC PNL TOTAL CA: CPT | Performed by: FAMILY MEDICINE

## 2021-04-06 PROCEDURE — 94799 UNLISTED PULMONARY SVC/PX: CPT

## 2021-04-06 PROCEDURE — 82962 GLUCOSE BLOOD TEST: CPT

## 2021-04-06 PROCEDURE — 25010000002 ENOXAPARIN PER 10 MG: Performed by: FAMILY MEDICINE

## 2021-04-06 PROCEDURE — 99222 1ST HOSP IP/OBS MODERATE 55: CPT | Performed by: INTERNAL MEDICINE

## 2021-04-06 PROCEDURE — 74176 CT ABD & PELVIS W/O CONTRAST: CPT

## 2021-04-06 RX ORDER — BISACODYL 10 MG
10 SUPPOSITORY, RECTAL RECTAL ONCE
Status: COMPLETED | OUTPATIENT
Start: 2021-04-06 | End: 2021-04-06

## 2021-04-06 RX ORDER — BUMETANIDE 0.25 MG/ML
2 INJECTION INTRAMUSCULAR; INTRAVENOUS ONCE
Status: COMPLETED | OUTPATIENT
Start: 2021-04-06 | End: 2021-04-06

## 2021-04-06 RX ORDER — TRIAMCINOLONE ACETONIDE 1 MG/G
CREAM TOPICAL EVERY 12 HOURS SCHEDULED
Status: DISCONTINUED | OUTPATIENT
Start: 2021-04-06 | End: 2021-04-09 | Stop reason: HOSPADM

## 2021-04-06 RX ORDER — BUMETANIDE 0.25 MG/ML
2 INJECTION INTRAMUSCULAR; INTRAVENOUS EVERY 8 HOURS
Status: DISCONTINUED | OUTPATIENT
Start: 2021-04-07 | End: 2021-04-06

## 2021-04-06 RX ORDER — IPRATROPIUM BROMIDE AND ALBUTEROL SULFATE 2.5; .5 MG/3ML; MG/3ML
3 SOLUTION RESPIRATORY (INHALATION) EVERY 4 HOURS PRN
Status: DISCONTINUED | OUTPATIENT
Start: 2021-04-06 | End: 2021-04-09 | Stop reason: HOSPADM

## 2021-04-06 RX ORDER — BUMETANIDE 0.25 MG/ML
2 INJECTION INTRAMUSCULAR; INTRAVENOUS EVERY 8 HOURS
Status: DISCONTINUED | OUTPATIENT
Start: 2021-04-06 | End: 2021-04-08

## 2021-04-06 RX ADMIN — IPRATROPIUM BROMIDE AND ALBUTEROL SULFATE 3 ML: .5; 3 SOLUTION RESPIRATORY (INHALATION) at 15:40

## 2021-04-06 RX ADMIN — METOPROLOL TARTRATE 50 MG: 50 TABLET, FILM COATED ORAL at 08:23

## 2021-04-06 RX ADMIN — SODIUM CHLORIDE, PRESERVATIVE FREE 10 ML: 5 INJECTION INTRAVENOUS at 08:23

## 2021-04-06 RX ADMIN — PETROLATUM: 42 OINTMENT TOPICAL at 08:24

## 2021-04-06 RX ADMIN — ENOXAPARIN SODIUM 40 MG: 40 INJECTION SUBCUTANEOUS at 11:31

## 2021-04-06 RX ADMIN — CLOPIDOGREL BISULFATE 75 MG: 75 TABLET ORAL at 06:38

## 2021-04-06 RX ADMIN — CETIRIZINE HYDROCHLORIDE 10 MG: 10 TABLET, FILM COATED ORAL at 06:38

## 2021-04-06 RX ADMIN — ASPIRIN 81 MG: 81 TABLET, COATED ORAL at 08:23

## 2021-04-06 RX ADMIN — DIPHENHYDRAMINE HYDROCHLORIDE 12.5 MG: 12.5 SOLUTION ORAL at 15:36

## 2021-04-06 RX ADMIN — PANTOPRAZOLE SODIUM 40 MG: 40 TABLET, DELAYED RELEASE ORAL at 06:38

## 2021-04-06 RX ADMIN — ISOSORBIDE MONONITRATE 30 MG: 30 TABLET, EXTENDED RELEASE ORAL at 06:38

## 2021-04-06 RX ADMIN — IPRATROPIUM BROMIDE AND ALBUTEROL SULFATE 3 ML: .5; 3 SOLUTION RESPIRATORY (INHALATION) at 07:47

## 2021-04-06 RX ADMIN — LEVETIRACETAM 500 MG: 500 TABLET ORAL at 08:23

## 2021-04-06 RX ADMIN — DIPHENHYDRAMINE HYDROCHLORIDE 12.5 MG: 12.5 SOLUTION ORAL at 07:17

## 2021-04-06 RX ADMIN — LUBIPROSTONE 24 MCG: 24 CAPSULE, GELATIN COATED ORAL at 08:23

## 2021-04-06 RX ADMIN — FLUOXETINE 20 MG: 20 CAPSULE ORAL at 08:24

## 2021-04-06 RX ADMIN — METOPROLOL TARTRATE 50 MG: 50 TABLET, FILM COATED ORAL at 20:34

## 2021-04-06 RX ADMIN — ARFORMOTEROL TARTRATE 15 MCG: 15 SOLUTION RESPIRATORY (INHALATION) at 10:12

## 2021-04-06 RX ADMIN — ARFORMOTEROL TARTRATE 15 MCG: 15 SOLUTION RESPIRATORY (INHALATION) at 20:15

## 2021-04-06 RX ADMIN — BUMETANIDE 2 MG: 0.25 INJECTION INTRAMUSCULAR; INTRAVENOUS at 08:22

## 2021-04-06 RX ADMIN — HYDROCODONE BITARTRATE AND ACETAMINOPHEN 1 TABLET: 5; 325 TABLET ORAL at 22:30

## 2021-04-06 RX ADMIN — HYDROCODONE BITARTRATE AND ACETAMINOPHEN 1 TABLET: 5; 325 TABLET ORAL at 11:31

## 2021-04-06 RX ADMIN — ENOXAPARIN SODIUM 40 MG: 40 INJECTION SUBCUTANEOUS at 22:30

## 2021-04-06 RX ADMIN — BISACODYL 10 MG: 10 SUPPOSITORY RECTAL at 08:24

## 2021-04-06 RX ADMIN — DIPHENHYDRAMINE HYDROCHLORIDE 12.5 MG: 12.5 SOLUTION ORAL at 11:31

## 2021-04-06 RX ADMIN — LEVETIRACETAM 500 MG: 500 TABLET ORAL at 20:34

## 2021-04-06 RX ADMIN — ALLOPURINOL 300 MG: 300 TABLET ORAL at 06:38

## 2021-04-06 RX ADMIN — HYDROCODONE BITARTRATE AND ACETAMINOPHEN 1 TABLET: 5; 325 TABLET ORAL at 01:23

## 2021-04-06 RX ADMIN — HYDROCODONE BITARTRATE AND ACETAMINOPHEN 1 TABLET: 5; 325 TABLET ORAL at 15:36

## 2021-04-06 RX ADMIN — TRIAMCINOLONE ACETONIDE: 1 CREAM TOPICAL at 21:08

## 2021-04-06 RX ADMIN — BUMETANIDE 2 MG: 0.25 INJECTION INTRAMUSCULAR; INTRAVENOUS at 21:08

## 2021-04-06 RX ADMIN — SODIUM CHLORIDE, PRESERVATIVE FREE 10 ML: 5 INJECTION INTRAVENOUS at 20:34

## 2021-04-06 RX ADMIN — Medication 5 MG: at 22:30

## 2021-04-06 RX ADMIN — HYDROCODONE BITARTRATE AND ACETAMINOPHEN 1 TABLET: 5; 325 TABLET ORAL at 06:38

## 2021-04-06 RX ADMIN — BUMETANIDE 2 MG: 0.25 INJECTION INTRAMUSCULAR; INTRAVENOUS at 14:56

## 2021-04-06 RX ADMIN — IPRATROPIUM BROMIDE AND ALBUTEROL SULFATE 3 ML: .5; 3 SOLUTION RESPIRATORY (INHALATION) at 11:54

## 2021-04-06 RX ADMIN — ATORVASTATIN CALCIUM 20 MG: 20 TABLET, FILM COATED ORAL at 08:23

## 2021-04-06 NOTE — PLAN OF CARE
Goal Outcome Evaluation:  Plan of Care Reviewed With: patient  Progress: improving  Outcome Summary: Pt VSS, am labs see measures. Pt ambulating to bathroom independently. Pt reports pain and itching controlled with current regimen. Pt pleasant, but discouraged with his daily wt this am, see measures. Pt last BM 4/4, reports he would like to have another BM and then re-weigh.

## 2021-04-06 NOTE — NURSING NOTE
Continued Stay Note  MAGDALENA Pollock     Patient Name: Esdras Ko  MRN: [id number]  Today's Date: 4/6/2021    Admit Date: 4/2/2021    Discharge Plan     Row Name 04/06/21 1214       Plan    Plan  Home with HH    Plan Comments  I spoke with the patient with his permission.  He was sitting up in the chair.  He asked if I would speak with his wife regarding home health.  I then spoke with his wife, Charley and she advised that she is current with [facility] and asked if we could send them a referral for him also.  I advised that I would reach out and make the referral.  I then contacted Rosy from [facility] and made a referral for HH.  Awaiting response.  CM will continue to follow.        Discharge Codes    No documentation.             Gifty Becker RN

## 2021-04-06 NOTE — PROGRESS NOTES
"Daily Progress Note:      Chief complaint: Shortness of breath, acute diastolic congestive heart failure, chronic kidney disease, diabetes, COPD    Subjective: Shortness of breath is improved.  His weight is up from yesterday complains of constipation lower extremity edema somewhat worse today     LOS: 4 days     Vital Signs  Temp:  [98 °F (36.7 °C)-98.4 °F (36.9 °C)] 98.1 °F (36.7 °C)  Heart Rate:  [77-96] 77  Resp:  [16-20] 18  BP: (115-133)/(60-74) 133/69  Oxygen Therapy  SpO2: 96 %  Pulse Oximetry Type: Intermittent  Device (Oxygen Therapy): nasal cannula  Flow (L/min): 2}  Body mass index is 52.33 kg/m².  Flowsheet Rows      First Filed Value   Admission Height  170.2 cm (67\") Documented at 04/02/2021 1045   Admission Weight  (!) 150 kg (330 lb 12.8 oz) Documented at 04/02/2021 1045                   Documented weights    04/02/21 1045 04/02/21 1539 04/03/21 0519 04/03/21 1154   Weight: (!) 150 kg (330 lb 12.8 oz) (!) 150 kg (330 lb) (!) 151 kg (333 lb) (!) 152 kg (334 lb 9.6 oz)    04/04/21 0545 04/05/21 0600 04/05/21 0726 04/06/21 0606   Weight: (!) 150 kg (331 lb 6.4 oz) (!) 150 kg (331 lb) (!) 150 kg (331 lb) (!) 152 kg (334 lb 2 oz)           Patient Vitals for the past 24 hrs:   BP Temp Temp src Pulse Resp SpO2 Weight   04/06/21 0606 133/69 98.1 °F (36.7 °C) Oral 77 18 96 % (!) 152 kg (334 lb 2 oz)   04/06/21 0409 -- -- -- 84 16 93 % --   04/05/21 2350 127/63 98 °F (36.7 °C) Oral 80 18 94 % --   04/05/21 2126 -- -- -- 82 -- -- --   04/05/21 2122 -- -- -- 80 20 -- --   04/05/21 2011 132/60 98.1 °F (36.7 °C) Oral -- -- -- --   04/05/21 2009 -- -- -- 78 16 -- --   04/05/21 1958 -- -- -- 85 16 95 % --   04/05/21 1623 -- -- -- 84 16 94 % --   04/05/21 1615 -- -- -- 84 16 93 % --   04/05/21 1528 132/74 98.4 °F (36.9 °C) Oral 84 18 94 % --   04/05/21 1215 115/73 98.2 °F (36.8 °C) Oral 86 16 93 % --   04/05/21 1134 -- -- -- 92 16 94 % --   04/05/21 0857 122/74 -- -- 96 -- -- --       (!) 152 kg (334 lb 2 " oz)      Intake/Output Summary (Last 24 hours) at 2021 0727  Last data filed at 2021 0009  Gross per 24 hour   Intake 1080 ml   Output 1300 ml   Net -220 ml       Review of Systems   Constitutional: Negative for activity change, appetite change and fatigue.   HENT: Negative for congestion.    Respiratory: Negative for cough, chest tightness, shortness of breath and wheezing.    Cardiovascular: Positive for leg swelling. Negative for chest pain.   Gastrointestinal: Negative for abdominal distention, abdominal pain, diarrhea, nausea and vomiting.   Endocrine: Negative for polyphagia and polyuria.   Genitourinary: Negative for frequency.   Musculoskeletal: Positive for arthralgias and back pain. Negative for gait problem.   Skin: Positive for color change and wound.   Neurological: Negative for light-headedness.   Hematological: Does not bruise/bleed easily.   Psychiatric/Behavioral: Negative for agitation and behavioral problems.       Physical Exam  Vitals and nursing note reviewed.   Constitutional:       General: He is not in acute distress.     Appearance: He is well-developed. He is morbidly obese.   HENT:      Head: Normocephalic.   Eyes:      Conjunctiva/sclera: Conjunctivae normal.   Neck:      Thyroid: No thyromegaly.      Vascular: No JVD.   Cardiovascular:      Rate and Rhythm: Normal rate and regular rhythm.      Heart sounds: Normal heart sounds. No murmur heard.     Pulmonary:      Effort: Pulmonary effort is normal. No respiratory distress.      Breath sounds: Decreased air movement present. No wheezing or rales.   Abdominal:      General: Bowel sounds are normal. There is no distension.      Palpations: Abdomen is soft.      Tenderness: There is no abdominal tenderness. There is no guarding.   Musculoskeletal:      Cervical back: Normal range of motion.      Right lower le+ Edema present.      Left lower le+ Edema present.   Skin:     General: Skin is warm and dry.      Findings: No  rash.      Comments: Multiple scabbed areas in both lower extremities some superficial blisters healing   Neurological:      Mental Status: He is alert.         Medication Review:   I have reviewed the patient's current medication list  Scheduled Meds:allopurinol, 300 mg, Oral, QAM  arformoterol, 15 mcg, Nebulization, BID - RT  aspirin, 81 mg, Oral, Daily  atorvastatin, 20 mg, Oral, Daily  bisacodyl, 10 mg, Rectal, Once  bumetanide, 2 mg, Intravenous, Once  cetirizine, 10 mg, Oral, QAM  clopidogrel, 75 mg, Oral, QAM  enoxaparin, 40 mg, Subcutaneous, Q24H  FLUoxetine, 20 mg, Oral, Daily  furosemide, 40 mg, Oral, BID  hydrophor, , Topical, Q24H  ipratropium-albuterol, 3 mL, Nebulization, 4x Daily - RT  isosorbide mononitrate, 30 mg, Oral, QAM  levETIRAcetam, 500 mg, Oral, Q12H  lubiprostone, 24 mcg, Oral, Daily With Breakfast  metoprolol tartrate, 50 mg, Oral, Q12H  O2, 2 L/min, Inhalation, Daily  pantoprazole, 40 mg, Oral, QAM  sodium chloride, 10 mL, Intravenous, Q12H      Continuous Infusions:   PRN Meds:.•  acetaminophen **OR** acetaminophen **OR** acetaminophen  •  diphenhydrAMINE  •  HYDROcodone-acetaminophen  •  melatonin  •  nitroglycerin  •  ondansetron **OR** ondansetron  •  sodium chloride  •  sodium chloride      Labs:  Results from last 7 days   Lab Units 04/05/21  0433 04/02/21  1054   WBC 10*3/mm3 7.19 7.93   HEMOGLOBIN g/dL 14.9 15.8   HEMATOCRIT % 45.4 49.5   PLATELETS 10*3/mm3 141 148     Results from last 7 days   Lab Units 04/06/21  0609 04/05/21  0433 04/04/21  0446 04/03/21  0446 04/02/21  1054   SODIUM mmol/L 140 141 139 141 141   POTASSIUM mmol/L 3.7 4.2 4.2 4.7 4.5   CHLORIDE mmol/L 102 103 102 105 105   CO2 mmol/L 29.4* 27.0 25.4 24.3 26.6   BUN mg/dL 24* 22 20 19 17   CREATININE mg/dL 1.27 1.25 1.17 1.19 1.09   CALCIUM mg/dL 8.8 9.2 8.6 8.8 8.8   BILIRUBIN mg/dL  --   --   --   --  0.7   ALK PHOS U/L  --   --   --   --  152*   ALT (SGPT) U/L  --   --   --   --  17   AST (SGOT) U/L  --   --    --   --  16   GLUCOSE mg/dL 141* 113* 120* 104* 136*           Results from last 7 days   Lab Units 04/05/21  0433 04/02/21  1054   AST (SGOT) U/L  --  16   ALT (SGPT) U/L  --  17   PLATELETS 10*3/mm3 141 148         Lab Results (last 24 hours)     Procedure Component Value Units Date/Time    Basic Metabolic Panel [[id number]]  (Abnormal) Collected: 04/06/21 0609    Specimen: Blood Updated: 04/06/21 0639     Glucose 141 mg/dL      BUN 24 mg/dL      Creatinine 1.27 mg/dL      Sodium 140 mmol/L      Potassium 3.7 mmol/L      Chloride 102 mmol/L      CO2 29.4 mmol/L      Calcium 8.8 mg/dL      eGFR Non African Amer 56 mL/min/1.73      BUN/Creatinine Ratio 18.9     Anion Gap 8.6 mmol/L     Narrative:      GFR Normal >60  Chronic Kidney Disease <60  Kidney Failure <15      POC Glucose Once [[id number]]  (Abnormal) Collected: 04/05/21 1704    Specimen: Blood Updated: 04/05/21 1710     Glucose 133 mg/dL         Results from last 7 days   Lab Units 04/02/21  1054   TROPONIN T ng/mL <0.010     Results from last 7 days   Lab Units 04/02/21  1054   TSH uIU/mL 3.630     Results from last 7 days   Lab Units 04/02/21  1054   PROBNP pg/mL 105.0                         Glucose   Date/Time Value Ref Range Status   04/05/2021 1704 133 (H) 70 - 130 mg/dL Final   04/05/2021 0750 129 70 - 130 mg/dL Final   04/04/2021 1635 136 (H) 70 - 130 mg/dL Final                         Radiology:  Imaging Results (Last 24 Hours)     ** No results found for the last 24 hours. **          Cardiology:  ECG/EMG Results (last 24 hours)     ** No results found for the last 24 hours. **          I have reviewed recent labs results and consult notes.  Parts of this note may have been copied and pasted but patient was examined and interviewed by me today    Assessment and Plan:  1. Chronic acute on chronic diastolic congestive heart failure improving for diuresis again in weight increasing we will get cardiology see the patient for further input     2. COPD  no exacerbation continue with duo nebs and monitor     3. Adult-onset diabetes mellitus controlled Accu-Chek sliding scale insulin as needed     4. Hypertension well controlled continue with home medications        5. Chronic kidney disease stage III at baseline we will monitor with use of IV diuretics     6. Lumbar degenerative disc disease back pain is improved      7. GERD nothing acute continue PPI     8. Coronary disease status post AURA placement to mid LAD nothing acute home medication be continued\     9. Morbid obesity likely compounding patient shortness of breath and exercise intolerance deconditioning     10. DVT prophylaxis Lovenox            Much of this encounter note is an electronic transcription/translation of spoken language to printed text using Dragon Software

## 2021-04-06 NOTE — PLAN OF CARE
Goal Outcome Evaluation:  Plan of Care Reviewed With: patient  Progress: no change  Outcome Summary: VSS, daily wts, increase in wt recorded this shift, iv bumex given as ordered, ble with +3/4 edema, iv bumex given as ordered. Pt c/o itching to back of left leg, n.o for kenalog cream bid received, prn benadryl given. O2 at 2l vnc, sleeps in recliner for comfort, ble elvated. Norco given prn q 4 hrs for c/o chronic backpain.

## 2021-04-06 NOTE — PLAN OF CARE
Problem: COPD Comorbidity  Goal: Maintenance of COPD Symptom Control  Outcome: Ongoing, Progressing   Goal Outcome Evaluation:

## 2021-04-06 NOTE — PROGRESS NOTES
"Adult Nutrition  Assessment/PES    Patient Name:  Esdras Ko  YOB: 1950  MRN: [id number]  Admit Date:  4/2/2021    Assessment Date:  4/6/2021    Comments:  Reinforced low sodium diet edu today, however pt focused on \"getting help at home\" and reports he can't remember things.  Denies using salt but then states he loves canned soup. Expect limited to poor compliance.     Reason for Assessment     Row Name 04/06/21 1412          Reason for Assessment    Reason For Assessment  follow-up protocol edu         Nutrition/Diet History     Row Name 04/06/21 1412          Nutrition/Diet History    Typical Food/Fluid Intake  Spoke w pt at bedside, awakens easily. reports doesn't remember talking Friday. Info present on bedside table. Pt reports he doesn't remember things, he knows to avoid salt. however he seems suprise to hear that canned soup and ramen noodles are high. Pt focused on \"getting help\" at home, called  from room for pt.         Anthropometrics     Row Name 04/06/21 1052          Anthropometrics    Weight  (!) 153 kg (336 lb 6.4 oz)               Nutrition Prescription Ordered     Row Name 04/06/21 1414          Nutrition Prescription PO    Common Modifiers  Low Sodium                 Problem/Interventions:  Problem 1     Row Name 04/06/21 1414          Nutrition Diagnoses Problem 1    Problem 1  Knowledge Deficit     Etiology (related to)  Medical Diagnosis     Cardiac  CHF     Signs/Symptoms (evidenced by)  Demonstrated Information Deficit               Intervention Goal     Row Name 04/06/21 1414          Intervention Goal    General  Provide information regarding MNT for treatment/condition             Education/Evaluation     Row Name 04/06/21 1414          Education    Education  Education topics Reinforced reducing salt intake. Avoid canned soups, ramen noodles, convience/processed foods. avoid ham/carreon/sausage/etc. Avoid direct salt.     Provided education regarding  Diet " rationale;Avoidance/improvement of symptoms     Advised Regarding Habits/Behavior  Food choices;Seasoning food        Monitor/Evaluation    Monitor  Per protocol;PO intake;Weight;Symptoms     Education Follow-up  Other (comment) pt reports he just wants to talk to , expect limited compliance.           Electronically signed by:  Talia Palmer RD  04/06/21 14:16 EDT

## 2021-04-06 NOTE — NURSING NOTE
Continued Stay Note  MAGDALENA Pollock     Patient Name: Esdras Ko  MRN: [id number]  Today's Date: 4/6/2021    Admit Date: 4/2/2021    Discharge Plan     Row Name 04/06/21 1231       Plan    Plan  Home with [employer]     Plan Comments  Rosy from [facility] called and advised that they would accept the patient for home health.  I advised the patient and his wife.  CM will continue to follow.                              Discharge Codes    No documentation.             Gifty Becker RN

## 2021-04-06 NOTE — CONSULTS
Date of Hospital Visit: 21  Encounter Provider: Kamlesh Bhardwaj MD  Place of Service: [facility]  Patient Name: Esdras Ko  :1950  Referral Provider: Alexis Arias*    Chief complaint:  Leg swelling    History of Present Illness     Mr Esdras Ko is a 70 year old man who follows with Dr Melo, although he has not been seen in the office since .     He was originally seen in 2018, when an EKG was performed prior to colonoscopy, and was abnormal. An echo revealed normal LVSF and grade 1 diastolic dysfunction.  A perfusion stress revealed a large amount of apical/septal ischemia.  Coronary angiography revealed chronic occlusion of the LAD (which was treated with a 3.28m73iv AURA), 50-60% stenosis of a small circumflex, and a large RCA with tandem 50% lesions.    He had a perfusion stress test in 2019 that was normal.  This was performed as he reported significant WILKS.    He was directly admitted to the hospital from Dr Arias' office four days ago with severe leg swelling. His legs are blistered, weeping, and pruritic. He has chronic severe dyspnea at baseline.  He has chronic orthopnea.  He denies chest pain.  There is a question about compliance with diuretics at home.    Upon arrival, his proBNP was only 100. His CXR is TDS but unremarkable.  According to the chart, he had almost 4L of UOP yesterday, but his weight is reportedly up 4 pounds today.  He is very constipated, and he did not empty his bladder before being weighed today.     An echo was performed this admission and was markedly TDS.  I reviewed it myself -- he has normal LVSF and grade 1 diastolic dysfunction.  Nothing else is visualized well.     ECHO 21    · The study is technically difficult for diagnosis with poor acoustic windows.  · Left ventricular ejection fraction appears to be 61 - 65%. Left ventricular systolic function is normal. Normal  left ventricular cavity size noted. All left ventricular wall segments contract normally.    Stress Test 9/26/19    · Myocardial perfusion imaging indicates a normal myocardial perfusion study with no evidence of ischemia.  · Impressions are consistent with a low risk study.       Past Medical History:   Diagnosis Date   • Anemia    • Bladder cancer (CMS/HCC) 2008   • Cerebrovascular accident (CMS/HCC)     Ischemic and TIAs; most recent 02/2014 which was a TIA.   • Chronic kidney disease, stage 3 (CMS/HCC)     Dr. KATHERYN Lin   • Colon polyp    • COPD (chronic obstructive pulmonary disease) (CMS/HCC)    • History of blood transfusion    • Hyperlipidemia    • Hypertension    • Hyperuricemia    • Left shoulder pain    • Lymphoma (CMS/HCC)     MANTLE CELL, HAD CHEMO  2010   • Osteoarthritis    • Pilonidal cyst     SCHEDULED FOR SX   • Psoriasis    • Seizure (CMS/HCC)     Following head trauma in 1970   • Skin lesions     SCARRING FROM SHINGLES, & BLISTERS FROM EDEMA   • Sleep apnea     Intolerant of CPAP   • Splenomegaly 2/21/2013   • Stroke (CMS/HCC)    • Tobacco abuse    • Type 2 diabetes mellitus (CMS/HCC)        Past Surgical History:   Procedure Laterality Date   • BRONCHOSCOPY N/A 8/21/2017    Procedure: BRONCHOSCOPY with fluro and biopsy and lavage.;  Surgeon: Red Topete MD;  Location: [facility];  Service:    • CARDIAC CATHETERIZATION N/A 9/17/2018    Procedure: Coronary angiography;  Surgeon: Kylie Victoria MD;  [facility] [facility];  Service: Cardiovascular   • CARDIAC CATHETERIZATION N/A 10/30/2018    Procedure: Chronic Total Occlussion;  Surgeon: Roe Wheatley MD;  Location: [facility];  Service: Cardiovascular   • CARDIAC CATHETERIZATION N/A 10/30/2018    Procedure: Stent AURA coronary;  Surgeon: Roe Wheatley MD;  Location: [facility];  Service: Cardiovascular   • COLONOSCOPY N/A 10/9/2019    Procedure: COLONOSCOPY with polypectomy;  Surgeon: Cem  Lee Starkey MD;  Location: [facility] OR;  Service: Gastroenterology   • PILONIDAL CYSTECTOMY N/A 11/30/2016    Procedure: PILONIDAL CYSTECTOMY;  Surgeon: Roe Davila MD;  Location: [facility] OR;  Service:    • SINUS SURGERY     • SKIN LESION EXCISION      DR. DAVILA ABOUT 10 YEARS AGO       Prior to Admission medications    Medication Sig Start Date End Date Taking? Authorizing Provider   allopurinol (ZYLOPRIM) 300 MG tablet Take 300 mg by mouth Every Morning. take 1 tablet by mouth daily 2/19/16  Yes Nikko Horta MD   AMITIZA 24 MCG capsule Take 24 mcg by mouth 2 (Two) Times a Day As Needed. 3/30/16  Yes Nikko Horta MD   amLODIPine (NORVASC) 5 MG tablet Take 2.5 mg by mouth Every Morning. 1/11/19  Yes Becky Melo MD   aspirin 81 MG tablet Take 1 tablet by mouth Every Morning. 12/7/18  Yes Becky Melo MD   atorvastatin (LIPITOR) 20 MG tablet Take 20 mg by mouth Daily.   Yes Nikko Horta MD   cetirizine (ZyrTEC) 10 MG tablet Take 10 mg by mouth Every Morning.   Yes Nikko Horta MD   clobetasol (TEMOVATE) 0.05 % cream apply to affected area (RASH FREELY) twice a day 1/23/17  Yes Nikko Horta MD   clopidogrel (PLAVIX) 75 MG tablet Take 75 mg by mouth Every Morning. 4/1/16  Yes Nikko Horta MD   FLUoxetine (PROZAC) 20 MG capsule Take 20 mg by mouth Daily.   Yes Nikko Horta MD   furosemide (LASIX) 40 MG tablet Take 40 mg by mouth 2 (Two) Times a Day. take 2 tablets by mouth twice a day 10/15/18  Yes Becky Melo MD   levETIRAcetam (KEPPRA) 1000 MG tablet Take 500 mg by mouth 2 (Two) Times a Day. 4/14/16  Yes Nikko Horta MD   metoprolol tartrate (LOPRESSOR) 50 MG tablet Take 50 mg by mouth 2 (two) times a day. 4/1/16  Yes Nikko Horta MD   O2 (OXYGEN) Inhale 2 L/min Daily.   Yes Nikko Horta MD   pantoprazole (PROTONIX) 40 MG EC tablet Take 40 mg by mouth Every Morning. take 1 tablet by mouth once  daily 4/3/16  Yes Nikko Horta MD   potassium chloride (K-DUR,KLOR-CON) 20 MEQ CR tablet Take 40 mEq by mouth 4 (Four) Times a Day. 3 TABS BID AND 2 TABS ONCE A DAY 3/31/16  Yes Nikko Horta MD   isosorbide mononitrate (IMDUR) 30 MG 24 hr tablet Take 1 tablet by mouth Every Morning.  Patient not taking: Reported on 2021   Becky Melo MD   loratadine (CLARITIN) 10 MG tablet Take 10 mg by mouth Daily.    Provider, MD Nikko       Social History     Socioeconomic History   • Marital status:      Spouse name: Charley   • Number of children: Not on file   • Years of education: 12   • Highest education level: Not on file   Tobacco Use   • Smoking status: Former Smoker     Packs/day: 3.00     Years: 50.00     Pack years: 150.00     Types: Cigarettes     Quit date:      Years since quittin.2   • Smokeless tobacco: Never Used   • Tobacco comment: 80 pack year   Substance and Sexual Activity   • Alcohol use: No     Comment: Heavy in past, none since around    • Drug use: No   • Sexual activity: Defer       Family History   Problem Relation Age of Onset   • Heart defect Mother    • Pancreatic cancer Father 56   • Anemia Sister    • Hypertension Brother    • Heart disease Brother    • Mental illness Brother    • Other Brother         Splenectomy   • Cancer Cousin        Review of Systems   Constitutional: Positive for fatigue and unexpected weight change.   Respiratory: Positive for shortness of breath.    Cardiovascular: Positive for leg swelling.   Musculoskeletal: Positive for arthralgias and back pain.   All other systems reviewed and are negative.       Objective:     Vitals:    21 0409 21 0606 21 0747 21 0754   BP:  133/69     BP Location:  Left arm     Patient Position:  Lying     Pulse: 84 77 70 70   Resp: 16 18 20 20   Temp:  98.1 °F (36.7 °C)     TempSrc:  Oral     SpO2: 93% 96% 95% 95%   Weight:  (!) 152 kg (334 lb 2 oz)     Height:   "       Body mass index is 52.33 kg/m².  Flowsheet Rows      First Filed Value   Admission Height  170.2 cm (67\") Documented at 04/02/2021 1045   Admission Weight  (!) 150 kg (330 lb 12.8 oz) Documented at 04/02/2021 1045          Physical Exam  Constitutional:       Appearance: He is morbidly obese.      Comments: Sitting in a recliner chair   HENT:      Head: Normocephalic.      Nose: Nose normal.      Mouth/Throat:      Mouth: Mucous membranes are moist.   Eyes:      Conjunctiva/sclera: Conjunctivae normal.   Cardiovascular:      Rate and Rhythm: Normal rate and regular rhythm.   Pulmonary:      Comments: Decreased at bases, difficult exam due to size  Abdominal:      Palpations: Abdomen is soft.      Tenderness: There is no abdominal tenderness.      Comments: Difficult exam due to size   Musculoskeletal:      Comments: Severe BLE pitting edema, numerous small intact blisters all over legs, some stasis dermatitis noted   Skin:     General: Skin is warm.   Neurological:      General: No focal deficit present.      Mental Status: He is alert.   Psychiatric:         Mood and Affect: Mood normal.                 Lab Review:                Results from last 7 days   Lab Units 04/06/21  0609   SODIUM mmol/L 140   POTASSIUM mmol/L 3.7   CHLORIDE mmol/L 102   CO2 mmol/L 29.4*   BUN mg/dL 24*   CREATININE mg/dL 1.27   GLUCOSE mg/dL 141*   CALCIUM mg/dL 8.8     Results from last 7 days   Lab Units 04/02/21  1054   TROPONIN T ng/mL <0.010     Results from last 7 days   Lab Units 04/05/21  0433   WBC 10*3/mm3 7.19   HEMOGLOBIN g/dL 14.9   HEMATOCRIT % 45.4   PLATELETS 10*3/mm3 141                                   I personally viewed and interpreted the patient's EKG/Telemetry data -- SR, PRWP, LAFB, no change from prior    Assessment/Plan:     1.  Severe LE edema without evidence of left sided CHF  2.  Morbid obesity  3. CKD3  4. CAD, stable, no angina  5. DM2    His echo reveals normal left-ventricular systolic function, " and grade 1 diastolic dysfunction, which is not associated with elevated filling pressures.  His proBNP is only 100.  While proBNP may be falsely low in morbidly obese patients, this value is completely normal.  With diuretics, his renal function is declining.  Overall, the picture is most consistent with severe extravascular volume overload, without significant intravascular volume overload.  This is likely going to cause problems with continued diuresis, as I suspect his renal function will continue to worsen. For now, we'll continue IV bumetanide for a day and reassess in the morning. .     I do not believe that his weight has gone up four pounds in one day, with almost 4L of urine output yesterday. I have asked him to be reweighed today after he has a bowel movement and after he is emptying his bladder.  He very much needs to keep his legs elevated and I really think they should be wrapped as well.    Given his history of lymphoma and bladder cancer, he probably needs a scan of his pelvis to make sure that we do not have any thing causing venous compression.  This is less likely as the swelling is bilateral, but it will be good to exclude it. However, his worsening renal function precludes the use of iodinated contrast. I s/w Dr Vera, and a non-contrasted CT will still be helpful to exclude bulky lymphadenopathy.    I will also start triamcinolone for stasis dermatitis.    We will follow.     ADD:    Reported history of urticaria with fluticasone; will not order topical steroids.

## 2021-04-06 NOTE — PROGRESS NOTES
BMI = 52.69 kg/m2  Wt = 153 kg    Per System P&T Guidelines, will change VTE prophylaxis regimen to Lovenox 40 mg twice daily. Will follow.    Eladia NaikD

## 2021-04-06 NOTE — DISCHARGE PLACEMENT REQUEST
"Lexie Ko (70 y.o. Male)     Date of Birth Social Security Number Address Home Phone MRN    1950  [address] [place] [id number] [phone] [id number]    [employer] Marital Status          Yazidism        Admission Date Admission Type Admitting Provider Attending Provider Department, Room/Bed    4/2/21 Elective Nitesh Arias MD Kemparajurs, Plavakeerthi, MD [facility] MED SURG, 1411/1    Discharge Date Discharge Disposition Discharge Destination                       Attending Provider: Nitesh Arias MD    Allergies: Amoxicillin, Ceftin [Cefuroxime Axetil], Codeine, Flonase [Fluticasone], Spiriva Handihaler [Tiotropium Bromide Monohydrate], Sulfa Antibiotics, Phenobarbital    Isolation: None   Infection: None   Code Status: CPR    Ht: 170.2 cm (67\")   Wt: 153 kg (336 lb 6.4 oz)    Admission Cmt: None   Principal Problem: None                Active Insurance as of 4/2/2021     Primary Coverage     Payor Plan Insurance Group Employer/Plan Group    ANTHEM MEDICARE REPLACEMENT ANTHEM MEDICARE ADVANTAGE [id number]     Payor Plan Address Payor Plan Phone Number Payor Plan Fax Number Effective Dates    [address] [phone]  1/1/2018 - None Entered    [facility] [id number]       Subscriber Name Subscriber Birth Date Member ID       LEXIE KO 1950 [id number]                 Emergency Contacts      (Rel.) Home Phone Work Phone Mobile Phone    Charley Ko (Spouse) [phone] -- --              "

## 2021-04-07 LAB
ANION GAP SERPL CALCULATED.3IONS-SCNC: 11.6 MMOL/L (ref 5–15)
BUN SERPL-MCNC: 22 MG/DL (ref 8–23)
BUN/CREAT SERPL: 20.6 (ref 7–25)
CALCIUM SPEC-SCNC: 8.8 MG/DL (ref 8.6–10.5)
CHLORIDE SERPL-SCNC: 102 MMOL/L (ref 98–107)
CO2 SERPL-SCNC: 26.4 MMOL/L (ref 22–29)
CREAT SERPL-MCNC: 1.07 MG/DL (ref 0.76–1.27)
GFR SERPL CREATININE-BSD FRML MDRD: 68 ML/MIN/1.73
GLUCOSE BLDC GLUCOMTR-MCNC: 123 MG/DL (ref 70–130)
GLUCOSE BLDC GLUCOMTR-MCNC: 141 MG/DL (ref 70–130)
GLUCOSE SERPL-MCNC: 120 MG/DL (ref 65–99)
POTASSIUM SERPL-SCNC: 3.3 MMOL/L (ref 3.5–5.2)
SODIUM SERPL-SCNC: 140 MMOL/L (ref 136–145)

## 2021-04-07 PROCEDURE — 82962 GLUCOSE BLOOD TEST: CPT

## 2021-04-07 PROCEDURE — 99232 SBSQ HOSP IP/OBS MODERATE 35: CPT | Performed by: HOSPITALIST

## 2021-04-07 PROCEDURE — 99232 SBSQ HOSP IP/OBS MODERATE 35: CPT | Performed by: INTERNAL MEDICINE

## 2021-04-07 PROCEDURE — 94799 UNLISTED PULMONARY SVC/PX: CPT

## 2021-04-07 PROCEDURE — 80048 BASIC METABOLIC PNL TOTAL CA: CPT | Performed by: FAMILY MEDICINE

## 2021-04-07 PROCEDURE — 25010000002 ENOXAPARIN PER 10 MG: Performed by: FAMILY MEDICINE

## 2021-04-07 RX ORDER — METOLAZONE 2.5 MG/1
2.5 TABLET ORAL ONCE
Status: COMPLETED | OUTPATIENT
Start: 2021-04-07 | End: 2021-04-07

## 2021-04-07 RX ADMIN — LEVETIRACETAM 500 MG: 500 TABLET ORAL at 21:05

## 2021-04-07 RX ADMIN — ENOXAPARIN SODIUM 40 MG: 40 INJECTION SUBCUTANEOUS at 22:30

## 2021-04-07 RX ADMIN — BUMETANIDE 2 MG: 0.25 INJECTION INTRAMUSCULAR; INTRAVENOUS at 06:23

## 2021-04-07 RX ADMIN — BUMETANIDE 2 MG: 0.25 INJECTION INTRAMUSCULAR; INTRAVENOUS at 14:37

## 2021-04-07 RX ADMIN — BUMETANIDE 2 MG: 0.25 INJECTION INTRAMUSCULAR; INTRAVENOUS at 21:04

## 2021-04-07 RX ADMIN — ENOXAPARIN SODIUM 40 MG: 40 INJECTION SUBCUTANEOUS at 10:58

## 2021-04-07 RX ADMIN — ARFORMOTEROL TARTRATE 15 MCG: 15 SOLUTION RESPIRATORY (INHALATION) at 09:41

## 2021-04-07 RX ADMIN — HYDROCODONE BITARTRATE AND ACETAMINOPHEN 1 TABLET: 5; 325 TABLET ORAL at 14:37

## 2021-04-07 RX ADMIN — PANTOPRAZOLE SODIUM 40 MG: 40 TABLET, DELAYED RELEASE ORAL at 06:24

## 2021-04-07 RX ADMIN — ISOSORBIDE MONONITRATE 30 MG: 30 TABLET, EXTENDED RELEASE ORAL at 06:24

## 2021-04-07 RX ADMIN — FLUOXETINE 20 MG: 20 CAPSULE ORAL at 08:57

## 2021-04-07 RX ADMIN — ARFORMOTEROL TARTRATE 15 MCG: 15 SOLUTION RESPIRATORY (INHALATION) at 19:36

## 2021-04-07 RX ADMIN — SODIUM CHLORIDE, PRESERVATIVE FREE 10 ML: 5 INJECTION INTRAVENOUS at 09:03

## 2021-04-07 RX ADMIN — LUBIPROSTONE 24 MCG: 24 CAPSULE, GELATIN COATED ORAL at 08:57

## 2021-04-07 RX ADMIN — ASPIRIN 81 MG: 81 TABLET, COATED ORAL at 08:57

## 2021-04-07 RX ADMIN — TRIAMCINOLONE ACETONIDE: 1 CREAM TOPICAL at 21:05

## 2021-04-07 RX ADMIN — PETROLATUM: 42 OINTMENT TOPICAL at 08:57

## 2021-04-07 RX ADMIN — TRIAMCINOLONE ACETONIDE: 1 CREAM TOPICAL at 08:58

## 2021-04-07 RX ADMIN — HYDROCODONE BITARTRATE AND ACETAMINOPHEN 1 TABLET: 5; 325 TABLET ORAL at 02:31

## 2021-04-07 RX ADMIN — CLOPIDOGREL BISULFATE 75 MG: 75 TABLET ORAL at 06:24

## 2021-04-07 RX ADMIN — ALLOPURINOL 300 MG: 300 TABLET ORAL at 06:24

## 2021-04-07 RX ADMIN — METOPROLOL TARTRATE 50 MG: 50 TABLET, FILM COATED ORAL at 21:05

## 2021-04-07 RX ADMIN — CETIRIZINE HYDROCHLORIDE 10 MG: 10 TABLET, FILM COATED ORAL at 06:24

## 2021-04-07 RX ADMIN — SODIUM CHLORIDE, PRESERVATIVE FREE 10 ML: 5 INJECTION INTRAVENOUS at 21:05

## 2021-04-07 RX ADMIN — LEVETIRACETAM 500 MG: 500 TABLET ORAL at 08:57

## 2021-04-07 RX ADMIN — HYDROCODONE BITARTRATE AND ACETAMINOPHEN 1 TABLET: 5; 325 TABLET ORAL at 22:40

## 2021-04-07 RX ADMIN — METOLAZONE 2.5 MG: 2.5 TABLET ORAL at 09:20

## 2021-04-07 RX ADMIN — ATORVASTATIN CALCIUM 20 MG: 20 TABLET, FILM COATED ORAL at 08:57

## 2021-04-07 RX ADMIN — METOPROLOL TARTRATE 50 MG: 50 TABLET, FILM COATED ORAL at 08:57

## 2021-04-07 NOTE — PROGRESS NOTES
"Hospitalist Team      Patient Care Team:  Nitesh Arias MD as PCP - General (Family Medicine)  Irving Restrepo MD as Consulting Physician (Hematology and Oncology)  Nitesh Arias MD as Referring Physician (Family Medicine)        Chief Complaint: Follow-up Volume overload.    Subjective    No acute events overnight.  Mr. Ko feels about the same.  He denies chest pain.  Tolerating PO.  Up in chair this morning.    Objective    Vital Signs  Temp:  [96.8 °F (36 °C)-98.1 °F (36.7 °C)] 97 °F (36.1 °C)  Heart Rate:  [72-90] 78  Resp:  [16-20] 20  BP: (106-142)/(58-79) 122/68  Oxygen Therapy  SpO2: 94 %  Pulse Oximetry Type: Intermittent  Device (Oxygen Therapy): nasal cannula  Flow (L/min): 3}    Flowsheet Rows      First Filed Value   Admission Height  170.2 cm (67\") Documented at 04/02/2021 1045   Admission Weight  (!) 150 kg (330 lb 12.8 oz) Documented at 04/02/2021 1045        Physical Exam:    General: Appears stated age in NAD.  Lungs: Breath sounds are diminished throughout all fields.  I do not appreciate any wheeze or rales.  No accessory use.  CV: Heart sounds are distant but regular.  No murmurs appreciated.  Radial pulses are 2+ and symmetric.  Abdomen:  Morbidly obese, soft, and non-tender w/ active bowel sounds.  MSK: No C/C.  Pitting edema noted bilateral lower extremities.  No asymmetry.  Skin:  Stasis changes noted bilaterally.  Neuro: CN II-XII grossly intact.  Psych: Normal affect.  Ox3.    Results Review:     I reviewed the patient's new clinical results.    Lab Results (last 24 hours)     Procedure Component Value Units Date/Time    POC Glucose Once [[id number]]  (Normal) Collected: 04/07/21 0720    Specimen: Blood Updated: 04/07/21 0735     Glucose 123 mg/dL     Basic Metabolic Panel [[id number]]  (Abnormal) Collected: 04/07/21 0522    Specimen: Blood Updated: 04/07/21 0604     Glucose 120 mg/dL      BUN 22 mg/dL      Creatinine 1.07 mg/dL      Sodium 140 mmol/L      " Potassium 3.3 mmol/L      Chloride 102 mmol/L      CO2 26.4 mmol/L      Calcium 8.8 mg/dL      eGFR Non African Amer 68 mL/min/1.73      BUN/Creatinine Ratio 20.6     Anion Gap 11.6 mmol/L     Narrative:      GFR Normal >60  Chronic Kidney Disease <60  Kidney Failure <15      POC Glucose Once [[id number]]  (Abnormal) Collected: 04/06/21 1702    Specimen: Blood Updated: 04/06/21 1709     Glucose 134 mg/dL           Imaging Results (Last 24 Hours)     Procedure Component Value Units Date/Time    CT Abdomen Pelvis Without Contrast [[id number]] Collected: 04/06/21 1604     Updated: 04/06/21 1611    Narrative:      CT ABDOMEN AND PELVIS, NONCONTRAST, 04/06/2021     HISTORY:  70-year-old male currently admitted to the hospital with severe  bilateral lower extremity edema, but no evidence of left-sided  congestive heart failure. Morbid obesity. He does have a past history of  treated mantle cell lymphoma, and CT imaging is requested to assess for  evidence of central venous compression within the abdomen or pelvis.     TECHNIQUE:   CT imaging of the abdomen and pelvis performed without IV contrast due  to history of abnormal renal function. Radiation dose reduction  techniques included automated exposure control or exposure modulation  based on body size. Radiation audit for CT and nuclear cardiology exams  in the last 12 months: 0.     COMPARISON:  *  CT abdomen/pelvis, 07/21/2017.     ABDOMEN FINDINGS:   No mass or adenopathy is seen along the course of the IVC, iliac veins  or proximal femoral veins. No adenopathy is seen elsewhere within the  abdomen, retroperitoneum or pelvis. Mild splenomegaly is stable 2017.     Liver and pancreas are normal in size and appearance. No gallbladder  distention or bile duct dilatation. There are several large bilateral  renal cysts, some of which are slightly higher in attenuation than  typical, but these have not changed significantly since 2017 and should  be benign. No nephrolithiasis  or evidence of upper urinary tract  obstruction. Normal caliber abdominal aorta. Normal adrenal glands.     Mild sigmoid and descending colonic diverticulosis. GI tract otherwise  unremarkable.     PELVIS FINDINGS:   Markedly enlarged prostate, but no bladder distention. Rectum is  negative. No free pelvic fluid or abdominal ascites. No inguinal hernia.     Lung base images show no active disease. No visible basilar pulmonary  edema. No pleural effusion.       Impression:      1.  No acute abnormality within the abdomen or pelvis.  2.  No evidence of recurrent lymphoma. No mass or adenopathy or other  cause of extrinsic central venous compression is identified within the  retroperitoneum, pelvis or inguinal regions.  3.  Stable mild splenomegaly.  4.  Marked prostate enlargement. No bladder distention.     This report was finalized on 4/6/2021 4:09 PM by Dr. Chepe Catherine MD.             Xray reviewed personally by physician.      Medication Review:   I have reviewed the patient's current medication list    Current Facility-Administered Medications:   •  acetaminophen (TYLENOL) tablet 650 mg, 650 mg, Oral, Q4H PRN, 650 mg at 04/02/21 1730 **OR** acetaminophen (TYLENOL) 160 MG/5ML solution 650 mg, 650 mg, Oral, Q4H PRN **OR** acetaminophen (TYLENOL) suppository 650 mg, 650 mg, Rectal, Q4H PRN, Nitesh Arias MD  •  allopurinol (ZYLOPRIM) tablet 300 mg, 300 mg, Oral, QAM, Nitesh Arias MD, 300 mg at 04/07/21 0624  •  arformoterol (BROVANA) nebulizer solution 15 mcg, 15 mcg, Nebulization, BID - RT, Nitesh Arias MD, 15 mcg at 04/06/21 2015  •  aspirin EC tablet 81 mg, 81 mg, Oral, Daily, Nitesh Arias MD, 81 mg at 04/07/21 0857  •  atorvastatin (LIPITOR) tablet 20 mg, 20 mg, Oral, Daily, Nitesh Arias MD, 20 mg at 04/07/21 0857  •  bumetanide (BUMEX) injection 2 mg, 2 mg, Intravenous, Q8H, Kamlesh Bhardwaj MD, 2 mg at 04/07/21 0623  •  cetirizine  (zyrTEC) tablet 10 mg, 10 mg, Oral, QAM, Nitesh Arias MD, 10 mg at 04/07/21 0624  •  clopidogrel (PLAVIX) tablet 75 mg, 75 mg, Oral, CORBY, Nitesh Arias MD, 75 mg at 04/07/21 0624  •  diphenhydrAMINE (BENADRYL) 12.5 MG/5ML elixir 12.5 mg, 12.5 mg, Oral, Q4H PRN, Nitesh Arias MD, 12.5 mg at 04/06/21 1536  •  enoxaparin (LOVENOX) syringe 40 mg, 40 mg, Subcutaneous, Q12H, Nitesh Arias MD, 40 mg at 04/06/21 2230  •  FLUoxetine (PROzac) capsule 20 mg, 20 mg, Oral, Daily, Nitesh Arias MD, 20 mg at 04/07/21 0857  •  HYDROcodone-acetaminophen (NORCO) 5-325 MG per tablet 1 tablet, 1 tablet, Oral, Q4H PRN, Nitesh Arias MD, 1 tablet at 04/07/21 0231  •  hydrophor (AQUAPHOR) ointment, , Topical, Q24H, Nitesh Arias MD, Given at 04/07/21 0857  •  ipratropium-albuterol (DUO-NEB) nebulizer solution 3 mL, 3 mL, Nebulization, Q4H PRN, Honorio Crisostomo MD  •  isosorbide mononitrate (IMDUR) 24 hr tablet 30 mg, 30 mg, Oral, CORBY, Nitesh Arias MD, 30 mg at 04/07/21 0624  •  levETIRAcetam (KEPPRA) tablet 500 mg, 500 mg, Oral, Q12H, Nitesh Arias MD, 500 mg at 04/07/21 0857  •  lubiprostone (AMITIZA) capsule 24 mcg, 24 mcg, Oral, Daily With Breakfast, Nitesh Airas MD, 24 mcg at 04/07/21 0857  •  melatonin tablet 5 mg, 5 mg, Oral, Nightly PRN, Nitesh Arias MD, 5 mg at 04/06/21 2230  •  metoprolol tartrate (LOPRESSOR) tablet 50 mg, 50 mg, Oral, Q12H, Nitesh Arias MD, 50 mg at 04/07/21 0857  •  nitroglycerin (NITROSTAT) SL tablet 0.4 mg, 0.4 mg, Sublingual, Q5 Min PRN, Nitesh Arias MD  •  O2 (OXYGEN), 2 L/min, Inhalation, Daily, Nitesh Arias MD, 2 L/min at 04/07/21 0900  •  ondansetron (ZOFRAN) tablet 4 mg, 4 mg, Oral, Q6H PRN **OR** ondansetron (ZOFRAN) injection 4 mg, 4 mg, Intravenous, Q6H PRN, Nitesh Arias MD  •  pantoprazole (PROTONIX)  EC tablet 40 mg, 40 mg, Oral, QAM, Nitesh Arias MD, 40 mg at 04/07/21 0624  •  sodium chloride 0.9 % flush 1-10 mL, 1-10 mL, Intravenous, PRN, Nitesh Arias MD  •  sodium chloride 0.9 % flush 10 mL, 10 mL, Intravenous, Q12H, Nitesh Arias MD, 10 mL at 04/07/21 0903  •  sodium chloride 0.9 % infusion 40 mL, 40 mL, Intravenous, PRN, Nitesh Arias MD  •  triamcinolone (KENALOG) 0.1 % cream, , Topical, Q12H, Nitesh Arias MD, Given at 04/07/21 0858      Assessment/Plan     1.  Volume Overload: Appreciate Dr. Tang' help.  Feels not likely to be acute CHF, but continue to diurese.  I do not believe weights are accurate as he continues to gain despite -I/O balances.  Will receive a dose of Zaroxylyn.  Continue Bumex as renal function tolerates.    2.  Diabetes Mellitus, Type 2 in Morbidly Obese: AM and bedside glucose at goal.  Continue current regimen.    3.  CKDIII: Creatinine stable w/ diuresis.  Continue to monitor.    4.  GERD: No acute issues.  Continue PPI therapy.    5.  CAD: No chest pain reported.  Continue home regimen.    6.  Morbid Obesity: Body mass index is 52.41 kg/m².  Nutrition eval.    Plan for disposition: Home when able.    Honorio Crisostomo MD  04/07/21  09:23 EDT

## 2021-04-07 NOTE — PLAN OF CARE
Goal Outcome Evaluation:  Plan of Care Reviewed With: patient     Outcome Summary: patient resting at this time, vital signs stable, oral  pain med controlled pain, bumex given, cream applied to both leg , both leg swelling , cont to monitor

## 2021-04-07 NOTE — PROGRESS NOTES
"    Patient Name: Esdras Ko  :1950  70 y.o.      Patient Care Team:  Nitesh Arias MD as PCP - General (Family Medicine)  Irving Restrepo MD as Consulting Physician (Hematology and Oncology)  Nitesh Arias MD as Referring Physician (Family Medicine)    Chief Complaint: LE swelling    Interval History: States that breathing is maybe a little bit better, weight today pending       Objective   Vital Signs  Temp:  [96.8 °F (36 °C)-98.1 °F (36.7 °C)] 97 °F (36.1 °C)  Heart Rate:  [72-90] 78  Resp:  [16-20] 20  BP: (106-142)/(58-79) 122/68    Intake/Output Summary (Last 24 hours) at 2021 0808  Last data filed at 2021 0700  Gross per 24 hour   Intake 1440 ml   Output 1600 ml   Net -160 ml     Flowsheet Rows      First Filed Value   Admission Height  170.2 cm (67\") Documented at 2021 1045   Admission Weight  (!) 150 kg (330 lb 12.8 oz) Documented at 2021 1045          Physical Exam:   General Appearance:    Alert, cooperative, in no acute distress   Lungs:     Clear to auscultation.  Normal respiratory effort and rate.      Heart:    Regular rhythm and normal rate, normal S1 and S2, no murmurs, gallops or rubs.     Chest Wall:    No abnormalities observed   Abdomen:     Soft, nontender, positive bowel sounds.     Extremities:   no cyanosis, clubbing 3+ edema.  No marked joint deformities.  Adequate musculoskeletal strength.       Results Review:    Results from last 7 days   Lab Units 21  0522   SODIUM mmol/L 140   POTASSIUM mmol/L 3.3*   CHLORIDE mmol/L 102   CO2 mmol/L 26.4   BUN mg/dL 22   CREATININE mg/dL 1.07   GLUCOSE mg/dL 120*   CALCIUM mg/dL 8.8     Results from last 7 days   Lab Units 21  1054   TROPONIN T ng/mL <0.010     Results from last 7 days   Lab Units 21  0433   WBC 10*3/mm3 7.19   HEMOGLOBIN g/dL 14.9   HEMATOCRIT % 45.4   PLATELETS 10*3/mm3 141                     Results for orders placed during the hospital encounter of " 04/02/21    Adult Transthoracic Echo Complete w/ Color, Spectral and Contrast if Necessary Per Protocol    Interpretation Summary  · The study is technically difficult for diagnosis with poor acoustic windows.  · Left ventricular ejection fraction appears to be 61 - 65%. Left ventricular systolic function is normal. Normal left ventricular cavity size noted. All left ventricular wall segments contract normally.          Medication Review:   allopurinol, 300 mg, Oral, QAM  arformoterol, 15 mcg, Nebulization, BID - RT  aspirin, 81 mg, Oral, Daily  atorvastatin, 20 mg, Oral, Daily  bumetanide, 2 mg, Intravenous, Q8H  cetirizine, 10 mg, Oral, QAM  clopidogrel, 75 mg, Oral, QAM  enoxaparin, 40 mg, Subcutaneous, Q12H  FLUoxetine, 20 mg, Oral, Daily  hydrophor, , Topical, Q24H  isosorbide mononitrate, 30 mg, Oral, QAM  levETIRAcetam, 500 mg, Oral, Q12H  lubiprostone, 24 mcg, Oral, Daily With Breakfast  metoprolol tartrate, 50 mg, Oral, Q12H  O2, 2 L/min, Inhalation, Daily  pantoprazole, 40 mg, Oral, QAM  sodium chloride, 10 mL, Intravenous, Q12H  triamcinolone, , Topical, Q12H              Assessment/Plan   Active Hospital Problems    Diagnosis  POA   • **Swelling of both lower extremities [M79.89]  Yes   • Morbid obesity with BMI of 50.0-59.9, adult (CMS/[facility]) [E66.01, Z68.43]  Not Applicable   • Chronic right-sided congestive heart failure (CMS/[facility]) [I50.812]  Yes   • Type 2 diabetes mellitus with circulatory disorder, without long-term current use of insulin (CMS/[facility]) [E11.59]  Yes   • Coronary artery disease involving native coronary artery of native heart without angina pectoris [I25.10]  Yes   • Mantle cell lymphoma (CMS/[facility]) [C83.10]  Yes   • Chronic kidney disease [N18.9]  Yes      Resolved Hospital Problems   No resolved problems to display.     1.  Bilateral lower extremity edema-echocardiogram demonstrates normal biventricular size and function, normal diastolic function, normal left atrial dimension, left atrial  pressure, and normal pulmonary arterial pressure.  NT proBNP is 105, which is unchanged from prior measurements and while NT proBNP can be somewhat depressed in obesity, normal is less than 900.  In aggregate, these data argue significantly against acute exacerbation of congestive heart failure.  I would continue to diurese as renal function allows.  I will give a single dose of oral Zaroxolyn's morning to see if that will help accelerate the diuresis.  We will await his weight this morning.  Weight yesterday was actually increased from before  2.  Morbid obesity complicating all aspects of care  3.  COPD  4.  Coronary disease, status post stent placement to the LAD -no evidence of acute coronary syndrome  5.  Chronic renal failure stage III, creatinine is at baseline  6.  Diabetes mellitus with circulatory complication-risk factor modification    Yo Tang III, MD  [facility]  04/07/21  08:08 EDT

## 2021-04-07 NOTE — PLAN OF CARE
Goal Outcome Evaluation:  Plan of Care Reviewed With: patient  Progress: improving  Outcome Summary: vss. pt ambulates in room, independently. pain controlled with po pain meds. BLE edema continues. no other complaints at this time.

## 2021-04-08 LAB
ANION GAP SERPL CALCULATED.3IONS-SCNC: 10.6 MMOL/L (ref 5–15)
BUN SERPL-MCNC: 22 MG/DL (ref 8–23)
BUN/CREAT SERPL: 19.1 (ref 7–25)
CALCIUM SPEC-SCNC: 8.9 MG/DL (ref 8.6–10.5)
CHLORIDE SERPL-SCNC: 95 MMOL/L (ref 98–107)
CO2 SERPL-SCNC: 31.4 MMOL/L (ref 22–29)
CREAT SERPL-MCNC: 1.15 MG/DL (ref 0.76–1.27)
GFR SERPL CREATININE-BSD FRML MDRD: 63 ML/MIN/1.73
GLUCOSE BLDC GLUCOMTR-MCNC: 124 MG/DL (ref 70–130)
GLUCOSE BLDC GLUCOMTR-MCNC: 151 MG/DL (ref 70–130)
GLUCOSE BLDC GLUCOMTR-MCNC: 151 MG/DL (ref 70–130)
GLUCOSE SERPL-MCNC: 144 MG/DL (ref 65–99)
MAGNESIUM SERPL-MCNC: 1.7 MG/DL (ref 1.6–2.4)
POTASSIUM SERPL-SCNC: 2.7 MMOL/L (ref 3.5–5.2)
SODIUM SERPL-SCNC: 137 MMOL/L (ref 136–145)

## 2021-04-08 PROCEDURE — 80048 BASIC METABOLIC PNL TOTAL CA: CPT | Performed by: FAMILY MEDICINE

## 2021-04-08 PROCEDURE — 25010000002 MAGNESIUM SULFATE IN D5W 1G/100ML (PREMIX) 1-5 GM/100ML-% SOLUTION: Performed by: HOSPITALIST

## 2021-04-08 PROCEDURE — 94799 UNLISTED PULMONARY SVC/PX: CPT

## 2021-04-08 PROCEDURE — 99233 SBSQ HOSP IP/OBS HIGH 50: CPT | Performed by: NURSE PRACTITIONER

## 2021-04-08 PROCEDURE — 99232 SBSQ HOSP IP/OBS MODERATE 35: CPT | Performed by: HOSPITALIST

## 2021-04-08 PROCEDURE — 83735 ASSAY OF MAGNESIUM: CPT | Performed by: NURSE PRACTITIONER

## 2021-04-08 PROCEDURE — 82962 GLUCOSE BLOOD TEST: CPT

## 2021-04-08 PROCEDURE — 25010000002 ENOXAPARIN PER 10 MG: Performed by: FAMILY MEDICINE

## 2021-04-08 RX ORDER — MAGNESIUM SULFATE 1 G/100ML
3 INJECTION INTRAVENOUS ONCE
Status: COMPLETED | OUTPATIENT
Start: 2021-04-08 | End: 2021-04-08

## 2021-04-08 RX ORDER — POTASSIUM CHLORIDE 20 MEQ/1
40 TABLET, EXTENDED RELEASE ORAL
Status: COMPLETED | OUTPATIENT
Start: 2021-04-08 | End: 2021-04-08

## 2021-04-08 RX ORDER — POTASSIUM CHLORIDE 20 MEQ/1
40 TABLET, EXTENDED RELEASE ORAL ONCE
Status: COMPLETED | OUTPATIENT
Start: 2021-04-08 | End: 2021-04-08

## 2021-04-08 RX ORDER — BUMETANIDE 0.25 MG/ML
2 INJECTION INTRAMUSCULAR; INTRAVENOUS EVERY 8 HOURS
Status: COMPLETED | OUTPATIENT
Start: 2021-04-08 | End: 2021-04-08

## 2021-04-08 RX ORDER — TORSEMIDE 20 MG/1
50 TABLET ORAL DAILY
Status: DISCONTINUED | OUTPATIENT
Start: 2021-04-09 | End: 2021-04-09 | Stop reason: HOSPADM

## 2021-04-08 RX ADMIN — ALLOPURINOL 300 MG: 300 TABLET ORAL at 06:13

## 2021-04-08 RX ADMIN — SODIUM CHLORIDE, PRESERVATIVE FREE 10 ML: 5 INJECTION INTRAVENOUS at 21:38

## 2021-04-08 RX ADMIN — ATORVASTATIN CALCIUM 20 MG: 20 TABLET, FILM COATED ORAL at 10:00

## 2021-04-08 RX ADMIN — HYDROCODONE BITARTRATE AND ACETAMINOPHEN 1 TABLET: 5; 325 TABLET ORAL at 11:17

## 2021-04-08 RX ADMIN — ENOXAPARIN SODIUM 40 MG: 40 INJECTION SUBCUTANEOUS at 12:24

## 2021-04-08 RX ADMIN — TRIAMCINOLONE ACETONIDE: 1 CREAM TOPICAL at 21:38

## 2021-04-08 RX ADMIN — PANTOPRAZOLE SODIUM 40 MG: 40 TABLET, DELAYED RELEASE ORAL at 06:13

## 2021-04-08 RX ADMIN — BUMETANIDE 2 MG: 0.25 INJECTION INTRAMUSCULAR; INTRAVENOUS at 15:04

## 2021-04-08 RX ADMIN — POTASSIUM CHLORIDE 40 MEQ: 1500 TABLET, EXTENDED RELEASE ORAL at 17:38

## 2021-04-08 RX ADMIN — ENOXAPARIN SODIUM 40 MG: 40 INJECTION SUBCUTANEOUS at 23:35

## 2021-04-08 RX ADMIN — BUMETANIDE 2 MG: 0.25 INJECTION INTRAMUSCULAR; INTRAVENOUS at 06:12

## 2021-04-08 RX ADMIN — FLUOXETINE 20 MG: 20 CAPSULE ORAL at 10:00

## 2021-04-08 RX ADMIN — CETIRIZINE HYDROCHLORIDE 10 MG: 10 TABLET, FILM COATED ORAL at 06:12

## 2021-04-08 RX ADMIN — ARFORMOTEROL TARTRATE 15 MCG: 15 SOLUTION RESPIRATORY (INHALATION) at 19:31

## 2021-04-08 RX ADMIN — POTASSIUM CHLORIDE 40 MEQ: 1500 TABLET, EXTENDED RELEASE ORAL at 21:37

## 2021-04-08 RX ADMIN — LEVETIRACETAM 500 MG: 500 TABLET ORAL at 10:00

## 2021-04-08 RX ADMIN — POTASSIUM CHLORIDE 40 MEQ: 1500 TABLET, EXTENDED RELEASE ORAL at 09:59

## 2021-04-08 RX ADMIN — TRIAMCINOLONE ACETONIDE: 1 CREAM TOPICAL at 10:00

## 2021-04-08 RX ADMIN — MAGNESIUM SULFATE HEPTAHYDRATE 3 G: 1 INJECTION, SOLUTION INTRAVENOUS at 09:59

## 2021-04-08 RX ADMIN — METOPROLOL TARTRATE 50 MG: 50 TABLET, FILM COATED ORAL at 09:59

## 2021-04-08 RX ADMIN — LUBIPROSTONE 24 MCG: 24 CAPSULE, GELATIN COATED ORAL at 09:59

## 2021-04-08 RX ADMIN — LEVETIRACETAM 500 MG: 500 TABLET ORAL at 21:37

## 2021-04-08 RX ADMIN — POTASSIUM CHLORIDE 40 MEQ: 1500 TABLET, EXTENDED RELEASE ORAL at 15:04

## 2021-04-08 RX ADMIN — HYDROCODONE BITARTRATE AND ACETAMINOPHEN 1 TABLET: 5; 325 TABLET ORAL at 06:13

## 2021-04-08 RX ADMIN — POTASSIUM CHLORIDE 40 MEQ: 1500 TABLET, EXTENDED RELEASE ORAL at 12:23

## 2021-04-08 RX ADMIN — PETROLATUM: 42 OINTMENT TOPICAL at 10:00

## 2021-04-08 RX ADMIN — ARFORMOTEROL TARTRATE 15 MCG: 15 SOLUTION RESPIRATORY (INHALATION) at 07:56

## 2021-04-08 RX ADMIN — BUMETANIDE 2 MG: 0.25 INJECTION INTRAMUSCULAR; INTRAVENOUS at 21:37

## 2021-04-08 RX ADMIN — SODIUM CHLORIDE, PRESERVATIVE FREE 10 ML: 5 INJECTION INTRAVENOUS at 10:00

## 2021-04-08 RX ADMIN — CLOPIDOGREL BISULFATE 75 MG: 75 TABLET ORAL at 06:12

## 2021-04-08 RX ADMIN — ISOSORBIDE MONONITRATE 30 MG: 30 TABLET, EXTENDED RELEASE ORAL at 06:12

## 2021-04-08 RX ADMIN — ASPIRIN 81 MG: 81 TABLET, COATED ORAL at 10:00

## 2021-04-08 RX ADMIN — METOPROLOL TARTRATE 50 MG: 50 TABLET, FILM COATED ORAL at 21:37

## 2021-04-08 NOTE — PLAN OF CARE
Goal Outcome Evaluation:     Progress: improving  Outcome Summary: patient vss. edema continues to be present. patient complains of back pain. IV diuretics continued.  O2 on.  patient states he cannot ambulate on his own at times- but has been ambulating well.

## 2021-04-08 NOTE — PROGRESS NOTES
"Hospitalist Team      Patient Care Team:  Nitesh Arias MD as PCP - General (Family Medicine)  Irving Restrepo MD as Consulting Physician (Hematology and Oncology)  Nitesh Arias MD as Referring Physician (Family Medicine)        Chief Complaint: Follow-up Volume Overload.    Subjective    Mr. Ko feels the same in that his legs are still swollen.  He denies chest pain and dyspnea.  He is tolerating PO.  He is ambulating to  bathroom.    Objective    Vital Signs  Temp:  [97.3 °F (36.3 °C)-98 °F (36.7 °C)] 98 °F (36.7 °C)  Heart Rate:  [60-90] 88  Resp:  [18-20] 20  BP: (106-119)/(63-74) 119/64  Oxygen Therapy  SpO2: 94 %  Pulse Oximetry Type: Intermittent  Device (Oxygen Therapy): nasal cannula  Flow (L/min): 2}    Flowsheet Rows      First Filed Value   Admission Height  170.2 cm (67\") Documented at 04/02/2021 1045   Admission Weight  (!) 150 kg (330 lb 12.8 oz) Documented at 04/02/2021 1045        Physical Exam:    General: Appears younger than his stated age and is in no acute distress.  Lungs: Diminished at the bases, but otherwise clear.  CV: Heart sounds are distant but regular. I appreciate no murmur.  Radial pulses are 2+ and symmetric.  Abdomen: Morbidly obese, soft, and non-tender w/ active bowel sounds.  MSK: No C/C.  Pitting edema of the BLE.  Skin: Bleb formation of the BLE.  No weeping.  Stasis changes noted.  Neuro: CN II-XII grossly intact.  Psych: Normal affect.      Results Review:     I reviewed the patient's new clinical results.    Lab Results (last 24 hours)     Procedure Component Value Units Date/Time    Magnesium [[id number]]  (Normal) Collected: 04/08/21 0435    Specimen: Blood Updated: 04/08/21 0624     Magnesium 1.7 mg/dL     POC Glucose Once [[id number]]  (Normal) Collected: 04/08/21 0546    Specimen: Blood Updated: 04/08/21 0552     Glucose 124 mg/dL     Basic Metabolic Panel [[id number]]  (Abnormal) Collected: 04/08/21 0435    Specimen: Blood Updated: " 04/08/21 0528     Glucose 144 mg/dL      BUN 22 mg/dL      Creatinine 1.15 mg/dL      Sodium 137 mmol/L      Potassium 2.7 mmol/L      Chloride 95 mmol/L      CO2 31.4 mmol/L      Calcium 8.9 mg/dL      eGFR Non African Amer 63 mL/min/1.73      BUN/Creatinine Ratio 19.1     Anion Gap 10.6 mmol/L     Narrative:      GFR Normal >60  Chronic Kidney Disease <60  Kidney Failure <15      POC Glucose Once [[id number]]  (Abnormal) Collected: 04/07/21 1717    Specimen: Blood Updated: 04/07/21 1724     Glucose 141 mg/dL           Imaging Results (Last 24 Hours)     ** No results found for the last 24 hours. **          Medication Review:   I have reviewed the patient's current medication list    Current Facility-Administered Medications:   •  acetaminophen (TYLENOL) tablet 650 mg, 650 mg, Oral, Q4H PRN, 650 mg at 04/02/21 1730 **OR** acetaminophen (TYLENOL) 160 MG/5ML solution 650 mg, 650 mg, Oral, Q4H PRN **OR** acetaminophen (TYLENOL) suppository 650 mg, 650 mg, Rectal, Q4H PRN, Nitesh Arias MD  •  allopurinol (ZYLOPRIM) tablet 300 mg, 300 mg, Oral, CORBY, Nitesh Arias MD, 300 mg at 04/08/21 0613  •  arformoterol (BROVANA) nebulizer solution 15 mcg, 15 mcg, Nebulization, BID - RT, Nitesh Arias MD, 15 mcg at 04/08/21 0756  •  aspirin EC tablet 81 mg, 81 mg, Oral, Daily, Nitesh Arias MD, 81 mg at 04/07/21 0857  •  atorvastatin (LIPITOR) tablet 20 mg, 20 mg, Oral, Daily, Nitesh Arias MD, 20 mg at 04/07/21 0857  •  bumetanide (BUMEX) injection 2 mg, 2 mg, Intravenous, Q8H, Ekta Mayorga, EDDIE  •  cetirizine (zyrTEC) tablet 10 mg, 10 mg, Oral, Juana TAVAREZ Plavakeerthi, MD, 10 mg at 04/08/21 0612  •  clopidogrel (PLAVIX) tablet 75 mg, 75 mg, Oral, Juana TAVAREZ Plavakeerthi, MD, 75 mg at 04/08/21 0612  •  diphenhydrAMINE (BENADRYL) 12.5 MG/5ML elixir 12.5 mg, 12.5 mg, Oral, Q4H PRN, Nitesh Arias MD, 12.5 mg at 04/06/21 1536  •  enoxaparin  (LOVENOX) syringe 40 mg, 40 mg, Subcutaneous, Q12H, Nitesh Arias MD, 40 mg at 04/07/21 2230  •  FLUoxetine (PROzac) capsule 20 mg, 20 mg, Oral, Daily, Nitesh Arias MD, 20 mg at 04/07/21 0857  •  HYDROcodone-acetaminophen (NORCO) 5-325 MG per tablet 1 tablet, 1 tablet, Oral, Q4H PRN, Nitesh Arias MD, 1 tablet at 04/08/21 0613  •  hydrophor (AQUAPHOR) ointment, , Topical, Q24H, Nitesh Arias MD, Given at 04/07/21 0857  •  ipratropium-albuterol (DUO-NEB) nebulizer solution 3 mL, 3 mL, Nebulization, Q4H PRN, Honorio Crisostomo MD  •  isosorbide mononitrate (IMDUR) 24 hr tablet 30 mg, 30 mg, Oral, QAM, Nitesh Arias MD, 30 mg at 04/08/21 0612  •  levETIRAcetam (KEPPRA) tablet 500 mg, 500 mg, Oral, Q12H, Nitesh Arias MD, 500 mg at 04/07/21 2105  •  lubiprostone (AMITIZA) capsule 24 mcg, 24 mcg, Oral, Daily With Breakfast, Nitesh Arias MD, 24 mcg at 04/07/21 0857  •  magnesium sulfate in D5W 1g/100mL (PREMIX), 3 g, Intravenous, Once, Honorio Crisostomo MD  •  melatonin tablet 5 mg, 5 mg, Oral, Nightly PRN, Nitesh Arias MD, 5 mg at 04/06/21 2230  •  metoprolol tartrate (LOPRESSOR) tablet 50 mg, 50 mg, Oral, Q12H, Nitesh Arias MD, 50 mg at 04/07/21 2105  •  nitroglycerin (NITROSTAT) SL tablet 0.4 mg, 0.4 mg, Sublingual, Q5 Min PRN, Nitesh Arias MD  •  O2 (OXYGEN), 2 L/min, Inhalation, Daily, Nitesh Arias MD, 2 L/min at 04/07/21 0900  •  ondansetron (ZOFRAN) tablet 4 mg, 4 mg, Oral, Q6H PRN **OR** ondansetron (ZOFRAN) injection 4 mg, 4 mg, Intravenous, Q6H PRN, Nitesh Arias MD  •  pantoprazole (PROTONIX) EC tablet 40 mg, 40 mg, Oral, QAM, Nitesh Arias MD, 40 mg at 04/08/21 0613  •  potassium chloride (K-DUR,KLOR-CON) CR tablet 40 mEq, 40 mEq, Oral, Q2H, Honorio Crisostomo MD  •  sodium chloride 0.9 % flush 1-10 mL, 1-10 mL, Intravenous, PRN, Juana  MD Nitesh  •  sodium chloride 0.9 % flush 10 mL, 10 mL, Intravenous, Q12H, Nitesh Arias MD, 10 mL at 04/07/21 2105  •  sodium chloride 0.9 % infusion 40 mL, 40 mL, Intravenous, PRN, Nitesh Arias MD  •  [START ON 4/9/2021] torsemide (DEMADEX) tablet 50 mg, 50 mg, Oral, Daily, Ekta Mayorga APRN  •  triamcinolone (KENALOG) 0.1 % cream, , Topical, Q12H, Nitesh Arias MD, Given at 04/07/21 2105      Assessment/Plan     1.  Volume Overload: Discussed w/ Cardiology and will continue IV diuresis through today.  Potassium and Magnesium being repleted.  Developing an alkalosis.    2.  Diabetes Mellitus, Type 2 in Morbidly Obese: Glucose readings continue to be at goal.  Continue to monitor.    3.  CKDIII: Creatinine continues to be stable w/ diuresis.  Will continue to monitor.  Elytes as above.    4.  GERD: No acute issues.  Continue PPI therapy.    5.  CAD s/p AURA to mid-LAD: No chest pain.  Continue home regimen.    6.  Morbid Obesity: Body mass index is 52.16 kg/m².    7.  COPD/Chronic Respiratory Failure: Continue inhaler regimen and O2.    Plan for disposition: Home when able    Honorio Crisostomo MD  04/08/21  09:52 EDT

## 2021-04-08 NOTE — PROGRESS NOTES
"    Patient Name: Esdras Ko  :1950  70 y.o.      Patient Care Team:  Nitesh Arias MD as PCP - General (Family Medicine)  Irving Restrepo MD as Consulting Physician (Hematology and Oncology)  Nitesh Arias MD as Referring Physician (Family Medicine)    Chief Complaint: LE edema    Interval History: Up in chair with legs elevated.  States his breathing is back to normal and that his legs are \"down\".  Denies angina       Objective   Vital Signs  Temp:  [97.3 °F (36.3 °C)-98 °F (36.7 °C)] 98 °F (36.7 °C)  Heart Rate:  [60-90] 88  Resp:  [18-20] 20  BP: (106-119)/(63-74) 119/64    Intake/Output Summary (Last 24 hours) at 2021 0812  Last data filed at 2021 0545  Gross per 24 hour   Intake 1080 ml   Output 3400 ml   Net -2320 ml     Flowsheet Rows      First Filed Value   Admission Height  170.2 cm (67\") Documented at 2021 1045   Admission Weight  (!) 150 kg (330 lb 12.8 oz) Documented at 2021 1045          Physical Exam:   General Appearance:    Alert, cooperative, in no acute distress   Lungs:     Clear to auscultation.  Normal respiratory effort and rate.      Heart:    Regular rhythm and normal rate, normal S1 and S2, no murmurs, gallops or rubs.     Chest Wall:    No abnormalities observed   Abdomen:     Soft, nontender, positive bowel sounds.     Extremities:   no cyanosis, clubbing.  LE erythematous, edematous, blisters noted, no weeping.  No marked joint deformities.  Adequate musculoskeletal strength.       Results Review:    Results from last 7 days   Lab Units 21  0435   SODIUM mmol/L 137   POTASSIUM mmol/L 2.7*   CHLORIDE mmol/L 95*   CO2 mmol/L 31.4*   BUN mg/dL 22   CREATININE mg/dL 1.15   GLUCOSE mg/dL 144*   CALCIUM mg/dL 8.9     Results from last 7 days   Lab Units 21  1054   TROPONIN T ng/mL <0.010     Results from last 7 days   Lab Units 21  0433   WBC 10*3/mm3 7.19   HEMOGLOBIN g/dL 14.9   HEMATOCRIT % 45.4   PLATELETS " "10*3/mm3 141         Results from last 7 days   Lab Units 04/08/21  0435   MAGNESIUM mg/dL 1.7                   Medication Review:   allopurinol, 300 mg, Oral, QAM  arformoterol, 15 mcg, Nebulization, BID - RT  aspirin, 81 mg, Oral, Daily  atorvastatin, 20 mg, Oral, Daily  bumetanide, 2 mg, Intravenous, Q8H  cetirizine, 10 mg, Oral, QAM  clopidogrel, 75 mg, Oral, QAM  enoxaparin, 40 mg, Subcutaneous, Q12H  FLUoxetine, 20 mg, Oral, Daily  hydrophor, , Topical, Q24H  isosorbide mononitrate, 30 mg, Oral, QAM  levETIRAcetam, 500 mg, Oral, Q12H  lubiprostone, 24 mcg, Oral, Daily With Breakfast  metoprolol tartrate, 50 mg, Oral, Q12H  O2, 2 L/min, Inhalation, Daily  pantoprazole, 40 mg, Oral, QAM  sodium chloride, 10 mL, Intravenous, Q12H  triamcinolone, , Topical, Q12H              Assessment/Plan     1.  Bilateral lower extremity edema-echocardiogram demonstrates normal biventricular size and function, normal diastolic function, normal left atrial dimension, left atrial pressure, and normal pulmonary arterial pressure.  NT proBNP is 105, which is unchanged from prior measurements and while NT proBNP can be somewhat depressed in obesity, normal is less than 900.  In aggregate, these data argue significantly against acute exacerbation of congestive heart failure. Good response from zaroxolyn yesterday with 3.4 L UOP in last 24 hours.  His LE are still quite edematous, he states they are \"down\".  Will continue IV diuresis today and plan to start on 50 mg torsemide in the am and reassess.  2.  Morbid obesity complicating all aspects of care  3.  COPD - continuous 02, denies SOA  4.  Coronary disease, status post stent placement to the LAD -no evidence of acute coronary syndrome.  Denies angina  5.  Chronic renal failure stage III, creatinine is at baseline, noted 1.15 today  6.  Diabetes mellitus with circulatory complication-risk factor modification  7.  Hypokalemia - being replaced     Possible DC tomorrow, per primary.  " When discharged will change home diuretic to torsemide 50 mg daily and titrate to 100 mg daily if needed as outpatient.    Will follow, assess in AM    EDDIE Dominique  [facility]  04/08/21  08:12 EDT

## 2021-04-08 NOTE — PLAN OF CARE
Goal Outcome Evaluation:  Plan of Care Reviewed With: patient     Outcome Summary: patient resting at this time , pain controlled by oral pain med, vital signs stable, patent ambulate to the bathroom by self , patient edecated to call for assist,  cont on bumex i/v , both lower extermities swellon cream applied cont to monitor.

## 2021-04-09 VITALS
DIASTOLIC BLOOD PRESSURE: 83 MMHG | BODY MASS INDEX: 49.44 KG/M2 | HEIGHT: 67 IN | HEART RATE: 74 BPM | SYSTOLIC BLOOD PRESSURE: 134 MMHG | TEMPERATURE: 97.2 F | WEIGHT: 315 LBS | RESPIRATION RATE: 20 BRPM | OXYGEN SATURATION: 97 %

## 2021-04-09 LAB
ALBUMIN SERPL-MCNC: 3.7 G/DL (ref 3.5–5.2)
ANION GAP SERPL CALCULATED.3IONS-SCNC: 10 MMOL/L (ref 5–15)
BUN SERPL-MCNC: 26 MG/DL (ref 8–23)
BUN/CREAT SERPL: 21.5 (ref 7–25)
CALCIUM SPEC-SCNC: 9.2 MG/DL (ref 8.6–10.5)
CHLORIDE SERPL-SCNC: 99 MMOL/L (ref 98–107)
CO2 SERPL-SCNC: 33 MMOL/L (ref 22–29)
CREAT SERPL-MCNC: 1.21 MG/DL (ref 0.76–1.27)
GFR SERPL CREATININE-BSD FRML MDRD: 59 ML/MIN/1.73
GLUCOSE BLDC GLUCOMTR-MCNC: 130 MG/DL (ref 70–130)
GLUCOSE BLDC GLUCOMTR-MCNC: 158 MG/DL (ref 70–130)
GLUCOSE SERPL-MCNC: 146 MG/DL (ref 65–99)
MAGNESIUM SERPL-MCNC: 2.1 MG/DL (ref 1.6–2.4)
PHOSPHATE SERPL-MCNC: 2.7 MG/DL (ref 2.5–4.5)
PLATELET # BLD AUTO: 153 10*3/MM3 (ref 140–450)
POTASSIUM SERPL-SCNC: 3.2 MMOL/L (ref 3.5–5.2)
SODIUM SERPL-SCNC: 142 MMOL/L (ref 136–145)

## 2021-04-09 PROCEDURE — 83735 ASSAY OF MAGNESIUM: CPT | Performed by: NURSE PRACTITIONER

## 2021-04-09 PROCEDURE — 80069 RENAL FUNCTION PANEL: CPT | Performed by: HOSPITALIST

## 2021-04-09 PROCEDURE — 94799 UNLISTED PULMONARY SVC/PX: CPT

## 2021-04-09 PROCEDURE — 99238 HOSP IP/OBS DSCHRG MGMT 30/<: CPT | Performed by: HOSPITALIST

## 2021-04-09 PROCEDURE — 99232 SBSQ HOSP IP/OBS MODERATE 35: CPT | Performed by: INTERNAL MEDICINE

## 2021-04-09 PROCEDURE — 85049 AUTOMATED PLATELET COUNT: CPT | Performed by: FAMILY MEDICINE

## 2021-04-09 PROCEDURE — 25010000002 ENOXAPARIN PER 10 MG: Performed by: FAMILY MEDICINE

## 2021-04-09 PROCEDURE — 82962 GLUCOSE BLOOD TEST: CPT

## 2021-04-09 RX ORDER — POTASSIUM CHLORIDE 20 MEQ/1
40 TABLET, EXTENDED RELEASE ORAL DAILY
Qty: 60 TABLET | Refills: 0 | Status: SHIPPED | OUTPATIENT
Start: 2021-04-10 | End: 2021-05-10

## 2021-04-09 RX ORDER — TORSEMIDE 100 MG/1
50 TABLET ORAL DAILY
Qty: 15 TABLET | Refills: 0 | Status: SHIPPED | OUTPATIENT
Start: 2021-04-10 | End: 2021-05-10

## 2021-04-09 RX ORDER — POTASSIUM CHLORIDE 20 MEQ/1
40 TABLET, EXTENDED RELEASE ORAL DAILY
Status: DISCONTINUED | OUTPATIENT
Start: 2021-04-09 | End: 2021-04-09 | Stop reason: HOSPADM

## 2021-04-09 RX ORDER — PETROLATUM 42 G/100G
OINTMENT TOPICAL
Start: 2021-04-10

## 2021-04-09 RX ORDER — ISOSORBIDE MONONITRATE 30 MG/1
30 TABLET, EXTENDED RELEASE ORAL EVERY MORNING
Qty: 30 TABLET | Refills: 0 | Status: SHIPPED | OUTPATIENT
Start: 2021-04-10 | End: 2021-05-10

## 2021-04-09 RX ORDER — ARFORMOTEROL TARTRATE 15 UG/2ML
15 SOLUTION RESPIRATORY (INHALATION)
Qty: 120 ML | Refills: 0 | Status: SHIPPED | OUTPATIENT
Start: 2021-04-09 | End: 2021-05-24

## 2021-04-09 RX ORDER — IPRATROPIUM BROMIDE AND ALBUTEROL SULFATE 2.5; .5 MG/3ML; MG/3ML
3 SOLUTION RESPIRATORY (INHALATION) EVERY 4 HOURS PRN
Qty: 360 ML | Refills: 0 | Status: SHIPPED | OUTPATIENT
Start: 2021-04-09

## 2021-04-09 RX ORDER — ACETAMINOPHEN 325 MG/1
650 TABLET ORAL EVERY 4 HOURS PRN
Start: 2021-04-09

## 2021-04-09 RX ORDER — TRIAMCINOLONE ACETONIDE 1 MG/G
CREAM TOPICAL EVERY 12 HOURS SCHEDULED
Qty: 1 G | Refills: 0 | Status: SHIPPED | OUTPATIENT
Start: 2021-04-09 | End: 2021-05-07

## 2021-04-09 RX ORDER — LUBIPROSTONE 24 UG/1
24 CAPSULE ORAL
Start: 2021-04-10

## 2021-04-09 RX ADMIN — ISOSORBIDE MONONITRATE 30 MG: 30 TABLET, EXTENDED RELEASE ORAL at 06:46

## 2021-04-09 RX ADMIN — ALLOPURINOL 300 MG: 300 TABLET ORAL at 06:46

## 2021-04-09 RX ADMIN — TRIAMCINOLONE ACETONIDE: 1 CREAM TOPICAL at 08:38

## 2021-04-09 RX ADMIN — TORSEMIDE 50 MG: 20 TABLET ORAL at 08:37

## 2021-04-09 RX ADMIN — CETIRIZINE HYDROCHLORIDE 10 MG: 10 TABLET, FILM COATED ORAL at 06:46

## 2021-04-09 RX ADMIN — SODIUM CHLORIDE, PRESERVATIVE FREE 10 ML: 5 INJECTION INTRAVENOUS at 08:39

## 2021-04-09 RX ADMIN — PETROLATUM: 42 OINTMENT TOPICAL at 08:37

## 2021-04-09 RX ADMIN — LEVETIRACETAM 500 MG: 500 TABLET ORAL at 08:36

## 2021-04-09 RX ADMIN — ASPIRIN 81 MG: 81 TABLET, COATED ORAL at 08:37

## 2021-04-09 RX ADMIN — LUBIPROSTONE 24 MCG: 24 CAPSULE, GELATIN COATED ORAL at 08:36

## 2021-04-09 RX ADMIN — ARFORMOTEROL TARTRATE 15 MCG: 15 SOLUTION RESPIRATORY (INHALATION) at 08:07

## 2021-04-09 RX ADMIN — ATORVASTATIN CALCIUM 20 MG: 20 TABLET, FILM COATED ORAL at 08:37

## 2021-04-09 RX ADMIN — HYDROCODONE BITARTRATE AND ACETAMINOPHEN 1 TABLET: 5; 325 TABLET ORAL at 02:49

## 2021-04-09 RX ADMIN — POTASSIUM CHLORIDE 40 MEQ: 1500 TABLET, EXTENDED RELEASE ORAL at 08:36

## 2021-04-09 RX ADMIN — METOPROLOL TARTRATE 50 MG: 50 TABLET, FILM COATED ORAL at 08:37

## 2021-04-09 RX ADMIN — CLOPIDOGREL BISULFATE 75 MG: 75 TABLET ORAL at 06:46

## 2021-04-09 RX ADMIN — FLUOXETINE 20 MG: 20 CAPSULE ORAL at 08:37

## 2021-04-09 RX ADMIN — PANTOPRAZOLE SODIUM 40 MG: 40 TABLET, DELAYED RELEASE ORAL at 06:47

## 2021-04-09 NOTE — NURSING NOTE
Continued Stay Note  [facility]     Patient Name: Esdras Ko  MRN: [id number]  Today's Date: 4/9/2021    Admit Date: 4/2/2021    Discharge Plan     Row Name 04/09/21 0937       Plan    Plan  Home with [facility]     Plan Comments  Spoke with Aliza at the [facility] in [place] regarding patient getting COVID vaccine today prior to discharge and she states that she has already spoken with patient's wife and she did not want patient to get his shot today and they have scheduled for him to come in next week. CM will continue to follow for needs.    Row Name 04/09/21 0921       Plan    Plan  Home with [facility]    Patient/Family in Agreement with Plan  yes spoke with patient's wife Charley via phone and she is also agreeable to this discharge plan    Plan Comments  Followed up with patient today to review discharge plan. Patient is currently sitting up in the chair and has finished with breakfast. Patient has his wife on the phone and is asking CM to speak to her regarding discharge plans. Charley states that patient will be returning home at discharge and will be followed by [facility] . She also states that the [facility] clinic has already contacted her and will schedule an appointment for next week. Patient and wife had no other questions or concerns regarding discharge plans at this time. CM called Rosy with [facility]  and made her aware of patient's discharge today.  Name and number placed on white board in room. CM will continue to follow for needs.        Discharge Codes    No documentation.             Mary Corral RN

## 2021-04-09 NOTE — NURSING NOTE
Continued Stay Note  [facility]     Patient Name: Esdras Ko  MRN: [id number]  Today's Date: 4/9/2021    Admit Date: 4/2/2021    Discharge Plan     Row Name 04/09/21 0921       Plan    Plan  Home with [facility]     Patient/Family in Agreement with Plan  yes spoke with patient's wife Charley via phone and she is also agreeable to this discharge plan    Plan Comments  Followed up with patient today to review discharge plan. Patient is currently sitting up in the chair and has finished with breakfast. Patient has his wife on the phone and is asking CM to speak to her regarding discharge plans. Charley states that patient will be returning home at discharge and will be followed by [facility] . She also states that the [facility] clinic has already contacted her and will schedule an appointment for next week. Patient and wife had no other questions or concerns regarding discharge plans at this time. Name and number placed on white board in room. CM will continue to follow for needs.        Discharge Codes    No documentation.             Mary Corral RN

## 2021-04-09 NOTE — PLAN OF CARE
Goal Outcome Evaluation:  Plan of Care Reviewed With: patient     Outcome Summary: vital signs stable ,patient still complain of  back pain , pain med given  med effective, cont on i/v bumex, oxygen on 2 litre, ambulate to bedroom by self , educate to call for assist.

## 2021-04-09 NOTE — DISCHARGE INSTR - APPOINTMENTS
Follow up with Dr. Arias on April 16th at 2:30    Follow up with [facility] cardiology in [facility] in 6 weeks. [phone]

## 2021-04-09 NOTE — PLAN OF CARE
Problem: Adult Inpatient Plan of Care  Goal: Plan of Care Review  Flowsheets (Taken 4/8/2021 1959)  Plan of Care Reviewed With: patient   Goal Outcome Evaluation:  Plan of Care Reviewed With: patient

## 2021-04-09 NOTE — PROGRESS NOTES
LOS: 7 days   Patient Care Team:  Nitesh Arias MD as PCP - General (Family Medicine)  Irving Restrepo MD as Consulting Physician (Hematology and Oncology)  Nitesh Arias MD as Referring Physician (Family Medicine)    Chief Complaint:       F/u edema    Interval History:       Still diuresing.  He denies chest pain, tachycardia, dizziness.  Is very sedentary.  He denies difficulty breathing, orthopnea or PND.    Objective   Vital Signs  Temp:  [97 °F (36.1 °C)-98 °F (36.7 °C)] 97.2 °F (36.2 °C)  Heart Rate:  [61-88] 77  Resp:  [18-20] 20  BP: (115-134)/(64-83) 134/83    Intake/Output Summary (Last 24 hours) at 4/9/2021 0721  Last data filed at 4/9/2021 0604  Gross per 24 hour   Intake 840 ml   Output 3500 ml   Net -2660 ml       Comfortable NAd  Neck supple, no JVD or thyromegaly appreciated  S1/S2 RRR, no m/r/g  Lungs CTA B, normal effort  Abdomen S/NT/ND (+) BS, no HSM appreciated  Extremities warm, no clubbing, cyanosis, 2+ lower extremity edema with erythema and blistering  A/Ox4, mood and affect appropriate    Results Review:      Results from last 7 days   Lab Units 04/09/21  0327 04/08/21  0435 04/07/21  0522   SODIUM mmol/L 142 137 140   POTASSIUM mmol/L 3.2* 2.7* 3.3*   CHLORIDE mmol/L 99 95* 102   CO2 mmol/L 33.0* 31.4* 26.4   BUN mg/dL 26* 22 22   CREATININE mg/dL 1.21 1.15 1.07   GLUCOSE mg/dL 146* 144* 120*   CALCIUM mg/dL 9.2 8.9 8.8     Results from last 7 days   Lab Units 04/02/21  1054   TROPONIN T ng/mL <0.010     Results from last 7 days   Lab Units 04/09/21  0327 04/05/21  0433 04/02/21  1054   WBC 10*3/mm3  --  7.19 7.93   HEMOGLOBIN g/dL  --  14.9 15.8   HEMATOCRIT %  --  45.4 49.5   PLATELETS 10*3/mm3 153 141 148             Results from last 7 days   Lab Units 04/08/21  0435   MAGNESIUM mg/dL 1.7           I reviewed the patient's new clinical results.  I personally viewed and interpreted the patient's EKG/Telemetry data        Medication Review:   allopurinol,  300 mg, Oral, QAM  arformoterol, 15 mcg, Nebulization, BID - RT  aspirin, 81 mg, Oral, Daily  atorvastatin, 20 mg, Oral, Daily  cetirizine, 10 mg, Oral, QAM  clopidogrel, 75 mg, Oral, QAM  enoxaparin, 40 mg, Subcutaneous, Q12H  FLUoxetine, 20 mg, Oral, Daily  hydrophor, , Topical, Q24H  isosorbide mononitrate, 30 mg, Oral, QAM  levETIRAcetam, 500 mg, Oral, Q12H  lubiprostone, 24 mcg, Oral, Daily With Breakfast  metoprolol tartrate, 50 mg, Oral, Q12H  O2, 2 L/min, Inhalation, Daily  pantoprazole, 40 mg, Oral, QAM  sodium chloride, 10 mL, Intravenous, Q12H  torsemide, 50 mg, Oral, Daily  triamcinolone, , Topical, Q12H             Assessment/Plan       Swelling of both lower extremities    Mantle cell lymphoma (CMS/HCC)    Chronic kidney disease    Coronary artery disease involving native coronary artery of native heart without angina pectoris    Type 2 diabetes mellitus with circulatory disorder, without long-term current use of insulin (CMS/[facility])    Morbid obesity with BMI of 50.0-59.9, adult (CMS/HCC)    Chronic right-sided congestive heart failure (CMS/HCC)    1.  Bilateral lower extremity edema-echocardiogram demonstrates normal biventricular size and function, normal diastolic function, normal left atrial dimension, left atrial pressure, and normal pulmonary arterial pressure.  NT proBNP is 105, which is unchanged from prior measurements  2.  Morbid obesity complicating all aspects of care  3.  COPD - continuous 02, denies SOA  4.  Coronary disease, status post stent placement to the LAD -no evidence of acute coronary syndrome.  Denies angina  5.  Chronic renal failure stage III, creatinine is at baseline, noted 1.15 today  6.  Diabetes mellitus with circulatory complication-risk factor modification  7.  Hypokalemia - being replaced    Continue torsemide.  Might benefit from wrapping legs but definitely benefits from elevation.  No further cardiac work-up is planned at this time.  I think he could go home soon but  would deftly recommend home health followed by his PCP.  Have him follow-up with Lanny Emerson in the office in 6 weeks, will sign off    Becky Melo MD  04/09/21  07:21 EDT

## 2021-04-09 NOTE — NURSING NOTE
Case Management Discharge Note      Final Note: Home with [facility].    Provided Post Acute Provider List?: N/A  N/A Provider List Comment: No needs at this time    Selected Continued Care - Discharged on 4/9/2021 Admission date: 4/2/2021 - Discharge disposition: Home or Self Care    Destination    No services have been selected for the patient.              Durable Medical Equipment    No services have been selected for the patient.              Dialysis/Infusion    No services have been selected for the patient.              Home Medical Care     Service Provider Selected Services Address Phone Fax Patient Preferred    [facility] [place]  [facility] [address] [address] [phone] --          Therapy    No services have been selected for the patient.              Community Resources    No services have been selected for the patient.                       Final Discharge Disposition Code: 06 - home with home health care

## 2021-04-10 ENCOUNTER — READMISSION MANAGEMENT (OUTPATIENT)
Dept: CALL CENTER | Facility: HOSPITAL | Age: 71
End: 2021-04-10

## 2021-04-10 NOTE — OUTREACH NOTE
Prep Survey      Responses   [facility] facility patient discharged from?  [place]   Is LACE score < 7 ?  No   Emergency Room discharge w/ pulse ox?  No   Eligibility  Readm Mgmt   Discharge diagnosis  edema bilateral lower extremities,  CKD   Does the patient have one of the following disease processes/diagnoses(primary or secondary)?  Other   Does the patient have Home health ordered?  Yes   What is the Home health agency?   [facility]   Comments regarding appointments  see AVS   Medication alerts for this patient  see AVS   Prep survey completed?  Yes          Nakia Rojas RN

## 2021-04-13 ENCOUNTER — READMISSION MANAGEMENT (OUTPATIENT)
Dept: CALL CENTER | Facility: HOSPITAL | Age: 71
End: 2021-04-13

## 2021-04-13 NOTE — OUTREACH NOTE
Medical Week 1 Survey      Responses   [facility] patient discharged from?  [place]   Does the patient have one of the following disease processes/diagnoses(primary or secondary)?  Other   Week 1 attempt successful?  Yes   Call start time  1024   Revoke  Decline to participate   Call end time  1024          Karol Villarreal, RN

## 2021-05-24 ENCOUNTER — OFFICE VISIT (OUTPATIENT)
Dept: CARDIOLOGY | Facility: CLINIC | Age: 71
End: 2021-05-24

## 2021-05-24 VITALS
BODY MASS INDEX: 49.12 KG/M2 | HEART RATE: 93 BPM | OXYGEN SATURATION: 93 % | SYSTOLIC BLOOD PRESSURE: 110 MMHG | HEIGHT: 67 IN | WEIGHT: 313 LBS | DIASTOLIC BLOOD PRESSURE: 80 MMHG | RESPIRATION RATE: 16 BRPM

## 2021-05-24 DIAGNOSIS — C83.10 MANTLE CELL LYMPHOMA, UNSPECIFIED BODY REGION (HCC): ICD-10-CM

## 2021-05-24 DIAGNOSIS — J44.9 CHRONIC OBSTRUCTIVE PULMONARY DISEASE, UNSPECIFIED COPD TYPE (HCC): ICD-10-CM

## 2021-05-24 DIAGNOSIS — M79.89 SWELLING OF BOTH LOWER EXTREMITIES: ICD-10-CM

## 2021-05-24 DIAGNOSIS — N18.9 CHRONIC KIDNEY DISEASE, UNSPECIFIED CKD STAGE: ICD-10-CM

## 2021-05-24 DIAGNOSIS — E66.01 MORBID OBESITY WITH BMI OF 50.0-59.9, ADULT (HCC): ICD-10-CM

## 2021-05-24 DIAGNOSIS — I50.812 CHRONIC RIGHT-SIDED CONGESTIVE HEART FAILURE (HCC): Primary | ICD-10-CM

## 2021-05-24 DIAGNOSIS — E11.59 TYPE 2 DIABETES MELLITUS WITH OTHER CIRCULATORY COMPLICATION, WITHOUT LONG-TERM CURRENT USE OF INSULIN (HCC): ICD-10-CM

## 2021-05-24 PROCEDURE — 93000 ELECTROCARDIOGRAM COMPLETE: CPT | Performed by: NURSE PRACTITIONER

## 2021-05-24 PROCEDURE — 99214 OFFICE O/P EST MOD 30 MIN: CPT | Performed by: NURSE PRACTITIONER

## 2021-05-24 RX ORDER — TORSEMIDE 100 MG/1
100 TABLET ORAL DAILY
COMMUNITY

## 2021-05-24 RX ORDER — POTASSIUM CHLORIDE 1500 MG/1
40 TABLET, FILM COATED, EXTENDED RELEASE ORAL DAILY
COMMUNITY
Start: 2021-04-12

## 2021-05-24 NOTE — PROGRESS NOTES
Date of Office Visit: 2021  Encounter Provider: EDDIE Ramirez  Place of Service: [facility] CARDIOLOGY  Patient Name: Esdras Ko  :1950      Patient ID:  Esdras Ko is a 70 y.o. male is here for followup for         History of Present Illness    He has a past medical history of diabetes, COPD, stage III chronic kidney disease, hypertension, hyperlipidemia, anemia, gout, obstructive sleep apnea, history of TIA, history of seizures, history of myocardial infarction.        He is  and has 2 children.  He is retired.  He quit smoking .  He has one cup of coffee per day and uses no caffeine or alcohol.     There is heart disease in his mother, brother, father and strokes in his mother.  His brother has hypertension.     He had a TIA happened in 2017.  He does not use CPAP for his sleep apnea but does use oxygen.  He does have history of seizure disorder and is no longer seeing a neurologist.  He has had Mantle cell lymphoma in the past requiring chemotherapy.  He is also had bladder cancer.  He is morbidly obese.     [facility].       He had an echo done 18 showing LVEF 57% grade 1 diastolic dysfunction and mild concentric left hypertrophy.  He also had a PET stress study showing large apical and septal ischemia.  He went on for a cardiac catheterization done 18 showing normal left main, occluded proximal LAD filling late with diagonals, small caliber circumflex with diffuse 50-60% stenosis, large caliber RCA with 40-50% mid vessel stenosis and 40-50% distal stenosis.  It does give collaterals to the LAD.     He had a PET stress done 18 showing a large-sized, moderate-to-severe area of ischemia located in the apex and septal wall.  His echo showed mild LVH with LVEF 57%.      On 10/30/18, he received a 3.25 x 23 mm drug-eluting stent to the proximal LAD.     He  saw Dr. Melo 8/30/2019 he was having some injured exertional chest pain and short windedness, he did not have the symptoms at rest.  He was having some mild dizziness that he thought was allergy issues. It usually happens when he was sitting with his head upright. He was sedentary because of severe back pain.  An echocardiogram and stress test 9/26/2019 his EF was 63%, he had normal left ventricular systolic function, and grade 1 left ventricular diastolic dysfunction. He had a normal myocardial perfusion study with no evidence of ischemia.    He was seen in the hospital 4/2/2021 for severe leg swelling. His legs were blistered, weeping, and puritic.  There was concern for medication compliance at home.  He was sent home with home health to evaluate medication compliance, leg wraps, and help with mobility.     He has severe chronic dyspnea at baseline.  His shortness of breath is unchanged.  He denies any chest pain or pressure.  He has had no PND orthopnea.  He has not felt his heart racing or skipping.  He is a very poor historian, does not remember having home health, having his legs wrapped, or having them come and help him walk.  His friend that accompanies him today, Macey, reports that he did have home health for some amount of time that came and wrapped his legs and helped him walk.  I do not know how compliant he is with his meds at home.  He reports he has lost 17 pounds.  His legs are very dry, he does not put anything on them at home.  His labs were done last at Dr. Thompson office at the end of April.  I have requested these for review.  I was going to get blood work done today, but since I had to send him downstairs to get registered and go to lab he preferred to wait for Dr. Cecilio Serrato office visit at the end of the week.  I would like to make sure that his BNP is still at baseline, that his BUN and creatinine are appropriate, and that his potassium has recovered.  At his last check in the  [facility] he was 3.2.  He states that he is on a daily potassium that he takes his medications as directed.      Past Medical History:   Diagnosis Date   • Anemia    • Bladder cancer (CMS/HCC) 2008   • Cerebrovascular accident (CMS/HCC)     Ischemic and TIAs; most recent 02/2014 which was a TIA.   • Chronic kidney disease, stage 3 (CMS/HCC)     Dr. KATHERYN Lin   • Colon polyp    • COPD (chronic obstructive pulmonary disease) (CMS/HCC)    • History of blood transfusion    • Hyperlipidemia    • Hypertension    • Hyperuricemia    • Left shoulder pain    • Lymphoma (CMS/HCC)     MANTLE CELL, HAD CHEMO  2010   • Osteoarthritis    • Pilonidal cyst     SCHEDULED FOR SX   • Psoriasis    • Seizure (CMS/HCC)     Following head trauma in 1970   • Skin lesions     SCARRING FROM SHINGLES, & BLISTERS FROM EDEMA   • Sleep apnea     Intolerant of CPAP   • Splenomegaly 2/21/2013   • Stroke (CMS/HCC)    • Tobacco abuse    • Type 2 diabetes mellitus (CMS/HCC)          Past Surgical History:   Procedure Laterality Date   • BRONCHOSCOPY N/A 8/21/2017    Procedure: BRONCHOSCOPY with fluro and biopsy and lavage.;  Surgeon: Red Topete MD;  Location: [facility] OR;  Service:    • CARDIAC CATHETERIZATION N/A 9/17/2018    Procedure: Coronary angiography;  Surgeon: Kylie Victoria MD;  Location:  [facility] CATH INVASIVE LOCATION;  Service: Cardiovascular   • CARDIAC CATHETERIZATION N/A 10/30/2018    Procedure: Chronic Total Occlussion;  Surgeon: Roe Wheatley MD;  Location[facility];  Service: Cardiovascular   • CARDIAC CATHETERIZATION N/A 10/30/2018    Procedure: Stent AURA coronary;  Surgeon: Roe Wheatley MD;  Location:  [facility] CATH INVASIVE LOCATION;  Service: Cardiovascular   • COLONOSCOPY N/A 10/9/2019    Procedure: COLONOSCOPY with polypectomy;  Surgeon: Lee Priest MD;  Location: [facility] OR;  Service: Gastroenterology   • PILONIDAL CYSTECTOMY N/A 11/30/2016    Procedure: PILONIDAL CYSTECTOMY;  Surgeon: Roe KEENAN  MD Giovanni;  Location: [facility];  Service:    • SINUS SURGERY     • SKIN LESION EXCISION      DR. REYNAGA ABOUT 10 YEARS AGO       Current Outpatient Medications on File Prior to Visit   Medication Sig Dispense Refill   • acetaminophen (TYLENOL) 325 MG tablet Take 2 tablets by mouth Every 4 (Four) Hours As Needed for Mild Pain  or Fever.     • allopurinol (ZYLOPRIM) 300 MG tablet Take 300 mg by mouth Every Morning. take 1 tablet by mouth daily  0   • AMITIZA 24 MCG capsule Take 24 mcg by mouth 2 (Two) Times a Day As Needed.  0   • atorvastatin (LIPITOR) 20 MG tablet Take 20 mg by mouth Daily.     • cetirizine (ZyrTEC) 10 MG tablet Take 10 mg by mouth Every Morning.     • clobetasol (TEMOVATE) 0.05 % cream apply to affected area (RASH FREELY) twice a day  0   • clopidogrel (PLAVIX) 75 MG tablet Take 75 mg by mouth Every Morning.  0   • FLUoxetine (PROZAC) 20 MG capsule Take 20 mg by mouth Daily.     • hydrophor (AQUAPHOR) ointment ointment Apply  topically to the appropriate area as directed Daily.     • ipratropium-albuterol (DUO-NEB) 0.5-2.5 mg/3 ml nebulizer Take 3 mL by nebulization Every 4 (Four) Hours As Needed for Shortness of Air. 360 mL 0   • levETIRAcetam (KEPPRA) 1000 MG tablet Take 500 mg by mouth 2 (Two) Times a Day.  0   • lubiprostone (Amitiza) 24 MCG capsule Take 1 capsule by mouth Daily With Breakfast.     • metoprolol tartrate (LOPRESSOR) 50 MG tablet Take 50 mg by mouth 2 (two) times a day.  0   • O2 (OXYGEN) Inhale 2 L/min Daily.     • pantoprazole (PROTONIX) 40 MG EC tablet Take 40 mg by mouth Every Morning. take 1 tablet by mouth once daily  0   • potassium chloride ER (K-TAB) 20 MEQ tablet controlled-release ER tablet Take 40 mEq by mouth Daily.     • torsemide (DEMADEX) 100 MG tablet Take 100 mg by mouth Daily.     • isosorbide mononitrate (IMDUR) 30 MG 24 hr tablet Take 1 tablet by mouth Every Morning for 30 days. 30 tablet 0   • torsemide (DEMADEX) 100 MG tablet Take 0.5 tablets by mouth Daily  for 30 days. 15 tablet 0   • [DISCONTINUED] arformoterol (BROVANA) 15 MCG/2ML nebulizer solution Take 2 mL by nebulization 2 (Two) Times a Day. 120 mL 0     No current facility-administered medications on file prior to visit.       Social History     Socioeconomic History   • Marital status:      Spouse name: Charley   • Number of children: Not on file   • Years of education: 12   • Highest education level: Not on file   Tobacco Use   • Smoking status: Former Smoker     Packs/day: 3.00     Years: 50.00     Pack years: 150.00     Types: Cigarettes     Quit date:      Years since quittin.4   • Smokeless tobacco: Never Used   • Tobacco comment: 80 pack year   Substance and Sexual Activity   • Alcohol use: No     Comment: Heavy in past, none since around    • Drug use: No   • Sexual activity: Defer           Review of Systems   Constitutional: Negative for chills, decreased appetite, diaphoresis, fever and malaise/fatigue.   HENT: Negative for nosebleeds.    Eyes: Negative for blurred vision.   Cardiovascular: Positive for leg swelling. Negative for chest pain, claudication, cyanosis, dyspnea on exertion, irregular heartbeat, near-syncope, orthopnea, palpitations, paroxysmal nocturnal dyspnea and syncope.   Respiratory: Positive for shortness of breath. Negative for cough, hemoptysis, sleep disturbances due to breathing, snoring and wheezing.    Hematologic/Lymphatic: Negative for bleeding problem. Does not bruise/bleed easily.   Musculoskeletal: Negative for falls.   Gastrointestinal: Negative for heartburn.   Neurological: Negative for excessive daytime sleepiness, dizziness, headaches, light-headedness, numbness and weakness.       Procedures    ECG 12 Lead    Date/Time: 2021 1:34 PM  Performed by: Lanny Emerson APRN  Authorized by: Lanny Emerson APRN   Comparison: compared with previous ECG from 2021  Rhythm: sinus rhythm  Rate: normal  T inversion: III, aVR and V1  QRS axis:  "indeterminate  Other findings: non-specific ST-T wave changes and poor R wave progression    Clinical impression: abnormal EKG                Objective:      Vitals:    05/24/21 1018   BP: 110/80   Pulse: 93   Resp: 16   SpO2: 93%   Weight: (!) 142 kg (313 lb)   Height: 170.2 cm (67\")     Body mass index is 49.02 kg/m².    Constitutional:       Appearance: Well-developed. Morbidly obese. Chronically ill-appearing.   Eyes:      Conjunctiva/sclera: Conjunctivae normal.   Neck:      Vascular: No carotid bruit.   Pulmonary:      Effort: Pulmonary effort is normal.      Breath sounds: Normal breath sounds.   Cardiovascular:      PMI at left midclavicular line. Normal rate. Regular rhythm.      Murmurs: There is no murmur.   Pulses:     Intact distal pulses.   Edema:     Peripheral edema present.     Pretibial: bilateral 1+ non-pitting edema of the pretibial area.     Ankle: bilateral 2+ pitting edema of the ankle.     Feet: bilateral pitting edema of the feet.  Abdominal:      General: There is no abdominal bruit.   Neurological:      Mental Status: Alert.         Lab Review:       Assessment:      Diagnosis Plan   1. Chronic right-sided congestive heart failure (CMS/HCC)     2. Morbid obesity with BMI of 50.0-59.9, adult (CMS/HCC)     3. Type 2 diabetes mellitus with other circulatory complication, without long-term current use of insulin (CMS/HCC)     4. Chronic kidney disease, unspecified CKD stage     5. Mantle cell lymphoma, unspecified body region (CMS/HCC)     6. Swelling of both lower extremities     7. Chronic obstructive pulmonary disease, unspecified COPD type (CMS/HCC)       1. Proximal LAD occlusion with severe dyspnea on exertion, s/p LAD stent 10/2018 for  with Dr. Wheatley.   2. COPD requiring oxygen which he only uses at night.   3. CHIQUIS, uses nocturnal oxygen.    4. Hypertension, well controlled.   5. Hyperlipidemia. On atorvastatin.   6. H/o mantle cell lymphoma and bladder cancer.   7. DM  8. Morbid " obesity.   9. H/o TIA - now memory issues.   10. Seizure disorder.   11. CKD.   12. Severe back pain, limiting mobility.      Plan:       Continue current medication regim  He will follow-up with Dr. Arias this week and have blood work checked there.  We will have him follow-up with Dr. Melo in 3 to 4 months or sooner if he has any new issues.

## 2021-06-07 ENCOUNTER — TELEPHONE (OUTPATIENT)
Dept: CARDIOLOGY | Facility: CLINIC | Age: 71
End: 2021-06-07

## 2022-07-31 ENCOUNTER — APPOINTMENT (OUTPATIENT)
Dept: CT IMAGING | Facility: HOSPITAL | Age: 72
End: 2022-07-31

## 2022-07-31 ENCOUNTER — APPOINTMENT (OUTPATIENT)
Dept: GENERAL RADIOLOGY | Facility: HOSPITAL | Age: 72
End: 2022-07-31

## 2022-07-31 ENCOUNTER — HOSPITAL ENCOUNTER (EMERGENCY)
Facility: HOSPITAL | Age: 72
Discharge: HOME | End: 2022-07-31
Attending: EMERGENCY MEDICINE | Admitting: EMERGENCY MEDICINE

## 2022-07-31 VITALS
OXYGEN SATURATION: 94 % | BODY MASS INDEX: 49.44 KG/M2 | WEIGHT: 315 LBS | TEMPERATURE: 98 F | HEIGHT: 67 IN | SYSTOLIC BLOOD PRESSURE: 113 MMHG | HEART RATE: 90 BPM | DIASTOLIC BLOOD PRESSURE: 83 MMHG | RESPIRATION RATE: 22 BRPM

## 2022-07-31 DIAGNOSIS — S43.402A SPRAIN OF LEFT SHOULDER, UNSPECIFIED SHOULDER SPRAIN TYPE, INITIAL ENCOUNTER: ICD-10-CM

## 2022-07-31 DIAGNOSIS — S22.42XA CLOSED FRACTURE OF MULTIPLE RIBS OF LEFT SIDE, INITIAL ENCOUNTER: Primary | ICD-10-CM

## 2022-07-31 LAB
ALBUMIN SERPL-MCNC: 4.1 G/DL (ref 3.5–5.2)
ALBUMIN/GLOB SERPL: 2.4 G/DL
ALP SERPL-CCNC: 142 U/L (ref 39–117)
ALT SERPL W P-5'-P-CCNC: 12 U/L (ref 1–41)
ANION GAP SERPL CALCULATED.3IONS-SCNC: 7.3 MMOL/L (ref 5–15)
AST SERPL-CCNC: 18 U/L (ref 1–40)
BASOPHILS # BLD AUTO: 0.03 10*3/MM3 (ref 0–0.2)
BASOPHILS NFR BLD AUTO: 0.3 % (ref 0–1.5)
BILIRUB SERPL-MCNC: 0.7 MG/DL (ref 0–1.2)
BUN SERPL-MCNC: 24 MG/DL (ref 8–23)
BUN/CREAT SERPL: 21.2 (ref 7–25)
CALCIUM SPEC-SCNC: 9.2 MG/DL (ref 8.6–10.5)
CHLORIDE SERPL-SCNC: 105 MMOL/L (ref 98–107)
CO2 SERPL-SCNC: 31.7 MMOL/L (ref 22–29)
CREAT SERPL-MCNC: 1.13 MG/DL (ref 0.76–1.27)
DEPRECATED RDW RBC AUTO: 46 FL (ref 37–54)
EGFRCR SERPLBLD CKD-EPI 2021: 69.1 ML/MIN/1.73
EOSINOPHIL # BLD AUTO: 0.19 10*3/MM3 (ref 0–0.4)
EOSINOPHIL NFR BLD AUTO: 1.8 % (ref 0.3–6.2)
ERYTHROCYTE [DISTWIDTH] IN BLOOD BY AUTOMATED COUNT: 15.1 % (ref 12.3–15.4)
GLOBULIN UR ELPH-MCNC: 1.7 GM/DL
GLUCOSE SERPL-MCNC: 142 MG/DL (ref 65–99)
HCT VFR BLD AUTO: 47 % (ref 37.5–51)
HGB BLD-MCNC: 15.2 G/DL (ref 13–17.7)
IMM GRANULOCYTES # BLD AUTO: 0.12 10*3/MM3 (ref 0–0.05)
IMM GRANULOCYTES NFR BLD AUTO: 1.1 % (ref 0–0.5)
LYMPHOCYTES # BLD AUTO: 0.66 10*3/MM3 (ref 0.7–3.1)
LYMPHOCYTES NFR BLD AUTO: 6.3 % (ref 19.6–45.3)
MCH RBC QN AUTO: 27.6 PG (ref 26.6–33)
MCHC RBC AUTO-ENTMCNC: 32.3 G/DL (ref 31.5–35.7)
MCV RBC AUTO: 85.3 FL (ref 79–97)
MONOCYTES # BLD AUTO: 0.58 10*3/MM3 (ref 0.1–0.9)
MONOCYTES NFR BLD AUTO: 5.5 % (ref 5–12)
NEUTROPHILS NFR BLD AUTO: 8.9 10*3/MM3 (ref 1.7–7)
NEUTROPHILS NFR BLD AUTO: 85 % (ref 42.7–76)
NRBC BLD AUTO-RTO: 0 /100 WBC (ref 0–0.2)
PLATELET # BLD AUTO: 138 10*3/MM3 (ref 140–450)
PMV BLD AUTO: 10.8 FL (ref 6–12)
POTASSIUM SERPL-SCNC: 3.6 MMOL/L (ref 3.5–5.2)
PROT SERPL-MCNC: 5.8 G/DL (ref 6–8.5)
RBC # BLD AUTO: 5.51 10*6/MM3 (ref 4.14–5.8)
SODIUM SERPL-SCNC: 144 MMOL/L (ref 136–145)
WBC NRBC COR # BLD: 10.48 10*3/MM3 (ref 3.4–10.8)

## 2022-07-31 PROCEDURE — 71260 CT THORAX DX C+: CPT

## 2022-07-31 PROCEDURE — 0 IOPAMIDOL PER 1 ML: Performed by: EMERGENCY MEDICINE

## 2022-07-31 PROCEDURE — 74177 CT ABD & PELVIS W/CONTRAST: CPT

## 2022-07-31 PROCEDURE — 99284 EMERGENCY DEPT VISIT MOD MDM: CPT

## 2022-07-31 PROCEDURE — 73090 X-RAY EXAM OF FOREARM: CPT

## 2022-07-31 PROCEDURE — 73060 X-RAY EXAM OF HUMERUS: CPT

## 2022-07-31 PROCEDURE — 85025 COMPLETE CBC W/AUTO DIFF WBC: CPT | Performed by: EMERGENCY MEDICINE

## 2022-07-31 PROCEDURE — 80053 COMPREHEN METABOLIC PANEL: CPT | Performed by: EMERGENCY MEDICINE

## 2022-07-31 RX ORDER — TRAMADOL HYDROCHLORIDE 50 MG/1
50 TABLET ORAL ONCE
Status: COMPLETED | OUTPATIENT
Start: 2022-07-31 | End: 2022-07-31

## 2022-07-31 RX ORDER — SODIUM CHLORIDE 0.9 % (FLUSH) 0.9 %
10 SYRINGE (ML) INJECTION AS NEEDED
Status: DISCONTINUED | OUTPATIENT
Start: 2022-07-31 | End: 2022-07-31 | Stop reason: HOSPADM

## 2022-07-31 RX ORDER — TRAMADOL HYDROCHLORIDE 50 MG/1
50 TABLET ORAL EVERY 8 HOURS PRN
Qty: 9 TABLET | Refills: 0 | Status: SHIPPED | OUTPATIENT
Start: 2022-07-31 | End: 2022-08-03

## 2022-07-31 RX ADMIN — IOPAMIDOL 50 ML: 755 INJECTION, SOLUTION INTRAVENOUS at 13:51

## 2022-07-31 RX ADMIN — TRAMADOL HYDROCHLORIDE 50 MG: 50 TABLET, FILM COATED ORAL at 15:28

## 2022-07-31 RX ADMIN — IOPAMIDOL 50 ML: 755 INJECTION, SOLUTION INTRAVENOUS at 13:50

## 2023-04-10 ENCOUNTER — HOSPITAL ENCOUNTER (OUTPATIENT)
Dept: GENERAL RADIOLOGY | Facility: HOSPITAL | Age: 73
Discharge: HOME | End: 2023-04-10
Admitting: FAMILY MEDICINE
Payer: MEDICARE

## 2023-04-10 ENCOUNTER — TRANSCRIBE ORDERS (OUTPATIENT)
Dept: ADMINISTRATIVE | Facility: HOSPITAL | Age: 73
End: 2023-04-10
Payer: MEDICARE

## 2023-04-10 DIAGNOSIS — J44.9 CHRONIC OBSTRUCTIVE PULMONARY DISEASE, UNSPECIFIED COPD TYPE: Primary | ICD-10-CM

## 2023-04-10 DIAGNOSIS — J44.9 CHRONIC OBSTRUCTIVE PULMONARY DISEASE, UNSPECIFIED COPD TYPE: ICD-10-CM

## 2023-04-10 PROCEDURE — 71046 X-RAY EXAM CHEST 2 VIEWS: CPT

## 2023-09-12 ENCOUNTER — APPOINTMENT (OUTPATIENT)
Dept: GENERAL RADIOLOGY | Facility: HOSPITAL | Age: 73
End: 2023-09-12

## 2023-09-12 ENCOUNTER — HOSPITAL ENCOUNTER (INPATIENT)
Facility: HOSPITAL | Age: 73
LOS: 3 days | Discharge: HOME | End: 2023-09-15
Attending: EMERGENCY MEDICINE | Admitting: FAMILY MEDICINE

## 2023-09-12 ENCOUNTER — APPOINTMENT (OUTPATIENT)
Dept: CT IMAGING | Facility: HOSPITAL | Age: 73
End: 2023-09-12

## 2023-09-12 DIAGNOSIS — J18.9 COMMUNITY ACQUIRED PNEUMONIA OF RIGHT MIDDLE LOBE OF LUNG: Primary | ICD-10-CM

## 2023-09-12 LAB
ALBUMIN SERPL-MCNC: 4.4 G/DL (ref 3.5–5.2)
ALBUMIN/GLOB SERPL: 2.3 G/DL
ALP SERPL-CCNC: 124 U/L (ref 39–117)
ALT SERPL W P-5'-P-CCNC: 8 U/L (ref 1–41)
AMMONIA BLD-SCNC: 24 UMOL/L (ref 16–60)
AMPHET+METHAMPHET UR QL: NEGATIVE
AMPHETAMINES UR QL: NEGATIVE
ANION GAP SERPL CALCULATED.3IONS-SCNC: 10.8 MMOL/L (ref 5–15)
AST SERPL-CCNC: 13 U/L (ref 1–40)
BACTERIA UR QL AUTO: ABNORMAL /HPF
BARBITURATES UR QL SCN: NEGATIVE
BASOPHILS # BLD AUTO: 0.03 10*3/MM3 (ref 0–0.2)
BASOPHILS NFR BLD AUTO: 0.5 % (ref 0–1.5)
BENZODIAZ UR QL SCN: NEGATIVE
BILIRUB SERPL-MCNC: 0.7 MG/DL (ref 0–1.2)
BILIRUB UR QL STRIP: NEGATIVE
BUN SERPL-MCNC: 19 MG/DL (ref 8–23)
BUN/CREAT SERPL: 18.4 (ref 7–25)
BUPRENORPHINE SERPL-MCNC: NEGATIVE NG/ML
CALCIUM SPEC-SCNC: 9.1 MG/DL (ref 8.6–10.5)
CANNABINOIDS SERPL QL: NEGATIVE
CHLORIDE SERPL-SCNC: 97 MMOL/L (ref 98–107)
CLARITY UR: CLEAR
CO2 SERPL-SCNC: 32.2 MMOL/L (ref 22–29)
COCAINE UR QL: NEGATIVE
COLOR UR: YELLOW
CREAT SERPL-MCNC: 1.03 MG/DL (ref 0.76–1.27)
D DIMER PPP FEU-MCNC: <0.27 MCGFEU/ML (ref 0–0.74)
D-LACTATE SERPL-SCNC: 1.2 MMOL/L (ref 0.5–2)
DEPRECATED RDW RBC AUTO: 48.5 FL (ref 37–54)
EGFRCR SERPLBLD CKD-EPI 2021: 76.2 ML/MIN/1.73
EOSINOPHIL # BLD AUTO: 0.29 10*3/MM3 (ref 0–0.4)
EOSINOPHIL NFR BLD AUTO: 4.8 % (ref 0.3–6.2)
ERYTHROCYTE [DISTWIDTH] IN BLOOD BY AUTOMATED COUNT: 15.5 % (ref 12.3–15.4)
ETHANOL BLD-MCNC: <10 MG/DL (ref 0–10)
ETHANOL UR QL: <0.01 %
FLUAV RNA RESP QL NAA+PROBE: NOT DETECTED
FLUBV RNA RESP QL NAA+PROBE: NOT DETECTED
GEN 5 2HR TROPONIN T REFLEX: 8 NG/L
GLOBULIN UR ELPH-MCNC: 1.9 GM/DL
GLUCOSE SERPL-MCNC: 92 MG/DL (ref 65–99)
GLUCOSE UR STRIP-MCNC: NEGATIVE MG/DL
HCT VFR BLD AUTO: 43.7 % (ref 37.5–51)
HGB BLD-MCNC: 14 G/DL (ref 13–17.7)
HGB UR QL STRIP.AUTO: ABNORMAL
HYALINE CASTS UR QL AUTO: ABNORMAL /LPF
IMM GRANULOCYTES # BLD AUTO: 0.04 10*3/MM3 (ref 0–0.05)
IMM GRANULOCYTES NFR BLD AUTO: 0.7 % (ref 0–0.5)
KETONES UR QL STRIP: NEGATIVE
LEUKOCYTE ESTERASE UR QL STRIP.AUTO: NEGATIVE
LYMPHOCYTES # BLD AUTO: 1.01 10*3/MM3 (ref 0.7–3.1)
LYMPHOCYTES NFR BLD AUTO: 16.6 % (ref 19.6–45.3)
MCH RBC QN AUTO: 27.8 PG (ref 26.6–33)
MCHC RBC AUTO-ENTMCNC: 32 G/DL (ref 31.5–35.7)
MCV RBC AUTO: 86.7 FL (ref 79–97)
METHADONE UR QL SCN: NEGATIVE
MONOCYTES # BLD AUTO: 0.48 10*3/MM3 (ref 0.1–0.9)
MONOCYTES NFR BLD AUTO: 7.9 % (ref 5–12)
NEUTROPHILS NFR BLD AUTO: 4.24 10*3/MM3 (ref 1.7–7)
NEUTROPHILS NFR BLD AUTO: 69.5 % (ref 42.7–76)
NITRITE UR QL STRIP: NEGATIVE
NRBC BLD AUTO-RTO: 0 /100 WBC (ref 0–0.2)
NT-PROBNP SERPL-MCNC: 264.1 PG/ML (ref 0–900)
OPIATES UR QL: NEGATIVE
OXYCODONE UR QL SCN: NEGATIVE
PCP UR QL SCN: NEGATIVE
PH UR STRIP.AUTO: 5.5 [PH] (ref 4.5–8)
PLATELET # BLD AUTO: 120 10*3/MM3 (ref 140–450)
PMV BLD AUTO: 11.3 FL (ref 6–12)
POTASSIUM SERPL-SCNC: 4.1 MMOL/L (ref 3.5–5.2)
PROCALCITONIN SERPL-MCNC: 0.07 NG/ML (ref 0–0.25)
PROPOXYPH UR QL: NEGATIVE
PROT SERPL-MCNC: 6.3 G/DL (ref 6–8.5)
PROT UR QL STRIP: ABNORMAL
RBC # BLD AUTO: 5.04 10*6/MM3 (ref 4.14–5.8)
RBC # UR STRIP: ABNORMAL /HPF
RBC MORPH BLD: NORMAL
REF LAB TEST METHOD: ABNORMAL
SARS-COV-2 RNA RESP QL NAA+PROBE: NOT DETECTED
SMALL PLATELETS BLD QL SMEAR: NORMAL
SODIUM SERPL-SCNC: 140 MMOL/L (ref 136–145)
SP GR UR STRIP: 1.01 (ref 1–1.03)
SQUAMOUS #/AREA URNS HPF: ABNORMAL /HPF
TRICYCLICS UR QL SCN: NEGATIVE
TROPONIN T DELTA: 0 NG/L
TROPONIN T SERPL HS-MCNC: 8 NG/L
TSH SERPL DL<=0.05 MIU/L-ACNC: 2.61 UIU/ML (ref 0.27–4.2)
UROBILINOGEN UR QL STRIP: ABNORMAL
WBC # UR STRIP: ABNORMAL /HPF
WBC MORPH BLD: NORMAL
WBC NRBC COR # BLD: 6.09 10*3/MM3 (ref 3.4–10.8)

## 2023-09-12 PROCEDURE — 25010000002 METHYLPREDNISOLONE PER 125 MG: Performed by: EMERGENCY MEDICINE

## 2023-09-12 PROCEDURE — 84484 ASSAY OF TROPONIN QUANT: CPT | Performed by: EMERGENCY MEDICINE

## 2023-09-12 PROCEDURE — 82077 ASSAY SPEC XCP UR&BREATH IA: CPT | Performed by: EMERGENCY MEDICINE

## 2023-09-12 PROCEDURE — 87636 SARSCOV2 & INF A&B AMP PRB: CPT | Performed by: EMERGENCY MEDICINE

## 2023-09-12 PROCEDURE — 85007 BL SMEAR W/DIFF WBC COUNT: CPT | Performed by: EMERGENCY MEDICINE

## 2023-09-12 PROCEDURE — 70450 CT HEAD/BRAIN W/O DYE: CPT

## 2023-09-12 PROCEDURE — 84145 PROCALCITONIN (PCT): CPT | Performed by: EMERGENCY MEDICINE

## 2023-09-12 PROCEDURE — 94799 UNLISTED PULMONARY SVC/PX: CPT

## 2023-09-12 PROCEDURE — 93005 ELECTROCARDIOGRAM TRACING: CPT

## 2023-09-12 PROCEDURE — 84443 ASSAY THYROID STIM HORMONE: CPT | Performed by: EMERGENCY MEDICINE

## 2023-09-12 PROCEDURE — 80306 DRUG TEST PRSMV INSTRMNT: CPT | Performed by: EMERGENCY MEDICINE

## 2023-09-12 PROCEDURE — 85379 FIBRIN DEGRADATION QUANT: CPT | Performed by: EMERGENCY MEDICINE

## 2023-09-12 PROCEDURE — 36415 COLL VENOUS BLD VENIPUNCTURE: CPT

## 2023-09-12 PROCEDURE — 80053 COMPREHEN METABOLIC PANEL: CPT | Performed by: EMERGENCY MEDICINE

## 2023-09-12 PROCEDURE — 94761 N-INVAS EAR/PLS OXIMETRY MLT: CPT

## 2023-09-12 PROCEDURE — 87040 BLOOD CULTURE FOR BACTERIA: CPT | Performed by: EMERGENCY MEDICINE

## 2023-09-12 PROCEDURE — 81001 URINALYSIS AUTO W/SCOPE: CPT | Performed by: EMERGENCY MEDICINE

## 2023-09-12 PROCEDURE — 83880 ASSAY OF NATRIURETIC PEPTIDE: CPT | Performed by: EMERGENCY MEDICINE

## 2023-09-12 PROCEDURE — 93010 ELECTROCARDIOGRAM REPORT: CPT | Performed by: INTERNAL MEDICINE

## 2023-09-12 PROCEDURE — 99285 EMERGENCY DEPT VISIT HI MDM: CPT

## 2023-09-12 PROCEDURE — 82140 ASSAY OF AMMONIA: CPT | Performed by: EMERGENCY MEDICINE

## 2023-09-12 PROCEDURE — 71045 X-RAY EXAM CHEST 1 VIEW: CPT

## 2023-09-12 PROCEDURE — 83605 ASSAY OF LACTIC ACID: CPT | Performed by: EMERGENCY MEDICINE

## 2023-09-12 PROCEDURE — 94640 AIRWAY INHALATION TREATMENT: CPT

## 2023-09-12 PROCEDURE — 25010000002 DIPHENHYDRAMINE PER 50 MG: Performed by: EMERGENCY MEDICINE

## 2023-09-12 PROCEDURE — 85025 COMPLETE CBC W/AUTO DIFF WBC: CPT | Performed by: EMERGENCY MEDICINE

## 2023-09-12 RX ORDER — ACETAMINOPHEN 160 MG/5ML
650 SOLUTION ORAL EVERY 4 HOURS PRN
Status: DISCONTINUED | OUTPATIENT
Start: 2023-09-12 | End: 2023-09-15 | Stop reason: HOSPADM

## 2023-09-12 RX ORDER — POTASSIUM CHLORIDE 20 MEQ/1
40 TABLET, EXTENDED RELEASE ORAL DAILY
Status: DISCONTINUED | OUTPATIENT
Start: 2023-09-13 | End: 2023-09-15 | Stop reason: HOSPADM

## 2023-09-12 RX ORDER — AMOXICILLIN 250 MG
2 CAPSULE ORAL NIGHTLY PRN
Status: DISCONTINUED | OUTPATIENT
Start: 2023-09-12 | End: 2023-09-15 | Stop reason: HOSPADM

## 2023-09-12 RX ORDER — SODIUM CHLORIDE 0.9 % (FLUSH) 0.9 %
10 SYRINGE (ML) INJECTION AS NEEDED
Status: DISCONTINUED | OUTPATIENT
Start: 2023-09-12 | End: 2023-09-15 | Stop reason: HOSPADM

## 2023-09-12 RX ORDER — ALUMINA, MAGNESIA, AND SIMETHICONE 2400; 2400; 240 MG/30ML; MG/30ML; MG/30ML
15 SUSPENSION ORAL EVERY 6 HOURS PRN
Status: DISCONTINUED | OUTPATIENT
Start: 2023-09-12 | End: 2023-09-15 | Stop reason: HOSPADM

## 2023-09-12 RX ORDER — DIPHENHYDRAMINE HYDROCHLORIDE 50 MG/ML
25 INJECTION INTRAMUSCULAR; INTRAVENOUS ONCE
Status: COMPLETED | OUTPATIENT
Start: 2023-09-12 | End: 2023-09-12

## 2023-09-12 RX ORDER — ALBUTEROL SULFATE 2.5 MG/3ML
2.5 SOLUTION RESPIRATORY (INHALATION) 4 TIMES DAILY
Status: DISCONTINUED | OUTPATIENT
Start: 2023-09-12 | End: 2023-09-15 | Stop reason: HOSPADM

## 2023-09-12 RX ORDER — BISACODYL 10 MG
10 SUPPOSITORY, RECTAL RECTAL DAILY PRN
Status: DISCONTINUED | OUTPATIENT
Start: 2023-09-12 | End: 2023-09-15 | Stop reason: HOSPADM

## 2023-09-12 RX ORDER — ONDANSETRON 4 MG/1
4 TABLET, FILM COATED ORAL EVERY 6 HOURS PRN
Status: DISCONTINUED | OUTPATIENT
Start: 2023-09-12 | End: 2023-09-15 | Stop reason: HOSPADM

## 2023-09-12 RX ORDER — ALLOPURINOL 300 MG/1
300 TABLET ORAL EVERY MORNING
Status: DISCONTINUED | OUTPATIENT
Start: 2023-09-13 | End: 2023-09-15 | Stop reason: HOSPADM

## 2023-09-12 RX ORDER — POLYETHYLENE GLYCOL 3350 17 G/17G
17 POWDER, FOR SOLUTION ORAL DAILY PRN
Status: DISCONTINUED | OUTPATIENT
Start: 2023-09-12 | End: 2023-09-15 | Stop reason: HOSPADM

## 2023-09-12 RX ORDER — LUBIPROSTONE 24 UG/1
24 CAPSULE ORAL 2 TIMES DAILY PRN
Status: DISCONTINUED | OUTPATIENT
Start: 2023-09-12 | End: 2023-09-15 | Stop reason: HOSPADM

## 2023-09-12 RX ORDER — ACETAMINOPHEN 650 MG/1
650 SUPPOSITORY RECTAL EVERY 4 HOURS PRN
Status: DISCONTINUED | OUTPATIENT
Start: 2023-09-12 | End: 2023-09-15 | Stop reason: HOSPADM

## 2023-09-12 RX ORDER — BISACODYL 5 MG/1
5 TABLET, DELAYED RELEASE ORAL DAILY PRN
Status: DISCONTINUED | OUTPATIENT
Start: 2023-09-12 | End: 2023-09-15 | Stop reason: HOSPADM

## 2023-09-12 RX ORDER — ONDANSETRON 2 MG/ML
4 INJECTION INTRAMUSCULAR; INTRAVENOUS EVERY 6 HOURS PRN
Status: DISCONTINUED | OUTPATIENT
Start: 2023-09-12 | End: 2023-09-15 | Stop reason: HOSPADM

## 2023-09-12 RX ORDER — SODIUM CHLORIDE 9 MG/ML
40 INJECTION, SOLUTION INTRAVENOUS AS NEEDED
Status: DISCONTINUED | OUTPATIENT
Start: 2023-09-12 | End: 2023-09-15 | Stop reason: HOSPADM

## 2023-09-12 RX ORDER — LEVETIRACETAM 500 MG/1
500 TABLET ORAL EVERY 12 HOURS SCHEDULED
Status: DISCONTINUED | OUTPATIENT
Start: 2023-09-12 | End: 2023-09-15 | Stop reason: HOSPADM

## 2023-09-12 RX ORDER — PANTOPRAZOLE SODIUM 40 MG/1
40 TABLET, DELAYED RELEASE ORAL EVERY MORNING
Status: DISCONTINUED | OUTPATIENT
Start: 2023-09-13 | End: 2023-09-15 | Stop reason: HOSPADM

## 2023-09-12 RX ORDER — SODIUM CHLORIDE 0.9 % (FLUSH) 0.9 %
10 SYRINGE (ML) INJECTION EVERY 12 HOURS SCHEDULED
Status: DISCONTINUED | OUTPATIENT
Start: 2023-09-12 | End: 2023-09-15 | Stop reason: HOSPADM

## 2023-09-12 RX ORDER — CETIRIZINE HYDROCHLORIDE 10 MG/1
10 TABLET ORAL EVERY MORNING
Status: DISCONTINUED | OUTPATIENT
Start: 2023-09-13 | End: 2023-09-15 | Stop reason: HOSPADM

## 2023-09-12 RX ORDER — FLUOXETINE HYDROCHLORIDE 20 MG/1
20 CAPSULE ORAL DAILY
Status: DISCONTINUED | OUTPATIENT
Start: 2023-09-13 | End: 2023-09-15 | Stop reason: HOSPADM

## 2023-09-12 RX ORDER — TORSEMIDE 20 MG/1
100 TABLET ORAL DAILY
Status: DISCONTINUED | OUTPATIENT
Start: 2023-09-13 | End: 2023-09-15 | Stop reason: HOSPADM

## 2023-09-12 RX ORDER — CLOPIDOGREL BISULFATE 75 MG/1
75 TABLET ORAL EVERY MORNING
Status: DISCONTINUED | OUTPATIENT
Start: 2023-09-13 | End: 2023-09-15 | Stop reason: HOSPADM

## 2023-09-12 RX ORDER — METOPROLOL TARTRATE 50 MG/1
50 TABLET, FILM COATED ORAL EVERY 12 HOURS SCHEDULED
Status: DISCONTINUED | OUTPATIENT
Start: 2023-09-12 | End: 2023-09-15 | Stop reason: HOSPADM

## 2023-09-12 RX ORDER — ISOSORBIDE MONONITRATE 30 MG/1
30 TABLET, EXTENDED RELEASE ORAL EVERY MORNING
Status: DISCONTINUED | OUTPATIENT
Start: 2023-09-13 | End: 2023-09-15 | Stop reason: HOSPADM

## 2023-09-12 RX ORDER — ACETAMINOPHEN 325 MG/1
650 TABLET ORAL EVERY 4 HOURS PRN
Status: DISCONTINUED | OUTPATIENT
Start: 2023-09-12 | End: 2023-09-15 | Stop reason: HOSPADM

## 2023-09-12 RX ORDER — CALCIUM CARBONATE 500 MG/1
1 TABLET, CHEWABLE ORAL 2 TIMES DAILY PRN
Status: DISCONTINUED | OUTPATIENT
Start: 2023-09-12 | End: 2023-09-15 | Stop reason: HOSPADM

## 2023-09-12 RX ORDER — METHYLPREDNISOLONE SODIUM SUCCINATE 125 MG/2ML
125 INJECTION, POWDER, LYOPHILIZED, FOR SOLUTION INTRAMUSCULAR; INTRAVENOUS ONCE
Status: COMPLETED | OUTPATIENT
Start: 2023-09-12 | End: 2023-09-12

## 2023-09-12 RX ORDER — IPRATROPIUM BROMIDE AND ALBUTEROL SULFATE 2.5; .5 MG/3ML; MG/3ML
3 SOLUTION RESPIRATORY (INHALATION) EVERY 4 HOURS PRN
Status: DISCONTINUED | OUTPATIENT
Start: 2023-09-12 | End: 2023-09-15 | Stop reason: HOSPADM

## 2023-09-12 RX ORDER — ALBUTEROL SULFATE 2.5 MG/3ML
2.5 SOLUTION RESPIRATORY (INHALATION) ONCE
Status: COMPLETED | OUTPATIENT
Start: 2023-09-12 | End: 2023-09-12

## 2023-09-12 RX ORDER — ENOXAPARIN SODIUM 100 MG/ML
40 INJECTION SUBCUTANEOUS NIGHTLY
Status: DISCONTINUED | OUTPATIENT
Start: 2023-09-12 | End: 2023-09-13

## 2023-09-12 RX ORDER — ATORVASTATIN CALCIUM 20 MG/1
20 TABLET, FILM COATED ORAL DAILY
Status: DISCONTINUED | OUTPATIENT
Start: 2023-09-13 | End: 2023-09-15 | Stop reason: HOSPADM

## 2023-09-12 RX ADMIN — DIPHENHYDRAMINE HYDROCHLORIDE 25 MG: 50 INJECTION, SOLUTION INTRAMUSCULAR; INTRAVENOUS at 18:57

## 2023-09-12 RX ADMIN — METHYLPREDNISOLONE SODIUM SUCCINATE 125 MG: 125 INJECTION, POWDER, FOR SOLUTION INTRAMUSCULAR; INTRAVENOUS at 19:00

## 2023-09-12 RX ADMIN — ALBUTEROL SULFATE 2.5 MG: 2.5 SOLUTION RESPIRATORY (INHALATION) at 18:44

## 2023-09-12 RX ADMIN — ALBUTEROL SULFATE 2.5 MG: 2.5 SOLUTION RESPIRATORY (INHALATION) at 23:59

## 2023-09-12 RX ADMIN — DOXYCYCLINE 100 MG: 100 INJECTION, POWDER, LYOPHILIZED, FOR SOLUTION INTRAVENOUS at 23:18

## 2023-09-12 NOTE — ED PROVIDER NOTES
Subjective   History of Present Illness  History of Present Illness    Chief complaint: Short of air    Location: Home    Quality/Severity: Moderate    Timing/Onset/Duration: Over the last couple of days    Modifying Factors: Worse with exertion    Associated Symptoms: No headache.  No fever chills.  Chronic cough, no worse than usual.  No sore throat or earache.  Patient has nasal congestion, he is unsure whether it is worse than usual.  No chest pain.  No abdominal pain.  No diarrhea or burning on urination.  No nausea or vomiting.    Narrative: This 73-year-old female presents with dizziness, worsening shortness of breath for last 2 to 3 days.  Patient's had 80 pound weight loss for the last 3 months.  Patient is on 2 L of oxygen at home at baseline.  The patient states that he is not thinking clearly.  He lives by himself.  He has a daughter-in-law that has groceries mail from [employer] to him.  EMS reported that the apartment appeared to be in order.  The patient states that he eats mainly cold cuts and fruit.    PCP:Nitesh Arias MD   Review of Systems   Constitutional:  Negative for chills and fever.   HENT:  Positive for congestion. Negative for ear pain and sore throat.    Respiratory:  Positive for cough and shortness of breath.    Cardiovascular:  Negative for chest pain.   Gastrointestinal:  Negative for abdominal pain, diarrhea, nausea and vomiting.   Genitourinary:  Negative for difficulty urinating and dysuria.   Musculoskeletal:  Positive for back pain (Chronic, no worse than usual).   Skin:  Negative for rash.   Neurological:  Positive for weakness (Generalized). Negative for headaches.       Past Medical History:   Diagnosis Date    Anemia     Bladder cancer 2008    Cerebrovascular accident     Ischemic and TIAs; most recent 02/2014 which was a TIA.    Chronic kidney disease, stage 3     Dr. KATHERYN Lin    Colon polyp     COPD (chronic obstructive pulmonary disease)     History of blood  transfusion     Hyperlipidemia     Hypertension     Hyperuricemia     Left shoulder pain     Lymphoma     MANTLE CELL, HAD CHEMO  2010    Osteoarthritis     Pilonidal cyst     SCHEDULED FOR SX    Psoriasis     Seizure     Following head trauma in 1970    Skin lesions     SCARRING FROM SHINGLES, & BLISTERS FROM EDEMA    Sleep apnea     Intolerant of CPAP    Splenomegaly 2/21/2013    Stroke     Tobacco abuse     Type 2 diabetes mellitus        Allergies   Allergen Reactions    Amoxicillin Hives    Ceftin [Cefuroxime Axetil] Hives    Codeine     Flonase [Fluticasone] Hives    Spiriva Handihaler [Tiotropium Bromide Monohydrate] Hives    Sulfa Antibiotics Hives    Phenobarbital Rash       Past Surgical History:   Procedure Laterality Date    BRONCHOSCOPY N/A 8/21/2017    Procedure: BRONCHOSCOPY with fluro and biopsy and lavage.;  Surgeon: Red Topete MD;  Location: [facility] OR;  Service:     CARDIAC CATHETERIZATION N/A 9/17/2018    Procedure: Coronary angiography;  Surgeon: Kylie Victoria MD;  Location:  [facility] CATH INVASIVE LOCATION;  Service: Cardiovascular    CARDIAC CATHETERIZATION N/A 10/30/2018    Procedure: Chronic Total Occlussion;  Surgeon: Roe Wheatley MD;  Location:  [facility] CATH INVASIVE LOCATION;  Service: Cardiovascular    CARDIAC CATHETERIZATION N/A 10/30/2018    Procedure: Stent AURA coronary;  Surgeon: Roe Wheatley MD;  Location:  [facility] CATH INVASIVE LOCATION;  Service: Cardiovascular    COLONOSCOPY N/A 10/9/2019    Procedure: COLONOSCOPY with polypectomy;  Surgeon: Lee Priest MD;  Location: [facility] OR;  Service: Gastroenterology    PILONIDAL CYSTECTOMY N/A 11/30/2016    Procedure: PILONIDAL CYSTECTOMY;  Surgeon: Roe Davila MD;  Location: [facility] OR;  Service:     SINUS SURGERY      SKIN LESION EXCISION      DR. DAVILA ABOUT 10 YEARS AGO       Family History   Problem Relation Age of Onset    Heart defect Mother     Pancreatic cancer Father 56    Anemia Sister     Hypertension Brother      Heart disease Brother     Mental illness Brother     Other Brother         Splenectomy    Cancer Cousin        Social History     Socioeconomic History    Marital status:      Spouse name: Charley    Years of education: 12   Tobacco Use    Smoking status: Former     Packs/day: 3.00     Years: 50.00     Pack years: 150.00     Types: Cigarettes     Quit date:      Years since quittin.7    Smokeless tobacco: Never    Tobacco comments:     80 pack year   Substance and Sexual Activity    Alcohol use: No     Comment: Heavy in past, none since around     Drug use: No    Sexual activity: Defer           Objective   Physical Exam  Vitals (The temperature is 97.3 °F, pulse 61, respirations 20, room air sats of 97%.) and nursing note reviewed.   Constitutional:       Appearance: He is well-developed.   HENT:      Head: Normocephalic.      Mouth/Throat:      Mouth: Mucous membranes are moist.   Cardiovascular:      Rate and Rhythm: Normal rate and regular rhythm.      Heart sounds: No murmur heard.    No friction rub. No gallop.   Pulmonary:      Breath sounds: Decreased breath sounds and wheezing (Mild expiratory) present.   Chest:      Chest wall: No tenderness.   Abdominal:      General: Bowel sounds are normal.      Palpations: Abdomen is soft. There is no mass.      Tenderness: There is no abdominal tenderness. There is no guarding or rebound.   Musculoskeletal:         General: Normal range of motion.      Cervical back: Normal range of motion and neck supple.      Right lower leg: No edema.      Left lower leg: No edema.   Skin:     General: Skin is warm and dry.      Findings: No rash.   Neurological:      General: No focal deficit present.      Mental Status: He is alert and oriented to person, place, and time.      Cranial Nerves: No cranial nerve deficit.      Motor: Weakness (Generalized) present.       Procedures           ED Course  ED Course as of 23   Tue Sep 12, 2023   1926 [facility]  flu is negative. [RC]   1926 The chloride is 97, CO2 is 32, alk phos is 124.  The CMP is otherwise unremarkable. [RC]   1926 The ethanol level is less than 0.010.  This is normal. [RC]   1926 The platelet count is 120,000, CBC is otherwise unremarkable [RC]   1927 On 7/31/2022 the platelet count was 138.  The platelet count is just slightly lower today [RC]   1955 The urine microscopic shows 0-2 RBCs, 0-2 WBCs, trace bacteria.  There is trace blood, protein 30 mg/dL.  Nitrite negative, leukocyte negative.  The urinalysis is otherwise unremarkable [RC]   1956 The urine drug screen is negative. [RC]   1956 The COVID flu is negative. [RC]   1956 The procalcitonin is 0.07.  This is normal [RC]   2023 The TSH is normal at 2.61.    The troponin is normal at 8.    The ammonia is normal at 24. [RC]   2105 The D-dimer is less than 0.27 [RC]   2156 The repeat high-sensitivity troponin is 8 with a delta T of 0. [RC]   2156 The urine drug screen is negative. [RC]   2156 The urine microscopic shows 0-2 RBCs, 0-2 WBCs, trace bacteria, leukocyte negative, nitrite negative. [RC]   2156 proBNP is 264 normal. [RC]   2157 The D-dimer is less than 0.27 and normal. [RC]   2157 The TSH is 2.61 and normal. [RC]   2158 Ammonia is 24 normal. [RC]      ED Course User Index  [RC] Esdras Baig MD      18:33 EDT, 09/12/23: The EKG was obtained at 1816 and read by me at 1818.  The EKG shows a normal sinus rhythm with rate of 59.  There is left anterior fascicular block.  The [place], QRS, and QT intervals are unremarkable.  There is no ectopy.  There is no acute ST elevation or depression.  There is poor R wave progression in the anterolateral leads.  The EKG today compared to EKG dated 4/2/2021 is essentially unchanged.       22:04 EDT, 09/12/23: The patient was reassessed.  He states he does not feel much better.  His vital signs were reviewed.  At rest the patient has sats of 96% on his usual 2 L but with standing up and even trying to use  the urinal his saturations dropped to the mid 80s and he becomes more short of breath.  Lung exam: Clear to auscultation equal bilaterally.    22:04 EDT, 09/12/23: The patient's diagnosis of pneumonia and shortness of breath with low sats with exertion with pleural effusion was discussed with him.  The patient will be admitted for breathing treatments, monitoring, antibiotics, oxygen and further work-up and evaluation.    22:11 EDT, 09/12/23: I spoke with Dr. Arias, the patient's primary care provider, he will admit the patient.  We will give the patient doxycycline here in the emergency department.                                  Medical Decision Making  Amount and/or Complexity of Data Reviewed  Labs: ordered.  Radiology: ordered.  ECG/medicine tests: ordered.    Risk  Prescription drug management.        Final diagnoses:   Community acquired pneumonia of right middle lobe of lung       ED Disposition  ED Disposition       None            No follow-up provider specified.       Medication List      No changes were made to your prescriptions during this visit.            Esdras Baig MD  09/13/23 0136

## 2023-09-12 NOTE — Clinical Note
Level of Care: Telemetry [5]   Diagnosis: Pneumonia [[id number]]   Admitting Physician: MULUGETA RIBEIRO [[id number]]   Certification: I Certify That Inpatient Hospital Services Are Medically Necessary For Greater Than 2 Midnights

## 2023-09-13 LAB
B PARAPERT DNA SPEC QL NAA+PROBE: NOT DETECTED
B PERT DNA SPEC QL NAA+PROBE: NOT DETECTED
C PNEUM DNA NPH QL NAA+NON-PROBE: NOT DETECTED
FLUAV SUBTYP SPEC NAA+PROBE: NOT DETECTED
FLUBV RNA ISLT QL NAA+PROBE: NOT DETECTED
HADV DNA SPEC NAA+PROBE: NOT DETECTED
HCOV 229E RNA SPEC QL NAA+PROBE: NOT DETECTED
HCOV HKU1 RNA SPEC QL NAA+PROBE: NOT DETECTED
HCOV NL63 RNA SPEC QL NAA+PROBE: NOT DETECTED
HCOV OC43 RNA SPEC QL NAA+PROBE: NOT DETECTED
HMPV RNA NPH QL NAA+NON-PROBE: NOT DETECTED
HPIV1 RNA ISLT QL NAA+PROBE: NOT DETECTED
HPIV2 RNA SPEC QL NAA+PROBE: NOT DETECTED
HPIV3 RNA NPH QL NAA+PROBE: NOT DETECTED
HPIV4 P GENE NPH QL NAA+PROBE: NOT DETECTED
M PNEUMO IGG SER IA-ACNC: NOT DETECTED
QT INTERVAL: 404 MS
QTC INTERVAL: 401 MS
RHINOVIRUS RNA SPEC NAA+PROBE: NOT DETECTED
RSV RNA NPH QL NAA+NON-PROBE: NOT DETECTED
SARS-COV-2 RNA NPH QL NAA+NON-PROBE: NOT DETECTED

## 2023-09-13 PROCEDURE — 25010000002 ENOXAPARIN PER 10 MG: Performed by: FAMILY MEDICINE

## 2023-09-13 PROCEDURE — 94799 UNLISTED PULMONARY SVC/PX: CPT

## 2023-09-13 PROCEDURE — 97165 OT EVAL LOW COMPLEX 30 MIN: CPT

## 2023-09-13 PROCEDURE — 0202U NFCT DS 22 TRGT SARS-COV-2: CPT | Performed by: FAMILY MEDICINE

## 2023-09-13 PROCEDURE — 97161 PT EVAL LOW COMPLEX 20 MIN: CPT

## 2023-09-13 PROCEDURE — 94664 DEMO&/EVAL PT USE INHALER: CPT

## 2023-09-13 RX ORDER — ENOXAPARIN SODIUM 100 MG/ML
40 INJECTION SUBCUTANEOUS EVERY 24 HOURS
Status: DISCONTINUED | OUTPATIENT
Start: 2023-09-14 | End: 2023-09-15 | Stop reason: HOSPADM

## 2023-09-13 RX ADMIN — LEVETIRACETAM 500 MG: 500 TABLET, FILM COATED ORAL at 01:20

## 2023-09-13 RX ADMIN — METOPROLOL TARTRATE 50 MG: 50 TABLET, FILM COATED ORAL at 01:20

## 2023-09-13 RX ADMIN — ATORVASTATIN CALCIUM 20 MG: 20 TABLET, FILM COATED ORAL at 11:16

## 2023-09-13 RX ADMIN — Medication 10 ML: at 20:47

## 2023-09-13 RX ADMIN — ALBUTEROL SULFATE 2.5 MG: 2.5 SOLUTION RESPIRATORY (INHALATION) at 07:34

## 2023-09-13 RX ADMIN — Medication 10 ML: at 00:00

## 2023-09-13 RX ADMIN — DOXYCYCLINE 100 MG: 100 INJECTION, POWDER, LYOPHILIZED, FOR SOLUTION INTRAVENOUS at 11:15

## 2023-09-13 RX ADMIN — Medication 10 ML: at 11:17

## 2023-09-13 RX ADMIN — ALBUTEROL SULFATE 2.5 MG: 2.5 SOLUTION RESPIRATORY (INHALATION) at 15:24

## 2023-09-13 RX ADMIN — ISOSORBIDE MONONITRATE 30 MG: 30 TABLET, EXTENDED RELEASE ORAL at 06:59

## 2023-09-13 RX ADMIN — SENNOSIDES AND DOCUSATE SODIUM 2 TABLET: 50; 8.6 TABLET ORAL at 21:52

## 2023-09-13 RX ADMIN — CLOPIDOGREL BISULFATE 75 MG: 75 TABLET ORAL at 06:59

## 2023-09-13 RX ADMIN — ALLOPURINOL 300 MG: 300 TABLET ORAL at 06:59

## 2023-09-13 RX ADMIN — ACETAMINOPHEN 650 MG: 325 TABLET ORAL at 23:36

## 2023-09-13 RX ADMIN — TORSEMIDE 100 MG: 20 TABLET ORAL at 11:15

## 2023-09-13 RX ADMIN — METOPROLOL TARTRATE 50 MG: 50 TABLET, FILM COATED ORAL at 11:16

## 2023-09-13 RX ADMIN — POTASSIUM CHLORIDE 40 MEQ: 1500 TABLET, EXTENDED RELEASE ORAL at 11:15

## 2023-09-13 RX ADMIN — ALBUTEROL SULFATE 2.5 MG: 2.5 SOLUTION RESPIRATORY (INHALATION) at 19:51

## 2023-09-13 RX ADMIN — METOPROLOL TARTRATE 50 MG: 50 TABLET, FILM COATED ORAL at 20:47

## 2023-09-13 RX ADMIN — FLUOXETINE 20 MG: 20 CAPSULE ORAL at 11:15

## 2023-09-13 RX ADMIN — CETIRIZINE HYDROCHLORIDE 10 MG: 10 TABLET, FILM COATED ORAL at 06:59

## 2023-09-13 RX ADMIN — DOXYCYCLINE 100 MG: 100 INJECTION, POWDER, LYOPHILIZED, FOR SOLUTION INTRAVENOUS at 21:53

## 2023-09-13 RX ADMIN — ENOXAPARIN SODIUM 40 MG: 40 INJECTION SUBCUTANEOUS at 01:20

## 2023-09-13 RX ADMIN — ALBUTEROL SULFATE 2.5 MG: 2.5 SOLUTION RESPIRATORY (INHALATION) at 11:35

## 2023-09-13 RX ADMIN — LEVETIRACETAM 500 MG: 500 TABLET, FILM COATED ORAL at 11:16

## 2023-09-13 RX ADMIN — LEVETIRACETAM 500 MG: 500 TABLET, FILM COATED ORAL at 20:47

## 2023-09-13 RX ADMIN — PANTOPRAZOLE SODIUM 40 MG: 40 TABLET, DELAYED RELEASE ORAL at 06:59

## 2023-09-13 RX ADMIN — POLYETHYLENE GLYCOL 3350 17 G: 17 POWDER, FOR SOLUTION ORAL at 20:47

## 2023-09-13 NOTE — ED NOTES
Pt assisted up to bedside to stand to urinate. Pt's SAO2 dropped to 86% on 2 l/min nc while up . Pt became very dyspneic. Pt assisted back to bed with assist of 2. Respirations 32. Pt unable to move himself up in bed and pt complete lift to position appropriately in bed

## 2023-09-13 NOTE — SIGNIFICANT NOTE
"   09/13/23 0000   Clinician Notification   Reason for Communication   (Pt reports \"he is unsure of home meds, but is sure he took all his day time and night time meds before coming to [facility].\"  pt's pharmacy closed at this time. [facility] will need to call in am.)       "

## 2023-09-13 NOTE — THERAPY DISCHARGE NOTE
Acute Care - Occupational Therapy Initial Evaluation/Discharge   Martha Mauricio     Patient Name: Esrdas Ko  : 1950  MRN: [id number]  Today's Date: 2023  Onset of Illness/Injury or Date of Surgery: 23  Date of Referral to OT: 23  Referring Physician: Dr Arias      Admit Date: 2023       ICD-10-CM ICD-9-CM   1. Community acquired pneumonia of right middle lobe of lung  J18.9 486     Patient Active Problem List   Diagnosis    Mantle cell lymphoma    Chronic obstructive pulmonary disease    Chronic kidney disease    Seizure    Lymphadenopathy    History of neoplasm of bladder    Splenomegaly    Adenomatous polyp of colon    Coronary artery disease involving native coronary artery of native heart without angina pectoris    Type 2 diabetes mellitus with circulatory disorder, without long-term current use of insulin    Swelling of both lower extremities    Personal history of colonic polyps    Morbid obesity with BMI of 50.0-59.9, adult    Chronic right-sided congestive heart failure    Pneumonia     Past Medical History:   Diagnosis Date    Anemia     Bladder cancer     Cerebrovascular accident     Ischemic and TIAs; most recent 2014 which was a TIA.    Chronic kidney disease, stage 3     Dr. KATHERYN Lin    Colon polyp     COPD (chronic obstructive pulmonary disease)     History of blood transfusion     Hyperlipidemia     Hypertension     Hyperuricemia     Left shoulder pain     Lymphoma     MANTLE CELL, HAD CHEMO      Osteoarthritis     Pilonidal cyst     SCHEDULED FOR SX    Psoriasis     Seizure     Following head trauma in     Skin lesions     SCARRING FROM SHINGLES, & BLISTERS FROM EDEMA    Sleep apnea     Intolerant of CPAP    Splenomegaly 2013    Stroke     Tobacco abuse     Type 2 diabetes mellitus      Past Surgical History:   Procedure Laterality Date    BRONCHOSCOPY N/A 2017    Procedure: BRONCHOSCOPY with fluro and biopsy and lavage.;  Surgeon: Red  MD Yoselyn;  Location:  [facility];  Service:     CARDIAC CATHETERIZATION N/A 9/17/2018    Procedure: Coronary angiography;  Surgeon: Kylie Victoria MD;  Location:  [facility];  Service: Cardiovascular    CARDIAC CATHETERIZATION N/A 10/30/2018    Procedure: Chronic Total Occlussion;  Surgeon: Roe Wheatley MD;  Location:  [facility];  Service: Cardiovascular    CARDIAC CATHETERIZATION N/A 10/30/2018    Procedure: Stent AURA coronary;  Surgeon: Roe Wheatley MD;  Location:  [facility];  Service: Cardiovascular    COLONOSCOPY N/A 10/9/2019    Procedure: COLONOSCOPY with polypectomy;  Surgeon: Lee Priest MD;  Location:  [facility] OR;  Service: Gastroenterology    PILONIDAL CYSTECTOMY N/A 11/30/2016    Procedure: PILONIDAL CYSTECTOMY;  Surgeon: Roe Davila MD;  Location: [facility] OR;  Service:     SINUS SURGERY      SKIN LESION EXCISION      DR. DAVILA ABOUT 10 YEARS AGO       OT ASSESSMENT FLOWSHEET (last 12 hours)       OT Evaluation and Treatment       Row Name 09/13/23 0920                   OT Time and Intention    Subjective Information complains of;weakness  -JJ        Document Type discharge evaluation/summary  -JJ        Mode of Treatment occupational therapy  -JJ        Patient Effort adequate  -JJ        Symptoms Noted During/After Treatment none  -JJ           General Information    Patient Profile Reviewed yes  -JJ        Onset of Illness/Injury or Date of Surgery 09/12/23  -JJ        Referring Physician Dr Arias  -JJ        General Observations of Patient pt supine in bed, agreed to evaluation with encouragement  -JJ        Prior Level of Function --  see pertinent hx of current problem  -JJ        Equipment Currently Used at Home none  -JJ        Pertinent History of Current Functional Problem pt admitted to hospital with generalized weakness and SOA. pt dx with pna. baseline on home O2 2L NC. pt states lives alone and performs household  mobility without assistive device and is independent with adls  -JJ        Existing Precautions/Restrictions fall  -JJ        Risks Reviewed patient:;LOB  -JJ        Benefits Reviewed patient:;improve function;increase independence  -JJ        Barriers to Rehab uncooperative  -JJ           Living Environment    Current Living Arrangements home  -JJ        People in Home alone  -JJ           Pain Assessment    Pretreatment Pain Rating 0/10 - no pain  -JJ        Posttreatment Pain Rating 0/10 - no pain  -JJ           Cognition    Orientation Status (Cognition) oriented x 3  -JJ        Follows Commands (Cognition) WFL  -JJ        Personal Safety Interventions gait belt;nonskid shoes/slippers when out of bed  -JJ           Range of Motion Comprehensive    Comment, General Range of Motion B UE AROm functional, unable to formally assess 2 uncooperative  -JJ           Strength Comprehensive (MMT)    Comment, General Manual Muscle Testing (MMT) Assessment B UE strength functional  -JJ           Activities of Daily Living    BADL Assessment/Intervention lower body dressing  -JJ           Lower Body Dressing Assessment/Training    Comment, (Lower Body Dressing) anticipate pt will require CGA for lb adls. pt states he wears slip on shoes and does not wear socks at home  -JJ           Bed Mobility    Bed Mobility supine-sit  -JJ        Supine-Sit [place] (Bed Mobility) minimum assist (75% patient effort)  -JJ        Comment, (Bed Mobility) pt required min assist to transfer to EOB. pt states he sleeps in recliner chair at baseline.  -JJ           Functional Mobility    Functional Mobility- Ind. Level standby assist  -JJ        Functional Mobility- Device --  no assistive device  -JJ        Functional Mobility-Distance (Feet) 80  -JJ        Functional Mobility- Comment pt performed functional mobility in room x 80 feet without assistive device  -J           Transfer Assessment/Treatment    Transfers sit-stand  transfer;stand-sit transfer  -           Sit-Stand Transfer    Sit-Stand [place] (Transfers) standby assist  -        Assistive Device (Sit-Stand Transfers) --  no assistive device  -           Stand-Sit Transfer    Stand-Sit [place] (Transfers) standby assist  -        Assistive Device (Stand-Sit Transfers) --  no assistive device  -           Balance    Comment, Balance SBA for static standing balance without assistive device  -           Plan of Care Review    Plan of Care Reviewed With patient  -        Outcome Evaluation OT evaluation completed. pt required min assist for supine to sit transfer to EOB. pt states he sleeps in recliner chair at baseline. pt required SBA for functional transfers and functional mobility x 80 feet without assistive device. anticipate pt will complete ADLs  with CGA. pt states no concerns with return home. no skilled OT recommendations at this time.  -           Positioning and Restraints    Pre-Treatment Position in bed  -        Post Treatment Position chair  -        In Chair reclined;call light within reach;encouraged to call for assist  -           Therapy Assessment/Plan (OT)    Date of Referral to OT 09/13/23  -        Patient/Family Therapy Goal Statement (OT) pt states plan is to return home  -        OT Diagnosis weakness sp hospitalization  -        Criteria for Skilled Therapeutic Interventions Met (OT) no;no problems identified which require skilled intervention  -        Therapy Frequency (OT) evaluation only  -           Evaluation Complexity (OT)    Overall Complexity of Evaluation (OT) low complexity  -           Therapy Plan Review/Discharge Plan (OT)    Anticipated Discharge Disposition (OT) home  -                  User Key  (r) = Recorded By, (t) = Taken By, (c) = Cosigned By      Initials Name Effective Dates    Mercedes Saldaña, OTR 06/16/21 -                     Occupational Therapy Education       Title:  PT OT SLP Therapies (Resolved)       Topic: Occupational Therapy (Resolved)       Point: Precautions (Resolved)       Description:   Instruct learner(s) on prescribed precautions during self-care and functional transfers.                  Learning Progress Summary             Patient Acceptance, E,TB, VU by  at 9/13/2023 1145    Comment: pt educated on safety with functional transfers and mobility and adls.                                         User Key       Initials Effective Dates Name Provider Type Discipline     06/16/21 -  Mercedes Crews, OTR Occupational Therapist OT                    OT Recommendation and Plan  Therapy Frequency (OT): evaluation only  Plan of Care Review  Plan of Care Reviewed With: patient  Outcome Evaluation: OT evaluation completed. pt required min assist for supine to sit transfer to EOB. pt states he sleeps in recliner chair at baseline. pt required SBA for functional transfers and functional mobility x 80 feet without assistive device. anticipate pt will complete ADLs  with CGA. pt states no concerns with return home. no skilled OT recommendations at this time.  Plan of Care Reviewed With: patient  Outcome Evaluation: OT evaluation completed. pt required min assist for supine to sit transfer to EOB. pt states he sleeps in recliner chair at baseline. pt required SBA for functional transfers and functional mobility x 80 feet without assistive device. anticipate pt will complete ADLs  with CGA. pt states no concerns with return home. no skilled OT recommendations at this time.          Outcome Measures       Row Name 09/13/23 0920             How much help from another is currently needed...    Putting on and taking off regular lower body clothing? 3  -JJ      Bathing (including washing, rinsing, and drying) 3  -JJ      Toileting (which includes using toilet bed pan or urinal) 4  -JJ      Putting on and taking off regular upper body clothing 4  -JJ      Taking care of  personal grooming (such as brushing teeth) 4  -J      Eating meals 4  -JJ      AM-PAC 6 Clicks Score (OT) 22  -J         Functional Assessment    Outcome Measure Options AM-PAC 6 Clicks Daily Activity (OT)  -                User Key  (r) = Recorded By, (t) = Taken By, (c) = Cosigned By      Initials Name Provider Type    Mercedes Saldaña OTR Occupational Therapist                    Time Calculation:    Time Calculation- OT       Row Name 09/13/23 1146             Time Calculation- OT    OT Start Time 0919  -      OT Stop Time 0945  -      OT Time Calculation (min) 26 min  -                User Key  (r) = Recorded By, (t) = Taken By, (c) = Cosigned By      Initials Name Provider Type    Mercedes Saldaña OTR Occupational Therapist                    Therapy Charges for Today       Code Description Service Date Service Provider Modifiers Qty    77799494124 HC OT EVAL LOW COMPLEXITY 2 9/13/2023 Mercedes Crews OTR GO 1                 OT Discharge Summary  Anticipated Discharge Disposition (OT): home    PEYMAN Duran  9/13/2023

## 2023-09-13 NOTE — CASE MANAGEMENT/SOCIAL WORK
Continued Stay Note  MAGDALENA Pollock     Patient Name: Esdras Ko  MRN: [id number]  Today's Date: 9/13/2023    Admit Date: 9/12/2023    Plan: plan home, lives alone   Discharge Plan       Row Name 09/13/23 1513       Plan    Plan plan home, lives alone    Patient/Family in Agreement with Plan yes    Plan Comments Spoke with patient at bedside, he is sitting up in recliner. Face sheet verified. Patient lives in a home alone. He is independent of ADLs, he states he only drives to [place]. He has a rw and 02 2L continuous, he is unsure of the company that provides his oxygen and thinks it might be [place]. He has used HH in the past, but does not recall the agency. He has not been to inpatient rehab previously. He does not have a living will and is not interested in creating one.. He sees Dr Arias as PCP. He uses [facility] and denies issues obtaining medications. Patient would like assistance cleaning his home, [employer] brochure provided. CM # placed on white board. Patient plans to return home at [place]. CM will continue to follow.                   Discharge Codes    No documentation.                       Albino Manzanares RN

## 2023-09-13 NOTE — PROGRESS NOTES
"[facility] Clinical Pharmacy Services: Enoxaparin Consult    Esdras Ko has a pharmacy consult to dose prophylactic enoxaparin per Dr. Arias' request.     Indication: VTE Prophylaxis  Home Anticoagulation: N/A     Relevant clinical data and objective history reviewed:  73 y.o. male 170.2 cm (67\") 106 kg (233 lb)   Body mass index is 36.49 kg/m².   Results from last 7 days   Lab Units 09/12/23  1838   PLATELETS 10*3/mm3 120*     Estimated Creatinine Clearance: 73.1 mL/min (by C-G formula based on SCr of 1.03 mg/dL).    Assessment/Plan    Will start patient on 40mg subcutaneous every 24 hours, adjusted for renal function. Consult order will be discontinued but pharmacy will continue to follow.     Gene Rodriguez III, [facility]  Clinical Pharmacist    "

## 2023-09-13 NOTE — PLAN OF CARE
Goal Outcome Evaluation:  Plan of Care Reviewed With: patient           Outcome Evaluation: OT evaluation completed. pt required min assist for supine to sit transfer to EOB. pt states he sleeps in recliner chair at baseline. pt required SBA for functional transfers and functional mobility x 80 feet without assistive device. anticipate pt will complete ADLs  with CGA. pt states no concerns with return home. no skilled OT recommendations at this time.      Anticipated Discharge Disposition (OT): home

## 2023-09-13 NOTE — PLAN OF CARE
Goal Outcome Evaluation:  Plan of Care Reviewed With: patient        Progress: improving  Outcome Evaluation: Pt VS improving, am labs pending. Pt continues o2@2L, RT titrating, see RT notes, pt participating in RT treatments. Pt denies pain overnight, reports controlled with current regimen, see emar, flowsheets. Pt denies n/v/d overnight, reports tolerating diet. Pt up with assist x1-2 and walker, reports SOA with exertion. Pt resting at this time.  See previous note r/t PTA meds, pt home pharmacy not open at this time.

## 2023-09-13 NOTE — CONSULTS
Adult Nutrition  Assessment/PES    Patient Name:  Esdras Ko  YOB: 1950  MRN: [id number]  Admit Date:  9/12/2023    Assessment Date:  9/13/2023    Comments:  Encourage po and voice food prefs.     Noted loss of 99# per EMR data in 14 months. Will further assess nutrition status as able and cont to follow.      Reason for Assessment       Row Name 09/13/23 1202          Reason for Assessment    Reason For Assessment identified at risk by screening criteria     Diagnosis pulmonary disease  PNA, Covid R/O, Acute on Chronic resp failure, weakness hx DM CKD     Identified At Risk by Screening Criteria MST SCORE 2+                    Nutrition/Diet History       Row Name 09/13/23 1203          Nutrition/Diet History    Typical Intake (Food/Fluid/EN/PN) Pt receving care at visit to floor.                    Labs/Tests/Procedures/Meds       Row Name 09/13/23 1203          Labs/Procedures/Meds    Lab Results Reviewed reviewed        Diagnostic Tests/Procedures    Diagnostic Test/Procedure Reviewed reviewed        Medications    Pertinent Medications Reviewed reviewed     Pertinent Medications Comments toresemide, kcl                      Estimated/Assessed Needs - Anthropometrics       Row Name 09/13/23 1204 09/13/23 0636       Anthropometrics    Weight -- 105 kg (230 lb 14.4 oz)    Weight for Calculation 105 kg (230 lb 14.4 oz) --    Additional Documentation --  7/2022 99.1# down in 14 months per EMR data --       Estimated/Assessed Needs    Additional Documentation Estimated Calorie Needs (Group);Fluid Requirements (Group);Protein Requirements (Group) --       Estimated Calorie Needs    Estimated Calorie Requirement (kcal/day) 2272 kcal ( mifflin 1.3) --    Estimated Calorie Need Method [facility] --       Protein Requirements    Est Protein Requirement Amount (gms/kg) 1.0 gm protein  105 gm pro --       Fluid Requirements    Estimated Fluid Requirement Method RDA Method  2272 ml --                    Nutrition Prescription Ordered       Row Name 09/13/23 1205          Nutrition Prescription PO    Common Modifiers Cardiac                    Evaluation of Received Nutrient/Fluid Intake       Row Name 09/13/23 1205          Fluid Intake Evaluation    Oral Fluid (mL) 360  insufficient data        PO Evaluation    % PO Intake 100% x 2 snackas recorded                       Problem/Interventions:   Problem 1       Row Name 09/13/23 1206          Nutrition Diagnoses Problem 1    Problem 1 Other (comment)  Predicted inadequate energy intake related to PNA as evidenced by decreased appetite on admit.                          Intervention Goal       Row Name 09/13/23 1209          Intervention Goal    General Meet nutritional needs for age/condition     PO Establish PO;PO intake (%)     PO Intake % 50 %  or greater                    Nutrition Intervention       Row Name 09/13/23 1209          Nutrition Intervention    RD/Tech Action Follow Tx progress                      Education/Evaluation       Row Name 09/13/23 1209          Education    Education Education not appropriate at this time        Monitor/Evaluation    Monitor Per protocol;I&O;PO intake;Pertinent labs;Weight;Symptoms                     Electronically signed by:  Talia Palmer RD  09/13/23 12:09 EDT

## 2023-09-13 NOTE — PLAN OF CARE
Goal Outcome Evaluation:  Plan of Care Reviewed With: patient        Progress: improving  Outcome Evaluation: VSS. 1L NC. A&Ox4. Patient states he has intermittent confusing. Patient states he feels better today. SOA improving. Respiratory PCR negative. No isolation needed. PT signed off, see note.

## 2023-09-13 NOTE — PLAN OF CARE
Goal Outcome Evaluation:  Plan of Care Reviewed With: patient           Outcome Evaluation: PT Evaluation Complete: Patient performs supine to sit transfer with min A, sit to/from stand transfers with SBA, and gait x 80 feet with SBA without use of an AD.  Patient demonstrates slow gait speed with wide base, he reports this is baseline. No loss of balance noted. Patient on 2L O2/NC, O2 sats following gait noted to be 92%. Patient is agitated throughout evaluation, unreceptive to all information/education from therapist. Patient remained angry throughout despite encouragement and multiple attempts to resolve complaints. Patient reports no concerns regarding his mobility or return home. No inpatient skilled PT needs at this time.      Anticipated Discharge Disposition (PT): home

## 2023-09-13 NOTE — H&P
HISTORY AND PHYSICAL      Patient Care Team:  Nitesh Arias MD as PCP - General (Family Medicine)  Irving Restrepo MD as Consulting Physician (Hematology and Oncology)  Nitesh Arias MD as Referring Physician (Family Medicine)    CHIEF COMPLAINT: Cough, chest congestion, with generalized weakness and shortness of breath    HISTORY OF PRESENT ILLNESS:    73-year-old white male with multiple medical problems who was in usual state of health and about 1 week ago started having some intermittent cough with nonproductive sputum some chest congestion with progressively worsening generalized weakness we can get up and walk today.  Has been more short of breath and felt like his legs which keep him up.  No any fever chills no nausea vomiting or diarrhea.  He does have a history of COPD but only uses as needed albuterol.  He has chronic hypoxic respiratory failure on 2 L of oxygen which he is compliant with.  He normally ambulates in his home without assistance.    He was evaluated emergency room found to have pneumonia but was very weak unable to ambulate without using a walker and dropped his sats to mid 80s even on oxygen is admitted for further evaluation and treatment.    Past Medical History:   Diagnosis Date    Anemia     Bladder cancer 2008    Cerebrovascular accident     Ischemic and TIAs; most recent 02/2014 which was a TIA.    Chronic kidney disease, stage 3     Dr. KATHERYN Lin    Colon polyp     COPD (chronic obstructive pulmonary disease)     History of blood transfusion     Hyperlipidemia     Hypertension     Hyperuricemia     Left shoulder pain     Lymphoma     MANTLE CELL, HAD CHEMO  2010    Osteoarthritis     Pilonidal cyst     SCHEDULED FOR SX    Psoriasis     Seizure     Following head trauma in 1970    Skin lesions     SCARRING FROM SHINGLES, & BLISTERS FROM EDEMA    Sleep apnea     Intolerant of CPAP    Splenomegaly 2/21/2013    Stroke     Tobacco abuse     Type 2 diabetes  mellitus      Past Surgical History:   Procedure Laterality Date    BRONCHOSCOPY N/A 2017    Procedure: BRONCHOSCOPY with fluro and biopsy and lavage.;  Surgeon: Red Topete MD;  Location: [facility] OR;  Service:     CARDIAC CATHETERIZATION N/A 2018    Procedure: Coronary angiography;  Surgeon: Kylie Victoria MD;  Location:  [facility];  Service: Cardiovascular    CARDIAC CATHETERIZATION N/A 10/30/2018    Procedure: Chronic Total Occlussion;  Surgeon: Roe Wheatley MD;  Location:  [facility];  Service: Cardiovascular    CARDIAC CATHETERIZATION N/A 10/30/2018    Procedure: Stent AURA coronary;  Surgeon: Roe Wheatley MD;  Location:  [facility];  Service: Cardiovascular    COLONOSCOPY N/A 10/9/2019    Procedure: COLONOSCOPY with polypectomy;  Surgeon: Lee Priest MD;  Location: [facility] OR;  Service: Gastroenterology    PILONIDAL CYSTECTOMY N/A 2016    Procedure: PILONIDAL CYSTECTOMY;  Surgeon: Roe Davila MD;  Location: [facility] OR;  Service:     SINUS SURGERY      SKIN LESION EXCISION      DR. DAVILA ABOUT 10 YEARS AGO     Family History   Problem Relation Age of Onset    Heart defect Mother     Pancreatic cancer Father 56    Anemia Sister     Hypertension Brother     Heart disease Brother     Mental illness Brother     Other Brother         Splenectomy    Cancer Cousin      Social History     Tobacco Use    Smoking status: Former     Packs/day: 3.00     Years: 50.00     Pack years: 150.00     Types: Cigarettes     Quit date:      Years since quittin.7    Smokeless tobacco: Never    Tobacco comments:     80 pack year   Vaping Use    Vaping Use: Never used   Substance Use Topics    Alcohol use: No     Comment: Heavy in past, none since around     Drug use: No     Medications Prior to Admission   Medication Sig Dispense Refill Last Dose    acetaminophen (TYLENOL) 325 MG tablet Take 2 tablets by mouth Every 4 (Four) Hours As  Needed for Mild Pain  or Fever.       allopurinol (ZYLOPRIM) 300 MG tablet Take 300 mg by mouth Every Morning. take 1 tablet by mouth daily  0     AMITIZA 24 MCG capsule Take 24 mcg by mouth 2 (Two) Times a Day As Needed.  0     atorvastatin (LIPITOR) 20 MG tablet Take 20 mg by mouth Daily.       cetirizine (ZyrTEC) 10 MG tablet Take 10 mg by mouth Every Morning.       clobetasol (TEMOVATE) 0.05 % cream apply to affected area (RASH FREELY) twice a day  0     clopidogrel (PLAVIX) 75 MG tablet Take 75 mg by mouth Every Morning.  0     FLUoxetine (PROZAC) 20 MG capsule Take 20 mg by mouth Daily.       hydrophor (AQUAPHOR) ointment ointment Apply  topically to the appropriate area as directed Daily.       ipratropium-albuterol (DUO-NEB) 0.5-2.5 mg/3 ml nebulizer Take 3 mL by nebulization Every 4 (Four) Hours As Needed for Shortness of Air. 360 mL 0     isosorbide mononitrate (IMDUR) 30 MG 24 hr tablet Take 1 tablet by mouth Every Morning for 30 days. 30 tablet 0     levETIRAcetam (KEPPRA) 1000 MG tablet Take 500 mg by mouth 2 (Two) Times a Day.  0     lubiprostone (Amitiza) 24 MCG capsule Take 1 capsule by mouth Daily With Breakfast.       metoprolol tartrate (LOPRESSOR) 50 MG tablet Take 50 mg by mouth 2 (two) times a day.  0     O2 (OXYGEN) Inhale 2 L/min Daily.       pantoprazole (PROTONIX) 40 MG EC tablet Take 40 mg by mouth Every Morning. take 1 tablet by mouth once daily  0     potassium chloride ER (K-TAB) 20 MEQ tablet controlled-release ER tablet Take 40 mEq by mouth Daily.       torsemide (DEMADEX) 100 MG tablet Take 0.5 tablets by mouth Daily for 30 days. 15 tablet 0     torsemide (DEMADEX) 100 MG tablet Take 100 mg by mouth Daily.        Allergies:  Amoxicillin, Ceftin [cefuroxime axetil], Codeine, Flonase [fluticasone], Spiriva handihaler [tiotropium bromide monohydrate], Sulfa antibiotics, and Phenobarbital     Review of Systems   Constitutional:  Positive for activity change, fatigue and unexpected weight  "change (3 pound weight loss over the last year). Negative for appetite change.   HENT:  Negative for congestion.    Respiratory:  Positive for cough and shortness of breath. Negative for chest tightness and wheezing.    Cardiovascular:  Negative for chest pain.   Gastrointestinal:  Negative for abdominal distention, abdominal pain, diarrhea, nausea and vomiting.   Endocrine: Negative for polyphagia and polyuria.   Genitourinary:  Positive for difficulty urinating. Negative for frequency.   Skin:  Negative for rash.   Neurological:  Positive for weakness. Negative for light-headedness.   Hematological:  Does not bruise/bleed easily.   Psychiatric/Behavioral:  Negative for agitation and behavioral problems.      Vital Signs  Temp:  [97.3 °F (36.3 °C)-98.6 °F (37 °C)] 98.6 °F (37 °C)  Heart Rate:  [60-99] 93  Resp:  [18-32] 18  BP: (138-185)/() 138/90  Oxygen Therapy  SpO2: 96 %  Pulse Oximetry Type: Continuous  Device (Oxygen Therapy): nasal cannula  Flow (L/min): 2}  Body mass index is 36.16 kg/m².  Flowsheet Rows      Flowsheet Row First Filed Value   Admission Height 170.2 cm (67\") Documented at 09/12/2023 1812   Admission Weight 106 kg (233 lb) Documented at 09/12/2023 1812                   Physical Exam  Vitals and nursing note reviewed.   Constitutional:       Appearance: He is well-developed. He is morbidly obese.   HENT:      Head: Normocephalic.   Eyes:      Conjunctiva/sclera: Conjunctivae normal.   Neck:      Thyroid: No thyromegaly.      Vascular: No JVD.   Cardiovascular:      Rate and Rhythm: Normal rate and regular rhythm.      Heart sounds: Normal heart sounds. No murmur heard.  Pulmonary:      Effort: Pulmonary effort is normal. No respiratory distress.      Breath sounds: Decreased breath sounds and wheezing (Few scattered wheezes) present. No rales.   Abdominal:      General: Bowel sounds are normal. There is no distension.      Palpations: Abdomen is soft.      Tenderness: There is no " abdominal tenderness. There is no guarding.   Skin:     General: Skin is warm and dry.      Findings: No rash.   Neurological:      General: No focal deficit present.      Mental Status: He is alert and oriented to person, place, and time.        Debilities/Disabilities Identified: None    Emotional Behavior: Appropriate    Result Review        I have personally reviewed the results from the time of this admission to 9/13/2023 07:37 EDT and agree with these findings:  [x]  Laboratory  []  Microbiology  [x]  Radiology  [x]  EKG/Telemetry   []  Cardiology/Vascular   []  Pathology  [x]  Old records  []  Other:          Results Review:    I reviewed the patient's new clinical results.  Lab Results (most recent)       Procedure Component Value Units Date/Time    Respiratory Panel PCR w/COVID-19(SARS-CoV-2) [facility] In-House, NP Swab in UTM/VTM, 3-4 HR TAT - Swab, Nasopharynx [[id number]] Collected: 09/13/23 0125    Specimen: Swab from Nasopharynx Updated: 09/13/23 0141    Lactic Acid, Plasma [[id number]]  (Normal) Collected: 09/12/23 2311    Specimen: Blood from Arm, Left Updated: 09/12/23 2347     Lactate 1.2 mmol/L     Blood Culture - Blood, Arm, Left [[id number]] Collected: 09/12/23 2311    Specimen: Blood from Arm, Left Updated: 09/12/23 2334    Blood Culture - Blood, Arm, Right [[id number]] Collected: 09/12/23 2255    Specimen: Blood from Arm, Right Updated: 09/12/23 2334    High Sensitivity Troponin T 2Hr [[id number]]  (Normal) Collected: 09/12/23 2054    Specimen: Blood from Arm, Right Updated: 09/12/23 2119     HS Troponin T 8 ng/L      Troponin T Delta 0 ng/L     Narrative:      High Sensitive Troponin T Reference Range:  <10.0 ng/L- Negative Female for AMI  <15.0 ng/L- Negative Male for AMI  >=10 - Abnormal Female indicating possible myocardial injury.  >=15 - Abnormal Male indicating possible myocardial injury.   Clinicians would have to utilize clinical acumen, EKG, Troponin, and serial changes  "to determine if it is an Acute Myocardial Infarction or myocardial injury due to an underlying chronic condition.         BNP [[id number]]  (Normal) Collected: 09/12/23 1838    Specimen: Blood Updated: 09/12/23 2116     proBNP 264.1 pg/mL     Narrative:      Among patients with dyspnea, NT-proBNP is highly sensitive for the detection of acute congestive heart failure. In addition NT-proBNP of <300 pg/ml effectively rules out acute congestive heart failure with 99% negative predictive value.      D-dimer, Quantitative [[id number]]  (Normal) Collected: 09/12/23 1838    Specimen: Blood Updated: 09/12/23 2103     D-Dimer, Quantitative <0.27 MCGFEU/mL     Narrative:      According to the assay 's published package insert, a normal (<0.50 MCGFEU/mL) D-dimer result in conjunction with a non-high clinical probability assessment, excludes deep vein thrombosis (DVT) and pulmonary embolism (PE) with high sensitivity.    D-dimer values increase with age and this can make VTE exclusion of an older population difficult. To address this, the [employer], based on best available evidence and recent guidelines, recommends that clinicians use age-adjusted D-dimer thresholds in patients greater than 50 years of age with: a) a low probability of PE who do not meet all Pulmonary Embolism Rule Out Criteria, or b) in those with intermediate probability of PE.   The formula for an age-adjusted D-dimer cut-off is \"age/100\".  For example, a 60 year old patient would have an age-adjusted cut-off of 0.60 MCGFEU/mL and an 80 year old 0.80 MCGFEU/mL.    Procalcitonin [[id number]]  (Normal) Collected: 09/12/23 1838    Specimen: Blood Updated: 09/1949     Procalcitonin 0.07 ng/mL     Narrative:      As a Marker for Sepsis (Non-Neonates):    1. <0.5 ng/mL represents a low risk of severe sepsis and/or septic shock.  2. >2 ng/mL represents a high risk of severe sepsis and/or septic shock.    As a Marker for Lower " "Respiratory Tract Infections that require antibiotic therapy:    PCT on Admission    Antibiotic Therapy       6-12 Hrs later    >0.5                Strongly Recommended  >0.25 - <0.5        Recommended   0.1 - 0.25          Discouraged              Remeasure/reassess PCT  <0.1                Strongly Discouraged     Remeasure/reassess PCT    As 28 day mortality risk marker: \"Change in Procalcitonin Result\" (>80% or <=80%) if Day 0 (or Day 1) and Day 4 values are available. Refer to http://[email or web address] Studiopct-calculator.com    Change in PCT <=80%  A decrease of PCT levels below or equal to 80% defines a positive change in PCT test result representing a higher risk for 28-day all-cause mortality of patients diagnosed with severe sepsis for septic shock.    Change in PCT >80%  A decrease of PCT levels of more than 80% defines a negative change in PCT result representing a lower risk for 28-day all-cause mortality of patients diagnosed with severe sepsis or septic shock.       CBC & Differential [[id number]]  (Abnormal) Collected: 09/12/23 1838    Specimen: Blood Updated: 09/12/23 1947    Narrative:      The following orders were created for panel order CBC & Differential.  Procedure                               Abnormality         Status                     ---------                               -----------         ------                     CBC Auto Differential[[id number]]        Abnormal            Final result               Scan Slide[[id number]]                                       Final result                 Please view results for these tests on the individual orders.    Scan Slide [[id number]] Collected: 09/12/23 1838    Specimen: Blood Updated: 09/12/23 1947     RBC Morphology Normal     WBC Morphology Normal     Platelet Estimate Decreased    Urine Drug Screen - Urine, Clean Catch [[id number]]  (Normal) Collected: 09/12/23 1910    Specimen: Urine, Clean Catch Updated: 09/12/23 1939     THC, Screen, Urine Negative "     Phencyclidine (PCP), Urine Negative     Cocaine Screen, Urine Negative     Methamphetamine, Ur Negative     Opiate Screen Negative     Amphetamine Screen, Urine Negative     Benzodiazepine Screen, Urine Negative     Tricyclic Antidepressants Screen Negative     Methadone Screen, Urine Negative     Barbiturates Screen, Urine Negative     Oxycodone Screen, Urine Negative     Propoxyphene Screen Negative     Buprenorphine, Screen, Urine Negative    Narrative:      Urine drug screen results are to be used for medical purposes only.  They are not to be used for legal purposes such as employment testing.  Negative results do not necessarily mean the complete absence of a subtance, but rather that the result is less than the cutoff for that substance.  Positive results are unconfirmed and considered Preliminary Positive.  [facility] does not automatically confirm Postitive Unconfirmed results.  The physician may request (order) an Unconfirmed Positive result to be sent out for confirmation.      Negative Thresholds for Drugs Screened:    THC screen, urine                          50 ng/ml  Phenycyclidine (PCP), urine                25 ng/ml  Cocaine screen, urine                     150 ng/ml  Methamphetamine, urine                    500 ng/ml  Opiate screen, urine                      100 ng/ml  Amphetamine screen, urine                 500 ng/ml  Benzodiazepine screen, urine              150 ng/ml  Tricyclic Antidepressants screen, urine   300 ng/ml  Methadone screen, urine                   200 ng/ml  Barbiturates screen, urine                200 ng/ml  Oxycodone screen, urine                   100 ng/ml  Propoxyphene screen, urine                300 ng/ml  Buprenorphine screen, urine                10 ng/ml    Urinalysis, Microscopic Only - Urine, Clean Catch [[id number]]  (Abnormal) Collected: 09/12/23 1910    Specimen: Urine, Clean Catch Updated: 09/12/23 1934     RBC, UA 0-2 /HPF      WBC, UA 0-2  /HPF      Bacteria, UA Trace /HPF      Squamous Epithelial Cells, UA 0-2 /HPF      Hyaline Casts, UA None Seen /LPF      Methodology Manual Light Microscopy    Urinalysis With Microscopic If Indicated (No Culture) - Urine, Clean Catch [[id number]]  (Abnormal) Collected: 09/12/23 1910    Specimen: Urine, Clean Catch Updated: 09/12/23 1934     Color, UA Yellow     Appearance, UA Clear     pH, UA 5.5     Specific Gravity, UA 1.015     Glucose, UA Negative     Ketones, UA Negative     Bilirubin, UA Negative     Blood, UA Trace     Protein, UA 30 mg/dL (1+)     Leuk Esterase, UA Negative     Nitrite, UA Negative     Urobilinogen, UA 0.2 E.U./dL    High Sensitivity Troponin T [[id number]]  (Normal) Collected: 09/12/23 1838    Specimen: Blood Updated: 09/12/23 1928     HS Troponin T 8 ng/L     Narrative:      High Sensitive Troponin T Reference Range:  <10.0 ng/L- Negative Female for AMI  <15.0 ng/L- Negative Male for AMI  >=10 - Abnormal Female indicating possible myocardial injury.  >=15 - Abnormal Male indicating possible myocardial injury.   Clinicians would have to utilize clinical acumen, EKG, Troponin, and serial changes to determine if it is an Acute Myocardial Infarction or myocardial injury due to an underlying chronic condition.         TSH [[id number]]  (Normal) Collected: 09/12/23 1838    Specimen: Blood Updated: 09/12/23 1928     TSH 2.610 uIU/mL     Ammonia [[id number]]  (Normal) Collected: 09/12/23 1838    Specimen: Blood Updated: 09/12/23 1921     Ammonia 24 umol/L     COVID-19 and FLU A/B PCR - Swab, Nasopharynx [[id number]]  (Normal) Collected: 09/12/23 1841    Specimen: Swab from Nasopharynx Updated: 09/12/23 1920     COVID19 Not Detected     Influenza A PCR Not Detected     Influenza B PCR Not Detected    Narrative:      Fact sheet for providers: https://www.fda.gov/media/683724/download    Fact sheet for patients: https://www.fda.gov/media/571098/download    Test performed by PCR.    Comprehensive  Metabolic Panel [[id number]]  (Abnormal) Collected: 09/12/23 1838    Specimen: Blood Updated: 09/12/23 1917     Glucose 92 mg/dL      BUN 19 mg/dL      Creatinine 1.03 mg/dL      Sodium 140 mmol/L      Potassium 4.1 mmol/L      Chloride 97 mmol/L      CO2 32.2 mmol/L      Calcium 9.1 mg/dL      Total Protein 6.3 g/dL      Albumin 4.4 g/dL      ALT (SGPT) 8 U/L      AST (SGOT) 13 U/L      Alkaline Phosphatase 124 U/L      Total Bilirubin 0.7 mg/dL      Globulin 1.9 gm/dL      A/G Ratio 2.3 g/dL      BUN/Creatinine Ratio 18.4     Anion Gap 10.8 mmol/L      eGFR 76.2 mL/min/1.73     Narrative:      GFR Normal >60  Chronic Kidney Disease <60  Kidney Failure <15    The GFR formula is only valid for adults with stable renal function between ages 18 and 70.    Ethanol [[id number]] Collected: 09/12/23 1838    Specimen: Blood Updated: 09/12/23 1917     Ethanol <10 mg/dL      Ethanol % <0.010 %     CBC Auto Differential [[id number]]  (Abnormal) Collected: 09/12/23 1838    Specimen: Blood Updated: 09/12/23 1910     WBC 6.09 10*3/mm3      RBC 5.04 10*6/mm3      Hemoglobin 14.0 g/dL      Hematocrit 43.7 %      MCV 86.7 fL      MCH 27.8 pg      MCHC 32.0 g/dL      RDW 15.5 %      RDW-SD 48.5 fl      MPV 11.3 fL      Platelets 120 10*3/mm3      Neutrophil % 69.5 %      Lymphocyte % 16.6 %      Monocyte % 7.9 %      Eosinophil % 4.8 %      Basophil % 0.5 %      Immature Grans % 0.7 %      Neutrophils, Absolute 4.24 10*3/mm3      Lymphocytes, Absolute 1.01 10*3/mm3      Monocytes, Absolute 0.48 10*3/mm3      Eosinophils, Absolute 0.29 10*3/mm3      Basophils, Absolute 0.03 10*3/mm3      Immature Grans, Absolute 0.04 10*3/mm3      nRBC 0.0 /100 WBC             Imaging Results (Most Recent)       Procedure Component Value Units Date/Time    CT Head Without Contrast [[id number]] Collected: 09/12/23 2022     Updated: 09/12/23 2107    Narrative:      CT HEAD, NONCONTRAST,     HISTORY:  Confusion and vertigo     COMPARISON:  11/30/2014         TECHNIQUE:  CT examination of the head was performed without IV contrast. Radiation  dose reduction techniques included automated exposure control or  exposure modulation based on body size. Radiation audit for CT and  nuclear cardiology exams in the last12 months: 1.           FINDINGS:  No acute intracranial hemorrhage. Right parietal encephalomalacia with  volume loss. Unchanged overlying calvarial abnormality likely reflects  sequelae of remote insult. Mild ventriculomegaly is likely ex vacuo. No  displaced calvarial fracture..       Impression:      Chronic changes without acute process visualized by noncontrast CT.     This report was finalized on 9/12/2023 8:29 PM by Jordi Prasad MD.       XR Chest 1 View [[id number]] Collected: 09/12/23 2032     Updated: 09/12/23 2106    Narrative:      AP PORTABLE CHEST,     HISTORY:  Shortness of air     COMPARISON:  4/10/2023     TECHNIQUE:  AP portable chest x-ray.     FINDINGS:  Borderline heart size. Heart size and pulmonary markings are accentuated  by AP technique. Allowing for this there does appear to be coarsening of  the pulmonary interstitium and suspicion for a right middle lobe  infiltrate obscuring the right heart border. Small left pleural  effusion.       Impression:      Favor coarsening of the pulmonary interstitium with right middle lobe  infiltrate and small left pleural effusion.     This report was finalized on 9/12/2023 8:32 PM by Jordi Prasad MD.             reviewed    ECG/EMG Results (most recent)       Procedure Component Value Units Date/Time    ECG 12 Lead Other; SOB [[id number]] Collected: 09/12/23 1816     Updated: 09/12/23 1817     QT Interval 404 ms      QTC Interval 401 ms     Narrative:      HEART RATE= 59  bpm  RR Interval= 1016  ms  RI Interval= 170  ms  P Horizontal Axis= 25  deg  P Front Axis= -23  deg  QRSD Interval= 99  ms  QT Interval= 404  ms  QTcB= 401  ms  QRS Axis= -78  deg  T Wave Axis= 9  deg  - ABNORMAL ECG -  Sinus  rhythm  Left anterior fascicular block  Low voltage, precordial leads  Probable anterolateral infarct, old  Electronically Signed By:   Date and Time of Study: 2023-09-12 18:16:11          reviewed    Assessment & Plan       .  Community-acquired right middle lobe pneumonia started on IV doxycycline respiratory treatments pulmonary toilet sputum cultures to be done respiratory viral panel will be done    .  Acute on chronic hypoxic respiratory failure continue oxygen supplementation keep sats above 90    .  Mild COPD exacerbation duo nebs respiratory toilet    .  Generalized weakness secondary to pneumonia PT OT will be consulted      .  Chronic diastolic congestive heart failure without any decompensation continue with home medications    .  Adult-onset diabetes mellitus diet controlled    .  Hypertension at goal nothing acute continue home medications    .  Chronic kidney disease stage III better than baseline    .  GERD nothing acute continue PPI    .  Coronary disease status post stent placement to LAD nothing acute Home medicines to be continued    .  Weight loss due to psychosocial issues patient able to get to the grocery store to get groceries have discussed with his daughter-in-law on this and is having staff delivered to his house his weight loss has stabilized    .  Morbid obesity complicating all aspects of care    .  DVT prophylaxis Lovenox    .     I discussed the patients findings and my recommendations with patient .     Nitesh Arias MD  09/13/23  07:37 EDT        Much of this encounter note is an electronic transcription/translation of spoken language to printed text. The electronic translation of spoken language may permit erroneous, or at times, nonsensical words or phrases to be inadvertently transcribed; Although I have reviewed the note for such errors, some may still exist.    Note Disclaimer: At [facility], we believe that sharing information builds trust and better  relationships. You are receiving this note because you recently visited [facility]. It is possible you will see health information before a provider has talked with you about it. This kind of information can be easy to misunderstand. To help you fully understand what it means for your health, we urge you to discuss this note with your provider.

## 2023-09-13 NOTE — THERAPY DISCHARGE NOTE
Patient Name: Esdras Ko  : 1950    MRN: [id number]                              Today's Date: 2023       Admit Date: 2023    Visit Dx:     ICD-10-CM ICD-9-CM   1. Community acquired pneumonia of right middle lobe of lung  J18.9 486     Patient Active Problem List   Diagnosis    Mantle cell lymphoma    Chronic obstructive pulmonary disease    Chronic kidney disease    Seizure    Lymphadenopathy    History of neoplasm of bladder    Splenomegaly    Adenomatous polyp of colon    Coronary artery disease involving native coronary artery of native heart without angina pectoris    Type 2 diabetes mellitus with circulatory disorder, without long-term current use of insulin    Swelling of both lower extremities    Personal history of colonic polyps    Morbid obesity with BMI of 50.0-59.9, adult    Chronic right-sided congestive heart failure    Pneumonia     Past Medical History:   Diagnosis Date    Anemia     Bladder cancer     Cerebrovascular accident     Ischemic and TIAs; most recent 2014 which was a TIA.    Chronic kidney disease, stage 3     Dr. KATHERYN Lin    Colon polyp     COPD (chronic obstructive pulmonary disease)     History of blood transfusion     Hyperlipidemia     Hypertension     Hyperuricemia     Left shoulder pain     Lymphoma     MANTLE CELL, HAD CHEMO      Osteoarthritis     Pilonidal cyst     SCHEDULED FOR SX    Psoriasis     Seizure     Following head trauma in     Skin lesions     SCARRING FROM SHINGLES, & BLISTERS FROM EDEMA    Sleep apnea     Intolerant of CPAP    Splenomegaly 2013    Stroke     Tobacco abuse     Type 2 diabetes mellitus      Past Surgical History:   Procedure Laterality Date    BRONCHOSCOPY N/A 2017    Procedure: BRONCHOSCOPY with fluro and biopsy and lavage.;  Surgeon: Red Topete MD;  Location: [facility];  Service:     CARDIAC CATHETERIZATION N/A 2018    Procedure: Coronary angiography;  Surgeon: Kylie Victoria MD;  Location:   [facility];  Service: Cardiovascular    CARDIAC CATHETERIZATION N/A 10/30/2018    Procedure: Chronic Total Occlussion;  Surgeon: Roe Wheatley MD;  Location:  [facility];  Service: Cardiovascular    CARDIAC CATHETERIZATION N/A 10/30/2018    Procedure: Stent AURA coronary;  Surgeon: Roe Wheatley MD;  Location:  [facility];  Service: Cardiovascular    COLONOSCOPY N/A 10/9/2019    Procedure: COLONOSCOPY with polypectomy;  Surgeon: Lee Priest MD;  Location:  [facility];  Service: Gastroenterology    PILONIDAL CYSTECTOMY N/A 11/30/2016    Procedure: PILONIDAL CYSTECTOMY;  Surgeon: Roe Davila MD;  Location:  [facility];  Service:     SINUS SURGERY      SKIN LESION EXCISION      DR. DAVILA ABOUT 10 YEARS AGO      General Information       Row Name 09/13/23 1238          Physical Therapy Time and Intention    Document Type discharge evaluation/summary  -BP     Mode of Treatment physical therapy  -BP       Row Name 09/13/23 1238          General Information    Patient Profile Reviewed yes  Patient presents due to cough, congestion, and generalized weakness. patient admitted for community aquired R middle lob PNA. Pt with acute on chronic COPD, O2 dependent at 2L  -BP     Prior Level of Function --  patient reports independence with all mobility at baseline without use of an AD.  -BP     Existing Precautions/Restrictions fall  -BP     Barriers to Rehab uncooperative  Patient is agitated and verbally agressive throughout evaluation. Patient is unreceptive to all information/education. He remains angry despite encouragement and multiple attempts to resolve complaints.  -BP       Row Name 09/13/23 1238          Living Environment    People in Home alone  -BP       Row Name 09/13/23 1238          Cognition    Orientation Status (Cognition) oriented x 3  -BP       Row Name 09/13/23 1238          Safety Issues, Functional Mobility    Comment, Safety  Issues/Impairments (Mobility) Patient very agitated throughout, unreceptive to all input or information from therapist.  -BP               User Key  (r) = Recorded By, (t) = Taken By, (c) = Cosigned By      Initials Name Provider Type    Zara Dhillon, PT Physical Therapist                   Mobility       Row Name 09/13/23 1243          Bed Mobility    Bed Mobility supine-sit  -BP     Supine-Sit [place] (Bed Mobility) minimum assist (75% patient effort)  -BP     Comment, (Bed Mobility) Patient requires min A to transfer to EOB however he sleeps in a recliner at baseline  -BP       Row Name 09/13/23 1243          Sit-Stand Transfer    Sit-Stand [place] (Transfers) standby assist  -BP     Assistive Device (Sit-Stand Transfers) --  no device  -BP       Row Name 09/13/23 1243          Gait/Stairs (Locomotion)    [place] Level (Gait) standby assist  -BP     Assistive Device (Gait) --  no device  -BP     Distance in Feet (Gait) 80  -BP     Deviations/Abnormal Patterns (Gait) gait speed decreased;base of support, wide  -BP     Bilateral Gait Deviations forward flexed posture  -BP     Comment, (Gait/Stairs) Patient does not use a device at baseline and does not use one during evaluation. Gait performed in room due to enhanced precautions. No loss of balance noted.  -BP               User Key  (r) = Recorded By, (t) = Taken By, (c) = Cosigned By      Initials Name Provider Type    Zara Dhillon PT Physical Therapist                   Obj/Interventions       Row Name 09/13/23 1245          Range of Motion Comprehensive    Comment, General Range of Motion B LE AROM WFL  -BP       Row Name 09/13/23 1249          Strength Comprehensive (MMT)    Comment, General Manual Muscle Testing (MMT) Assessment B LE strength functional. Unable to formerly assess due to patient agitation  -BP       Row Name 09/13/23 1240          Balance    Comment, Balance sitting balance-supervision. Static standing  balance-SBA without use of an AD.  -BP               User Key  (r) = Recorded By, (t) = Taken By, (c) = Cosigned By      Initials Name Provider Type    Zara Dhillon, PT Physical Therapist                   Goals/Plan    No documentation.                  Clinical Impression       Row Name 09/13/23 1246          Pain    Pretreatment Pain Rating 0/10 - no pain  -BP     Posttreatment Pain Rating 0/10 - no pain  -BP     Additional Documentation Pain Scale: Numbers Pre/Post-Treatment (Group)  -BP       Row Name 09/13/23 1246          Plan of Care Review    Plan of Care Reviewed With patient  -BP     Outcome Evaluation PT Evaluation Complete: Patient performs supine to sit transfer with min A, sit to/from stand transfers with SBA, and gait x 80 feet with SBA without use of an AD.  Patient demonstrates slow gait speed with wide base, he reports this is baseline. No loss of balance noted. Patient on 2L O2/NC, O2 sats following gait noted to be 92%. Patient is agitated throughout evaluation, unreceptive to all information/education from therapist. Patient remained angry throughout despite encouragement and multiple attempts to resolve complaints. Patient reports no concerns regarding his mobility or return home. No inpatient skilled PT needs at this time.  -BP       Row Name 09/13/23 1246          Therapy Assessment/Plan (PT)    Criteria for Skilled Interventions Met (PT) no problems identified which require skilled intervention  -BP     Therapy Frequency (PT) evaluation only  -BP       Row Name 09/13/23 1246          Positioning and Restraints    Pre-Treatment Position in bed  -BP     Post Treatment Position chair  -BP     In Chair notified nsg;reclined;call light within reach;encouraged to call for assist  -BP               User Key  (r) = Recorded By, (t) = Taken By, (c) = Cosigned By      Initials Name Provider Type    Zara Dhillon, PT Physical Therapist                   Outcome Measures       Row Name  09/13/23 1251          How much help from another person do you currently need...    Turning from your back to your side while in flat bed without using bedrails? 3  -BP     Moving from lying on back to sitting on the side of a flat bed without bedrails? 3  -BP     Moving to and from a bed to a chair (including a wheelchair)? 3  -BP     Standing up from a chair using your arms (e.g., wheelchair, bedside chair)? 3  -BP     Climbing 3-5 steps with a railing? 3  -BP     To walk in hospital room? 3  -BP     AM-PAC 6 Clicks Score (PT) 18  -BP     Highest level of mobility 6 --> Walked 10 steps or more  -       Row Name 09/13/23 1251 09/13/23 0920       Functional Assessment    Outcome Measure Options AM-PAC 6 Clicks Basic Mobility (PT)  - AM-PAC 6 Clicks Daily Activity (OT)  -              User Key  (r) = Recorded By, (t) = Taken By, (c) = Cosigned By      Initials Name Provider Type    Mercedes Saldaña, OTR Occupational Therapist    Zara Dhillon, PT Physical Therapist                  Physical Therapy Education       Title: PT OT SLP Therapies (Resolved)       Topic: Physical Therapy (Resolved)       Point: Mobility training (Resolved)       Learning Progress Summary             Patient Nonacceptance, E,TB, NL,RT by  at 9/13/2023 1251                                         User Key       Initials Effective Dates Name Provider Type Discipline     06/16/21 -  Zara Cervantes, PT Physical Therapist PT                  PT Recommendation and Plan     Plan of Care Reviewed With: patient  Outcome Evaluation: PT Evaluation Complete: Patient performs supine to sit transfer with min A, sit to/from stand transfers with SBA, and gait x 80 feet with SBA without use of an AD.  Patient demonstrates slow gait speed with wide base, he reports this is baseline. No loss of balance noted. Patient on 2L O2/NC, O2 sats following gait noted to be 92%. Patient is agitated throughout evaluation, unreceptive to all  information/education from therapist. Patient remained angry throughout despite encouragement and multiple attempts to resolve complaints. Patient reports no concerns regarding his mobility or return home. No inpatient skilled PT needs at this time.     Time Calculation:   PT Evaluation Complexity  History, PT Evaluation Complexity: 3 or more personal factors and/or comorbidities  Examination of Body Systems (PT Eval Complexity): 1-2 elements  Clinical Presentation (PT Evaluation Complexity): stable  Clinical Decision Making (PT Evaluation Complexity): low complexity  Overall Complexity (PT Evaluation Complexity): low complexity     PT Charges       Row Name 09/13/23 1254             Time Calculation    Start Time 0919  -BP      Stop Time 0945  -BP      Time Calculation (min) 26 min  -BP      PT Received On 09/13/23  -BP                User Key  (r) = Recorded By, (t) = Taken By, (c) = Cosigned By      Initials Name Provider Type    Zara Dhillon, PT Physical Therapist                  Therapy Charges for Today       Code Description Service Date Service Provider Modifiers Qty    [id number] HC PT EVAL LOW COMPLEXITY 2 9/13/2023 Zara Cervantes, PT GP 1            PT G-Codes  Outcome Measure Options: AM-PAC 6 Clicks Basic Mobility (PT)  AM-PAC 6 Clicks Score (PT): 18  AM-PAC 6 Clicks Score (OT): 22    PT Discharge Summary  Anticipated Discharge Disposition (PT): home  Reason for Discharge: At baseline function  Discharge Destination: Home    Zara Cervantes PT  9/13/2023

## 2023-09-14 LAB
ANION GAP SERPL CALCULATED.3IONS-SCNC: 8.1 MMOL/L (ref 5–15)
BUN SERPL-MCNC: 24 MG/DL (ref 8–23)
BUN/CREAT SERPL: 21.6 (ref 7–25)
CALCIUM SPEC-SCNC: 8.7 MG/DL (ref 8.6–10.5)
CHLORIDE SERPL-SCNC: 98 MMOL/L (ref 98–107)
CO2 SERPL-SCNC: 30.9 MMOL/L (ref 22–29)
CREAT SERPL-MCNC: 1.11 MG/DL (ref 0.76–1.27)
DEPRECATED RDW RBC AUTO: 49.1 FL (ref 37–54)
EGFRCR SERPLBLD CKD-EPI 2021: 69.7 ML/MIN/1.73
ERYTHROCYTE [DISTWIDTH] IN BLOOD BY AUTOMATED COUNT: 15.6 % (ref 12.3–15.4)
GLUCOSE SERPL-MCNC: 103 MG/DL (ref 65–99)
HCT VFR BLD AUTO: 41 % (ref 37.5–51)
HGB BLD-MCNC: 13 G/DL (ref 13–17.7)
MCH RBC QN AUTO: 27.7 PG (ref 26.6–33)
MCHC RBC AUTO-ENTMCNC: 31.7 G/DL (ref 31.5–35.7)
MCV RBC AUTO: 87.2 FL (ref 79–97)
PLATELET # BLD AUTO: 111 10*3/MM3 (ref 140–450)
PMV BLD AUTO: 11.3 FL (ref 6–12)
POTASSIUM SERPL-SCNC: 3.7 MMOL/L (ref 3.5–5.2)
RBC # BLD AUTO: 4.7 10*6/MM3 (ref 4.14–5.8)
SODIUM SERPL-SCNC: 137 MMOL/L (ref 136–145)
WBC NRBC COR # BLD: 7.11 10*3/MM3 (ref 3.4–10.8)

## 2023-09-14 PROCEDURE — 94799 UNLISTED PULMONARY SVC/PX: CPT

## 2023-09-14 PROCEDURE — 94618 PULMONARY STRESS TESTING: CPT

## 2023-09-14 PROCEDURE — 85027 COMPLETE CBC AUTOMATED: CPT | Performed by: FAMILY MEDICINE

## 2023-09-14 PROCEDURE — 80048 BASIC METABOLIC PNL TOTAL CA: CPT | Performed by: FAMILY MEDICINE

## 2023-09-14 PROCEDURE — 94664 DEMO&/EVAL PT USE INHALER: CPT

## 2023-09-14 PROCEDURE — 25010000002 ENOXAPARIN PER 10 MG: Performed by: FAMILY MEDICINE

## 2023-09-14 RX ADMIN — ISOSORBIDE MONONITRATE 30 MG: 30 TABLET, EXTENDED RELEASE ORAL at 06:03

## 2023-09-14 RX ADMIN — BISACODYL 5 MG: 5 TABLET, COATED ORAL at 06:03

## 2023-09-14 RX ADMIN — TORSEMIDE 100 MG: 20 TABLET ORAL at 08:59

## 2023-09-14 RX ADMIN — Medication 10 ML: at 20:20

## 2023-09-14 RX ADMIN — LEVETIRACETAM 500 MG: 500 TABLET, FILM COATED ORAL at 08:59

## 2023-09-14 RX ADMIN — ACETAMINOPHEN 650 MG: 325 TABLET ORAL at 20:19

## 2023-09-14 RX ADMIN — ALLOPURINOL 300 MG: 300 TABLET ORAL at 06:03

## 2023-09-14 RX ADMIN — PANTOPRAZOLE SODIUM 40 MG: 40 TABLET, DELAYED RELEASE ORAL at 06:03

## 2023-09-14 RX ADMIN — ENOXAPARIN SODIUM 40 MG: 40 INJECTION SUBCUTANEOUS at 00:30

## 2023-09-14 RX ADMIN — Medication 10 ML: at 08:59

## 2023-09-14 RX ADMIN — DOXYCYCLINE 100 MG: 100 INJECTION, POWDER, LYOPHILIZED, FOR SOLUTION INTRAVENOUS at 10:57

## 2023-09-14 RX ADMIN — ACETAMINOPHEN 650 MG: 325 TABLET ORAL at 05:31

## 2023-09-14 RX ADMIN — ALBUTEROL SULFATE 2.5 MG: 2.5 SOLUTION RESPIRATORY (INHALATION) at 15:34

## 2023-09-14 RX ADMIN — ALBUTEROL SULFATE 2.5 MG: 2.5 SOLUTION RESPIRATORY (INHALATION) at 12:01

## 2023-09-14 RX ADMIN — IPRATROPIUM BROMIDE AND ALBUTEROL SULFATE 3 ML: .5; 3 SOLUTION RESPIRATORY (INHALATION) at 07:30

## 2023-09-14 RX ADMIN — DOXYCYCLINE 100 MG: 100 INJECTION, POWDER, LYOPHILIZED, FOR SOLUTION INTRAVENOUS at 23:05

## 2023-09-14 RX ADMIN — CETIRIZINE HYDROCHLORIDE 10 MG: 10 TABLET, FILM COATED ORAL at 06:03

## 2023-09-14 RX ADMIN — ATORVASTATIN CALCIUM 20 MG: 20 TABLET, FILM COATED ORAL at 08:59

## 2023-09-14 RX ADMIN — POTASSIUM CHLORIDE 40 MEQ: 1500 TABLET, EXTENDED RELEASE ORAL at 08:59

## 2023-09-14 RX ADMIN — ALBUTEROL SULFATE 2.5 MG: 2.5 SOLUTION RESPIRATORY (INHALATION) at 20:39

## 2023-09-14 RX ADMIN — FLUOXETINE 20 MG: 20 CAPSULE ORAL at 08:59

## 2023-09-14 RX ADMIN — CLOPIDOGREL BISULFATE 75 MG: 75 TABLET ORAL at 06:03

## 2023-09-14 RX ADMIN — METOPROLOL TARTRATE 50 MG: 50 TABLET, FILM COATED ORAL at 20:19

## 2023-09-14 RX ADMIN — METOPROLOL TARTRATE 50 MG: 50 TABLET, FILM COATED ORAL at 08:59

## 2023-09-14 RX ADMIN — LEVETIRACETAM 500 MG: 500 TABLET, FILM COATED ORAL at 20:19

## 2023-09-14 NOTE — ACP (ADVANCE CARE PLANNING)
Advance Care Planning     ACP education and discussion with patient.  Patient completed Living Will.  Patient also stated he did not want CPR.  Patient signed EMS/DNR.      Copies scanned to chart    Chaplain Devi

## 2023-09-14 NOTE — NURSING NOTE
Patient called out to use the bathroom and the aide was with another patient so the monitor tech asked the nurse which is I to take him to the bathroom. Upon arrival to patients room he was sitting on the toilet and began to scream at me because he called out 4 times to go to the bathroom. I let him know that I was only told about him calling out once for the IV to be replaced and to go to the bathroom. IV was taken out per patient. Stated it was hurting him. When he requested the IV to be replaced I was with another patient. He continued to yell so I asked another nurse to go in and assess patient. Charge nurse notified and going to check on patient and replaced IV.

## 2023-09-14 NOTE — PROGRESS NOTES
"Daily Progress Note:      Chief complaint: Weakness, pneumonia, hypertension, chronic kidney disease    Subjective: Subjectively feels about the same still weak generalized malaise to get up with assistance yesterday nurse reports he has been up to the bathroom with minimal assistance today     LOS: 2 days     Vital Signs  Temp:  [97 °F (36.1 °C)-98.6 °F (37 °C)] 97 °F (36.1 °C)  Heart Rate:  [67-93] 67  Resp:  [16-20] 20  BP: (123-138)/(71-78) 138/78  Oxygen Therapy  SpO2: 94 %  Pulse Oximetry Type: Continuous  Device (Oxygen Therapy): nasal cannula  Flow (L/min): 1}  Body mass index is 36.24 kg/m².  Flowsheet Rows      Flowsheet Row First Filed Value   Admission Height 170.2 cm (67\") Documented at 09/12/2023 1812   Admission Weight 106 kg (233 lb) Documented at 09/12/2023 1812                     Documented weights    09/12/23 1812 09/13/23 0636 09/14/23 0506   Weight: 106 kg (233 lb) 105 kg (230 lb 14.4 oz) 105 kg (231 lb 6 oz)           Patient Vitals for the past 24 hrs:   BP Temp Temp src Pulse Resp SpO2 Weight   09/14/23 0506 138/78 97 °F (36.1 °C) Oral 67 20 94 % 105 kg (231 lb 6 oz)   09/14/23 0300 -- -- -- 70 18 95 % --   09/14/23 0030 -- -- -- 73 18 95 % --   09/13/23 2000 -- -- -- 79 18 -- --   09/13/23 1951 -- -- -- 76 18 94 % --   09/13/23 1926 123/71 98.6 °F (37 °C) Oral 77 16 94 % --   09/13/23 1539 135/74 97.6 °F (36.4 °C) Oral 80 -- 96 % --   09/13/23 1531 -- -- -- 76 16 95 % --   09/13/23 1524 -- -- -- 76 16 95 % --   09/13/23 1234 128/77 97.7 °F (36.5 °C) Oral 93 -- 95 % --   09/13/23 1200 -- -- -- -- -- 94 % --   09/13/23 1155 -- -- -- -- -- 98 % --   09/13/23 1142 -- -- -- 72 -- 94 % --   09/13/23 1135 -- -- -- 72 16 94 % --   09/13/23 0741 -- -- -- 72 16 94 % --   09/13/23 0734 -- -- -- 72 16 94 % --       105 kg (231 lb 6 oz)    Intake/Output                         09/12/23 0701 - 09/13/23 0700 09/13/23 0701 - 09/14/23 0700     [id number] Total [id number] 1901-0700 Total              "    Intake    P.O.  --  360 360  720  720 1440    IV Piggyback  --  -- --  --  100 100    Total Intake -- 360 360        Output    Urine  --  100 100  225  200 425    Total Output -- 100 100 225 200 425             Intake/Output Summary (Last 24 hours) at 9/14/2023 0723  Last data filed at 9/14/2023 0120  Gross per 24 hour   Intake 1540 ml   Output 425 ml   Net 1115 ml      Intake/Output Summary (Last 24 hours) at 9/14/2023 0723  Last data filed at 9/14/2023 0120  Gross per 24 hour   Intake 1540 ml   Output 425 ml   Net 1115 ml        Review of Systems   Constitutional:  Positive for fatigue. Negative for activity change and appetite change.   HENT:  Negative for congestion.    Respiratory:  Positive for shortness of breath. Negative for cough, chest tightness and wheezing.    Cardiovascular:  Negative for chest pain.   Gastrointestinal:  Negative for abdominal distention, abdominal pain, diarrhea, nausea and vomiting.   Endocrine: Negative for polyphagia and polyuria.   Genitourinary:  Negative for frequency.   Skin:  Negative for rash.   Neurological:  Positive for weakness. Negative for light-headedness.   Hematological:  Does not bruise/bleed easily.   Psychiatric/Behavioral:  Negative for agitation and behavioral problems.      Physical Exam  Vitals and nursing note reviewed.   Constitutional:       Appearance: He is well-developed. He is morbidly obese.   HENT:      Head: Normocephalic.   Eyes:      Conjunctiva/sclera: Conjunctivae normal.   Neck:      Thyroid: No thyromegaly.      Vascular: No JVD.   Cardiovascular:      Rate and Rhythm: Normal rate and regular rhythm.      Heart sounds: Normal heart sounds. No murmur heard.  Pulmonary:      Effort: Pulmonary effort is normal. No respiratory distress.      Breath sounds: Decreased air movement present. Decreased breath sounds present. No wheezing or rales.   Abdominal:      General: Bowel sounds are normal. There is no distension.      Palpations:  Abdomen is soft.      Tenderness: There is no abdominal tenderness. There is no guarding.   Skin:     General: Skin is warm and dry.      Findings: No rash.   Neurological:      General: No focal deficit present.      Mental Status: He is alert and oriented to person, place, and time.       Medication Review:   I have reviewed the patient's current medication list  Scheduled Meds:albuterol, 2.5 mg, Nebulization, 4x Daily  allopurinol, 300 mg, Oral, QAM  atorvastatin, 20 mg, Oral, Daily  cetirizine, 10 mg, Oral, QAM  clopidogrel, 75 mg, Oral, QAM  doxycycline, 100 mg, Intravenous, Q12H  enoxaparin, 40 mg, Subcutaneous, Q24H  FLUoxetine, 20 mg, Oral, Daily  isosorbide mononitrate, 30 mg, Oral, QAM  levETIRAcetam, 500 mg, Oral, Q12H  metoprolol tartrate, 50 mg, Oral, Q12H  pantoprazole, 40 mg, Oral, QAM  potassium chloride, 40 mEq, Oral, Daily  sodium chloride, 10 mL, Intravenous, Q12H  torsemide, 100 mg, Oral, Daily      Continuous Infusions:Pharmacy to Dose enoxaparin (LOVENOX),       PRN Meds:.  acetaminophen **OR** acetaminophen **OR** acetaminophen    aluminum-magnesium hydroxide-simethicone    senna-docusate sodium **AND** polyethylene glycol **AND** bisacodyl **AND** bisacodyl    calcium carbonate    ipratropium-albuterol    lubiprostone    ondansetron **OR** ondansetron    Pharmacy to Dose enoxaparin (LOVENOX)    sodium chloride    sodium chloride      Result Review        I have personally reviewed the results from the time of this admission to 9/14/2023 07:23 EDT and agree with these findings:  [x]  Laboratory  []  Microbiology  [x]  Radiology  []  EKG/Telemetry   []  Cardiology/Vascular   []  Pathology  []  Old records  []  Other:          Labs:  Results from last 7 days   Lab Units 09/12/23  1838   WBC 10*3/mm3 6.09   RBC 10*6/mm3 5.04   HEMOGLOBIN g/dL 14.0   HEMATOCRIT % 43.7   RDW % 15.5*   MCV fL 86.7   MCH pg 27.8   MCHC g/dL 32.0   MPV fL 11.3   PLATELETS 10*3/mm3 120*   RDW-SD fl 48.5       Results  from last 7 days   Lab Units 09/12/23  1838   SODIUM mmol/L 140   POTASSIUM mmol/L 4.1   CHLORIDE mmol/L 97*   CO2 mmol/L 32.2*   BUN mg/dL 19   CREATININE mg/dL 1.03   CALCIUM mg/dL 9.1   BILIRUBIN mg/dL 0.7   ALK PHOS U/L 124*   ALT (SGPT) U/L 8   AST (SGOT) U/L 13   GLUCOSE mg/dL 92           Results from last 7 days   Lab Units 09/12/23  2311 09/12/23  1838   AST (SGOT) U/L  --  13   ALT (SGPT) U/L  --  8   PROCALCITONIN ng/mL  --  0.07   LACTATE mmol/L 1.2  --    D DIMER QUANT MCGFEU/mL  --  <0.27   PLATELETS 10*3/mm3  --  120*         Lab Results (last 24 hours)       Procedure Component Value Units Date/Time    CBC (No Diff) [[id number]] Collected: 09/14/23 0720    Specimen: Blood Updated: 09/14/23 0720    Basic Metabolic Panel [[id number]] Collected: 09/14/23 0720    Specimen: Blood Updated: 09/14/23 0720    Blood Culture - Blood, Arm, Right [[id number]]  (Normal) Collected: 09/12/23 2255    Specimen: Blood from Arm, Right Updated: 09/13/23 2345     Blood Culture No growth at 24 hours    Blood Culture - Blood, Arm, Left [[id number]]  (Normal) Collected: 09/12/23 2311    Specimen: Blood from Arm, Left Updated: 09/13/23 2345     Blood Culture No growth at 24 hours    Respiratory Panel PCR w/COVID-19(SARS-CoV-2) [facility]ACELI/HERBERT/[facility]ERSON/[facility] In-House, NP Swab in UTM/VTM, 3-4 HR TAT - Swab, Nasopharynx [[id number]]  (Normal) Collected: 09/13/23 0125    Specimen: Swab from Nasopharynx Updated: 09/13/23 1130     ADENOVIRUS, PCR Not Detected     Coronavirus 229E Not Detected     Coronavirus HKU1 Not Detected     Coronavirus NL63 Not Detected     Coronavirus OC43 Not Detected     COVID19 Not Detected     Human Metapneumovirus Not Detected     Human Rhinovirus/Enterovirus Not Detected     Influenza A PCR Not Detected     Influenza B PCR Not Detected     Parainfluenza Virus 1 Not Detected     Parainfluenza Virus 2 Not Detected     Parainfluenza Virus 3 Not Detected     Parainfluenza Virus 4 Not Detected     RSV, PCR Not  Detected     Bordetella pertussis pcr Not Detected     Bordetella parapertussis PCR Not Detected     Chlamydophila pneumoniae PCR Not Detected     Mycoplasma pneumo by PCR Not Detected    Narrative:      In the setting of a positive respiratory panel with a viral infection PLUS a negative procalcitonin without other underlying concern for bacterial infection, consider observing off antibiotics or discontinuation of antibiotics and continue supportive care. If the respiratory panel is positive for atypical bacterial infection (Bordetella pertussis, Chlamydophila pneumoniae, or Mycoplasma pneumoniae), consider antibiotic de-escalation to target atypical bacterial infection.          Results from last 7 days   Lab Units 09/12/23 2054 09/12/23  1838   HSTROP T ng/L 8 8   D DIMER QUANT MCGFEU/mL  --  <0.27     Results from last 7 days   Lab Units 09/12/23  1838   TSH uIU/mL 2.610     Results from last 7 days   Lab Units 09/12/23  1838   PROBNP pg/mL 264.1                         No results found for: POCGLU  Results from last 7 days   Lab Units 09/12/23  2311 09/12/23  1838   PROCALCITONIN ng/mL  --  0.07   LACTATE mmol/L 1.2  --      Results from last 7 days   Lab Units 09/12/23  2311 09/12/23  2255   BLOODCX  No growth at 24 hours No growth at 24 hours     Results from last 7 days   Lab Units 09/12/23  1910   NITRITE UA  Negative   WBC UA /HPF 0-2*   BACTERIA UA /HPF Trace*   SQUAM EPITHEL UA /HPF 0-2     Results from last 7 days   Lab Units 09/13/23  0125   ADENOVIRUS DETECTION BY PCR  Not Detected   CORONAVIRUS 229E  Not Detected   CORONAVIRUS HKU1  Not Detected   CORONAVIRUS NL63  Not Detected   CORONAVIRUS OC43  Not Detected   HUMAN METAPNEUMOVIRUS  Not Detected   HUMAN RHINOVIRUS/ENTEROVIRUS  Not Detected   INFLUENZA B PCR  Not Detected   PARAINFLUENZA 1  Not Detected   PARAINFLUENZA VIRUS 2  Not Detected   PARAINFLUENZA VIRUS 3  Not Detected   PARAINFLUENZA VIRUS 4  Not Detected   BORDETELLA PERTUSSIS PCR  Not  Detected   CHLAMYDOPHILA PNEUMONIAE PCR  Not Detected   MYCOPLAMA PNEUMO PCR  Not Detected   INFLUENZA A PCR  Not Detected   RSV, PCR  Not Detected         Radiology:  Imaging Results (Last 24 Hours)       ** No results found for the last 24 hours. **            Cardiology:  ECG/EMG Results (last 24 hours)       Procedure Component Value Units Date/Time    ECG 12 Lead Other; SOB [[id number]] Collected: 09/12/23 1816     Updated: 09/13/23 0803     QT Interval 404 ms      QTC Interval 401 ms     Narrative:      HEART RATE= 59  bpm  RR Interval= 1016  ms  GA Interval= 170  ms  P Horizontal Axis= 25  deg  P Front Axis= -23  deg  QRSD Interval= 99  ms  QT Interval= 404  ms  QTcB= 401  ms  QRS Axis= -78  deg  T Wave Axis= 9  deg  - ABNORMAL ECG -  Sinus rhythm  Left anterior fascicular block  Probable anterolateral infarct, old  No change from prior tracing  Electronically Signed By: Becky Melo ([facility]) 13-Sep-2023 08:03:32  Date and Time of Study: 2023-09-12 18:16:11    SCANNED - TELEMETRY   [[id number]] Resulted: 09/12/23     Updated: 09/13/23 1055            I have reviewed recent labs results and consult notes.    Please note portions of this assessment/plan may have been copied and pasted, but I have personally seen this patient and reviewed each line of this assessment and plan for accuracy and made updates to reflect my necessary changes    Assessment and Plan:    .  Community-acquired right middle lobe pneumonia continue current treatment seems to be improving.    .  Acute on chronic hypoxic respiratory failure improved physical therapy notes noted yesterday did not drop his sats on exertion we will get a walking oximetry likely home tomorrow    .  Mild COPD exacerbation duo nebs respiratory toilet     .  Generalized weakness secondary to pneumonia PT OT will be consulted        .  Chronic diastolic congestive heart failure without any decompensation continue with home medications     .  Adult-onset diabetes  mellitus diet controlled     .  Hypertension at goal nothing acute continue home medications     .  Chronic kidney disease stage III better than baseline     .  GERD nothing acute continue PPI     .  Coronary disease status post stent placement to LAD nothing acute Home medicines to be continued     .  Weight loss due to psychosocial issues patient able to get to the grocery store to get groceries have discussed with his daughter-in-law on this and is having staff delivered to his house his weight loss has stabilized     .  Morbid obesity complicating all aspects of care     .  DVT prophylaxis Lovenox    Much of this encounter note is an electronic transcription/translation of spoken language to printed text. The electronic translation of spoken language may permit erroneous, or at times, nonsensical words or phrases to be inadvertently transcribed; Although I have reviewed the note for such errors, some may still exist.    Note Disclaimer: At [facility], we believe that sharing information builds trust and better relationships. You are receiving this note because you recently visited [facility]. It is possible you will see health information before a provider has talked with you about it. This kind of information can be easy to misunderstand. To help you fully understand what it means for your health, we urge you to discuss this note with your provider.

## 2023-09-14 NOTE — PROCEDURES
.  Exercise Oximetry    Patient Name:Esdras Ko   MRN: [id number]   Date: 09/14/23             ROOM AIR Baseline at rest   SpO2%  87   Heart Rate  94   Blood Pressure       Oxygen Baseline at rest   Oxygen (LPM)  1lpm   SpO2%  95   Heart Rate  96   Blood Pressure       EXERCISE  SpO2% HR Supplemental Oxygen, LPM   1 MINUTE 92 99 1lpm   2 MINUTES 90 100 1lpm   3 MINUTES 87 100 Increased to 2lpm   4 MINUTES 94 100 2lpm   5 MINUTES 95 103 2lpm   6 MINUTES 94 103 2lpm     Recovery       Time to Baseline/Recovery (minutes)    SpO2 % 95   HR 99   Oxygen  2lpm     Distance Walked (meters) 100 meters       Pre Post   Dyspnea (Nathalie Scale)   6   Fatigue (Nathalie Scale)   7     Comments: Head probe and gait belt used during walk.

## 2023-09-14 NOTE — CASE MANAGEMENT/SOCIAL WORK
Continued Stay Note  MAGDALENA Pollock     Patient Name: Esdras Ko  MRN: [id number]  Today's Date: 9/14/2023    Admit Date: 9/12/2023    Plan: Plan home, daughter assists   Discharge Plan       Row Name 09/14/23 1058       Plan    Plan Plan home, daughter assists    Patient/Family in Agreement with Plan yes    Plan Comments Information regarding Meals on Wheels and assistance with house keeping requested. Patient sleeping sitting up in bed, brochure for [employer] placed on over bed table - contact #  and services highlighted. CM will continue to follow.                   Discharge Codes    No documentation.                       Albino Manzanares RN

## 2023-09-14 NOTE — PLAN OF CARE
Goal Outcome Evaluation:  Plan of Care Reviewed With: patient        Progress: improving  Outcome Evaluation: Pt VSS, am labs pending. Pt continues 02@1Lnc RT titrating, pt reports his home dose is 2Lnc, participating in RT treatments, see RT notes. Pt reports chronic back pain improved with current prn regimen, see emar, flowsheets. Pt ambulating to restroom, per self or standby assist, often refuses walker, see flowsheets; pt working with PT OT on dayshift, see notes.  Pt Covid and flu negative, RVP negative, blood cultures no growth at 24hrs, see labs results. Pt reports desire to d/c home soon, CM following. Pt resting at this time.

## 2023-09-14 NOTE — PLAN OF CARE
Goal Outcome Evaluation:  Plan of Care Reviewed With: patient        Progress: improving  Outcome Evaluation: VSS. 2L NC. A&Ox4. Patient states he has intermittent confusing. Toelrating cardiac diet. IV replaced. IV abx.

## 2023-09-14 NOTE — PROGRESS NOTES
Walking oximetry attempted.  Pt became dizzy and nauseated upon standing and stated he could not walk and needed to sit down.  We will attempt a walk study again this afternoon.  RN aware.

## 2023-09-15 VITALS
HEART RATE: 92 BPM | OXYGEN SATURATION: 97 % | DIASTOLIC BLOOD PRESSURE: 65 MMHG | RESPIRATION RATE: 20 BRPM | BODY MASS INDEX: 36.51 KG/M2 | SYSTOLIC BLOOD PRESSURE: 138 MMHG | WEIGHT: 232.6 LBS | TEMPERATURE: 97.8 F | HEIGHT: 67 IN

## 2023-09-15 PROCEDURE — 97164 PT RE-EVAL EST PLAN CARE: CPT

## 2023-09-15 PROCEDURE — 94799 UNLISTED PULMONARY SVC/PX: CPT

## 2023-09-15 PROCEDURE — 25010000002 ENOXAPARIN PER 10 MG: Performed by: FAMILY MEDICINE

## 2023-09-15 RX ORDER — DOXYCYCLINE HYCLATE 100 MG/1
100 CAPSULE ORAL 2 TIMES DAILY
Qty: 14 CAPSULE | Refills: 0 | Status: SHIPPED | OUTPATIENT
Start: 2023-09-15

## 2023-09-15 RX ADMIN — METOPROLOL TARTRATE 50 MG: 50 TABLET, FILM COATED ORAL at 08:24

## 2023-09-15 RX ADMIN — ATORVASTATIN CALCIUM 20 MG: 20 TABLET, FILM COATED ORAL at 08:24

## 2023-09-15 RX ADMIN — CETIRIZINE HYDROCHLORIDE 10 MG: 10 TABLET, FILM COATED ORAL at 06:12

## 2023-09-15 RX ADMIN — ALBUTEROL SULFATE 2.5 MG: 2.5 SOLUTION RESPIRATORY (INHALATION) at 11:14

## 2023-09-15 RX ADMIN — PANTOPRAZOLE SODIUM 40 MG: 40 TABLET, DELAYED RELEASE ORAL at 06:12

## 2023-09-15 RX ADMIN — ALBUTEROL SULFATE 2.5 MG: 2.5 SOLUTION RESPIRATORY (INHALATION) at 07:42

## 2023-09-15 RX ADMIN — TORSEMIDE 100 MG: 20 TABLET ORAL at 09:39

## 2023-09-15 RX ADMIN — Medication 10 ML: at 08:24

## 2023-09-15 RX ADMIN — LEVETIRACETAM 500 MG: 500 TABLET, FILM COATED ORAL at 08:24

## 2023-09-15 RX ADMIN — ALLOPURINOL 300 MG: 300 TABLET ORAL at 06:12

## 2023-09-15 RX ADMIN — ISOSORBIDE MONONITRATE 30 MG: 30 TABLET, EXTENDED RELEASE ORAL at 06:12

## 2023-09-15 RX ADMIN — ENOXAPARIN SODIUM 40 MG: 40 INJECTION SUBCUTANEOUS at 01:09

## 2023-09-15 RX ADMIN — FLUOXETINE 20 MG: 20 CAPSULE ORAL at 08:24

## 2023-09-15 RX ADMIN — ACETAMINOPHEN 650 MG: 325 TABLET ORAL at 01:09

## 2023-09-15 RX ADMIN — ALBUTEROL SULFATE 2.5 MG: 2.5 SOLUTION RESPIRATORY (INHALATION) at 16:26

## 2023-09-15 RX ADMIN — DOXYCYCLINE 100 MG: 100 INJECTION, POWDER, LYOPHILIZED, FOR SOLUTION INTRAVENOUS at 09:40

## 2023-09-15 RX ADMIN — CLOPIDOGREL BISULFATE 75 MG: 75 TABLET ORAL at 06:12

## 2023-09-15 RX ADMIN — POTASSIUM CHLORIDE 40 MEQ: 1500 TABLET, EXTENDED RELEASE ORAL at 08:24

## 2023-09-15 NOTE — PROGRESS NOTES
"Enter Query Response Below      Query Response:   I was told the patient's oxygen saturations dropped to the low 80s by the ER physician of the reason the patient was admitted please refer this question to the ER physicians for any further clarification for documentation purposes thank you           If applicable, please update the problem list.     Patient: Esdras Ko        : 1950  Account: [id number]           Admit Date: 2023        How to Respond to this query:       a. Click New Note     b. Answer query within the yellow box.                c. Update the Problem List, if applicable.      If you have any questions about this query contact me at: [email or web address]     Dr. Arias,    73-year-old male admitted with pneumonia and mild COPD exacerbation. Your notes document \"Acute on chronic hypoxic respiratory failure.\" H&P & ER MD note documents \"chronic hypoxic respiratory failure on 2 L of oxygen and O2 sats drop in the 80s with exertion.\" The patient has documented O2 2L with sats 86-98%, O2 1L with sats 94-96% then back on 2L O2 with sats 95-97%, respiratory rate 16-20 and heart rate 60-80s. There was no documentation of respiratory distress, accessory muscle use, O2 >5L high flow or ABGs. The patient was treated with Albuterol nebulizer, Vibramycin IV, DuoNeb and Solu-Medrol IV x1 dose.    After study, was acute hypoxic respiratory failure clinically supported during this admission?    - Acute hypoxic respiratory failure was supported with additional clinical indicators: ____________  - Acute hypoxic respiratory failure was not supported  - Other- specify_____________  - Unable to determine    By submitting this query, we are merely seeking further clarification of documentation to accurately reflect all conditions that you are monitoring, evaluating, treating or that extend the hospitalization or utilize additional resources of care. Please utilize your independent clinical " judgment when addressing the question(s) above.     This query and your response, once completed, will be entered into the legal medical record.    Sincerely,  Neida Douglas RN  Clinical Documentation Integrity Program

## 2023-09-15 NOTE — THERAPY RE-EVALUATION
Patient Name: Esdras Ko  : 1950    MRN: [id number]                              Today's Date: 9/15/2023       Admit Date: 2023    Visit Dx:     ICD-10-CM ICD-9-CM   1. Community acquired pneumonia of right middle lobe of lung  J18.9 486     Patient Active Problem List   Diagnosis    Mantle cell lymphoma    Chronic obstructive pulmonary disease    Chronic kidney disease    Seizure    Lymphadenopathy    History of neoplasm of bladder    Splenomegaly    Adenomatous polyp of colon    Coronary artery disease involving native coronary artery of native heart without angina pectoris    Type 2 diabetes mellitus with circulatory disorder, without long-term current use of insulin    Swelling of both lower extremities    Personal history of colonic polyps    Morbid obesity with BMI of 50.0-59.9, adult    Chronic right-sided congestive heart failure    Pneumonia     Past Medical History:   Diagnosis Date    Anemia     Bladder cancer     Cerebrovascular accident     Ischemic and TIAs; most recent 2014 which was a TIA.    Chronic kidney disease, stage 3     Dr. KATHERYN Lin    Chronic right-sided CHF (congestive heart failure)         Colon polyp     COPD (chronic obstructive pulmonary disease)     History of blood transfusion     Hyperlipidemia     Hypertension     Hyperuricemia     Left shoulder pain     Lymphoma     MANTLE CELL, HAD CHEMO      On home O2     per pt, 2L    Osteoarthritis     Pilonidal cyst     SCHEDULED FOR SX    Psoriasis     Seizure     Following head trauma in 1970    Skin lesions     SCARRING FROM SHINGLES, & BLISTERS FROM EDEMA    Sleep apnea     Intolerant of CPAP    Splenomegaly 2013    Stroke     Tobacco abuse     Type 2 diabetes mellitus      Past Surgical History:   Procedure Laterality Date    BRONCHOSCOPY N/A 2017    Procedure: BRONCHOSCOPY with fluro and biopsy and lavage.;  Surgeon: Red Topete MD;  Location: [facility] OR;  Service:     CARDIAC  "CATHETERIZATION N/A 9/17/2018    Procedure: Coronary angiography;  Surgeon: Kylie Victoria MD;  Location:  [facility];  Service: Cardiovascular    CARDIAC CATHETERIZATION N/A 10/30/2018    Procedure: Chronic Total Occlussion;  Surgeon: Roe Wheatley MD;  Location:  [facility];  Service: Cardiovascular    CARDIAC CATHETERIZATION N/A 10/30/2018    Procedure: Stent AURA coronary;  Surgeon: Roe Wheatley MD;  Location:  [facility];  Service: Cardiovascular    COLONOSCOPY N/A 10/9/2019    Procedure: COLONOSCOPY with polypectomy;  Surgeon: Lee Priest MD;  Location:  [facility] OR;  Service: Gastroenterology    PILONIDAL CYSTECTOMY N/A 11/30/2016    Procedure: PILONIDAL CYSTECTOMY;  Surgeon: Roe Davila MD;  Location: [facility] OR;  Service:     SINUS SURGERY      SKIN LESION EXCISION      DR. DAVILA ABOUT 10 YEARS AGO      General Information       Row Name 09/15/23 1252          Physical Therapy Time and Intention    Document Type --  Re-evaluation  -BP     Mode of Treatment physical therapy  -BP       Row Name 09/15/23 1254          General Information    Patient Profile Reviewed yes  Pt evaluated by PT on 9/13 by therapy. Pt was unreceptive at the time to all education and input by therapist and reported he was at baseline with no concerns for return home. Pt was not placed on caseload at that time. Re-consult now received by MD  -BP     Prior Level of Function --  See Initial evaluation for history. Pt reports once again that he does not sleep in a bed he sleeps a mechanical recliner and has done so \"for years.\" Pt previously refused use of an AD and continues to do so today.  -BP     Existing Precautions/Restrictions fall  -BP     Barriers to Rehab none identified  Patient more cooperative and less agitated today.  -BP       Row Name 09/15/23 1255          Living Environment    People in Home alone  -BP       Row Name 09/15/23 1259          Cognition    " "Orientation Status (Cognition) oriented x 3  -BP       Row Name 09/15/23 1258          Safety Issues, Functional Mobility    Comment, Safety Issues/Impairments (Mobility) Patient continues to refuse use of an AD despite lateral sway noted during gait training. No loss of balance. Patient does not complain of dizziness at rest or with mobility.  -BP               User Key  (r) = Recorded By, (t) = Taken By, (c) = Cosigned By      Initials Name Provider Type    Zara Dhillon, PT Physical Therapist                   Mobility       Row Name 09/15/23 1306          Bed Mobility    Bed Mobility supine-sit  -BP     Supine-Sit Tate (Bed Mobility) supervision  -BP     Assistive Device (Bed Mobility) head of bed elevated;bed rails  -BP     Comment, (Bed Mobility) Patient performs supine to sit transfer without assist however does use bed rail with HOB elevated. Patient reiterates that he does not sleep in a bed, he sleeps in a mechanical recliner and has done so \"for years.\"  -BP       Row Name 09/15/23 1306          Sit-Stand Transfer    Sit-Stand Tate (Transfers) standby assist  -BP       Row Name 09/15/23 1306          Gait/Stairs (Locomotion)    Tate Level (Gait) standby assist;contact guard  -BP     Assistive Device (Gait) --  no device. Refuses need for a device  -BP     Distance in Feet (Gait) 194  -BP     Deviations/Abnormal Patterns (Gait) gait speed decreased;base of support, wide  -BP     Bilateral Gait Deviations forward flexed posture  -BP     Comment, (Gait/Stairs) Patient with two episodes of lateral deviations without loss of balance.  Paient continues to refuse a device despite lateral sway with intermittent lateral deviations. Patient does not complain of dizziness with mobility  -BP               User Key  (r) = Recorded By, (t) = Taken By, (c) = Cosigned By      Initials Name Provider Type    Zara Dhillon PT Physical Therapist                   Obj/Interventions       " Row Name 09/15/23 1313          Range of Motion Comprehensive    Comment, General Range of Motion B LE AROM WFL.  -BP       Row Name 09/15/23 1313          Strength Comprehensive (MMT)    Comment, General Manual Muscle Testing (MMT) Assessment B LE strength functional  -BP       Row Name 09/15/23 1313          Balance    Comment, Balance sitting balance-supervision. Static standing balance-supervision without use of an AD.  -BP               User Key  (r) = Recorded By, (t) = Taken By, (c) = Cosigned By      Initials Name Provider Type    BP Zara Cervantes, PT Physical Therapist                   Goals/Plan       Row Name 09/15/23 1321          Transfer Goal 1 (PT)    Activity/Assistive Device (Transfer Goal 1, PT) transfers, all  -BP     Boyle Level/Cues Needed (Transfer Goal 1, PT) supervision required  -BP     Time Frame (Transfer Goal 1, PT) 2 days  -BP     Progress/Outcome (Transfer Goal 1, PT) new goal  -BP       Row Name 09/15/23 1321          Gait Training Goal 1 (PT)    Activity/Assistive Device (Gait Training Goal 1, PT) gait (walking locomotion);walker, rolling  -BP     Boyle Level (Gait Training Goal 1, PT) supervision required  -BP     Distance (Gait Training Goal 1, PT) 200  -BP     Time Frame (Gait Training Goal 1, PT) 2 days  -BP       Row Name 09/15/23 1321          Therapy Assessment/Plan (PT)    Planned Therapy Interventions (PT) patient/family education;strengthening;transfer training;gait training  -BP               User Key  (r) = Recorded By, (t) = Taken By, (c) = Cosigned By      Initials Name Provider Type    Zara Dhillon, PT Physical Therapist                   Clinical Impression       Row Name 09/15/23 1313          Pain    Pretreatment Pain Rating 0/10 - no pain  -BP     Posttreatment Pain Rating 0/10 - no pain  -BP       Row Name 09/15/23 1313          Plan of Care Review    Plan of Care Reviewed With patient  -BP     Outcome Evaluation PT Re-evaluation  "Complete: patient performs supine to sit transfer with supervision with use of bed rails, sit to/from stand transfers with SBA, and gait x 194 feet with SBA/CGA without use of an AD. Patient states that he sleeps in a mechanical recliner as he previously reported during inital evaluation and has so \"for years.\" O2 sats noted to be 92% following mobility on 2L O2/NC. Recommend use of FWW for mobility however, patient refuses it and reports he will not use one at home. Patient cooperative and less frustrated today vs initial evaluation. Pt is agreeable to PT follow up for one addtional visit to ensure consistency of mobility. Patient reports he has no concerns for return home. Plan to see patient one additional visit.  -BP       Row Name 09/15/23 1313          Positioning and Restraints    Pre-Treatment Position in bed  -BP     Post Treatment Position chair  -BP     In Chair notified nsg;reclined;call light within reach;encouraged to call for assist  -BP               User Key  (r) = Recorded By, (t) = Taken By, (c) = Cosigned By      Initials Name Provider Type    BP Zara Cervantes, PT Physical Therapist                   Outcome Measures       Row Name 09/15/23 1322          How much help from another person do you currently need...    Turning from your back to your side while in flat bed without using bedrails? 3  -BP     Moving from lying on back to sitting on the side of a flat bed without bedrails? 3  -BP     Moving to and from a bed to a chair (including a wheelchair)? 3  -BP     Standing up from a chair using your arms (e.g., wheelchair, bedside chair)? 3  -BP     Climbing 3-5 steps with a railing? 3  -BP     To walk in hospital room? 3  -BP     AM-PAC 6 Clicks Score (PT) 18  -BP     Highest level of mobility 6 --> Walked 10 steps or more  -BP       Row Name 09/15/23 1322          Functional Assessment    Outcome Measure Options AM-PAC 6 Clicks Basic Mobility (PT)  -BP               User Key  (r) = Recorded " "By, (t) = Taken By, (c) = Cosigned By      Initials Name Provider Type    BP Zara Cervantes, SUDHA Physical Therapist                                 Physical Therapy Education       Title: PT OT SLP Therapies (Done)       Topic: Physical Therapy (Done)       Point: Mobility training (Done)       Learning Progress Summary             Patient Acceptance, E,TB, VU by  at 9/15/2023 1322    Nonacceptance, E,TB, NL,RT by  at 9/13/2023 1251                                         User Key       Initials Effective Dates Name Provider Type Discipline     06/16/21 -  Zara Cervantes, PT Physical Therapist PT                  PT Recommendation and Plan  Planned Therapy Interventions (PT): patient/family education, strengthening, transfer training, gait training  Plan of Care Reviewed With: patient  Outcome Evaluation: PT Re-evaluation Complete: patient performs supine to sit transfer with supervision with use of bed rails, sit to/from stand transfers with SBA, and gait x 194 feet with SBA/CGA without use of an AD. Patient states that he sleeps in a mechanical recliner as he previously reported during inital evaluation and has so \"for years.\" O2 sats noted to be 92% following mobility on 2L O2/NC. Recommend use of FWW for mobility however, patient refuses it and reports he will not use one at home. Patient cooperative and less frustrated today vs initial evaluation. Pt is agreeable to PT follow up for one addtional visit to ensure consistency of mobility. Patient reports he has no concerns for return home. Plan to see patient one additional visit.     Time Calculation:   PT Evaluation Complexity  History, PT Evaluation Complexity: 3 or more personal factors and/or comorbidities  Examination of Body Systems (PT Eval Complexity): 1-2 elements  Clinical Presentation (PT Evaluation Complexity): stable  Clinical Decision Making (PT Evaluation Complexity): low complexity  Overall Complexity (PT Evaluation Complexity): low " complexity     PT Charges       Row Name 09/15/23 1323             Time Calculation    Start Time 1046  -BP      Stop Time 1112  -BP      Time Calculation (min) 26 min  -BP                User Key  (r) = Recorded By, (t) = Taken By, (c) = Cosigned By      Initials Name Provider Type    BP Zara Cervantes, PT Physical Therapist                  Therapy Charges for Today       Code Description Service Date Service Provider Modifiers Qty    82156956918 HC PT RE-EVAL ESTABLISHED PLAN 2 9/15/2023 Zara Cervantes, PT GP 1            PT G-Codes  Outcome Measure Options: AM-PAC 6 Clicks Basic Mobility (PT)  AM-PAC 6 Clicks Score (PT): 18  AM-PAC 6 Clicks Score (OT): 22  PT Discharge Summary  Anticipated Discharge Disposition (PT): home  Reason for Discharge: At baseline function  Discharge Destination: Home    Zara Cervantes, PT  9/15/2023

## 2023-09-15 NOTE — PROGRESS NOTES
Adult Nutrition  Assessment/PES    Patient Name:  Esdras Ko  YOB: 1950  MRN: [id number]  Admit Date:  9/12/2023    Assessment Date:  9/15/2023    Comments:  Po intake 100% of meals.     Noted 99# wt loss in 14 months, on exam pt with some muscle loss however access and appetite are reported main reason for wt loss.   Reports D[facility] helps get food/meds now so denies no longer an issue.     Nutrition exam completed, doesn't meet criteria for malnutrition at this time.    Will cont to follow and monitor.      Reason for Assessment       Row Name 09/15/23 1312          Reason for Assessment    Reason For Assessment follow-up protocol  nutrition exam     Diagnosis pulmonary disease  PNA, weakness, CKD                    Nutrition/Diet History       Row Name 09/15/23 1312          Nutrition/Diet History    Typical Intake (Food/Fluid/EN/PN) Spoke w pt at visit to room. Reports access to food was an issue in past as well as appetite does confirm 100# loss in just over past year. Reports DIL orders food and has it sent from [employer] now , usually only when hungry, lunch meat, soup, grapes, bananas. No regular eating routine.                    Labs/Tests/Procedures/Meds       Row Name 09/15/23 1314          Labs/Procedures/Meds    Lab Results Reviewed reviewed     Lab Results Comments BUn 24 H        Diagnostic Tests/Procedures    Diagnostic Test/Procedure Reviewed reviewed        Medications    Pertinent Medications Reviewed reviewed     Pertinent Medications Comments toresmide, KCL                      Estimated/Assessed Needs - Anthropometrics       Row Name 09/15/23 0730          Anthropometrics    Weight 106 kg (232 lb 9.6 oz)                    Nutrition Prescription Ordered       Row Name 09/15/23 1314          Nutrition Prescription PO    Common Modifiers Cardiac                    Evaluation of Received Nutrient/Fluid Intake       Row Name 09/15/23 1314          Fluid Intake Evaluation    Oral Fluid  (mL) 920  ave x 3, 40%        PO Evaluation    Number of Meals 5     % PO Intake 100                       Problem/Interventions:   Problem 1       Row Name 09/15/23 1317          Nutrition Diagnoses Problem 1    Problem 1 Other (comment)  Predicted inadequate energy intake related to PNA as evidenced by decreased appetite on admit & 99# wt loss in 14 months per EMR data                          Intervention Goal       Row Name 09/15/23 1317          Intervention Goal    General Meet nutritional needs for age/condition     PO Maintain intake;PO intake (%)     PO Intake % 75 %  or greater                    Nutrition Intervention       Row Name 09/15/23 1317          Nutrition Intervention    RD/Tech Action Follow Tx progress                      Education/Evaluation       Row Name 09/15/23 1317          Education    Education Provided education regarding;Advised regarding habits/behavior;Education topics  Edu on nutrition exam due to wt loss & access to food issue reported in past year. Edu on Adequate pro at meals, eating more than once per day. Discussed easy sources of protein.     Education Topics Protein        Monitor/Evaluation    Monitor Per protocol;I&O;PO intake;Pertinent labs;Weight;Symptoms     Education Follow-up Other (comment)  pt verbalized understanding                     Electronically signed by:  Talia Palmer RD  09/15/23 13:18 EDT

## 2023-09-15 NOTE — PROGRESS NOTES
"Daily Progress Note:      Chief complaint: Weakness, pneumonia, hypertension, chronic kidney disease    Subjective: Subjectively feels about the same still weak denies any shortness of breath      LOS: 3 days     Vital Signs  Temp:  [97.2 °F (36.2 °C)-98 °F (36.7 °C)] 98 °F (36.7 °C)  Heart Rate:  [66-95] 66  Resp:  [18-24] 24  BP: (105-128)/(56-67) 128/67  Oxygen Therapy  SpO2: 95 %  Pulse Oximetry Type: Continuous  Device (Oxygen Therapy): nasal cannula  Flow (L/min): 2}  Body mass index is 36.43 kg/m².  Flowsheet Rows      Flowsheet Row First Filed Value   Admission Height 170.2 cm (67\") Documented at 09/12/2023 1812   Admission Weight 106 kg (233 lb) Documented at 09/12/2023 1812                     Documented weights    09/12/23 1812 09/13/23 0636 09/14/23 0506 09/15/23 0730   Weight: 106 kg (233 lb) 105 kg (230 lb 14.4 oz) 105 kg (231 lb 6 oz) 106 kg (232 lb 9.6 oz)           Patient Vitals for the past 24 hrs:   BP Temp Temp src Pulse Resp SpO2 Weight   09/15/23 0730 -- -- -- -- -- -- 106 kg (232 lb 9.6 oz)   09/15/23 0534 128/67 98 °F (36.7 °C) Oral 66 24 95 % --   09/14/23 2046 -- -- -- 75 20 95 % --   09/14/23 2039 -- -- -- 72 20 97 % --   09/14/23 1936 107/56 97.5 °F (36.4 °C) Oral 77 20 95 % --   09/14/23 1613 105/62 97.2 °F (36.2 °C) Oral 95 -- 96 % --   09/14/23 1534 -- -- -- 74 20 96 % --   09/14/23 1201 -- -- -- 78 18 97 % --         106 kg (232 lb 9.6 oz)    Intake/Output                         09/13/23 0701 - 09/14/23 0700 09/14/23 0701 - 09/15/23 0700     [id number] 7977-4678 Total 9637-2654 4671-1306 Total                 Intake    P.O.  720  720 1440  960  -- 960    IV Piggyback  --  100 100  100  -- 100    Total Intake  1060 -- 1060       Output    Urine  225  200 425  --  -- --    Total Output 225 200 425 -- -- --             Intake/Output Summary (Last 24 hours) at 9/15/2023 0739  Last data filed at 9/14/2023 1753  Gross per 24 hour   Intake 1060 ml   Output --   Net 1060 ml      "   Intake/Output Summary (Last 24 hours) at 9/15/2023 0739  Last data filed at 9/14/2023 1753  Gross per 24 hour   Intake 1060 ml   Output --   Net 1060 ml          Review of Systems   Constitutional:  Positive for fatigue. Negative for activity change and appetite change.   HENT:  Negative for congestion.    Respiratory:  Negative for cough, chest tightness, shortness of breath and wheezing.    Cardiovascular:  Negative for chest pain.   Gastrointestinal:  Negative for abdominal distention, abdominal pain, diarrhea, nausea and vomiting.   Endocrine: Negative for polyphagia and polyuria.   Genitourinary:  Negative for frequency.   Skin:  Negative for rash.   Neurological:  Positive for weakness. Negative for light-headedness.   Hematological:  Does not bruise/bleed easily.   Psychiatric/Behavioral:  Negative for agitation and behavioral problems.      Physical Exam  Vitals and nursing note reviewed.   Constitutional:       Appearance: He is well-developed. He is morbidly obese.   HENT:      Head: Normocephalic.   Eyes:      Conjunctiva/sclera: Conjunctivae normal.   Neck:      Thyroid: No thyromegaly.      Vascular: No JVD.   Cardiovascular:      Rate and Rhythm: Normal rate and regular rhythm.      Heart sounds: Normal heart sounds. No murmur heard.  Pulmonary:      Effort: Pulmonary effort is normal. No respiratory distress.      Breath sounds: Decreased air movement present. Decreased breath sounds present. No wheezing or rales.   Abdominal:      General: Bowel sounds are normal. There is no distension.      Palpations: Abdomen is soft.      Tenderness: There is no abdominal tenderness. There is no guarding.   Skin:     General: Skin is warm and dry.      Findings: No rash.   Neurological:      General: No focal deficit present.      Mental Status: He is alert and oriented to person, place, and time.       Medication Review:   I have reviewed the patient's current medication list  Scheduled Meds:albuterol, 2.5 mg,  Nebulization, 4x Daily  allopurinol, 300 mg, Oral, QAM  atorvastatin, 20 mg, Oral, Daily  cetirizine, 10 mg, Oral, QAM  clopidogrel, 75 mg, Oral, QAM  doxycycline, 100 mg, Intravenous, Q12H  enoxaparin, 40 mg, Subcutaneous, Q24H  FLUoxetine, 20 mg, Oral, Daily  isosorbide mononitrate, 30 mg, Oral, QAM  levETIRAcetam, 500 mg, Oral, Q12H  metoprolol tartrate, 50 mg, Oral, Q12H  pantoprazole, 40 mg, Oral, QAM  potassium chloride, 40 mEq, Oral, Daily  sodium chloride, 10 mL, Intravenous, Q12H  torsemide, 100 mg, Oral, Daily      Continuous Infusions:Pharmacy to Dose enoxaparin (LOVENOX),       PRN Meds:.  acetaminophen **OR** acetaminophen **OR** acetaminophen    aluminum-magnesium hydroxide-simethicone    senna-docusate sodium **AND** polyethylene glycol **AND** bisacodyl **AND** bisacodyl    calcium carbonate    ipratropium-albuterol    lubiprostone    ondansetron **OR** ondansetron    Pharmacy to Dose enoxaparin (LOVENOX)    sodium chloride    sodium chloride      Result Review        I have personally reviewed the results from the time of this admission to 9/15/2023 07:39 EDT and agree with these findings:  [x]  Laboratory  []  Microbiology  [x]  Radiology  []  EKG/Telemetry   []  Cardiology/Vascular   []  Pathology  []  Old records  []  Other:          Labs:  Results from last 7 days   Lab Units 09/14/23  0720 09/12/23  1838   WBC 10*3/mm3 7.11 6.09   RBC 10*6/mm3 4.70 5.04   HEMOGLOBIN g/dL 13.0 14.0   HEMATOCRIT % 41.0 43.7   RDW % 15.6* 15.5*   MCV fL 87.2 86.7   MCH pg 27.7 27.8   MCHC g/dL 31.7 32.0   MPV fL 11.3 11.3   PLATELETS 10*3/mm3 111* 120*   RDW-SD fl 49.1 48.5         Results from last 7 days   Lab Units 09/14/23  0720 09/12/23  1838   SODIUM mmol/L 137 140   POTASSIUM mmol/L 3.7 4.1   CHLORIDE mmol/L 98 97*   CO2 mmol/L 30.9* 32.2*   BUN mg/dL 24* 19   CREATININE mg/dL 1.11 1.03   CALCIUM mg/dL 8.7 9.1   BILIRUBIN mg/dL  --  0.7   ALK PHOS U/L  --  124*   ALT (SGPT) U/L  --  8   AST (SGOT) U/L  --   13   GLUCOSE mg/dL 103* 92             Results from last 7 days   Lab Units 09/14/23 0720 09/12/23 2311 09/12/23 1838   AST (SGOT) U/L  --   --  13   ALT (SGPT) U/L  --   --  8   PROCALCITONIN ng/mL  --   --  0.07   LACTATE mmol/L  --  1.2  --    D DIMER QUANT MCGFEU/mL  --   --  <0.27   PLATELETS 10*3/mm3 111*  --  120*           Lab Results (last 24 hours)       Procedure Component Value Units Date/Time    Blood Culture - Blood, Arm, Right [[id number]]  (Normal) Collected: 09/12/23 2255    Specimen: Blood from Arm, Right Updated: 09/14/23 2345     Blood Culture No growth at 2 days    Blood Culture - Blood, Arm, Left [[id number]]  (Normal) Collected: 09/12/23 2311    Specimen: Blood from Arm, Left Updated: 09/14/23 2345     Blood Culture No growth at 2 days    Basic Metabolic Panel [[id number]]  (Abnormal) Collected: 09/14/23 0720    Specimen: Blood Updated: 09/14/23 0750     Glucose 103 mg/dL      BUN 24 mg/dL      Creatinine 1.11 mg/dL      Sodium 137 mmol/L      Potassium 3.7 mmol/L      Chloride 98 mmol/L      CO2 30.9 mmol/L      Calcium 8.7 mg/dL      BUN/Creatinine Ratio 21.6     Anion Gap 8.1 mmol/L      eGFR 69.7 mL/min/1.73     Narrative:      GFR Normal >60  Chronic Kidney Disease <60  Kidney Failure <15    The GFR formula is only valid for adults with stable renal function between ages 18 and 70.          Results from last 7 days   Lab Units 09/12/23 2054 09/12/23 1838   HSTROP T ng/L 8 8   D DIMER QUANT MCGFEU/mL  --  <0.27       Results from last 7 days   Lab Units 09/12/23  1838   TSH uIU/mL 2.610       Results from last 7 days   Lab Units 09/12/23 1838   PROBNP pg/mL 264.1                           No results found for: POCGLU  Results from last 7 days   Lab Units 09/12/23 2311 09/12/23 1838   PROCALCITONIN ng/mL  --  0.07   LACTATE mmol/L 1.2  --        Results from last 7 days   Lab Units 09/12/23  2311 09/12/23  2255   BLOODCX  No growth at 2 days No growth at 2 days       Results from last  7 days   Lab Units 09/12/23  1910   NITRITE UA  Negative   WBC UA /HPF 0-2*   BACTERIA UA /HPF Trace*   SQUAM EPITHEL UA /HPF 0-2       Results from last 7 days   Lab Units 09/13/23  0125   ADENOVIRUS DETECTION BY PCR  Not Detected   CORONAVIRUS 229E  Not Detected   CORONAVIRUS HKU1  Not Detected   CORONAVIRUS NL63  Not Detected   CORONAVIRUS OC43  Not Detected   HUMAN METAPNEUMOVIRUS  Not Detected   HUMAN RHINOVIRUS/ENTEROVIRUS  Not Detected   INFLUENZA B PCR  Not Detected   PARAINFLUENZA 1  Not Detected   PARAINFLUENZA VIRUS 2  Not Detected   PARAINFLUENZA VIRUS 3  Not Detected   PARAINFLUENZA VIRUS 4  Not Detected   BORDETELLA PERTUSSIS PCR  Not Detected   CHLAMYDOPHILA PNEUMONIAE PCR  Not Detected   MYCOPLAMA PNEUMO PCR  Not Detected   INFLUENZA A PCR  Not Detected   RSV, PCR  Not Detected           Radiology:  Imaging Results (Last 24 Hours)       ** No results found for the last 24 hours. **            Cardiology:  ECG/EMG Results (last 24 hours)       Procedure Component Value Units Date/Time    ECG 12 Lead Other; SOB [[id number]] Collected: 09/12/23 1816     Updated: 09/13/23 0803     QT Interval 404 ms      QTC Interval 401 ms     Narrative:      HEART RATE= 59  bpm  RR Interval= 1016  ms  IN Interval= 170  ms  P Horizontal Axis= 25  deg  P Front Axis= -23  deg  QRSD Interval= 99  ms  QT Interval= 404  ms  QTcB= 401  ms  QRS Axis= -78  deg  T Wave Axis= 9  deg  - ABNORMAL ECG -  Sinus rhythm  Left anterior fascicular block  Probable anterolateral infarct, old  No change from prior tracing  Electronically Signed By: Becky Melo ([facility]) 13-Sep-2023 08:03:32  Date and Time of Study: 2023-09-12 18:16:11    SCANNED - TELEMETRY   [[id number]] Resulted: 09/12/23     Updated: 09/13/23 1055            I have reviewed recent labs results and consult notes.    Please note portions of this assessment/plan may have been copied and pasted, but I have personally seen this patient and reviewed each line of this  assessment and plan for accuracy and made updates to reflect my necessary changes    Assessment and Plan:    .  Community-acquired right middle lobe pneumonia continue current treatment check chest x-ray today changed to p.o. doxycycline    .  Acute on chronic hypoxic respiratory failure patient has had no deterioration respiratory status during admission.  Walking oximetry noted no desaturations noted    .  Mild COPD exacerbation duo nebs respiratory toilet     .  Generalized weakness secondary to pneumonia conflicting reports from nursing/respiratory therapy and physical therapy.  Physical therapy signed off as the patient did not need any physical therapy needs however patient complains weakness difficulty getting out of the bed which have been supported by the nursing staff as well respiratory therapist will have them reevaluate the patient so long-term disposition can be adequately addressed        .  Chronic diastolic congestive heart failure without any decompensation continue with home medications     .  Adult-onset diabetes mellitus diet controlled     .  Hypertension at goal nothing acute continue home medications     .  Chronic kidney disease stage III better than baseline     .  GERD nothing acute continue PPI     .  Coronary disease status post stent placement to LAD nothing acute Home medicines to be continued     .  Weight loss due to psychosocial issues patient able to get to the grocery store to get groceries have discussed with his daughter-in-law on this and is having staff delivered to his house his weight loss has stabilized     .  Morbid obesity complicating all aspects of care     .  DVT prophylaxis Lovenox    Much of this encounter note is an electronic transcription/translation of spoken language to printed text. The electronic translation of spoken language may permit erroneous, or at times, nonsensical words or phrases to be inadvertently transcribed; Although I have reviewed the note for  such errors, some may still exist.    Note Disclaimer: At [facility], we believe that sharing information builds trust and better relationships. You are receiving this note because you recently visited [facility]. It is possible you will see health information before a provider has talked with you about it. This kind of information can be easy to misunderstand. To help you fully understand what it means for your health, we urge you to discuss this note with your provider.

## 2023-09-15 NOTE — PLAN OF CARE
"Goal Outcome Evaluation:  Plan of Care Reviewed With: patient           Outcome Evaluation: PT Re-evaluation Complete: patient performs supine to sit transfer with supervision with use of bed rails, sit to/from stand transfers with SBA, and gait x 194 feet with SBA/CGA without use of an AD. Patient states that he sleeps in a mechanical recliner as he previously reported during inital evaluation and has so \"for years.\" O2 sats noted to be 92% following mobility on 2L O2/NC. Recommend use of FWW for mobility however, patient refuses it and reports he will not use one at home. Patient cooperative and less frustrated today vs initial evaluation. Pt is agreeable to PT follow up for one addtional visit to ensure consistency of mobility. Patient reports he has no concerns for return home. Plan to see patient one additional visit.      Anticipated Discharge Disposition (PT): home  "

## 2023-09-16 ENCOUNTER — READMISSION MANAGEMENT (OUTPATIENT)
Dept: CALL CENTER | Facility: HOSPITAL | Age: 73
End: 2023-09-16
Payer: MEDICARE

## 2023-09-16 NOTE — OUTREACH NOTE
Prep Survey      Flowsheet Row Responses   [facility] facility patient discharged from? [place]   Is LACE score < 7 ? No   Eligibility Readm Mgmt   Discharge diagnosis CAP, Acute on chronic hypoxic respiratory failure, Mild COPD exacerbation   Does the patient have one of the following disease processes/diagnoses(primary or secondary)? Pneumonia   Does the patient have Home health ordered? No   Is there a DME ordered? No   Prep survey completed? Yes            Apurva YU - Registered Nurse

## 2023-09-16 NOTE — CASE MANAGEMENT/SOCIAL WORK
Case Management Discharge Note      Final Note: dc home         Selected Continued Care - Discharged on 9/15/2023 Admission date: 9/12/2023 - Discharge disposition: Home or Self Care      Destination    No services have been selected for the patient.                Durable Medical Equipment    No services have been selected for the patient.                Dialysis/Infusion    No services have been selected for the patient.                Home Medical Care    No services have been selected for the patient.                Therapy    No services have been selected for the patient.                Community Resources    No services have been selected for the patient.                Community & DME    No services have been selected for the patient.                         Final Discharge Disposition Code: 01 - home or self-care

## 2023-09-17 LAB
BACTERIA SPEC AEROBE CULT: NORMAL
BACTERIA SPEC AEROBE CULT: NORMAL

## 2023-09-18 NOTE — DISCHARGE SUMMARY
Esdras Ko  1950  [id number]        Discharge Summary    Date of Admission: 9/12/2023  Date of Discharge:  9/18/2023    Primary Discharge Diagnoses:     Community-acquired right middle lobe pneumonia  Acute on chronic hypoxic respiratory failure  Generalized weakness secondary to above  Mild COPD exacerbation    Secondary Discharge Diagnoses:     Chronic diastolic congestive heart failure without decompensation  Adult-onset diabetes mellitus  Hypertension  Chronic kidney disease better than baseline  GERD  Coronary artery disease  Morbid obesity    PCP  Patient Care Team:  Nitesh Arias MD as PCP - General (Family Medicine)  Irving Restrepo MD as Consulting Physician (Hematology and Oncology)  Nitesh Arias MD as Referring Physician (Family Medicine)    Consults:   Consults       No orders found from 8/14/2023 to 9/13/2023.              History of Present Illness:     73-year-old white male with multiple medical problems who was in usual state of health and about 1 week ago started having some intermittent cough with nonproductive sputum some chest congestion with progressively worsening generalized weakness we can get up and walk today.  Has been more short of breath and felt like his legs which keep him up.  No any fever chills no nausea vomiting or diarrhea.  He does have a history of COPD but only uses as needed albuterol.  He has chronic hypoxic respiratory failure on 2 L of oxygen which he is compliant with.  He normally ambulates in his home without assistance.     He was evaluated emergency room found to have pneumonia but was very weak unable to ambulate without using a walker and dropped his sats to mid 80s even on oxygen is admitted for further evaluation and treatment.      Hospital Course    Patient admitted to hospital started on IV doxycycline DuoNebs and respiratory toilet.  His shortness of breath improving oxygenation improving hypoxia resolved is back to  baseline.  He is generalized weakness persistent but somewhat improved physical therapy outpatient consultation and made good progress but he refused to use any walker or cane.  Physical therapy felt it was safe to be discharged home with continued monitoring as needed.  He did not wish to have any therapies at home      Operations and Procedures Performed:       Walking Oximetry    Result Date: 9/14/2023  Narrative: Ellen Morrow, CRT     9/14/2023  3:54 PM . Exercise Oximetry Patient Name:Esdras Ko MRN: [id number] Date: 09/14/23         ROOM AIR Baseline at rest SpO2%  87 Heart Rate  94 Blood Pressure   Oxygen Baseline at rest Oxygen (LPM)  1lpm SpO2%  95 Heart Rate  96 Blood Pressure   EXERCISE  SpO2% HR Supplemental Oxygen, LPM 1 MINUTE 92 99 1lpm 2 MINUTES 90 100 1lpm 3 MINUTES 87 100 Increased to 2lpm 4 MINUTES 94 100 2lpm 5 MINUTES 95 103 2lpm 6 MINUTES 94 103 2lpm Recovery     Time to Baseline/Recovery (minutes)  SpO2 % 95 HR 99 Oxygen  2lpm Distance Walked (meters) 100 meters   Pre Post Dyspnea (Nathalie Scale)   6 Fatigue (Nathalie Scale)   7 Comments: Head probe and gait belt used during walk.     CT Head Without Contrast    Result Date: 9/12/2023  Narrative: CT HEAD, NONCONTRAST,  HISTORY: Confusion and vertigo  COMPARISON: 11/30/2014   TECHNIQUE: CT examination of the head was performed without IV contrast. Radiation dose reduction techniques included automated exposure control or exposure modulation based on body size. Radiation audit for CT and nuclear cardiology exams in the last12 months: 1.    FINDINGS: No acute intracranial hemorrhage. Right parietal encephalomalacia with volume loss. Unchanged overlying calvarial abnormality likely reflects sequelae of remote insult. Mild ventriculomegaly is likely ex vacuo. No displaced calvarial fracture..      Impression: Chronic changes without acute process visualized by noncontrast CT.  This report was finalized on 9/12/2023 8:29 PM by Jordi Prasad MD.       XR Chest 1 View    Result Date: 9/12/2023  Narrative: AP PORTABLE CHEST,  HISTORY: Shortness of air  COMPARISON: 4/10/2023  TECHNIQUE: AP portable chest x-ray.  FINDINGS: Borderline heart size. Heart size and pulmonary markings are accentuated by AP technique. Allowing for this there does appear to be coarsening of the pulmonary interstitium and suspicion for a right middle lobe infiltrate obscuring the right heart border. Small left pleural effusion.      Impression: Favor coarsening of the pulmonary interstitium with right middle lobe infiltrate and small left pleural effusion.  This report was finalized on 9/12/2023 8:32 PM by Jordi Prasad MD.       Labs:  Results from last 7 days   Lab Units 09/14/23  0720 09/12/23  1838   WBC 10*3/mm3 7.11 6.09   HEMOGLOBIN g/dL 13.0 14.0   HEMATOCRIT % 41.0 43.7   PLATELETS 10*3/mm3 111* 120*     Results from last 7 days   Lab Units 09/14/23  0720 09/12/23  1838   SODIUM mmol/L 137 140   POTASSIUM mmol/L 3.7 4.1   CHLORIDE mmol/L 98 97*   CO2 mmol/L 30.9* 32.2*   ANION GAP mmol/L 8.1 10.8   BUN mg/dL 24* 19   CREATININE mg/dL 1.11 1.03   BUN / CREAT RATIO  21.6 18.4   EGFR mL/min/1.73 69.7 76.2   CALCIUM mg/dL 8.7 9.1   GLUCOSE mg/dL 103* 92       Lab Results (last 24 hours)       ** No results found for the last 24 hours. **          Results from last 7 days   Lab Units 09/12/23 2054 09/12/23  1838   HSTROP T ng/L 8 8   D DIMER QUANT MCGFEU/mL  --  <0.27     Results from last 7 days   Lab Units 09/12/23  1838   TSH uIU/mL 2.610     Results from last 7 days   Lab Units 09/12/23  1838   PROBNP pg/mL 264.1                   Invalid input(s): LDLCALC          No results found for: POCGLU  Results from last 7 days   Lab Units 09/12/23  2311 09/12/23  1838   PROCALCITONIN ng/mL  --  0.07   LACTATE mmol/L 1.2  --      Results from last 7 days   Lab Units 09/12/23  2311 09/12/23  2255   BLOODCX  No growth at 5 days No growth at 5 days     Results from last 7 days   Lab Units  09/12/23  1910   NITRITE UA  Negative   WBC UA /HPF 0-2*   BACTERIA UA /HPF Trace*   SQUAM EPITHEL UA /HPF 0-2     Results from last 7 days   Lab Units 09/13/23  0125   ADENOVIRUS DETECTION BY PCR  Not Detected   CORONAVIRUS 229E  Not Detected   CORONAVIRUS HKU1  Not Detected   CORONAVIRUS NL63  Not Detected   CORONAVIRUS OC43  Not Detected   HUMAN METAPNEUMOVIRUS  Not Detected   HUMAN RHINOVIRUS/ENTEROVIRUS  Not Detected   INFLUENZA B PCR  Not Detected   PARAINFLUENZA 1  Not Detected   PARAINFLUENZA VIRUS 2  Not Detected   PARAINFLUENZA VIRUS 3  Not Detected   PARAINFLUENZA VIRUS 4  Not Detected   BORDETELLA PERTUSSIS PCR  Not Detected   CHLAMYDOPHILA PNEUMONIAE PCR  Not Detected   MYCOPLAMA PNEUMO PCR  Not Detected   INFLUENZA A PCR  Not Detected   RSV, PCR  Not Detected         Radiology:  Imaging Results (Last 24 Hours)       ** No results found for the last 24 hours. **            PROCEDURES          Allergies:  is allergic to amoxicillin, ceftin [cefuroxime axetil], codeine, flonase [fluticasone], spiriva handihaler [tiotropium bromide monohydrate], sulfa antibiotics, and phenobarbital.      Discharge Medications:     Your medication list        START taking these medications        Instructions Last Dose Given Next Dose Due   doxycycline 100 MG capsule  Commonly known as: VIBRAMYCIN      Take 1 capsule by mouth 2 (Two) Times a Day.              CHANGE how you take these medications        Instructions Last Dose Given Next Dose Due   Amitiza 24 MCG capsule  Generic drug: lubiprostone  What changed: Another medication with the same name was removed. Continue taking this medication, and follow the directions you see here.      Take 1 capsule by mouth 2 (Two) Times a Day As Needed.              CONTINUE taking these medications        Instructions Last Dose Given Next Dose Due   acetaminophen 325 MG tablet  Commonly known as: TYLENOL      Take 2 tablets by mouth Every 4 (Four) Hours As Needed for Mild Pain  or  Fever.       allopurinol 300 MG tablet  Commonly known as: ZYLOPRIM      Take 1 tablet by mouth Every Morning. take 1 tablet by mouth daily       atorvastatin 20 MG tablet  Commonly known as: LIPITOR      Take 1 tablet by mouth Daily.       cetirizine 10 MG tablet  Commonly known as: zyrTEC      Take 1 tablet by mouth Every Morning.       clobetasol 0.05 % cream  Commonly known as: TEMOVATE      apply to affected area (RASH FREELY) twice a day       clopidogrel 75 MG tablet  Commonly known as: PLAVIX      Take 1 tablet by mouth Every Morning.       FLUoxetine 20 MG capsule  Commonly known as: PROzac      Take 1 capsule by mouth Daily.       hydrophor ointment ointment      Apply  topically to the appropriate area as directed Daily.       ipratropium-albuterol 0.5-2.5 mg/3 ml nebulizer  Commonly known as: DUO-NEB      Take 3 mL by nebulization Every 4 (Four) Hours As Needed for Shortness of Air.       isosorbide mononitrate 30 MG 24 hr tablet  Commonly known as: IMDUR      Take 1 tablet by mouth Every Morning for 30 days.       levETIRAcetam 1000 MG tablet  Commonly known as: KEPPRA      Take 0.5 tablets by mouth 2 (Two) Times a Day.       metoprolol tartrate 50 MG tablet  Commonly known as: LOPRESSOR      Take 1 tablet by mouth 2 (Two) Times a Day.       O2  Commonly known as: OXYGEN      Inhale 2 L/min Daily.       pantoprazole 40 MG EC tablet  Commonly known as: PROTONIX      Take 1 tablet by mouth Every Morning. take 1 tablet by mouth once daily       potassium chloride ER 20 MEQ tablet controlled-release ER tablet  Commonly known as: K-TAB      Take 2 tablets by mouth Daily.       torsemide 100 MG tablet  Commonly known as: DEMADEX      Take 1 tablet by mouth Daily.       torsemide 100 MG tablet  Commonly known as: DEMADEX      Take 0.5 tablets by mouth Daily for 30 days.                 Where to Get Your Medications        These medications were sent to [facility] [place] - [place], [place] - [address]  [phone]  - [phone] FX  [address] [address]      Phone: [phone]   doxycycline 100 MG capsule         Home medications and discharge medications reconciled with patient      Condition on Discharge: Stable    Discharge Disposition  Home    Visiting Nurse:    No     Home PT/OT:  No     Home Safety Evaluation:  No     DME  None    Discharge Diet: Cardiac       Activity at Discharge:  As tolerated      Follow-up Appointments:  Additional Instructions for the Follow-ups that You Need to Schedule       Call MD for problems / concerns.   As directed      Discharge Follow-up with PCP   As directed       Currently Documented PCP:    Nitesh Arias MD    PCP Phone Number:    [phone]     Follow Up Details: thursday               Follow-up Information       Nitesh Arias MD .    Specialty: Family Medicine  Why: thursday  Contact information:  [address]  [place] [address] [phone]               Nitesh Arias MD .    Specialty: Family Medicine  Contact information:  [address]  [place] [address] [phone]                             Test Results Pending at Discharge       Nitesh Arias MD  09/18/23  15:55 EDT          Much of this encounter note is an electronic transcription/translation of spoken language to printed text. The electronic translation of spoken language may permit erroneous, or at times, nonsensical words or phrases to be inadvertently transcribed; Although I have reviewed the note for such errors, some may still exist.    Note Disclaimer: At [facility], we believe that sharing information builds trust and better relationships. You are receiving this note because you recently visited [facility]. It is possible you will see health information before a provider has talked with you about it. This kind of information can be easy to misunderstand. To help you fully understand what it means  for your health, we urge you to discuss this note with your provider.

## 2023-09-19 ENCOUNTER — TRANSITIONAL CARE MANAGEMENT TELEPHONE ENCOUNTER (OUTPATIENT)
Dept: CALL CENTER | Facility: HOSPITAL | Age: 73
End: 2023-09-19
Payer: MEDICARE

## 2023-09-19 NOTE — OUTREACH NOTE
Call Center TCM Note      Flowsheet Row Responses   [facility] patient discharged from? [facility]   Does the patient have one of the following disease processes/diagnoses(primary or secondary)? Pneumonia   TCM attempt successful? No   Unsuccessful attempts Attempt 1   Discharge diagnosis CAP, Acute on chronic hypoxic respiratory failure, Mild COPD exacerbation   TCM call completed? Yes   Wrap up additional comments PCP not a  PCP. Dr Arias is with [facility]   Would this patient benefit from a Referral to [facility] Social Work? No   Is the patient interested in additional calls from an ambulatory ? No            Kathy Corral RN    9/19/2023, 16:43 EDT

## 2023-10-02 ENCOUNTER — HOSPITAL ENCOUNTER (EMERGENCY)
Facility: HOSPITAL | Age: 73
Discharge: HOME | End: 2023-10-02
Attending: EMERGENCY MEDICINE | Admitting: EMERGENCY MEDICINE
Payer: MEDICARE

## 2023-10-02 ENCOUNTER — APPOINTMENT (OUTPATIENT)
Dept: GENERAL RADIOLOGY | Facility: HOSPITAL | Age: 73
End: 2023-10-02
Payer: MEDICARE

## 2023-10-02 VITALS
WEIGHT: 231 LBS | HEIGHT: 67 IN | BODY MASS INDEX: 36.26 KG/M2 | RESPIRATION RATE: 22 BRPM | OXYGEN SATURATION: 96 % | SYSTOLIC BLOOD PRESSURE: 125 MMHG | DIASTOLIC BLOOD PRESSURE: 69 MMHG | HEART RATE: 64 BPM | TEMPERATURE: 99 F

## 2023-10-02 DIAGNOSIS — R06.09 DYSPNEA ON EXERTION: Primary | ICD-10-CM

## 2023-10-02 LAB
ALBUMIN SERPL-MCNC: 4.2 G/DL (ref 3.5–5.2)
ALBUMIN/GLOB SERPL: 2.1 G/DL
ALP SERPL-CCNC: 123 U/L (ref 39–117)
ALT SERPL W P-5'-P-CCNC: 10 U/L (ref 1–41)
ANION GAP SERPL CALCULATED.3IONS-SCNC: 9.9 MMOL/L (ref 5–15)
AST SERPL-CCNC: 14 U/L (ref 1–40)
BASOPHILS # BLD AUTO: 0.02 10*3/MM3 (ref 0–0.2)
BASOPHILS NFR BLD AUTO: 0.3 % (ref 0–1.5)
BILIRUB SERPL-MCNC: 0.8 MG/DL (ref 0–1.2)
BUN SERPL-MCNC: 20 MG/DL (ref 8–23)
BUN/CREAT SERPL: 17.9 (ref 7–25)
CALCIUM SPEC-SCNC: 9.1 MG/DL (ref 8.6–10.5)
CHLORIDE SERPL-SCNC: 101 MMOL/L (ref 98–107)
CO2 SERPL-SCNC: 35.1 MMOL/L (ref 22–29)
CREAT SERPL-MCNC: 1.12 MG/DL (ref 0.76–1.27)
D-LACTATE SERPL-SCNC: 1.3 MMOL/L (ref 0.5–2)
DEPRECATED RDW RBC AUTO: 49.4 FL (ref 37–54)
EGFRCR SERPLBLD CKD-EPI 2021: 68.9 ML/MIN/1.73
EOSINOPHIL # BLD AUTO: 0.27 10*3/MM3 (ref 0–0.4)
EOSINOPHIL NFR BLD AUTO: 4.1 % (ref 0.3–6.2)
ERYTHROCYTE [DISTWIDTH] IN BLOOD BY AUTOMATED COUNT: 15 % (ref 12.3–15.4)
GLOBULIN UR ELPH-MCNC: 2 GM/DL
GLUCOSE SERPL-MCNC: 106 MG/DL (ref 65–99)
HCT VFR BLD AUTO: 44.1 % (ref 37.5–51)
HGB BLD-MCNC: 13.9 G/DL (ref 13–17.7)
IMM GRANULOCYTES # BLD AUTO: 0.03 10*3/MM3 (ref 0–0.05)
IMM GRANULOCYTES NFR BLD AUTO: 0.5 % (ref 0–0.5)
LYMPHOCYTES # BLD AUTO: 1.02 10*3/MM3 (ref 0.7–3.1)
LYMPHOCYTES NFR BLD AUTO: 15.6 % (ref 19.6–45.3)
MCH RBC QN AUTO: 27.8 PG (ref 26.6–33)
MCHC RBC AUTO-ENTMCNC: 31.5 G/DL (ref 31.5–35.7)
MCV RBC AUTO: 88.2 FL (ref 79–97)
MONOCYTES # BLD AUTO: 0.51 10*3/MM3 (ref 0.1–0.9)
MONOCYTES NFR BLD AUTO: 7.8 % (ref 5–12)
NEUTROPHILS NFR BLD AUTO: 4.68 10*3/MM3 (ref 1.7–7)
NEUTROPHILS NFR BLD AUTO: 71.7 % (ref 42.7–76)
NT-PROBNP SERPL-MCNC: 161.3 PG/ML (ref 0–900)
PLATELET # BLD AUTO: 125 10*3/MM3 (ref 140–450)
PMV BLD AUTO: 11.3 FL (ref 6–12)
POTASSIUM SERPL-SCNC: 4.9 MMOL/L (ref 3.5–5.2)
PROCALCITONIN SERPL-MCNC: 0.06 NG/ML (ref 0–0.25)
PROT SERPL-MCNC: 6.2 G/DL (ref 6–8.5)
QT INTERVAL: 408 MS
QTC INTERVAL: 420 MS
RBC # BLD AUTO: 5 10*6/MM3 (ref 4.14–5.8)
SODIUM SERPL-SCNC: 146 MMOL/L (ref 136–145)
WBC NRBC COR # BLD: 6.53 10*3/MM3 (ref 3.4–10.8)

## 2023-10-02 PROCEDURE — 83880 ASSAY OF NATRIURETIC PEPTIDE: CPT | Performed by: EMERGENCY MEDICINE

## 2023-10-02 PROCEDURE — 80053 COMPREHEN METABOLIC PANEL: CPT | Performed by: EMERGENCY MEDICINE

## 2023-10-02 PROCEDURE — 71045 X-RAY EXAM CHEST 1 VIEW: CPT

## 2023-10-02 PROCEDURE — 93005 ELECTROCARDIOGRAM TRACING: CPT | Performed by: EMERGENCY MEDICINE

## 2023-10-02 PROCEDURE — 96360 HYDRATION IV INFUSION INIT: CPT

## 2023-10-02 PROCEDURE — 85025 COMPLETE CBC W/AUTO DIFF WBC: CPT | Performed by: EMERGENCY MEDICINE

## 2023-10-02 PROCEDURE — 36415 COLL VENOUS BLD VENIPUNCTURE: CPT

## 2023-10-02 PROCEDURE — 83605 ASSAY OF LACTIC ACID: CPT | Performed by: EMERGENCY MEDICINE

## 2023-10-02 PROCEDURE — 99284 EMERGENCY DEPT VISIT MOD MDM: CPT

## 2023-10-02 PROCEDURE — 84145 PROCALCITONIN (PCT): CPT | Performed by: EMERGENCY MEDICINE

## 2023-10-02 PROCEDURE — 87040 BLOOD CULTURE FOR BACTERIA: CPT | Performed by: EMERGENCY MEDICINE

## 2023-10-02 RX ORDER — SODIUM CHLORIDE 9 MG/ML
125 INJECTION, SOLUTION INTRAVENOUS CONTINUOUS
Status: DISCONTINUED | OUTPATIENT
Start: 2023-10-02 | End: 2023-10-02 | Stop reason: HOSPADM

## 2023-10-02 RX ORDER — SODIUM CHLORIDE 0.9 % (FLUSH) 0.9 %
10 SYRINGE (ML) INJECTION AS NEEDED
Status: DISCONTINUED | OUTPATIENT
Start: 2023-10-02 | End: 2023-10-02 | Stop reason: HOSPADM

## 2023-10-02 RX ADMIN — SODIUM CHLORIDE 125 ML/HR: 9 INJECTION, SOLUTION INTRAVENOUS at 11:34

## 2023-10-02 NOTE — ED PROVIDER NOTES
Subjective     History provided by:  Patient and medical records  History of Present Illness    Chief complaint: Shortness of breath    Location: Lungs    Quality/Severity: Patient is shortness of breath with exertion.  He has a nonproductive cough.    Timing/Onset: Patient states symptoms have been present since he was released from the hospital 9/18/2023.    Modifying Factors: Exertion exacerbates his shortness of breath.    Associated symptoms: Denies a fever or chest pain.  Denies any swelling of his legs.    Narrative: The patient is a 73-year-old white male who was hospitalized here 9/12-18/23 for right middle lobe pneumonia, acute on chronic respiratory failure with a history of COPD and diastolic congestive heart failure.  He also has a history of hypertension, chronic kidney disease, morbid obesity.  The patient was discharged on doxycycline which she completed.  He is normally on home O2 2 L via nasal cannula which she states is compliant with.  He also states he is compliant with his nebulizer treatments.  The patient states since being discharged he has continued to have shortness of breath with exertion.  He also continued had a nonproductive cough.  The patient is a former 40-year smoker who quit smoking about 20 years ago.  Review of Systems    Past Medical History:   Diagnosis Date    Anemia     Bladder cancer 2008    Cerebrovascular accident     Ischemic and TIAs; most recent 02/2014 which was a TIA.    Chronic kidney disease, stage 3     Dr. KATHERYN Lin    Chronic right-sided CHF (congestive heart failure)         Colon polyp     COPD (chronic obstructive pulmonary disease)     History of blood transfusion     Hyperlipidemia     Hypertension     Hyperuricemia     Left shoulder pain     Lymphoma     MANTLE CELL, HAD CHEMO  2010    On home O2     per pt, 2L    Osteoarthritis     Pilonidal cyst     SCHEDULED FOR SX    Psoriasis     Seizure     Following head trauma in 1970    Skin lesions      SCARRING FROM SHINGLES, & BLISTERS FROM EDEMA    Sleep apnea     Intolerant of CPAP    Splenomegaly 02/21/2013    Stroke     Tobacco abuse     Type 2 diabetes mellitus        Allergies   Allergen Reactions    Amoxicillin Hives    Ceftin [Cefuroxime Axetil] Hives    Codeine     Flonase [Fluticasone] Hives    Spiriva Handihaler [Tiotropium Bromide Monohydrate] Hives    Sulfa Antibiotics Hives    Phenobarbital Rash       Past Surgical History:   Procedure Laterality Date    BRONCHOSCOPY N/A 8/21/2017    Procedure: BRONCHOSCOPY with fluro and biopsy and lavage.;  Surgeon: Red Topete MD;  Location:  [facility] OR;  Service:     CARDIAC CATHETERIZATION N/A 9/17/2018    Procedure: Coronary angiography;  Surgeon: Kylie Victoria MD;  Location:  [facility];  Service: Cardiovascular    CARDIAC CATHETERIZATION N/A 10/30/2018    Procedure: Chronic Total Occlussion;  Surgeon: Roe Wheatley MD;  Location:  [facility];  Service: Cardiovascular    CARDIAC CATHETERIZATION N/A 10/30/2018    Procedure: Stent AURA coronary;  Surgeon: Roe Wheatley MD;  Location:  [facility];  Service: Cardiovascular    COLONOSCOPY N/A 10/9/2019    Procedure: COLONOSCOPY with polypectomy;  Surgeon: Lee Priest MD;  Location:  [facility] OR;  Service: Gastroenterology    PILONIDAL CYSTECTOMY N/A 11/30/2016    Procedure: PILONIDAL CYSTECTOMY;  Surgeon: Roe Davila MD;  [facility];  Service:     SINUS SURGERY      SKIN LESION EXCISION      DR. DAVILA ABOUT 10 YEARS AGO       Family History   Problem Relation Age of Onset    Heart defect Mother     Pancreatic cancer Father 56    Anemia Sister     Hypertension Brother     Heart disease Brother     Mental illness Brother     Other Brother         Splenectomy    Cancer Cousin        Social History     Socioeconomic History    Marital status:      Spouse name: Charley    Years of education: 12   Tobacco Use    Smoking status: Former      Packs/day: 3.00     Years: 50.00     Pack years: 150.00     Types: Cigarettes     Quit date:      Years since quittin.7    Smokeless tobacco: Never    Tobacco comments:     80 pack year   Vaping Use    Vaping Use: Never used   Substance and Sexual Activity    Alcohol use: No     Comment: Heavy in past, none since around     Drug use: No    Sexual activity: Defer     Comment: , per pt report           Objective   Physical Exam  Vitals and nursing note reviewed.   Constitutional:       Appearance: He is obese. He is not toxic-appearing.      Comments: The patient has a chronic ill appearance.  He does not appear acutely toxic.  Review of his vital signs: He is afebrile with a temperature of 99, tachypnea with a respiratory rate of 29 with a oxygen saturation of 98% on 2 L of oxygen via nasal cannula when I was in the room, heart rate 68, blood pressure normal 137/84.   HENT:      Head: Normocephalic and atraumatic.      Mouth/Throat:      Mouth: Mucous membranes are moist.      Pharynx: Oropharynx is clear.   Eyes:      Pupils: Pupils are equal, round, and reactive to light.   Cardiovascular:      Rate and Rhythm: Normal rate and regular rhythm.      Heart sounds: No murmur heard.  Pulmonary:      Effort: Tachypnea present.      Breath sounds: No stridor. No decreased breath sounds, wheezing, rhonchi or rales.   Abdominal:      Palpations: Abdomen is soft.      Tenderness: There is no guarding.   Musculoskeletal:      Cervical back: Normal range of motion and neck supple.      Right lower leg: No edema.      Left lower leg: No edema.   Lymphadenopathy:      Cervical: No cervical adenopathy.   Skin:     General: Skin is warm and dry.      Capillary Refill: Capillary refill takes less than 2 seconds.   Neurological:      General: No focal deficit present.      Mental Status: He is alert and oriented to person, place, and time.   Psychiatric:         Mood and Affect: Mood normal.         Behavior:  Behavior normal.       Procedures           ED Course  ED Course as of 10/02/23 1429   Mon Oct 02, 2023   1158 My interpretation of the patient's EKG tracing performed 11: 04 is normal sinus rhythm with a rate of 64, left axis deviation, left anterior fascicular block, no acute ST segment elevation or depression consistent with ischemia, inverted T waves in leads III and V1, no R wave transition, normal TX and QT intervals.  In comparison to tracing 9/12/2023 there is no change. [TP]   1159 Review of the patient's laboratory studies: The patient CBC has a normal white count of 6.5 with a normal differential.  Hemoglobin, hematocrit within normal limits.  CMP has an elevated sodium of 146, normal potassium of 4.9 and normal glucose.  CO2 is elevated at 35.  Liver enzymes are within normal limits.  Renal function test within normal limits.  proBNP is normal at 161.  Lactic acid and procalcitonin are normal.  2 sets of blood cultures are pending. [TP]   1208 Patient's chest x-ray was interpreted by me and the radiologist is negative. [TP]   1209 The patient is not hypoxic here with an oxygen saturation of 98% on his home O2 of 2 L.  His lungs are clear to auscultation.  Chest x-ray is normal.  No evidence of volume overload.  His white count, lactic acid and procalcitonin are all normal. [TP]      ED Course User Index  [TP] Roe Green MD                                           Medical Decision Making  My differential diagnosis for dyspnea includes but is not limited to:  Asthma, COPD, pneumonia, pulmonary embolus, acute respiratory distress syndrome, pneumothorax, pleural effusion, pulmonary fibrosis, congestive heart failure, myocardial infarction, DKA, uremia, acidosis, sepsis, anemia, drug related, hyperventilation, CNS disease     Problems Addressed:  Dyspnea on exertion: complicated acute illness or injury    Amount and/or Complexity of Data Reviewed  Labs: ordered. Decision-making details documented in ED  "Course.  Radiology: ordered. Decision-making details documented in ED Course.  ECG/medicine tests: ordered and independent interpretation performed. Decision-making details documented in ED Course.    Risk  Prescription drug management.          Labs Reviewed   COMPREHENSIVE METABOLIC PANEL - Abnormal; Notable for the following components:       Result Value    Glucose 106 (*)     Sodium 146 (*)     CO2 35.1 (*)     Alkaline Phosphatase 123 (*)     All other components within normal limits    Narrative:     GFR Normal >60  Chronic Kidney Disease <60  Kidney Failure <15    The GFR formula is only valid for adults with stable renal function between ages 18 and 70.   CBC WITH AUTO DIFFERENTIAL - Abnormal; Notable for the following components:    Platelets 125 (*)     Lymphocyte % 15.6 (*)     All other components within normal limits   BNP (IN-HOUSE) - Normal    Narrative:     This assay is used as an aid in the diagnosis of individuals suspected of having heart failure. It can be used as an aid in the diagnosis of acute decompensated heart failure (ADHF) in patients presenting with signs and symptoms of ADHF to the emergency department (ED). In addition, NT-proBNP of <300 pg/mL indicates ADHF is not likely.   LACTIC ACID, PLASMA - Normal   PROCALCITONIN - Normal    Narrative:     As a Marker for Sepsis (Non-Neonates):    1. <0.5 ng/mL represents a low risk of severe sepsis and/or septic shock.  2. >2 ng/mL represents a high risk of severe sepsis and/or septic shock.    As a Marker for Lower Respiratory Tract Infections that require antibiotic therapy:    PCT on Admission    Antibiotic Therapy       6-12 Hrs later    >0.5                Strongly Recommended  >0.25 - <0.5        Recommended   0.1 - 0.25          Discouraged              Remeasure/reassess PCT  <0.1                Strongly Discouraged     Remeasure/reassess PCT    As 28 day mortality risk marker: \"Change in Procalcitonin Result\" (>80% or <=80%) if Day 0 " (or Day 1) and Day 4 values are available. Refer to http://www.[facility]-pct-calculator.com    Change in PCT <=80%  A decrease of PCT levels below or equal to 80% defines a positive change in PCT test result representing a higher risk for 28-day all-cause mortality of patients diagnosed with severe sepsis for septic shock.    Change in PCT >80%  A decrease of PCT levels of more than 80% defines a negative change in PCT result representing a lower risk for 28-day all-cause mortality of patients diagnosed with severe sepsis or septic shock.      BLOOD CULTURE   BLOOD CULTURE   CBC AND DIFFERENTIAL    Narrative:     The following orders were created for panel order CBC & Differential.  Procedure                               Abnormality         Status                     ---------                               -----------         ------                     CBC Auto Differential[[id number]]        Abnormal            Final result                 Please view results for these tests on the individual orders.   Julius pearl       Medication List      No changes were made to your prescriptions during this visit.           Final diagnoses:   Dyspnea on exertion       ED Disposition  ED Disposition       ED Disposition   Discharge    Condition   Stable    Comment   --               Nitesh Arias MD  [address] [place] [address]  [facility] [address]  [phone]    Schedule an appointment as soon as possible for a visit   next available         Medication List      No changes were made to your prescriptions during this visit.            Roe Green MD  10/02/23 4438

## 2023-10-03 ENCOUNTER — READMISSION MANAGEMENT (OUTPATIENT)
Dept: CALL CENTER | Facility: HOSPITAL | Age: 73
End: 2023-10-03
Payer: MEDICARE

## 2023-10-03 NOTE — OUTREACH NOTE
COPD/PN Week 3 Survey      Flowsheet Row Responses   [facility] facility patient discharged from? [facility]   Does the patient have one of the following disease processes/diagnoses(primary or secondary)? Pneumonia   Week 3 attempt successful? No   Unsuccessful attempts Attempt 1            MUNA Orta Registered Nurse

## 2023-10-07 LAB
BACTERIA SPEC AEROBE CULT: NORMAL
BACTERIA SPEC AEROBE CULT: NORMAL

## 2023-10-10 ENCOUNTER — READMISSION MANAGEMENT (OUTPATIENT)
Dept: CALL CENTER | Facility: HOSPITAL | Age: 73
End: 2023-10-10
Payer: MEDICARE

## 2023-10-10 NOTE — OUTREACH NOTE
COPD/PN Week 3 Survey      Flowsheet Row Responses   [facility] facility patient discharged from? [facility]   Does the patient have one of the following disease processes/diagnoses(primary or secondary)? Pneumonia   Week 3 attempt successful? No   Unsuccessful attempts Attempt 2   Revoke Decline to participate  [attempted x 3 to contact patient, unsuccessful. revoked per policy.]            Lucina DAMICO - Registered Nurse

## 2023-12-04 ENCOUNTER — APPOINTMENT (OUTPATIENT)
Dept: GENERAL RADIOLOGY | Facility: HOSPITAL | Age: 73
End: 2023-12-04
Payer: MEDICARE

## 2023-12-04 ENCOUNTER — HOSPITAL ENCOUNTER (EMERGENCY)
Facility: HOSPITAL | Age: 73
Discharge: HOME | End: 2023-12-04
Attending: EMERGENCY MEDICINE | Admitting: EMERGENCY MEDICINE
Payer: MEDICARE

## 2023-12-04 VITALS
RESPIRATION RATE: 18 BRPM | HEART RATE: 92 BPM | SYSTOLIC BLOOD PRESSURE: 145 MMHG | TEMPERATURE: 98 F | DIASTOLIC BLOOD PRESSURE: 84 MMHG | BODY MASS INDEX: 36.1 KG/M2 | WEIGHT: 230 LBS | OXYGEN SATURATION: 93 % | HEIGHT: 67 IN

## 2023-12-04 DIAGNOSIS — J18.9 PNEUMONIA DUE TO INFECTIOUS ORGANISM, UNSPECIFIED LATERALITY, UNSPECIFIED PART OF LUNG: ICD-10-CM

## 2023-12-04 DIAGNOSIS — J44.1 COPD EXACERBATION: Primary | ICD-10-CM

## 2023-12-04 LAB
ALBUMIN SERPL-MCNC: 3.4 G/DL (ref 3.5–5.2)
ALBUMIN/GLOB SERPL: 1.9 G/DL
ALP SERPL-CCNC: 97 U/L (ref 39–117)
ALT SERPL W P-5'-P-CCNC: 6 U/L (ref 1–41)
ANION GAP SERPL CALCULATED.3IONS-SCNC: 9.1 MMOL/L (ref 5–15)
AST SERPL-CCNC: 21 U/L (ref 1–40)
BASOPHILS # BLD AUTO: 0.03 10*3/MM3 (ref 0–0.2)
BASOPHILS NFR BLD AUTO: 0.6 % (ref 0–1.5)
BILIRUB SERPL-MCNC: 0.5 MG/DL (ref 0–1.2)
BILIRUB UR QL STRIP: NEGATIVE
BUN SERPL-MCNC: 13 MG/DL (ref 8–23)
BUN/CREAT SERPL: 13.5 (ref 7–25)
CALCIUM SPEC-SCNC: 7.9 MG/DL (ref 8.6–10.5)
CHLORIDE SERPL-SCNC: 105 MMOL/L (ref 98–107)
CLARITY UR: CLEAR
CO2 SERPL-SCNC: 29.9 MMOL/L (ref 22–29)
COLOR UR: YELLOW
CREAT SERPL-MCNC: 0.96 MG/DL (ref 0.76–1.27)
D DIMER PPP FEU-MCNC: <0.27 MCGFEU/ML (ref 0–0.74)
DEPRECATED RDW RBC AUTO: 48.4 FL (ref 37–54)
EGFRCR SERPLBLD CKD-EPI 2021: 82.9 ML/MIN/1.73
EOSINOPHIL # BLD AUTO: 0.34 10*3/MM3 (ref 0–0.4)
EOSINOPHIL NFR BLD AUTO: 6.4 % (ref 0.3–6.2)
ERYTHROCYTE [DISTWIDTH] IN BLOOD BY AUTOMATED COUNT: 15.2 % (ref 12.3–15.4)
FLUAV RNA RESP QL NAA+PROBE: NOT DETECTED
FLUBV RNA RESP QL NAA+PROBE: NOT DETECTED
GEN 5 2HR TROPONIN T REFLEX: 10 NG/L
GLOBULIN UR ELPH-MCNC: 1.8 GM/DL
GLUCOSE SERPL-MCNC: 96 MG/DL (ref 65–99)
GLUCOSE UR STRIP-MCNC: NEGATIVE MG/DL
HCT VFR BLD AUTO: 44.4 % (ref 37.5–51)
HGB BLD-MCNC: 13.8 G/DL (ref 13–17.7)
HGB UR QL STRIP.AUTO: NEGATIVE
IMM GRANULOCYTES # BLD AUTO: 0.03 10*3/MM3 (ref 0–0.05)
IMM GRANULOCYTES NFR BLD AUTO: 0.6 % (ref 0–0.5)
KETONES UR QL STRIP: NEGATIVE
LEUKOCYTE ESTERASE UR QL STRIP.AUTO: NEGATIVE
LYMPHOCYTES # BLD AUTO: 0.81 10*3/MM3 (ref 0.7–3.1)
LYMPHOCYTES NFR BLD AUTO: 15.2 % (ref 19.6–45.3)
MCH RBC QN AUTO: 27.5 PG (ref 26.6–33)
MCHC RBC AUTO-ENTMCNC: 31.1 G/DL (ref 31.5–35.7)
MCV RBC AUTO: 88.4 FL (ref 79–97)
MONOCYTES # BLD AUTO: 0.4 10*3/MM3 (ref 0.1–0.9)
MONOCYTES NFR BLD AUTO: 7.5 % (ref 5–12)
NEUTROPHILS NFR BLD AUTO: 3.73 10*3/MM3 (ref 1.7–7)
NEUTROPHILS NFR BLD AUTO: 69.7 % (ref 42.7–76)
NITRITE UR QL STRIP: NEGATIVE
NRBC BLD AUTO-RTO: 0 /100 WBC (ref 0–0.2)
NT-PROBNP SERPL-MCNC: 103.8 PG/ML (ref 0–900)
PH UR STRIP.AUTO: 5.5 [PH] (ref 4.5–8)
PLATELET # BLD AUTO: 116 10*3/MM3 (ref 140–450)
PMV BLD AUTO: 11.2 FL (ref 6–12)
POTASSIUM SERPL-SCNC: 4 MMOL/L (ref 3.5–5.2)
PROT SERPL-MCNC: 5.2 G/DL (ref 6–8.5)
PROT UR QL STRIP: ABNORMAL
RBC # BLD AUTO: 5.02 10*6/MM3 (ref 4.14–5.8)
SARS-COV-2 RNA RESP QL NAA+PROBE: NOT DETECTED
SODIUM SERPL-SCNC: 144 MMOL/L (ref 136–145)
SP GR UR STRIP: 1.01 (ref 1–1.03)
TROPONIN T DELTA: 0 NG/L
TROPONIN T SERPL HS-MCNC: 10 NG/L
UROBILINOGEN UR QL STRIP: ABNORMAL
WBC NRBC COR # BLD AUTO: 5.34 10*3/MM3 (ref 3.4–10.8)

## 2023-12-04 PROCEDURE — 85379 FIBRIN DEGRADATION QUANT: CPT

## 2023-12-04 PROCEDURE — 83880 ASSAY OF NATRIURETIC PEPTIDE: CPT

## 2023-12-04 PROCEDURE — 36415 COLL VENOUS BLD VENIPUNCTURE: CPT

## 2023-12-04 PROCEDURE — 87636 SARSCOV2 & INF A&B AMP PRB: CPT

## 2023-12-04 PROCEDURE — 80053 COMPREHEN METABOLIC PANEL: CPT

## 2023-12-04 PROCEDURE — 25010000002 METHYLPREDNISOLONE PER 125 MG

## 2023-12-04 PROCEDURE — 99284 EMERGENCY DEPT VISIT MOD MDM: CPT

## 2023-12-04 PROCEDURE — 84484 ASSAY OF TROPONIN QUANT: CPT

## 2023-12-04 PROCEDURE — 96374 THER/PROPH/DIAG INJ IV PUSH: CPT

## 2023-12-04 PROCEDURE — 85025 COMPLETE CBC W/AUTO DIFF WBC: CPT

## 2023-12-04 PROCEDURE — 81003 URINALYSIS AUTO W/O SCOPE: CPT

## 2023-12-04 PROCEDURE — 71045 X-RAY EXAM CHEST 1 VIEW: CPT

## 2023-12-04 PROCEDURE — 93005 ELECTROCARDIOGRAM TRACING: CPT | Performed by: EMERGENCY MEDICINE

## 2023-12-04 RX ORDER — METHYLPREDNISOLONE 4 MG/1
TABLET ORAL
Qty: 21 TABLET | Refills: 0 | Status: SHIPPED | OUTPATIENT
Start: 2023-12-04

## 2023-12-04 RX ORDER — DOXYCYCLINE 100 MG/1
100 CAPSULE ORAL 2 TIMES DAILY
Qty: 10 CAPSULE | Refills: 0 | Status: SHIPPED | OUTPATIENT
Start: 2023-12-04 | End: 2023-12-09

## 2023-12-04 RX ORDER — METHYLPREDNISOLONE SODIUM SUCCINATE 125 MG/2ML
125 INJECTION, POWDER, LYOPHILIZED, FOR SOLUTION INTRAMUSCULAR; INTRAVENOUS ONCE
Status: COMPLETED | OUTPATIENT
Start: 2023-12-04 | End: 2023-12-04

## 2023-12-04 RX ORDER — SODIUM CHLORIDE 0.9 % (FLUSH) 0.9 %
10 SYRINGE (ML) INJECTION AS NEEDED
Status: DISCONTINUED | OUTPATIENT
Start: 2023-12-04 | End: 2023-12-04 | Stop reason: HOSPADM

## 2023-12-04 RX ADMIN — METHYLPREDNISOLONE SODIUM SUCCINATE 125 MG: 125 INJECTION, POWDER, FOR SOLUTION INTRAMUSCULAR; INTRAVENOUS at 15:45

## 2023-12-04 NOTE — ED NOTES
"Patient is cantankerous and yelled out stating he needs his door to his room to always be open and stated that \"this mattress and bed is uncomfortable\". Patient was offered to be adjusted in the bed, additional pillows, or a blanket to attempt to get more comfortable. Patient declined all those options stating \"I know how it is here\". Patient was also educated that we cannot leave ER patient room's doors open due to HIPAA and infection control but that his door could be cracked if needed. Patient was not pleased with those options.   "

## 2023-12-04 NOTE — DISCHARGE INSTRUCTIONS
Medications as directed.  Use mini nebulizer or metered-dose inhaler 4 times daily for the next 4-5 days.  Albuterol can be used up to 6 times a day if needed.  Decrease frequency of inhalers after 4-5 days as your symptoms improve.  Follow-up with your PCP or pulmonologist as above.  Return to ED for worsening symptoms, medical emergencies.

## 2023-12-04 NOTE — ED NOTES
"While attempting to triage patient, patient is a poor historian and is argumentative towards staff stating that we should \"already have everything in there\", in there meaning our computer system. Patient was educated that we have to ask these triage questions with every new patient encounter. Patient \"grunted\" in response.   "

## 2023-12-04 NOTE — ED PROVIDER NOTES
EMERGENCY DEPARTMENT ENCOUNTER      Room Number: 07/07    History is provided by the patient, no translation services needed    HPI:    Chief complaint: Shortness of air    Location: Chest    Quality/Severity: The patient denies any pain at this time.    Timing/Duration: Since this morning    Modifying Factors: None    Associated Symptoms: Occasional dizziness    Narrative: Pt is a 73 y.o. male who presents complaining of shortness of air since this morning.  He advises that he has a history of COPD and he chronically feels short of breath.  He does wear 2 L of oxygen at all times at home.  He states that he felt like he had acute worsening this morning and called 911.  The patient denies any associated chest pain or new cough.  He denies any fevers or chills.  He denies any abdominal pain, vomiting, diarrhea, or urinary complaints.  He has not had any neck or back pain.  He does complain of occasional nausea and occasional dizziness.  He denies any vision changes, headaches, or weakness.      PMD: Nitesh Arias MD    REVIEW OF SYSTEMS  Review of Systems   Constitutional:  Negative for chills and fever.   Eyes:  Negative for photophobia and visual disturbance.   Respiratory:  Positive for shortness of breath. Negative for cough and chest tightness.    Cardiovascular:  Negative for chest pain and palpitations.   Gastrointestinal:  Positive for nausea (Occasional). Negative for abdominal pain, diarrhea and vomiting.   Genitourinary:  Negative for difficulty urinating and dysuria.   Musculoskeletal:  Negative for back pain and neck pain.   Skin:  Negative for color change, pallor, rash and wound.   Neurological:  Positive for dizziness (Occasional). Negative for tremors, seizures, syncope, facial asymmetry, speech difficulty, weakness, light-headedness, numbness and headaches.   Psychiatric/Behavioral:  Negative for confusion. The patient is not nervous/anxious.          PAST MEDICAL HISTORY  Active  Ambulatory Problems     Diagnosis Date Noted    Mantle cell lymphoma 08/09/2016    Chronic obstructive pulmonary disease 02/21/2013    Chronic kidney disease 02/21/2013    Seizure 02/21/2013    Lymphadenopathy 02/21/2013    History of neoplasm of bladder 02/21/2013    Splenomegaly 02/21/2013    Adenomatous polyp of colon 06/28/2018    Coronary artery disease involving native coronary artery of native heart without angina pectoris 10/15/2018    Type 2 diabetes mellitus with circulatory disorder, without long-term current use of insulin 10/15/2018    Swelling of both lower extremities 08/20/2019    Personal history of colonic polyps 09/16/2019    Morbid obesity with BMI of 50.0-59.9, adult 04/06/2021    Chronic right-sided congestive heart failure 04/06/2021    Pneumonia 09/12/2023     Resolved Ambulatory Problems     Diagnosis Date Noted    Pancytopenia 02/21/2013    Uncontrolled diabetes mellitus 03/15/2018    Preoperative cardiovascular examination 08/30/2018    Thrombocytopenia 02/19/2019    Polycythemia 03/03/2020     Past Medical History:   Diagnosis Date    Anemia     Bladder cancer 2008    Cerebrovascular accident     Chronic kidney disease, stage 3     Chronic right-sided CHF (congestive heart failure)     Colon polyp     COPD (chronic obstructive pulmonary disease)     History of blood transfusion     Hyperlipidemia     Hypertension     Hyperuricemia     Left shoulder pain     Lymphoma     On home O2     Osteoarthritis     Pilonidal cyst     Psoriasis     Skin lesions     Sleep apnea     Stroke     Tobacco abuse     Type 2 diabetes mellitus        PAST SURGICAL HISTORY  Past Surgical History:   Procedure Laterality Date    BRONCHOSCOPY N/A 8/21/2017    Procedure: BRONCHOSCOPY with fluro and biopsy and lavage.;  Surgeon: Red Topete MD;  Location: [facility] OR;  Service:     CARDIAC CATHETERIZATION N/A 9/17/2018    Procedure: Coronary angiography;  Surgeon: Kylie Victoria MD;  Location: [facility] CATH INVASIVE  LOCATION;  Service: Cardiovascular    CARDIAC CATHETERIZATION N/A 10/30/2018    Procedure: Chronic Total Occlussion;  Surgeon: Roe Wheatley MD;  Location:  [facility] CATH INVASIVE LOCATION;  Service: Cardiovascular    CARDIAC CATHETERIZATION N/A 10/30/2018    Procedure: Stent AURA coronary;  Surgeon: Roe Wheatley MD;  Location:  [facility] CATH INVASIVE LOCATION;  Service: Cardiovascular    COLONOSCOPY N/A 10/9/2019    Procedure: COLONOSCOPY with polypectomy;  Surgeon: Lee Priest MD;  Location:  [facility] OR;  Service: Gastroenterology    PILONIDAL CYSTECTOMY N/A 2016    Procedure: PILONIDAL CYSTECTOMY;  Surgeon: Roe Davila MD;  Location:  [facility] OR;  Service:     SINUS SURGERY      SKIN LESION EXCISION      DR. DAVILA ABOUT 10 YEARS AGO       FAMILY HISTORY  Family History   Problem Relation Age of Onset    Heart defect Mother     Pancreatic cancer Father 56    Anemia Sister     Hypertension Brother     Heart disease Brother     Mental illness Brother     Other Brother         Splenectomy    Cancer Cousin        SOCIAL HISTORY  Social History     Socioeconomic History    Marital status:      Spouse name: Charley    Years of education: 12   Tobacco Use    Smoking status: Former     Packs/day: 3.00     Years: 50.00     Additional pack years: 0.00     Total pack years: 150.00     Types: Cigarettes     Quit date:      Years since quittin.9    Smokeless tobacco: Never    Tobacco comments:     80 pack year   Vaping Use    Vaping Use: Never used   Substance and Sexual Activity    Alcohol use: No     Comment: Heavy in past, none since around     Drug use: No    Sexual activity: Defer     Comment: , per pt report       ALLERGIES  Amoxicillin, Ceftin [cefuroxime axetil], Codeine, Flonase [fluticasone], Spiriva handihaler [tiotropium bromide monohydrate], Sulfa antibiotics, and Phenobarbital      Current Facility-Administered Medications:     [COMPLETED] Insert Peripheral IV, , , Once **AND**  sodium chloride 0.9 % flush 10 mL, 10 mL, Intravenous, PRN, Graciela Srinivasan PA-C    Current Outpatient Medications:     acetaminophen (TYLENOL) 325 MG tablet, Take 2 tablets by mouth Every 4 (Four) Hours As Needed for Mild Pain  or Fever., Disp: , Rfl:     allopurinol (ZYLOPRIM) 300 MG tablet, Take 1 tablet by mouth Every Morning. take 1 tablet by mouth daily, Disp: , Rfl: 0    AMITIZA 24 MCG capsule, Take 1 capsule by mouth 2 (Two) Times a Day As Needed., Disp: , Rfl: 0    atorvastatin (LIPITOR) 20 MG tablet, Take 1 tablet by mouth Daily., Disp: , Rfl:     cetirizine (ZyrTEC) 10 MG tablet, Take 1 tablet by mouth Every Morning., Disp: , Rfl:     clobetasol (TEMOVATE) 0.05 % cream, apply to affected area (RASH FREELY) twice a day, Disp: , Rfl: 0    clopidogrel (PLAVIX) 75 MG tablet, Take 1 tablet by mouth Every Morning., Disp: , Rfl: 0    doxycycline (MONODOX) 100 MG capsule, Take 1 capsule by mouth 2 (Two) Times a Day for 5 days., Disp: 10 capsule, Rfl: 0    FLUoxetine (PROzac) 20 MG capsule, Take 1 capsule by mouth Daily., Disp: , Rfl:     hydrophor (AQUAPHOR) ointment ointment, Apply  topically to the appropriate area as directed Daily., Disp: , Rfl:     ipratropium-albuterol (DUO-NEB) 0.5-2.5 mg/3 ml nebulizer, Take 3 mL by nebulization Every 4 (Four) Hours As Needed for Shortness of Air., Disp: 360 mL, Rfl: 0    isosorbide mononitrate (IMDUR) 30 MG 24 hr tablet, Take 1 tablet by mouth Every Morning for 30 days., Disp: 30 tablet, Rfl: 0    levETIRAcetam (KEPPRA) 1000 MG tablet, Take 0.5 tablets by mouth 2 (Two) Times a Day., Disp: , Rfl: 0    methylPREDNISolone (MEDROL) 4 MG dose pack, Take as directed on package instructions., Disp: 21 tablet, Rfl: 0    metoprolol tartrate (LOPRESSOR) 50 MG tablet, Take 1 tablet by mouth 2 (Two) Times a Day., Disp: , Rfl: 0    O2 (OXYGEN), Inhale 2 L/min Daily., Disp: , Rfl:     pantoprazole (PROTONIX) 40 MG EC tablet, Take 1 tablet by mouth Every Morning. take 1 tablet by mouth  once daily, Disp: , Rfl: 0    potassium chloride ER (K-TAB) 20 MEQ tablet controlled-release ER tablet, Take 2 tablets by mouth Daily., Disp: , Rfl:     torsemide (DEMADEX) 100 MG tablet, Take 0.5 tablets by mouth Daily for 30 days., Disp: 15 tablet, Rfl: 0    torsemide (DEMADEX) 100 MG tablet, Take 1 tablet by mouth Daily., Disp: , Rfl:     PHYSICAL EXAM  ED Triage Vitals [12/04/23 1304]   Temp Heart Rate Resp BP SpO2   98 °F (36.7 °C) 96 18 138/73 91 %      Temp src Heart Rate Source Patient Position BP Location FiO2 (%)   -- -- -- -- --       Physical Exam  Vitals and nursing note reviewed.   Constitutional:       General: He is not in acute distress.     Appearance: He is well-developed. He is not ill-appearing, toxic-appearing or diaphoretic.   HENT:      Head: Normocephalic and atraumatic.   Eyes:      Extraocular Movements: Extraocular movements intact.      Conjunctiva/sclera: Conjunctivae normal.      Pupils: Pupils are equal, round, and reactive to light.   Neck:      Vascular: No hepatojugular reflux or JVD.      Trachea: No tracheal deviation.   Cardiovascular:      Rate and Rhythm: Normal rate and regular rhythm.      Heart sounds: Normal heart sounds.      No friction rub.   Pulmonary:      Effort: Pulmonary effort is normal. No tachypnea, bradypnea, accessory muscle usage or respiratory distress.      Breath sounds: No stridor. Examination of the right-upper field reveals decreased breath sounds. Examination of the left-upper field reveals decreased breath sounds. Examination of the right-middle field reveals decreased breath sounds. Examination of the left-middle field reveals decreased breath sounds. Examination of the right-lower field reveals decreased breath sounds. Examination of the left-lower field reveals decreased breath sounds. Decreased breath sounds present. No wheezing, rhonchi or rales.   Chest:      Chest wall: No mass, deformity, tenderness, crepitus or edema.   Abdominal:       General: Bowel sounds are normal. There is no distension.      Palpations: Abdomen is soft. There is no mass.      Tenderness: There is no abdominal tenderness. There is no guarding or rebound.   Musculoskeletal:         General: Normal range of motion.      Cervical back: Normal range of motion and neck supple.      Right lower leg: No tenderness.      Left lower leg: No tenderness.   Skin:     General: Skin is warm and dry.      Coloration: Skin is not cyanotic or pale.      Findings: No ecchymosis, erythema or rash.      Nails: There is no clubbing.   Neurological:      Mental Status: He is alert and oriented to person, place, and time.   Psychiatric:         Mood and Affect: Mood and affect normal.           LAB RESULTS  Lab Results (last 24 hours)       Procedure Component Value Units Date/Time    CBC & Differential [[id number]]  (Abnormal) Collected: 12/04/23 1341    Specimen: Blood Updated: 12/04/23 1358    Narrative:      The following orders were created for panel order CBC & Differential.  Procedure                               Abnormality         Status                     ---------                               -----------         ------                     CBC Auto Differential[[id number]]        Abnormal            Final result               Scan Slide[[id number]]                                                                    Please view results for these tests on the individual orders.    Comprehensive Metabolic Panel [[id number]]  (Abnormal) Collected: 12/04/23 1341    Specimen: Blood Updated: 12/04/23 1417     Glucose 96 mg/dL      BUN 13 mg/dL      Creatinine 0.96 mg/dL      Sodium 144 mmol/L      Potassium 4.0 mmol/L      Comment: Slight hemolysis detected by analyzer. Result may be falsely elevated.        Chloride 105 mmol/L      CO2 29.9 mmol/L      Calcium 7.9 mg/dL      Total Protein 5.2 g/dL      Albumin 3.4 g/dL      ALT (SGPT) 6 U/L      AST (SGOT) 21 U/L      Comment: Slight hemolysis  detected by analyzer. Result may be falsely elevated.        Alkaline Phosphatase 97 U/L      Total Bilirubin 0.5 mg/dL      Globulin 1.8 gm/dL      A/G Ratio 1.9 g/dL      BUN/Creatinine Ratio 13.5     Anion Gap 9.1 mmol/L      eGFR 82.9 mL/min/1.73     Narrative:      GFR Normal >60  Chronic Kidney Disease <60  Kidney Failure <15    The GFR formula is only valid for adults with stable renal function between ages 18 and 70.    BNP [[id number]]  (Normal) Collected: 12/04/23 1341    Specimen: Blood Updated: 12/04/23 1449     proBNP 103.8 pg/mL     Narrative:      This assay is used as an aid in the diagnosis of individuals suspected of having heart failure. It can be used as an aid in the diagnosis of acute decompensated heart failure (ADHF) in patients presenting with signs and symptoms of ADHF to the emergency department (ED). In addition, NT-proBNP of <300 pg/mL indicates ADHF is not likely.    Age Range Result Interpretation  NT-proBNP Concentration (pg/mL:      <50             Positive            >450                   Gray                 300-450                    Negative             <300    50-75           Positive            >900                  Gray                300-900                  Negative            <300      >75             Positive            >1800                  Gray                300-1800                  Negative            <300    High Sensitivity Troponin T [[id number]]  (Normal) Collected: 12/04/23 1341    Specimen: Blood Updated: 12/04/23 1418     HS Troponin T 10 ng/L     Narrative:      High Sensitive Troponin T Reference Range:  <14.0 ng/L- Negative Female for AMI  <22.0 ng/L- Negative Male for AMI  >=14 - Abnormal Female indicating possible myocardial injury.  >=22 - Abnormal Male indicating possible myocardial injury.   Clinicians would have to utilize clinical acumen, EKG, Troponin, and serial changes to determine if it is an Acute Myocardial Infarction or myocardial injury due to  an underlying chronic condition.         CBC Auto Differential [[id number]]  (Abnormal) Collected: 12/04/23 1341    Specimen: Blood Updated: 12/04/23 1358     WBC 5.34 10*3/mm3      RBC 5.02 10*6/mm3      Hemoglobin 13.8 g/dL      Hematocrit 44.4 %      MCV 88.4 fL      MCH 27.5 pg      MCHC 31.1 g/dL      RDW 15.2 %      RDW-SD 48.4 fl      MPV 11.2 fL      Platelets 116 10*3/mm3      Neutrophil % 69.7 %      Lymphocyte % 15.2 %      Monocyte % 7.5 %      Eosinophil % 6.4 %      Basophil % 0.6 %      Immature Grans % 0.6 %      Neutrophils, Absolute 3.73 10*3/mm3      Lymphocytes, Absolute 0.81 10*3/mm3      Monocytes, Absolute 0.40 10*3/mm3      Eosinophils, Absolute 0.34 10*3/mm3      Basophils, Absolute 0.03 10*3/mm3      Immature Grans, Absolute 0.03 10*3/mm3      nRBC 0.0 /100 WBC     COVID-19 and FLU A/B PCR, 1 HR TAT - Swab, Nasopharynx [[id number]]  (Normal) Collected: 12/04/23 1342    Specimen: Swab from Nasopharynx Updated: 12/04/23 1411     COVID19 Not Detected     Influenza A PCR Not Detected     Influenza B PCR Not Detected    Narrative:      Fact sheet for providers: https://www.fda.gov/media/111505/download    Fact sheet for patients: https://www.fda.gov/media/899800/download    Test performed by PCR.    Urinalysis With Microscopic If Indicated (No Culture) - Urine, Clean Catch [[id number]]  (Abnormal) Collected: 12/04/23 1416    Specimen: Urine, Clean Catch Updated: 12/04/23 1431     Color, UA Yellow     Appearance, UA Clear     pH, UA 5.5     Specific Gravity, UA 1.015     Glucose, UA Negative     Ketones, UA Negative     Bilirubin, UA Negative     Blood, UA Negative     Protein, UA Trace     Leuk Esterase, UA Negative     Nitrite, UA Negative     Urobilinogen, UA 0.2 E.U./dL    Narrative:      Urine microscopic not indicated.    D-dimer, Quantitative [[id number]]  (Normal) Collected: 12/04/23 1416    Specimen: Blood Updated: 12/04/23 1442     D-Dimer, Quantitative <0.27 MCGFEU/mL     Narrative:       "According to the assay 's published package insert, a normal (<0.50 MCGFEU/mL) D-dimer result in conjunction with a non-high clinical probability assessment, excludes deep vein thrombosis (DVT) and pulmonary embolism (PE) with high sensitivity.    D-dimer values increase with age and this can make VTE exclusion of an older population difficult. To address this, the American College of [employer], based on best available evidence and recent guidelines, recommends that clinicians use age-adjusted D-dimer thresholds in patients greater than 50 years of age with: a) a low probability of PE who do not meet all Pulmonary Embolism Rule Out Criteria, or b) in those with intermediate probability of PE.   The formula for an age-adjusted D-dimer cut-off is \"age/100\".  For example, a 60 year old patient would have an age-adjusted cut-off of 0.60 MCGFEU/mL and an 80 year old 0.80 MCGFEU/mL.    High Sensitivity Troponin T 2Hr [[id number]]  (Normal) Collected: 12/04/23 1545    Specimen: Blood Updated: 12/04/23 1627     HS Troponin T 10 ng/L      Comment: Specimen hemolyzed.  Results may be affected.        Troponin T Delta 0 ng/L     Narrative:      High Sensitive Troponin T Reference Range:  <14.0 ng/L- Negative Female for AMI  <22.0 ng/L- Negative Male for AMI  >=14 - Abnormal Female indicating possible myocardial injury.  >=22 - Abnormal Male indicating possible myocardial injury.   Clinicians would have to utilize clinical acumen, EKG, Troponin, and serial changes to determine if it is an Acute Myocardial Infarction or myocardial injury due to an underlying chronic condition.                   I ordered the above labs and reviewed the results    RADIOLOGY  XR Chest 1 View    Result Date: 12/4/2023  Chest x-ray portable HISTORY: Short of air COPD and hypertension. Dizziness cough area FINDINGS: Single frontal portable view the chest is reviewed. Comparison study is from 9/12/2023. There is normal cardiac silhouette " size. There is new infiltrate in the right lung most confluent at the right lung base and new patchy infiltrate in the left mid to lower lung. Please correlate for clinical evidence of aspiration or pneumonia. Atypical pneumonia is to be included in the differential. Right costophrenic angles excluded from the film. Allowing for this no pleural effusion or pneumothorax is suspected.     1. Groundglass infiltrates bilaterally right greater than left. Please correlate for clinical evidence for aspiration or pneumonia. Atypical pneumonia in the differential. Follow-up recommended. Signer Name: Dimple Meng MD Signed: 12/4/2023 3:24 PM EST [facility]     I ordered the above radiologic testing and reviewed the results    PROCEDURES  Procedures      PROGRESS AND CONSULTS  ED Course as of 12/04/23 1651   Mon Dec 04, 2023   1334 The patient is cantankerous and a poor historian.  His exam is essentially unremarkable.  He was given a neb treatment by EMS and route.  Patient states that he does feel better at this time.  Labs and imaging ordered to evaluate further. [AH]   1433 EKG         EKG time / Interpretation time: 1318 / 1323  Rhythm/Rate: Sinus, 95   CO: 170  QRS, axis: 259   QTc 453  ST and T waves: No acute ST segment changes or T wave abnormalities.  EKG Tracing Interpreted Contemporaneously by me, independently viewed by me and MD.   [AH]   1645 Results discussed in depth with the patient. He expressed understanding. Follow up instructions given. Return to the ED instructions given. [AH]   1650 Patient has no complaints at this time. He states he feels much better and is ready to be discharged. [AH]      ED Course User Index  [AH] Graciela Srinivasan PA-C           MEDICAL DECISION MAKING    MDM       My differential diagnosis for dyspnea includes but is not limited to:  Asthma, COPD, pneumonia, pulmonary embolus, acute respiratory distress syndrome, pneumothorax, pleural effusion,  pulmonary fibrosis, congestive heart failure, myocardial infarction, DKA, uremia, acidosis, sepsis, anemia, drug related, hyperventilation, CNS disease   DIAGNOSIS  Final diagnoses:   COPD exacerbation   Pneumonia due to infectious organism, unspecified laterality, unspecified part of lung       Latest Documented Vital Signs:  As of 16:51 EST  BP- 145/84 HR- 92 Temp- 98 °F (36.7 °C) O2 sat- 93%    DISPOSITION  Pt discharged    Discussed pertinent findings with the patient/family.  Patient/Family voiced understanding of need to follow-up for recheck and further testing as needed.  Return to the Emergency Department warnings were given.         Medication List        New Prescriptions      doxycycline 100 MG capsule  Commonly known as: MONODOX  Take 1 capsule by mouth 2 (Two) Times a Day for 5 days.     methylPREDNISolone 4 MG dose pack  Commonly known as: MEDROL  Take as directed on package instructions.               Where to Get Your Medications        These medications were sent to [facility] [place] - [place], [place] - [address] - [phone]  - [phone] FX  [address] [place] [address]      Phone: [phone]   doxycycline 100 MG capsule  methylPREDNISolone 4 MG dose pack              Follow-up Information       Nitesh Arias MD. Call today.    Specialty: Family Medicine  Why: to schedule follow up  Contact information:  [address]  [place] [id number] [phone]               Go to  [facility] EMERGENCY DEPARTMENT.    Specialty: Emergency Medicine  Why: If symptoms worsen  Contact information:  [address]  [place] [address] [phone]                             Dictated utilizing Dragon dictation     Graciela Srinivasan PA-C  12/04/23 9491

## 2023-12-05 LAB
QT INTERVAL: 361 MS
QTC INTERVAL: 453 MS

## 2024-06-30 ENCOUNTER — HOSPITAL ENCOUNTER (EMERGENCY)
Facility: HOSPITAL | Age: 74
Discharge: HOME | End: 2024-06-30
Attending: STUDENT IN AN ORGANIZED HEALTH CARE EDUCATION/TRAINING PROGRAM | Admitting: STUDENT IN AN ORGANIZED HEALTH CARE EDUCATION/TRAINING PROGRAM
Payer: MEDICARE

## 2024-06-30 VITALS
OXYGEN SATURATION: 98 % | DIASTOLIC BLOOD PRESSURE: 66 MMHG | WEIGHT: 241.6 LBS | RESPIRATION RATE: 16 BRPM | SYSTOLIC BLOOD PRESSURE: 130 MMHG | TEMPERATURE: 98.2 F | HEART RATE: 55 BPM | HEIGHT: 67 IN | BODY MASS INDEX: 37.92 KG/M2

## 2024-06-30 DIAGNOSIS — R41.0 CONFUSION: Primary | ICD-10-CM

## 2024-06-30 LAB
ALBUMIN SERPL-MCNC: 3.9 G/DL (ref 3.5–5.2)
ALBUMIN/GLOB SERPL: 2.2 G/DL
ALP SERPL-CCNC: 117 U/L (ref 39–117)
ALT SERPL W P-5'-P-CCNC: 10 U/L (ref 1–41)
ANION GAP SERPL CALCULATED.3IONS-SCNC: 6.8 MMOL/L (ref 5–15)
AST SERPL-CCNC: 14 U/L (ref 1–40)
BACTERIA UR QL AUTO: ABNORMAL /HPF
BASOPHILS # BLD AUTO: 0.03 10*3/MM3 (ref 0–0.2)
BASOPHILS NFR BLD AUTO: 0.5 % (ref 0–1.5)
BILIRUB SERPL-MCNC: 0.4 MG/DL (ref 0–1.2)
BILIRUB UR QL STRIP: NEGATIVE
BUN SERPL-MCNC: 21 MG/DL (ref 8–23)
BUN/CREAT SERPL: 21 (ref 7–25)
CALCIUM SPEC-SCNC: 8.9 MG/DL (ref 8.6–10.5)
CHLORIDE SERPL-SCNC: 101 MMOL/L (ref 98–107)
CLARITY UR: CLEAR
CO2 SERPL-SCNC: 33.2 MMOL/L (ref 22–29)
COLOR UR: YELLOW
CREAT SERPL-MCNC: 1 MG/DL (ref 0.76–1.27)
DEPRECATED RDW RBC AUTO: 50 FL (ref 37–54)
EGFRCR SERPLBLD CKD-EPI 2021: 79 ML/MIN/1.73
EOSINOPHIL # BLD AUTO: 0.37 10*3/MM3 (ref 0–0.4)
EOSINOPHIL NFR BLD AUTO: 5.7 % (ref 0.3–6.2)
ERYTHROCYTE [DISTWIDTH] IN BLOOD BY AUTOMATED COUNT: 15.4 % (ref 12.3–15.4)
GLOBULIN UR ELPH-MCNC: 1.8 GM/DL
GLUCOSE SERPL-MCNC: 82 MG/DL (ref 65–99)
GLUCOSE UR STRIP-MCNC: NEGATIVE MG/DL
HCT VFR BLD AUTO: 43.6 % (ref 37.5–51)
HGB BLD-MCNC: 13.7 G/DL (ref 13–17.7)
HGB UR QL STRIP.AUTO: NEGATIVE
HYALINE CASTS UR QL AUTO: ABNORMAL /LPF
IMM GRANULOCYTES # BLD AUTO: 0.04 10*3/MM3 (ref 0–0.05)
IMM GRANULOCYTES NFR BLD AUTO: 0.6 % (ref 0–0.5)
KETONES UR QL STRIP: NEGATIVE
LEUKOCYTE ESTERASE UR QL STRIP.AUTO: NEGATIVE
LYMPHOCYTES # BLD AUTO: 1.35 10*3/MM3 (ref 0.7–3.1)
LYMPHOCYTES NFR BLD AUTO: 20.8 % (ref 19.6–45.3)
MCH RBC QN AUTO: 28.4 PG (ref 26.6–33)
MCHC RBC AUTO-ENTMCNC: 31.4 G/DL (ref 31.5–35.7)
MCV RBC AUTO: 90.3 FL (ref 79–97)
MONOCYTES # BLD AUTO: 0.57 10*3/MM3 (ref 0.1–0.9)
MONOCYTES NFR BLD AUTO: 8.8 % (ref 5–12)
MUCOUS THREADS URNS QL MICRO: ABNORMAL /HPF
NEUTROPHILS NFR BLD AUTO: 4.13 10*3/MM3 (ref 1.7–7)
NEUTROPHILS NFR BLD AUTO: 63.6 % (ref 42.7–76)
NITRITE UR QL STRIP: NEGATIVE
NRBC BLD AUTO-RTO: 0 /100 WBC (ref 0–0.2)
PH UR STRIP.AUTO: 6 [PH] (ref 4.5–8)
PLATELET # BLD AUTO: 117 10*3/MM3 (ref 140–450)
PMV BLD AUTO: 11.6 FL (ref 6–12)
POTASSIUM SERPL-SCNC: 4.2 MMOL/L (ref 3.5–5.2)
PROT SERPL-MCNC: 5.7 G/DL (ref 6–8.5)
PROT UR QL STRIP: ABNORMAL
RBC # BLD AUTO: 4.83 10*6/MM3 (ref 4.14–5.8)
RBC # UR STRIP: ABNORMAL /HPF
REF LAB TEST METHOD: ABNORMAL
SODIUM SERPL-SCNC: 141 MMOL/L (ref 136–145)
SP GR UR STRIP: 1.01 (ref 1–1.03)
SQUAMOUS #/AREA URNS HPF: ABNORMAL /HPF
UROBILINOGEN UR QL STRIP: ABNORMAL
WBC # UR STRIP: ABNORMAL /HPF
WBC NRBC COR # BLD AUTO: 6.49 10*3/MM3 (ref 3.4–10.8)

## 2024-06-30 PROCEDURE — 80053 COMPREHEN METABOLIC PANEL: CPT | Performed by: STUDENT IN AN ORGANIZED HEALTH CARE EDUCATION/TRAINING PROGRAM

## 2024-06-30 PROCEDURE — 99283 EMERGENCY DEPT VISIT LOW MDM: CPT

## 2024-06-30 PROCEDURE — 25010000002 ONDANSETRON PER 1 MG: Performed by: STUDENT IN AN ORGANIZED HEALTH CARE EDUCATION/TRAINING PROGRAM

## 2024-06-30 PROCEDURE — 85025 COMPLETE CBC W/AUTO DIFF WBC: CPT | Performed by: STUDENT IN AN ORGANIZED HEALTH CARE EDUCATION/TRAINING PROGRAM

## 2024-06-30 PROCEDURE — 96374 THER/PROPH/DIAG INJ IV PUSH: CPT

## 2024-06-30 PROCEDURE — 81001 URINALYSIS AUTO W/SCOPE: CPT | Performed by: STUDENT IN AN ORGANIZED HEALTH CARE EDUCATION/TRAINING PROGRAM

## 2024-06-30 RX ORDER — ONDANSETRON 2 MG/ML
4 INJECTION INTRAMUSCULAR; INTRAVENOUS ONCE
Status: COMPLETED | OUTPATIENT
Start: 2024-06-30 | End: 2024-06-30

## 2024-06-30 RX ORDER — ONDANSETRON 4 MG/1
4 TABLET, ORALLY DISINTEGRATING ORAL EVERY 8 HOURS PRN
Qty: 15 TABLET | Refills: 0 | Status: SHIPPED | OUTPATIENT
Start: 2024-06-30 | End: 2024-07-05

## 2024-06-30 RX ADMIN — ONDANSETRON 4 MG: 2 INJECTION INTRAMUSCULAR; INTRAVENOUS at 02:26

## 2024-06-30 NOTE — ED PROVIDER NOTES
Subjective   History of Present Illness  Pt is a 73 y.o. male with PMH as listed who presents for   Chief Complaint   Patient presents with    Altered Mental Status       Patient is a 73-year-old male presents for confusion earlier this evening.  States that he was changing channels while watching television and could not remember the TV station that he had been on previously and this concerned him.  He then was trying to get in contact with his neighbor due to his concern and could not remember her phone number.  He went outside and asked her to call over and EMS was called to transport him to the ED for further evaluation.  He has not had any other episodes of confusion and has no disorientation or new neurologic changes at this time.  Denies any headache, chest pain, new shortness of breath beyond his baseline, is on 3 L at baseline for COPD.  Does not have any other new concerns or complaints currently.    Review of Systems    Past Medical History:   Diagnosis Date    Anemia     Bladder cancer 2008    Cerebrovascular accident     Ischemic and TIAs; most recent 02/2014 which was a TIA.    Chronic kidney disease, stage 3     Dr. KATHERYN Lin    Chronic right-sided CHF (congestive heart failure)         Colon polyp     COPD (chronic obstructive pulmonary disease)     History of blood transfusion     Hyperlipidemia     Hypertension     Hyperuricemia     Left shoulder pain     Lymphoma     MANTLE CELL, HAD CHEMO  2010    On home O2     per pt, 2L    Osteoarthritis     Pilonidal cyst     SCHEDULED FOR SX    Psoriasis     Seizure     Following head trauma in 1970    Skin lesions     SCARRING FROM SHINGLES, & BLISTERS FROM EDEMA    Sleep apnea     Intolerant of CPAP    Splenomegaly 02/21/2013    Stroke     Tobacco abuse     Type 2 diabetes mellitus        Allergies   Allergen Reactions    Amoxicillin Hives    Ceftin [Cefuroxime Axetil] Hives    Codeine     Flonase [Fluticasone] Hives    Spiriva Handihaler  [Tiotropium Bromide Monohydrate] Hives    Sulfa Antibiotics Hives    Phenobarbital Rash       Past Surgical History:   Procedure Laterality Date    BRONCHOSCOPY N/A 2017    Procedure: BRONCHOSCOPY with fluro and biopsy and lavage.;  Surgeon: Red Topete MD;  Location: [facility] OR;  Service:     CARDIAC CATHETERIZATION N/A 2018    Procedure: Coronary angiography;  Surgeon: Kylie Victoria MD;  Location: [facility] CATH INVASIVE LOCATION;  Service: Cardiovascular    CARDIAC CATHETERIZATION N/A 10/30/2018    Procedure: Chronic Total Occlussion;  Surgeon: Roe Wheatley MD;  Location:  [facility] CATH INVASIVE LOCATION;  Service: Cardiovascular    CARDIAC CATHETERIZATION N/A 10/30/2018    Procedure: Stent AURA coronary;  Surgeon: Roe Wheatley MD;  Location: [facility]ATH [facility] LOC[facility];  Service: Cardiovascular    COLONOSCOPY N/A 10/9/2019    Procedure: COLONOSCOPY with polypectomy;  Surgeon: Lee Priest MD;  Location: [facility] OR;  Service: Gastroenterology    PILONIDAL CYSTECTOMY N/A 2016    Procedure: PILONIDAL CYSTECTOMY;  Surgeon: Roe Davila MD;  Location: [facility] OR;  Service:     SINUS SURGERY      SKIN LESION EXCISION      DR. DAVILA ABOUT 10 YEARS AGO       Family History   Problem Relation Age of Onset    Heart defect Mother     Pancreatic cancer Father 56    Anemia Sister     Hypertension Brother     Heart disease Brother     Mental illness Brother     Other Brother         Splenectomy    Cancer Cousin        Social History     Socioeconomic History    Marital status:      Spouse name: Charley    Years of education: 12   Tobacco Use    Smoking status: Former     Current packs/day: 0.00     Average packs/day: 3.0 packs/day for 50.0 years (150.0 ttl pk-yrs)     Types: Cigarettes     Start date:      Quit date: 2000     Years since quittin.5    Smokeless tobacco: Never    Tobacco comments:     80 pack year   Vaping Use    Vaping status: Never Used   Substance and Sexual Activity     Alcohol use: No     Comment: Heavy in past, none since around 2000    Drug use: No    Sexual activity: Defer     Comment: , per pt report           Objective   Physical Exam  Constitutional:       Appearance: Normal appearance.   HENT:      Head: Normocephalic and atraumatic.      Mouth/Throat:      Mouth: Mucous membranes are moist.      Pharynx: Oropharynx is clear.   Eyes:      Conjunctiva/sclera: Conjunctivae normal.   Cardiovascular:      Rate and Rhythm: Normal rate and regular rhythm.   Pulmonary:      Effort: Pulmonary effort is normal.      Breath sounds: Normal breath sounds.   Abdominal:      General: Abdomen is flat.   Musculoskeletal:      Cervical back: Neck supple.   Skin:     General: Skin is warm and dry.   Neurological:      Mental Status: He is alert and oriented to person, place, and time. Mental status is at baseline.   Psychiatric:         Mood and Affect: Mood normal.         Procedures           ED Course  ED Course as of 06/30/24 0655   Sun Jun 30, 2024   0209 Patient is a 73-year-old male who presents for episode of confusion earlier this evening.  Exam is unremarkable.  Will obtain CBC, CMP, and UA.  Patient is on baseline O2, is not having any confusion is mentating appropriately at this time, not having any new neurologic changes. [JF]   0258 All lab work unremarkable for any acute findings.  Patient still without any other complaints, still oriented and currently at his neurologic baseline.  Has not had any headache, vital signs are stable.  No other concerns.  Discussed the plan for discharge with PCP follow-up.  He states that his nausea is improved with the Zofran provided here, sent prescription for Zofran to patient's pharmacy.  All questions answered. [JF]      ED Course User Index  [JF] Roger Rodriguez MD                                             Medical Decision Making  My differential diagnosis for altered mental status includes but is not limited to:  Hypoglycemia,  hyperglycemia, DKA, overdose, ethanol intoxication, thiamine deficiency, niacin deficiency, hypothymia, hyperviscosity, Yonatan's disease, hyponatremia, hypernatremia, liver failure, kidney failure, hyper or hypothyroid, no insufficiency, hypoxia, hypercarbia, carbon monoxide poisoning, postanoxic encephalopathy, ischemic stroke, intracranial bleed, subarachnoid hemorrhage, brain tumor, closed head injury, epidural hematoma, epidural hematoma, seizure activity, postictal state, syncopal episode, disseminated encephalomyelitis, central pontine myelinolysis, post cardiac arrest, bacterial meningitis, viral meningitis, fungal meningitis, encephalitis, brain abscess, subdural empyema, hysteria, catatonic state, malingering, hypertensive encephalopathy, vasculitis, TTP, DIC      Problems Addressed:  Confusion: complicated acute illness or injury    Amount and/or Complexity of Data Reviewed  Labs: ordered. Decision-making details documented in ED Course.    Risk  Prescription drug management.        Final diagnoses:   Confusion       ED Disposition  ED Disposition       ED Disposition   Discharge    Condition   Stable    Comment   --               Nitesh Arias MD  [address]  [place] [address] [phone]    Schedule an appointment as soon as possible for a visit in 2 days  For re-evaluation         Medication List        New Prescriptions      ondansetron ODT 4 MG disintegrating tablet  Commonly known as: ZOFRAN-ODT  Place 1 tablet on the tongue Every 8 (Eight) Hours As Needed for Nausea for up to 5 days.               Where to Get Your Medications        These medications were sent to [id number] - [place], [address] [facility] & [address] - [phone] [facility] [phone] [address] [facility] [place] [address]      Phone: [phone]   ondansetron ODT 4 MG disintegrating tablet            Roger Rodriguez MD  06/30/24 4761

## 2024-09-20 ENCOUNTER — HOSPITAL ENCOUNTER (EMERGENCY)
Facility: HOSPITAL | Age: 74
Discharge: HOME | End: 2024-09-20
Attending: STUDENT IN AN ORGANIZED HEALTH CARE EDUCATION/TRAINING PROGRAM
Payer: MEDICARE

## 2024-09-20 VITALS
BODY MASS INDEX: 36.37 KG/M2 | RESPIRATION RATE: 16 BRPM | OXYGEN SATURATION: 92 % | WEIGHT: 240 LBS | HEART RATE: 74 BPM | SYSTOLIC BLOOD PRESSURE: 145 MMHG | TEMPERATURE: 97.6 F | HEIGHT: 68 IN | DIASTOLIC BLOOD PRESSURE: 80 MMHG

## 2024-09-20 DIAGNOSIS — Z98.890 POST-OPERATIVE STATE: Primary | ICD-10-CM

## 2024-09-20 DIAGNOSIS — T14.8XXA BLEEDING FROM WOUND: ICD-10-CM

## 2024-09-20 PROCEDURE — 99283 EMERGENCY DEPT VISIT LOW MDM: CPT | Performed by: STUDENT IN AN ORGANIZED HEALTH CARE EDUCATION/TRAINING PROGRAM

## 2024-09-20 RX ORDER — LIDOCAINE HYDROCHLORIDE AND EPINEPHRINE 10; 10 MG/ML; UG/ML
INJECTION, SOLUTION INFILTRATION; PERINEURAL
Status: COMPLETED
Start: 2024-09-20 | End: 2024-09-20

## 2024-09-20 RX ORDER — LIDOCAINE HYDROCHLORIDE AND EPINEPHRINE 10; 10 MG/ML; UG/ML
10 INJECTION, SOLUTION INFILTRATION; PERINEURAL ONCE
Status: COMPLETED | OUTPATIENT
Start: 2024-09-20 | End: 2024-09-20

## 2024-09-20 RX ORDER — DIAPER,BRIEF,INFANT-TODD,DISP
EACH MISCELLANEOUS
Status: COMPLETED
Start: 2024-09-20 | End: 2024-09-20

## 2024-09-20 RX ORDER — GINSENG 100 MG
1 CAPSULE ORAL ONCE
Status: COMPLETED | OUTPATIENT
Start: 2024-09-20 | End: 2024-09-20

## 2024-09-20 RX ADMIN — Medication 0.9 G: at 17:23

## 2024-09-20 RX ADMIN — LIDOCAINE HYDROCHLORIDE AND EPINEPHRINE 10 ML: 10; 10 INJECTION, SOLUTION INFILTRATION; PERINEURAL at 17:24

## 2024-09-20 RX ADMIN — BACITRACIN 0.9 G: 500 OINTMENT TOPICAL at 17:23

## 2024-09-20 RX ADMIN — SILVER NITRATE APPLICATORS 1 APPLICATION: 25; 75 STICK TOPICAL at 17:24

## 2024-09-20 RX ADMIN — LIDOCAINE HYDROCHLORIDE,EPINEPHRINE BITARTRATE 10 ML: 10; .01 INJECTION, SOLUTION INFILTRATION; PERINEURAL at 17:24

## 2024-09-22 ENCOUNTER — HOSPITAL ENCOUNTER (EMERGENCY)
Facility: HOSPITAL | Age: 74
Discharge: HOME | End: 2024-09-22
Attending: STUDENT IN AN ORGANIZED HEALTH CARE EDUCATION/TRAINING PROGRAM | Admitting: STUDENT IN AN ORGANIZED HEALTH CARE EDUCATION/TRAINING PROGRAM
Payer: MEDICARE

## 2024-09-22 VITALS
TEMPERATURE: 97.5 F | RESPIRATION RATE: 20 BRPM | HEIGHT: 68 IN | WEIGHT: 240.3 LBS | DIASTOLIC BLOOD PRESSURE: 105 MMHG | HEART RATE: 64 BPM | BODY MASS INDEX: 36.42 KG/M2 | SYSTOLIC BLOOD PRESSURE: 175 MMHG | OXYGEN SATURATION: 96 %

## 2024-09-22 DIAGNOSIS — Z51.89 VISIT FOR WOUND CHECK: Primary | ICD-10-CM

## 2024-09-22 DIAGNOSIS — T14.8XXA BLEEDING FROM WOUND: ICD-10-CM

## 2024-09-22 PROCEDURE — 99283 EMERGENCY DEPT VISIT LOW MDM: CPT | Performed by: STUDENT IN AN ORGANIZED HEALTH CARE EDUCATION/TRAINING PROGRAM

## 2024-09-22 RX ORDER — LIDOCAINE HYDROCHLORIDE AND EPINEPHRINE 10; 10 MG/ML; UG/ML
INJECTION, SOLUTION INFILTRATION; PERINEURAL
Status: COMPLETED
Start: 2024-09-22 | End: 2024-09-22

## 2024-09-22 RX ORDER — LIDOCAINE HYDROCHLORIDE AND EPINEPHRINE 10; 10 MG/ML; UG/ML
10 INJECTION, SOLUTION INFILTRATION; PERINEURAL ONCE
Status: COMPLETED | OUTPATIENT
Start: 2024-09-22 | End: 2024-09-22

## 2024-09-22 RX ADMIN — LIDOCAINE HYDROCHLORIDE,EPINEPHRINE BITARTRATE 10 ML: 10; .01 INJECTION, SOLUTION INFILTRATION; PERINEURAL at 19:11

## 2024-09-22 RX ADMIN — LIDOCAINE HYDROCHLORIDE AND EPINEPHRINE 10 ML: 10; 10 INJECTION, SOLUTION INFILTRATION; PERINEURAL at 19:11

## 2024-09-22 RX ADMIN — SILVER NITRATE APPLICATORS 1 APPLICATION: 25; 75 STICK TOPICAL at 19:12

## 2024-09-26 ENCOUNTER — HOSPITAL ENCOUNTER (EMERGENCY)
Facility: HOSPITAL | Age: 74
Discharge: HOME | End: 2024-09-26
Attending: EMERGENCY MEDICINE
Payer: MEDICARE

## 2024-09-26 VITALS
WEIGHT: 240 LBS | RESPIRATION RATE: 22 BRPM | HEIGHT: 68 IN | TEMPERATURE: 98.1 F | BODY MASS INDEX: 36.37 KG/M2 | DIASTOLIC BLOOD PRESSURE: 76 MMHG | SYSTOLIC BLOOD PRESSURE: 179 MMHG | HEART RATE: 75 BPM | OXYGEN SATURATION: 97 %

## 2024-09-26 DIAGNOSIS — Z51.89 VISIT FOR WOUND CHECK: Primary | ICD-10-CM

## 2024-09-26 PROCEDURE — 99283 EMERGENCY DEPT VISIT LOW MDM: CPT | Performed by: EMERGENCY MEDICINE

## 2024-09-26 NOTE — ED PROVIDER NOTES
Subjective   History of Present Illness  Patient presents with request for wound check.  Patient recently had biopsy of his forehead and his ear and had some episodes of bleeding that required 2 visits and some cauterization.  Patient's had dressings on since then and was supposed to follow-up with PCP but PCP is currently out of town.  Patient has follow-up appointment with surgery on Monday for full removal of cancer.  Patient denies any fever, redness, rebleeding and otherwise at baseline health.      Review of Systems   All other systems reviewed and are negative.      Past Medical History:   Diagnosis Date    Anemia     Bladder cancer 2008    Cerebrovascular accident     Ischemic and TIAs; most recent 02/2014 which was a TIA.    Chronic kidney disease, stage 3     Dr. KATHERYN Lin    Chronic right-sided CHF (congestive heart failure)         Colon polyp     COPD (chronic obstructive pulmonary disease)     History of blood transfusion     Hyperlipidemia     Hypertension     Hyperuricemia     Left shoulder pain     Lymphoma     MANTLE CELL, HAD CHEMO  2010    On home O2     per pt, 2L    Osteoarthritis     Pilonidal cyst     SCHEDULED FOR SX    Psoriasis     Seizure     Following head trauma in 1970    Skin lesions     SCARRING FROM SHINGLES, & BLISTERS FROM EDEMA    Sleep apnea     Intolerant of CPAP    Splenomegaly 02/21/2013    Stroke     Tobacco abuse     Type 2 diabetes mellitus        Allergies   Allergen Reactions    Amoxicillin Hives    Ceftin [Cefuroxime Axetil] Hives    Codeine     Flonase [Fluticasone] Hives    Spiriva Handihaler [Tiotropium Bromide Monohydrate] Hives    Sulfa Antibiotics Hives    Phenobarbital Rash       Past Surgical History:   Procedure Laterality Date    BRONCHOSCOPY N/A 8/21/2017    Procedure: BRONCHOSCOPY with fluro and biopsy and lavage.;  Surgeon: Red Topete MD;  Location: [facility] OR;  Service:     CARDIAC CATHETERIZATION N/A 9/17/2018    Procedure: Coronary  angiography;  Surgeon: Kylie Victoria MD;  Location:  [facility] CATH INVASIVE LOCATION;  Service: Cardiovascular    CARDIAC CATHETERIZATION N/A 10/30/2018    Procedure: Chronic Total Occlussion;  Surgeon: Roe Wheatley MD;  Location:  [facility] CATH INVASIVE LOCATION;  Service: Cardiovascular    CARDIAC CATHETERIZATION N/A 10/30/2018    Procedure: Stent AURA coronary;  Surgeon: Roe Wheatley MD;  Location:  [facility] CATH INVASIVE LOCATION;  Service: Cardiovascular    COLONOSCOPY N/A 10/9/2019    Procedure: COLONOSCOPY with polypectomy;  Surgeon: Lee Priest MD;  Location:  [facility] OR;  Service: Gastroenterology    PILONIDAL CYSTECTOMY N/A 2016    Procedure: PILONIDAL CYSTECTOMY;  Surgeon: Roe Davila MD;  Location:  [facility] OR;  Service:     SINUS SURGERY      SKIN LESION EXCISION      DR. DAVILA ABOUT 10 YEARS AGO       Family History   Problem Relation Age of Onset    Heart defect Mother     Pancreatic cancer Father 56    Anemia Sister     Hypertension Brother     Heart disease Brother     Mental illness Brother     Other Brother         Splenectomy    Cancer Cousin        Social History     Socioeconomic History    Marital status:      Spouse name: Charley    Years of education: 12   Tobacco Use    Smoking status: Former     Current packs/day: 0.00     Average packs/day: 3.0 packs/day for 50.0 years (150.0 ttl pk-yrs)     Types: Cigarettes     Start date:      Quit date: 2000     Years since quittin.7    Smokeless tobacco: Never    Tobacco comments:     80 pack year   Vaping Use    Vaping status: Never Used   Substance and Sexual Activity    Alcohol use: No     Comment: Heavy in past, none since around     Drug use: No    Sexual activity: Defer     Comment: , per pt report           Objective   Physical Exam  Vitals and nursing note reviewed.   HENT:      Head: Normocephalic.      Comments: Patient has scab over the right forehead.  No surrounding erythema or drainage or bleeding.   Patient has scab overlying antihelix.  No surrounding erythema or drainage or bleeding.     Nose: Nose normal.      Mouth/Throat:      Pharynx: Oropharynx is clear.   Eyes:      Conjunctiva/sclera: Conjunctivae normal.   Musculoskeletal:      Cervical back: Neck supple.   Lymphadenopathy:      Cervical: No cervical adenopathy.   Neurological:      Mental Status: He is alert.   Psychiatric:         Mood and Affect: Mood normal.         Behavior: Behavior normal.         Procedures           ED Course                                             Medical Decision Making  Ddx cellulitis, abscess, bleeding    0905 patient's wounds look good.  No signs of infection.  Will redress and have patient follow-up on Monday as scheduled.  All questions personally answered at the bedside and all d/c instructions personally reviewed with pt.  Discussed the importance of close outpt. f/u and pt. understands this and agrees to do so.  Pt agrees to return to ED immediately for any new, persistent, or worsening symptoms.    EMR Dragon/Transcription disclaimer:  Much of this encounter note is an electronic transcription/translation of spoken language to printed text, aka voice recognition.  The electronic translation of spoken language may permit erroneous or at times nonsensical words or phrases to be inadvertently transcribed; although I have reviewed the note for such errors, some may still exist so please interpret based on surrounding text content.          Final diagnoses:   Visit for wound check       ED Disposition  ED Disposition       ED Disposition   Discharge    Condition   Stable    Comment   --               [facility]  [address] [place] [address] [phone]    As needed         Medication List      No changes were made to your prescriptions during this visit.            Andrew Mackenzie MD  09/26/24 7381

## 2024-10-01 ENCOUNTER — HOSPITAL ENCOUNTER (EMERGENCY)
Facility: HOSPITAL | Age: 74
Discharge: HOME | End: 2024-10-01
Payer: MEDICARE

## 2024-10-01 VITALS
DIASTOLIC BLOOD PRESSURE: 98 MMHG | OXYGEN SATURATION: 94 % | RESPIRATION RATE: 20 BRPM | TEMPERATURE: 98.2 F | SYSTOLIC BLOOD PRESSURE: 139 MMHG | BODY MASS INDEX: 36.37 KG/M2 | WEIGHT: 240 LBS | HEIGHT: 68 IN | HEART RATE: 77 BPM

## 2024-10-01 DIAGNOSIS — R58 BLEEDING: Primary | ICD-10-CM

## 2024-10-01 PROCEDURE — 99283 EMERGENCY DEPT VISIT LOW MDM: CPT

## 2024-10-01 NOTE — ED PROVIDER NOTES
Subjective   History of Present Illness    73yo male w/ Phx COPD, HLD, bladder cancer, anemia, sleep apnea, pilonidal cyst, CKD3, right-sided heart failure, seizures, HTN, lymphoma, presenting for evaluation of post-op problem- He underwent a cancer resection -> skin grafting of the right ear on Friday, and developed bleeding at the site today. He has had the dressing on and denies any trauma.     Review of Systems    ROS as specified in HPI.      Past Medical History:   Diagnosis Date    Anemia     Bladder cancer 2008    Cerebrovascular accident     Ischemic and TIAs; most recent 02/2014 which was a TIA.    Chronic kidney disease, stage 3     Dr. KATHERYN Lin    Chronic right-sided CHF (congestive heart failure)         Colon polyp     COPD (chronic obstructive pulmonary disease)     History of blood transfusion     Hyperlipidemia     Hypertension     Hyperuricemia     Left shoulder pain     Lymphoma     MANTLE CELL, HAD CHEMO  2010    On home O2     per pt, 2L    Osteoarthritis     Pilonidal cyst     SCHEDULED FOR SX    Psoriasis     Seizure     Following head trauma in 1970    Skin lesions     SCARRING FROM SHINGLES, & BLISTERS FROM EDEMA    Sleep apnea     Intolerant of CPAP    Splenomegaly 02/21/2013    Stroke     Tobacco abuse     Type 2 diabetes mellitus        Allergies   Allergen Reactions    Amoxicillin Hives    Ceftin [Cefuroxime Axetil] Hives    Codeine     Flonase [Fluticasone] Hives    Spiriva Handihaler [Tiotropium Bromide Monohydrate] Hives    Sulfa Antibiotics Hives    Phenobarbital Rash       Past Surgical History:   Procedure Laterality Date    BRONCHOSCOPY N/A 8/21/2017    Procedure: BRONCHOSCOPY with fluro and biopsy and lavage.;  Surgeon: Red Topete MD;  Location:  [facility] OR;  Service:     CARDIAC CATHETERIZATION N/A 9/17/2018    Procedure: Coronary angiography;  Surgeon: Kylie Victoria MD;  Location:  [facility] CATH INVASIVE LOCATION;  Service: Cardiovascular    CARDIAC CATHETERIZATION  N/A 10/30/2018    Procedure: Chronic Total Occlussion;  Surgeon: Roe Wheatley MD;  Location:  [facility] CATH INVASIVE LOCATION;  Service: Cardiovascular    CARDIAC CATHETERIZATION N/A 10/30/2018    Procedure: Stent AURA coronary;  Surgeon: Roe Wheatley MD;  Location:  [facility] CATH INVASIVE LOCATION;  Service: Cardiovascular    COLONOSCOPY N/A 10/9/2019    Procedure: COLONOSCOPY with polypectomy;  Surgeon: Lee Priest MD;  Location:  [facility] OR;  Service: Gastroenterology    PILONIDAL CYSTECTOMY N/A 2016    Procedure: PILONIDAL CYSTECTOMY;  Surgeon: Roe Davila MD;  Location:  [facility] OR;  Service:     SINUS SURGERY      SKIN LESION EXCISION      DR. DAVILA ABOUT 10 YEARS AGO       Family History   Problem Relation Age of Onset    Heart defect Mother     Pancreatic cancer Father 56    Anemia Sister     Hypertension Brother     Heart disease Brother     Mental illness Brother     Other Brother         Splenectomy    Cancer Cousin        Social History     Socioeconomic History    Marital status:      Spouse name: [facility]    Years of education: 12   Tobacco Use    Smoking status: Former     Current packs/day: 0.00     Average packs/day: 3.0 packs/day for 50.0 years (150.0 ttl pk-yrs)     Types: Cigarettes     Start date:      Quit date: 2000     Years since quittin.7    Smokeless tobacco: Never    Tobacco comments:     80 pack year   Vaping Use    Vaping status: Never Used   Substance and Sexual Activity    Alcohol use: No     Comment: Heavy in past, none since around     Drug use: No    Sexual activity: Defer     Comment: , per pt report           Objective   Physical Exam  Vitals reviewed.   Constitutional:       General: He is not in acute distress.     Appearance: He is normal weight. He is not toxic-appearing.   HENT:      Head: Normocephalic and atraumatic.      Ears:      Comments: Right ear w/ wound dressing overlying, sutured in place. No active bleeding, some old blood  surrounding, coagulated.      Nose: Nose normal. No congestion.      Mouth/Throat:      Mouth: Mucous membranes are moist.      Pharynx: Oropharynx is clear.   Eyes:      General: No scleral icterus.     Conjunctiva/sclera: Conjunctivae normal.   Cardiovascular:      Rate and Rhythm: Normal rate and regular rhythm.      Pulses: Normal pulses.   Pulmonary:      Effort: Pulmonary effort is normal. No respiratory distress.   Abdominal:      General: There is no distension.      Palpations: Abdomen is soft.   Musculoskeletal:         General: No swelling or deformity. Normal range of motion.      Cervical back: Normal range of motion and neck supple. No rigidity.      Right lower leg: No edema.      Left lower leg: No edema.   Skin:     General: Skin is warm and dry.      Capillary Refill: Capillary refill takes less than 2 seconds.      Coloration: Skin is not jaundiced or pale.   Neurological:      Mental Status: He is alert and oriented to person, place, and time. Mental status is at baseline.   Psychiatric:         Mood and Affect: Mood normal.         Behavior: Behavior normal.         Procedures           ED Course                                             Medical Decision Making  Problems Addressed:  Bleeding: acute illness or injury        Final diagnoses:   Bleeding       ED Disposition  ED Disposition       ED Disposition   Discharge    Condition   Stable    Comment   --               Nitesh Arias MD  [address]  [address]  [facility] [address]  [phone]    Call in 3 days           Medication List      No changes were made to your prescriptions during this visit.         ED Course: Patient presented to the ED c/o bleeding from right auricular skin biopsy site. Suture materials intact as well as dressing. Dressing was removed and no active bleeding was occurring. VSS, no evidence of large-volume blood loss.     Pressure dressing was placed and patient was monitored in the ED for signs of  recurrent bleeding given his blood thinner use. None occurred. OK for discharge home, will keep pressure dressing in place.     EKG: None    Consults: None    Incidental Findings: None         Gino Still,   10/02/24 1546

## 2024-10-02 ENCOUNTER — HOSPITAL ENCOUNTER (EMERGENCY)
Facility: HOSPITAL | Age: 74
Discharge: HOME | End: 2024-10-02
Attending: EMERGENCY MEDICINE
Payer: MEDICARE

## 2024-10-02 VITALS
TEMPERATURE: 98.1 F | RESPIRATION RATE: 16 BRPM | HEIGHT: 68 IN | SYSTOLIC BLOOD PRESSURE: 123 MMHG | BODY MASS INDEX: 36.37 KG/M2 | WEIGHT: 240 LBS | OXYGEN SATURATION: 98 % | DIASTOLIC BLOOD PRESSURE: 92 MMHG | HEART RATE: 54 BPM

## 2024-10-02 DIAGNOSIS — Z48.89 ENCOUNTER FOR POST SURGICAL WOUND CHECK: Primary | ICD-10-CM

## 2024-10-02 LAB
ALBUMIN SERPL-MCNC: 4 G/DL (ref 3.5–5.2)
ALBUMIN/GLOB SERPL: 2.2 G/DL
ALP SERPL-CCNC: 122 U/L (ref 39–117)
ALT SERPL W P-5'-P-CCNC: 13 U/L (ref 1–41)
ANION GAP SERPL CALCULATED.3IONS-SCNC: 7.5 MMOL/L (ref 5–15)
APTT PPP: 28.5 SECONDS (ref 22.7–35.4)
AST SERPL-CCNC: 16 U/L (ref 1–40)
BASOPHILS # BLD AUTO: 0.04 10*3/MM3 (ref 0–0.2)
BASOPHILS NFR BLD AUTO: 0.6 % (ref 0–1.5)
BILIRUB SERPL-MCNC: 0.8 MG/DL (ref 0–1.2)
BUN SERPL-MCNC: 18 MG/DL (ref 8–23)
BUN/CREAT SERPL: 15.9 (ref 7–25)
CALCIUM SPEC-SCNC: 8.9 MG/DL (ref 8.6–10.5)
CHLORIDE SERPL-SCNC: 103 MMOL/L (ref 98–107)
CO2 SERPL-SCNC: 32.5 MMOL/L (ref 22–29)
CREAT SERPL-MCNC: 1.13 MG/DL (ref 0.76–1.27)
DEPRECATED RDW RBC AUTO: 46.2 FL (ref 37–54)
EGFRCR SERPLBLD CKD-EPI 2021: 67.8 ML/MIN/1.73
EOSINOPHIL # BLD AUTO: 0.2 10*3/MM3 (ref 0–0.4)
EOSINOPHIL NFR BLD AUTO: 3 % (ref 0.3–6.2)
ERYTHROCYTE [DISTWIDTH] IN BLOOD BY AUTOMATED COUNT: 14.5 % (ref 12.3–15.4)
GLOBULIN UR ELPH-MCNC: 1.8 GM/DL
GLUCOSE SERPL-MCNC: 109 MG/DL (ref 65–99)
HCT VFR BLD AUTO: 39.6 % (ref 37.5–51)
HGB BLD-MCNC: 13.1 G/DL (ref 13–17.7)
IMM GRANULOCYTES # BLD AUTO: 0.07 10*3/MM3 (ref 0–0.05)
IMM GRANULOCYTES NFR BLD AUTO: 1 % (ref 0–0.5)
INR PPP: 1.11 (ref 0.9–1.1)
LYMPHOCYTES # BLD AUTO: 0.86 10*3/MM3 (ref 0.7–3.1)
LYMPHOCYTES NFR BLD AUTO: 12.9 % (ref 19.6–45.3)
MCH RBC QN AUTO: 28.8 PG (ref 26.6–33)
MCHC RBC AUTO-ENTMCNC: 33.1 G/DL (ref 31.5–35.7)
MCV RBC AUTO: 87 FL (ref 79–97)
MONOCYTES # BLD AUTO: 0.48 10*3/MM3 (ref 0.1–0.9)
MONOCYTES NFR BLD AUTO: 7.2 % (ref 5–12)
NEUTROPHILS NFR BLD AUTO: 5.02 10*3/MM3 (ref 1.7–7)
NEUTROPHILS NFR BLD AUTO: 75.3 % (ref 42.7–76)
PLATELET # BLD AUTO: 131 10*3/MM3 (ref 140–450)
PMV BLD AUTO: 10.9 FL (ref 6–12)
POTASSIUM SERPL-SCNC: 4 MMOL/L (ref 3.5–5.2)
PROT SERPL-MCNC: 5.8 G/DL (ref 6–8.5)
PROTHROMBIN TIME: 14.5 SECONDS (ref 11.7–14.2)
RBC # BLD AUTO: 4.55 10*6/MM3 (ref 4.14–5.8)
SODIUM SERPL-SCNC: 143 MMOL/L (ref 136–145)
WBC NRBC COR # BLD AUTO: 6.67 10*3/MM3 (ref 3.4–10.8)

## 2024-10-02 PROCEDURE — 36415 COLL VENOUS BLD VENIPUNCTURE: CPT

## 2024-10-02 PROCEDURE — 85025 COMPLETE CBC W/AUTO DIFF WBC: CPT | Performed by: EMERGENCY MEDICINE

## 2024-10-02 PROCEDURE — 85730 THROMBOPLASTIN TIME PARTIAL: CPT | Performed by: EMERGENCY MEDICINE

## 2024-10-02 PROCEDURE — 99283 EMERGENCY DEPT VISIT LOW MDM: CPT

## 2024-10-02 PROCEDURE — 85610 PROTHROMBIN TIME: CPT | Performed by: EMERGENCY MEDICINE

## 2024-10-02 PROCEDURE — 80053 COMPREHEN METABOLIC PANEL: CPT | Performed by: EMERGENCY MEDICINE

## 2024-10-02 NOTE — ED NOTES
No active bleeding from surgical site. Site cleansed and dressing applied. Instructed patient to contact dermatologist in case of complications with surgical site.

## 2024-10-02 NOTE — ED PROVIDER NOTES
EMERGENCY DEPARTMENT ENCOUNTER    Room Number:  15/15  PCP: Nitesh Arias MD  Patient Care Team:  Nitesh Arias MD as PCP - General (Family Medicine)  Irving Restrepo MD as Consulting Physician (Hematology and Oncology)  Nitesh Arias MD as Referring Physician (Family Medicine)   Independent Historians: Patient    HPI:  Chief Complaint: Wound check    A complete HPI/ROS/PMH/PSH/SH/FH are unobtainable due to: Patient is poor historian    Chronic or social conditions impacting patient care (Social Determinants of Health): None  (Financial Resource Strain / Food Insecurity / Transportation Needs / Physical Activity / Stress / Social Connections / Intimate Partner Violence / Housing Stability)    Context: Esdras Ko is a 74 y.o. male who presents to the ED c/o acute bleeding from wound to right ear.  Patient had cancer resected from the ER and had a skin graft.  Patient states that he scratched the wound and there was some bleeding.  Wound is not actively bleeding at this time    Review of prior external notes (non-ED) -and- Review of prior external test results outside of this encounter: I reviewed patient's ED visit from yesterday at [facility]    Prescription drug monitoring program review:         PAST MEDICAL HISTORY  Active Ambulatory Problems     Diagnosis Date Noted    Mantle cell lymphoma 08/09/2016    Chronic obstructive pulmonary disease 02/21/2013    Chronic kidney disease 02/21/2013    Seizure 02/21/2013    Lymphadenopathy 02/21/2013    History of neoplasm of bladder 02/21/2013    Splenomegaly 02/21/2013    Adenomatous polyp of colon 06/28/2018    Coronary artery disease involving native coronary artery of native heart without angina pectoris 10/15/2018    Type 2 diabetes mellitus with circulatory disorder, without long-term current use of insulin 10/15/2018    Swelling of both lower extremities 08/20/2019    Personal history of colonic polyps 09/16/2019     Morbid obesity with BMI of 50.0-59.9, adult 04/06/2021    Chronic right-sided congestive heart failure 04/06/2021    Pneumonia 09/12/2023     Resolved Ambulatory Problems     Diagnosis Date Noted    Pancytopenia 02/21/2013    Uncontrolled diabetes mellitus 03/15/2018    Preoperative cardiovascular examination 08/30/2018    Thrombocytopenia 02/19/2019    Polycythemia 03/03/2020     Past Medical History:   Diagnosis Date    Anemia     Bladder cancer 2008    Cerebrovascular accident     Chronic kidney disease, stage 3     Chronic right-sided CHF (congestive heart failure)     Colon polyp     COPD (chronic obstructive pulmonary disease)     History of blood transfusion     Hyperlipidemia     Hypertension     Hyperuricemia     Left shoulder pain     Lymphoma     On home O2     Osteoarthritis     Pilonidal cyst     Psoriasis     Skin lesions     Sleep apnea     Stroke     Tobacco abuse     Type 2 diabetes mellitus          PAST SURGICAL HISTORY  Past Surgical History:   Procedure Laterality Date    BRONCHOSCOPY N/A 8/21/2017    Procedure: BRONCHOSCOPY with fluro and biopsy and lavage.;  Surgeon: Red Topete MD;  Location: [facility] OR;  Service:     CARDIAC CATHETERIZATION N/A 9/17/2018    Procedure: Coronary angiography;  Surgeon: Kylie Victoria MD;  Location: [facility] CATH INVASIVE LOCATION;  Service: Cardiovascular    CARDIAC CATHETERIZATION N/A 10/30/2018    Procedure: Chronic Total Occlussion;  Surgeon: Roe Wheatley MD;  Location: [facility] CATH INVASIVE LOCATION;  Service: Cardiovascular    CARDIAC CATHETERIZATION N/A 10/30/2018    Procedure: Stent AURA coronary;  Surgeon: Roe Wheatley MD;  [facility];  Service: Cardiovascular    COLONOSCOPY N/A 10/9/2019    Procedure: COLONOSCOPY with polypectomy;  Surgeon: Lee Priest MD;  Location: [facility] OR;  Service: Gastroenterology    PILONIDAL CYSTECTOMY N/A 11/30/2016    Procedure: PILONIDAL CYSTECTOMY;  Surgeon: Roe Davila MD;   Location: [facility] OR;  Service:     SINUS SURGERY      SKIN LESION EXCISION      DR. REYNAGA ABOUT 10 YEARS AGO         FAMILY HISTORY  Family History   Problem Relation Age of Onset    Heart defect Mother     Pancreatic cancer Father 56    Anemia Sister     Hypertension Brother     Heart disease Brother     Mental illness Brother     Other Brother         Splenectomy    Cancer Cousin          SOCIAL HISTORY  Social History     Socioeconomic History    Marital status:      Spouse name: Charley    Years of education: 12   Tobacco Use    Smoking status: Former     Current packs/day: 0.00     Average packs/day: 3.0 packs/day for 50.0 years (150.0 ttl pk-yrs)     Types: Cigarettes     Start date:      Quit date:      Years since quittin.7    Smokeless tobacco: Never    Tobacco comments:     80 pack year   Vaping Use    Vaping status: Never Used   Substance and Sexual Activity    Alcohol use: No     Comment: Heavy in past, none since around     Drug use: No    Sexual activity: Defer     Comment: , per pt report         ALLERGIES  Amoxicillin, Ceftin [cefuroxime axetil], Codeine, Flonase [fluticasone], Spiriva handihaler [tiotropium bromide monohydrate], Sulfa antibiotics, and Phenobarbital        REVIEW OF SYSTEMS  Review of Systems  Included in HPI  All systems reviewed and negative except for those discussed in HPI.      PHYSICAL EXAM    I have reviewed the triage vital signs and nursing notes.    ED Triage Vitals   Temp Heart Rate Resp BP SpO2   10/02/24 0219 10/02/24 0218 10/02/24 0218 10/02/24 0218 10/02/24 0218   98.1 °F (36.7 °C) 68 16 168/67 99 %      Temp src Heart Rate Source Patient Position BP Location FiO2 (%)   10/02/24 0219 -- -- -- --   Tympanic           Physical Exam  GENERAL: alert, no acute distress  SKIN: Warm, dry  HENT: Normocephalic, atraumatic, dried blood to surgical wound with skin graft to right ear no active bleeding appreciated no significant erythema swelling or  dehiscence appreciated  EYES: no scleral icterus  CV: regular rhythm, regular rate  RESPIRATORY: normal effort, lungs clear  ABDOMEN: soft, nontender, nondistended  MUSCULOSKELETAL: no deformity  NEURO: alert, moves all extremities, follows commands                                                               LAB RESULTS  Recent Results (from the past 24 hour(s))   Comprehensive Metabolic Panel    Collection Time: 10/02/24  4:25 AM    Specimen: Arm, Left; Blood   Result Value Ref Range    Glucose 109 (H) 65 - 99 mg/dL    BUN 18 8 - 23 mg/dL    Creatinine 1.13 0.76 - 1.27 mg/dL    Sodium 143 136 - 145 mmol/L    Potassium 4.0 3.5 - 5.2 mmol/L    Chloride 103 98 - 107 mmol/L    CO2 32.5 (H) 22.0 - 29.0 mmol/L    Calcium 8.9 8.6 - 10.5 mg/dL    Total Protein 5.8 (L) 6.0 - 8.5 g/dL    Albumin 4.0 3.5 - 5.2 g/dL    ALT (SGPT) 13 1 - 41 U/L    AST (SGOT) 16 1 - 40 U/L    Alkaline Phosphatase 122 (H) 39 - 117 U/L    Total Bilirubin 0.8 0.0 - 1.2 mg/dL    Globulin 1.8 gm/dL    A/G Ratio 2.2 g/dL    BUN/Creatinine Ratio 15.9 7.0 - 25.0    Anion Gap 7.5 5.0 - 15.0 mmol/L    eGFR 67.8 >60.0 mL/min/1.73   Protime-INR    Collection Time: 10/02/24  4:25 AM    Specimen: Arm, Left; Blood   Result Value Ref Range    Protime 14.5 (H) 11.7 - 14.2 Seconds    INR 1.11 (H) 0.90 - 1.10   aPTT    Collection Time: 10/02/24  4:25 AM    Specimen: Arm, Left; Blood   Result Value Ref Range    PTT 28.5 22.7 - 35.4 seconds   CBC Auto Differential    Collection Time: 10/02/24  4:25 AM    Specimen: Arm, Left; Blood   Result Value Ref Range    WBC 6.67 3.40 - 10.80 10*3/mm3    RBC 4.55 4.14 - 5.80 10*6/mm3    Hemoglobin 13.1 13.0 - 17.7 g/dL    Hematocrit 39.6 37.5 - 51.0 %    MCV 87.0 79.0 - 97.0 fL    MCH 28.8 26.6 - 33.0 pg    MCHC 33.1 31.5 - 35.7 g/dL    RDW 14.5 12.3 - 15.4 %    RDW-SD 46.2 37.0 - 54.0 fl    MPV 10.9 6.0 - 12.0 fL    Platelets 131 (L) 140 - 450 10*3/mm3    Neutrophil % 75.3 42.7 - 76.0 %    Lymphocyte % 12.9 (L) 19.6 - 45.3 %     Monocyte % 7.2 5.0 - 12.0 %    Eosinophil % 3.0 0.3 - 6.2 %    Basophil % 0.6 0.0 - 1.5 %    Immature Grans % 1.0 (H) 0.0 - 0.5 %    Neutrophils, Absolute 5.02 1.70 - 7.00 10*3/mm3    Lymphocytes, Absolute 0.86 0.70 - 3.10 10*3/mm3    Monocytes, Absolute 0.48 0.10 - 0.90 10*3/mm3    Eosinophils, Absolute 0.20 0.00 - 0.40 10*3/mm3    Basophils, Absolute 0.04 0.00 - 0.20 10*3/mm3    Immature Grans, Absolute 0.07 (H) 0.00 - 0.05 10*3/mm3       I ordered the above labs and independently reviewed the results.        RADIOLOGY  No Radiology Exams Resulted Within Past 24 Hours    I ordered the above noted radiological studies. Reviewed by me and discussed with radiologist.  See dictation for official radiology interpretation.      PROCEDURES    Procedures      MEDICATIONS GIVEN IN ER    Medications - No data to display      ORDERS PLACED DURING THIS VISIT:  Orders Placed This Encounter   Procedures    Comprehensive Metabolic Panel    Protime-INR    aPTT    CBC Auto Differential    Please clean and dress wound  Misc Nursing Order (Specify)    CBC & Differential         PROGRESS, DATA ANALYSIS, CONSULTS, AND MEDICAL DECISION MAKING    All labs have been independently interpreted by me.  All radiology studies have been reviewed by me and discussed with radiologist dictating the report.   EKG's independently viewed and interpreted by me.  Discussion below represents my analysis of pertinent findings related to patient's condition, differential diagnosis, treatment plan and final disposition.    Differential diagnosis includes but is not limited to cellulitis, dehiscence, wound check.    Clinical Scores:              ED Course as of 10/02/24 0714   Wed Oct 02, 2024   0503 Wound had been cleaned and dressed. [TJ]      ED Course User Index  [TJ] Roe Crews MD               PPE: The patient wore a mask and I wore an N95 mask throughout the entire patient encounter.       AS OF 07:14 EDT VITALS:    BP - 123/92  HR -  54  TEMP - 98.1 °F (36.7 °C) (Tympanic)  O2 SATS - 98%        DIAGNOSIS  Final diagnoses:   Encounter for post surgical wound check         DISPOSITION  ED Disposition       ED Disposition   Discharge    Condition   Stable    Comment   --                  Note Disclaimer: At [facility], we believe that sharing information builds trust and better relationships. You are receiving this note because you recently visited [facility]. It is possible you will see health information before a provider has talked with you about it. This kind of information can be easy to misunderstand. To help you fully understand what it means for your health, we urge you to discuss this note with your provider.         Roe Crews MD  10/02/24 0714

## 2024-10-02 NOTE — DISCHARGE INSTRUCTIONS
Keep the applied dressing on atleast overnight and return to the ED if you develop new uncontrolled bleeding that doesn't stop with pressure, pus draining from the operative site, vomiting, fever/ chills.

## 2024-10-02 NOTE — ED NOTES
"Patient to ED from home via EMS for call of bleeding ear. Patient has a hx of skin cancer that was removed out of the ear. A skin graft was placed on the ear. Patient was seen at [facility] and \"did not like the care he was receiving\".   "

## 2024-12-01 ENCOUNTER — HOSPITAL ENCOUNTER (EMERGENCY)
Facility: HOSPITAL | Age: 74
Discharge: ANOTHER HOSPITAL | End: 2024-12-01
Attending: STUDENT IN AN ORGANIZED HEALTH CARE EDUCATION/TRAINING PROGRAM | Admitting: STUDENT IN AN ORGANIZED HEALTH CARE EDUCATION/TRAINING PROGRAM
Payer: MEDICARE

## 2024-12-01 VITALS
HEART RATE: 86 BPM | DIASTOLIC BLOOD PRESSURE: 86 MMHG | SYSTOLIC BLOOD PRESSURE: 159 MMHG | TEMPERATURE: 98.4 F | WEIGHT: 240.3 LBS | HEIGHT: 68 IN | RESPIRATION RATE: 25 BRPM | BODY MASS INDEX: 36.42 KG/M2 | OXYGEN SATURATION: 98 %

## 2024-12-01 DIAGNOSIS — L03.115 CELLULITIS OF RIGHT LOWER EXTREMITY: Primary | ICD-10-CM

## 2024-12-01 DIAGNOSIS — I87.2 STASIS DERMATITIS: ICD-10-CM

## 2024-12-01 DIAGNOSIS — M79.89 RIGHT LEG SWELLING: ICD-10-CM

## 2024-12-01 PROCEDURE — 99285 EMERGENCY DEPT VISIT HI MDM: CPT | Performed by: STUDENT IN AN ORGANIZED HEALTH CARE EDUCATION/TRAINING PROGRAM

## 2024-12-01 RX ORDER — GINSENG 100 MG
1 CAPSULE ORAL DAILY
Qty: 4539 G | Refills: 0 | Status: SHIPPED | OUTPATIENT
Start: 2024-12-01

## 2024-12-01 RX ORDER — DOXYCYCLINE 100 MG/1
100 CAPSULE ORAL 2 TIMES DAILY
Qty: 14 CAPSULE | Refills: 0 | Status: SHIPPED | OUTPATIENT
Start: 2024-12-01 | End: 2024-12-08

## 2024-12-02 NOTE — DISCHARGE INSTRUCTIONS
GO TO THE EMERGENCY ROOM TO PERFORM DUPLEX US OF THE RIGHT LOWER EXTREMITY.      FOLLOW-UP CARE (2 DAYS):  Earle Ray MD - Vascular Surgery  [facility]  [address], [address]  [place] [address]   [phone]

## 2024-12-02 NOTE — ED PROVIDER NOTES
Subjective   History of Present Illness  73 yo male hx bladder cancer, lymphoma both in remission, CHF, COPD presents to ed w/ cc of anterior RLE leg redness; associated sx RLE swelling > LLE swelling the setting of chronic bilateral LE x years. Denies f/c, cough, dyspnea, syncope, chest, back, abd pain. ROS otherwise neg. Vitals are wnls. On exam pt is well-appearing, bilateral LE skin scaling, bilateral 3+ pitting edema right leg, 2+ pitting edema left leg, R anterior shin spreading erythema, 4 cm, no crepitus appreciated.  Review of Systems    Past Medical History:   Diagnosis Date    Anemia     Bladder cancer 2008    Cerebrovascular accident     Ischemic and TIAs; most recent 02/2014 which was a TIA.    Chronic kidney disease, stage 3     Dr. KATHERYN Lin    Chronic right-sided CHF (congestive heart failure)         Colon polyp     COPD (chronic obstructive pulmonary disease)     History of blood transfusion     Hyperlipidemia     Hypertension     Hyperuricemia     Left shoulder pain     Lymphoma     MANTLE CELL, HAD CHEMO  2010    On home O2     per pt, 2L    Osteoarthritis     Pilonidal cyst     SCHEDULED FOR SX    Psoriasis     Seizure     Following head trauma in 1970    Skin lesions     SCARRING FROM SHINGLES, & BLISTERS FROM EDEMA    Sleep apnea     Intolerant of CPAP    Splenomegaly 02/21/2013    Stroke     Tobacco abuse     Type 2 diabetes mellitus        Allergies   Allergen Reactions    Amoxicillin Hives    Ceftin [Cefuroxime Axetil] Hives    Codeine     Flonase [Fluticasone] Hives    Spiriva Handihaler [Tiotropium Bromide Monohydrate] Hives    Sulfa Antibiotics Hives    Phenobarbital Rash       Past Surgical History:   Procedure Laterality Date    BRONCHOSCOPY N/A 8/21/2017    Procedure: BRONCHOSCOPY with fluro and biopsy and lavage.;  Surgeon: Red Topete MD;  Location: [facility];  Service:     CARDIAC CATHETERIZATION N/A 9/17/2018    Procedure: Coronary angiography;  Surgeon: Esme  MD Kylie;  Location:  [facility] CATH INVASIVE LOCATION;  Service: Cardiovascular    CARDIAC CATHETERIZATION N/A 10/30/2018    Procedure: Chronic Total Occlussion;  Surgeon: Roe Wheatley MD;  Location:  [facility] CATH INVASIVE LOCATION;  Service: Cardiovascular    CARDIAC CATHETERIZATION N/A 10/30/2018    Procedure: Stent AURA coronary;  Surgeon: Roe Wheatley MD;  Location:  [facility] CATH INVASIVE LOCATION;  Service: Cardiovascular    COLONOSCOPY N/A 10/9/2019    Procedure: COLONOSCOPY with polypectomy;  Surgeon: Lee Priest MD;  Location:  [facility] OR;  Service: Gastroenterology    PILONIDAL CYSTECTOMY N/A 2016    Procedure: PILONIDAL CYSTECTOMY;  Surgeon: Roe Davila MD;  Location:  [facility] OR;  Service:     SINUS SURGERY      SKIN LESION EXCISION      DR. DAVILA ABOUT 10 YEARS AGO       Family History   Problem Relation Age of Onset    Heart defect Mother     Pancreatic cancer Father 56    Anemia Sister     Hypertension Brother     Heart disease Brother     Mental illness Brother     Other Brother         Splenectomy    Cancer Cousin        Social History     Socioeconomic History    Marital status:      Spouse name: Charley    Years of education: 12   Tobacco Use    Smoking status: Former     Current packs/day: 0.00     Average packs/day: 3.0 packs/day for 50.0 years (150.0 ttl pk-yrs)     Types: Cigarettes     Start date:      Quit date: 2000     Years since quittin.9    Smokeless tobacco: Never    Tobacco comments:     80 pack year   Vaping Use    Vaping status: Never Used   Substance and Sexual Activity    Alcohol use: No     Comment: Heavy in past, none since around     Drug use: No    Sexual activity: Defer     Comment: , per pt report           Objective   Physical Exam    Procedures           ED Course                                                       Medical Decision Making    75 yo male hx bladder cancer, lymphoma both in remission, CHF, COPD presents to ed w/ cc of  anterior RLE leg redness; associated sx RLE swelling > LLE swelling the setting of chronic bilateral LE x years. Denies f/c, cough, dyspnea, syncope, chest, back, abd pain. ROS otherwise neg. Vitals are wnls. On exam pt is well-appearing, bilateral LE skin scaling, bilateral 3+ pitting edema right leg, 2+ pitting edema left leg, R anterior shin spreading erythema, 4 cm with mild desquamation, no crepitus appreciated.    Concern for cellulitis vs stasis dermatitis of RLE; leaning towards cellulitis given the localization and desquamation, however, in a patient with a history of several cancers, a RLE that is more swollen then left, it would be pertinent to rule out deep vein thrombosis. We do not have ultrasound at this facility thus I will initiate transfer to [facility] ER-to-ER, pending callback.    8:36 pm [facility] declined transfer, stating they do not have VASCULAR ultrasound at this facility at this time.    Tobi Hathaway MD, Hospitalist, [facility], stated he would accept for observation / duplex US, but to first as [facility] ER if they would accept transfer first.    Dr. Cedrick Jackson, [facility], has accepted the patient to RO DVT.    [facility]st, pt  Dsp, [facility], pending callback  Final diagnoses:   Cellulitis of right lower extremity   Stasis dermatitis   Right leg swelling       ED Disposition  ED Disposition       ED Disposition   Transfer to Another Facility     Condition   --    Comment   --               No follow-up provider specified.       Medication List        New Prescriptions      bacitracin 500 UNIT/GM ointment  Apply 1 Application topically to the appropriate area as directed Daily.     doxycycline 100 MG capsule  Commonly known as: MONODOX  Take 1 capsule by mouth 2 (Two) Times a Day for 7 days.               Where to Get Your Medications        These medications were sent to [id number] - [address] [place] [address] AT [facility]  [address] - [phone]  - [phone] FX  [address] [address]      Phone: [phone]   bacitracin 500 UNIT/GM ointment  doxycycline 100 MG capsule            Azam Connolly MD  12/02/24 0055

## 2024-12-06 ENCOUNTER — APPOINTMENT (OUTPATIENT)
Dept: GENERAL RADIOLOGY | Facility: HOSPITAL | Age: 74
End: 2024-12-06
Payer: MEDICARE

## 2024-12-06 ENCOUNTER — HOSPITAL ENCOUNTER (EMERGENCY)
Facility: HOSPITAL | Age: 74
Discharge: HOME | End: 2024-12-06
Attending: EMERGENCY MEDICINE | Admitting: EMERGENCY MEDICINE
Payer: MEDICARE

## 2024-12-06 VITALS
WEIGHT: 240 LBS | OXYGEN SATURATION: 99 % | TEMPERATURE: 98.4 F | HEIGHT: 67 IN | RESPIRATION RATE: 20 BRPM | DIASTOLIC BLOOD PRESSURE: 82 MMHG | SYSTOLIC BLOOD PRESSURE: 150 MMHG | BODY MASS INDEX: 37.67 KG/M2 | HEART RATE: 70 BPM

## 2024-12-06 DIAGNOSIS — M79.89 LEG SWELLING: Primary | ICD-10-CM

## 2024-12-06 DIAGNOSIS — S81.802A WOUND OF LEFT LOWER EXTREMITY, INITIAL ENCOUNTER: ICD-10-CM

## 2024-12-06 LAB
ALBUMIN SERPL-MCNC: 4 G/DL (ref 3.5–5.2)
ALBUMIN/GLOB SERPL: 2.1 G/DL
ALP SERPL-CCNC: 115 U/L (ref 39–117)
ALT SERPL W P-5'-P-CCNC: 10 U/L (ref 1–41)
ANION GAP SERPL CALCULATED.3IONS-SCNC: 6.3 MMOL/L (ref 5–15)
AST SERPL-CCNC: 17 U/L (ref 1–40)
BASOPHILS # BLD AUTO: 0.02 10*3/MM3 (ref 0–0.2)
BASOPHILS NFR BLD AUTO: 0.3 % (ref 0–1.5)
BILIRUB SERPL-MCNC: 0.7 MG/DL (ref 0–1.2)
BUN SERPL-MCNC: 19 MG/DL (ref 8–23)
BUN/CREAT SERPL: 17.1 (ref 7–25)
CALCIUM SPEC-SCNC: 9.1 MG/DL (ref 8.6–10.5)
CHLORIDE SERPL-SCNC: 102 MMOL/L (ref 98–107)
CO2 SERPL-SCNC: 36.7 MMOL/L (ref 22–29)
CREAT SERPL-MCNC: 1.11 MG/DL (ref 0.76–1.27)
DEPRECATED RDW RBC AUTO: 46.9 FL (ref 37–54)
EGFRCR SERPLBLD CKD-EPI 2021: 69.2 ML/MIN/1.73
EOSINOPHIL # BLD AUTO: 0.25 10*3/MM3 (ref 0–0.4)
EOSINOPHIL NFR BLD AUTO: 3.6 % (ref 0.3–6.2)
ERYTHROCYTE [DISTWIDTH] IN BLOOD BY AUTOMATED COUNT: 14.6 % (ref 12.3–15.4)
GLOBULIN UR ELPH-MCNC: 1.9 GM/DL
GLUCOSE SERPL-MCNC: 110 MG/DL (ref 65–99)
HCT VFR BLD AUTO: 37.4 % (ref 37.5–51)
HGB BLD-MCNC: 11.9 G/DL (ref 13–17.7)
IMM GRANULOCYTES # BLD AUTO: 0.03 10*3/MM3 (ref 0–0.05)
IMM GRANULOCYTES NFR BLD AUTO: 0.4 % (ref 0–0.5)
LYMPHOCYTES # BLD AUTO: 0.82 10*3/MM3 (ref 0.7–3.1)
LYMPHOCYTES NFR BLD AUTO: 11.9 % (ref 19.6–45.3)
MCH RBC QN AUTO: 28.2 PG (ref 26.6–33)
MCHC RBC AUTO-ENTMCNC: 31.8 G/DL (ref 31.5–35.7)
MCV RBC AUTO: 88.6 FL (ref 79–97)
MONOCYTES # BLD AUTO: 0.71 10*3/MM3 (ref 0.1–0.9)
MONOCYTES NFR BLD AUTO: 10.3 % (ref 5–12)
NEUTROPHILS NFR BLD AUTO: 5.08 10*3/MM3 (ref 1.7–7)
NEUTROPHILS NFR BLD AUTO: 73.5 % (ref 42.7–76)
NRBC BLD AUTO-RTO: 0 /100 WBC (ref 0–0.2)
NT-PROBNP SERPL-MCNC: 407 PG/ML (ref 0–1800)
PLATELET # BLD AUTO: 116 10*3/MM3 (ref 140–450)
PMV BLD AUTO: 11.2 FL (ref 6–12)
POTASSIUM SERPL-SCNC: 3.6 MMOL/L (ref 3.5–5.2)
PROT SERPL-MCNC: 5.9 G/DL (ref 6–8.5)
RBC # BLD AUTO: 4.22 10*6/MM3 (ref 4.14–5.8)
SODIUM SERPL-SCNC: 145 MMOL/L (ref 136–145)
WBC NRBC COR # BLD AUTO: 6.91 10*3/MM3 (ref 3.4–10.8)

## 2024-12-06 PROCEDURE — 73630 X-RAY EXAM OF FOOT: CPT

## 2024-12-06 PROCEDURE — 80053 COMPREHEN METABOLIC PANEL: CPT | Performed by: EMERGENCY MEDICINE

## 2024-12-06 PROCEDURE — 99283 EMERGENCY DEPT VISIT LOW MDM: CPT | Performed by: EMERGENCY MEDICINE

## 2024-12-06 PROCEDURE — 73610 X-RAY EXAM OF ANKLE: CPT

## 2024-12-06 PROCEDURE — 83880 ASSAY OF NATRIURETIC PEPTIDE: CPT | Performed by: STUDENT IN AN ORGANIZED HEALTH CARE EDUCATION/TRAINING PROGRAM

## 2024-12-06 PROCEDURE — 85025 COMPLETE CBC W/AUTO DIFF WBC: CPT | Performed by: EMERGENCY MEDICINE

## 2024-12-06 PROCEDURE — 25010000002 FUROSEMIDE PER 20 MG: Performed by: EMERGENCY MEDICINE

## 2024-12-06 PROCEDURE — 73590 X-RAY EXAM OF LOWER LEG: CPT

## 2024-12-06 PROCEDURE — 96374 THER/PROPH/DIAG INJ IV PUSH: CPT

## 2024-12-06 RX ORDER — SODIUM CHLORIDE 0.9 % (FLUSH) 0.9 %
10 SYRINGE (ML) INJECTION AS NEEDED
Status: DISCONTINUED | OUTPATIENT
Start: 2024-12-06 | End: 2024-12-06 | Stop reason: HOSPADM

## 2024-12-06 RX ORDER — FUROSEMIDE 10 MG/ML
80 INJECTION INTRAMUSCULAR; INTRAVENOUS ONCE
Status: COMPLETED | OUTPATIENT
Start: 2024-12-06 | End: 2024-12-06

## 2024-12-06 RX ADMIN — FUROSEMIDE 80 MG: 10 INJECTION, SOLUTION INTRAVENOUS at 14:39

## 2024-12-06 NOTE — ED PROVIDER NOTES
Obtained sign out from Dr. Mackenzie at 3pm.    ED Course as of 12/06/24 1812   Fri Dec 06, 2024   1559 proBNP: 407.0  Normal. Less likely severe CHF exacerbation.  [DL]   1627 I came to bedside to assess patient.  He is 74 years of age.  He has been having leg swelling bilaterally for the past week.  Started on Friday.  He was seen in the emergency room here on Sunday and he was transferred to [facility] for a DVT ultrasound which was reportedly negative.  He was put on doxycycline antibiotics.  His torsemide was up to 200 mg daily for the past 3 days and went down to 100 mg daily which is his baseline.  He states that his legs continue to be swollen.  He states that he does have a chair at home but it does not work to elevate his legs.  He also states that his left leg started to hurt more.  He thinks that he banged it over the weekend when he tried to get into the van.    On exam, patient appears well.  He wears oxygen which is his baseline.  He has bilateral leg swelling 3+ pitting edema equally.  He does have a really strong 2+ DP pulses in bilateral feet.  He does have normal range of motion of bilateral hips, knees and ankles.  His lower legs with dry skin, redness.  The left leg with some scattered open wounds.  No pus discharge.  Ecchymosis along the lateral side of the left leg.  He is awake and alert x 4, conversing appropriately with me.    Assessment: The patient with leg swelling.  Negative DVT ultrasound which is reassuring.  He has bilateral.  I did obtain a BNP which was normal at 407.  Less likely to be CHF exacerbation.  He is not having any more oxygen need or shortness of breath.    His kidney function is normal with a creatinine of 1.1.  GFR is 69.  Potassium is also normal at 3.6.  80 mg of Lasix was already given to the patient through the IV by Dr. Mackenzie.  I do think that he likely has some venous insufficiency.  Could be nephrotic syndrome as well.  However, given a normal kidney function,  we will have him follow-up with PCP instead of further workup in the ER.    He is not an alcoholic or having any LFT abnormalities so I doubt that he is experiencing cirrhosis leading to the leg swelling. [DL]   1631 The patient does not elevate his legs as advised because he does not have a chair to do that at home which could also contribute to leg swelling because of the gravity pulling the fluid down.    There is some ecchymosis on the lateral side of his leg which makes me concerned for injury or fracture so I order x-ray of the left tib-fib, left ankle and left foot.    He does have doxycycline that he takes at home to treat cellulitis which I think is appropriate.   He will need referral to wound care, especially for dry skin bilaterally and some open sores on left lower leg.    He has been good pulses on his feet.  Low suspicion for an arterial occlusion.   [DL]   1725 XR Tibia Fibula 2 View Left  No acute fracture or malalignment.     Diffuse and nonspecific soft tissue and edema involving the imaged left lower extremity.   [DL]   1726 XR Ankle 3+ View Left  No acute fracture or malalignment.     Diffuse and nonspecific soft tissue and edema involving the imaged left lower extremity.   [DL]   1726 XR Foot 3+ View Left  No acute fracture or malalignment.     Diffuse and nonspecific soft tissue and edema involving the imaged left lower extremity.   [DL]   1738 I came to bedside to speak with the patient, the female friend at bedside and with nurse Kristal Mclain RN to updated patient on results of x-ray, lab tests and my advice for home care instructions as well as return precautions.  He is to see wound care which I referred and wrote the address for.  Advised him to elevate the legs, wear compression socks, continue the torsemide at home. He voices understanding and agreement to this plan. [DL]      ED Course User Index  [DL] Derrick Upton MD      Diagnosis Plan   1. Leg swelling        2. Wound of left  lower extremity, initial encounter  Ambulatory Referral to Wound Clinic        Please note that portions of this note were completed with a voice recognition program.          Derrick Upton MD  12/06/24 0631

## 2024-12-06 NOTE — DISCHARGE INSTRUCTIONS
Thank you for your visit to the Emergency Department.     It was a pleasure to take care of you in the emergency room today.     Today you were seen for leg swelling and wound on your left leg. We performed a thorough history and physical exam.  We obtain x-ray of your leg which did not show any fractures.  The lab test did not show severe kidney dysfunction or heart failure.  We recommend that you continue taking the torsemide.  Elevate your legs at home.  We referred you to wound care so that they can help you with dressing of your wound.      Please return to the nearest ED or call 911 if you experience:    Worsening symptoms, severe pain, difficulty breathing, chest pain, fever that doesn't break with Tylenol or Motrin, uncontrolled vomiting or diarrhea that doesn't stop, passing out, lethargy (drowsiness, hard to wake up), numbness/tingling/weakness on one side, speech difficulty, cannot walk, or any concerns for limb/life threatening issues.    The Emergency Department is open 24 hours a day.     Please follow up with: your primary care doctor within 1 to 3 days.    In the meantime, please:  Take acetaminophen 650mg every 6-8 hours on a full stomach as needed for pain or fever.   Continue to take any other existing medications as prescribed.

## 2024-12-06 NOTE — ED PROVIDER NOTES
Subjective   History of Present Illness  Patient presents complaining of ongoing leg swelling.  Patient says its never been this bad.  Patient's concern is that his foot now hurts and he cannot stand up or walk on it because it hurts so bad.  Patient denies any weakness or sensory changes.  Patient was seen here recently and evaluated and then sent down to [facility] for an ultrasound which was negative for DVT.  Patient was put on a double dose of his diuretic for 3 days to try to get some of the swelling to go down and then advised to follow-up.  Patient's PCP is out of town so patient came here.  Patient denies any fever, chest pain, shortness of breath, or rash.  Patient's spouse says he is not hardly mobile at all and lays around either in the bed or in a chair.  We discussed that patient is likely not can to get much of the fluid off if he is not very mobile.  Also discussed having him follow-up with wound care for wraps.      Review of Systems   All other systems reviewed and are negative.      Past Medical History:   Diagnosis Date    Anemia     Bladder cancer 2008    Cerebrovascular accident     Ischemic and TIAs; most recent 02/2014 which was a TIA.    Chronic kidney disease, stage 3     Dr. KATHERYN Lin    Chronic right-sided CHF (congestive heart failure)         Colon polyp     COPD (chronic obstructive pulmonary disease)     History of blood transfusion     Hyperlipidemia     Hypertension     Hyperuricemia     Left shoulder pain     Lymphoma     MANTLE CELL, HAD CHEMO  2010    On home O2     per pt, 2L    Osteoarthritis     Pilonidal cyst     SCHEDULED FOR SX    Psoriasis     Seizure     Following head trauma in 1970    Skin lesions     SCARRING FROM SHINGLES, & BLISTERS FROM EDEMA    Sleep apnea     Intolerant of CPAP    Splenomegaly 02/21/2013    Stroke     Tobacco abuse     Type 2 diabetes mellitus        Allergies   Allergen Reactions    Amoxicillin Hives    Ceftin [Cefuroxime Axetil]  Hives    Codeine     Flonase [Fluticasone] Hives    Spiriva Handihaler [Tiotropium Bromide Monohydrate] Hives    Sulfa Antibiotics Hives    Phenobarbital Rash       Past Surgical History:   Procedure Laterality Date    BRONCHOSCOPY N/A 2017    Procedure: BRONCHOSCOPY with fluro and biopsy and lavage.;  Surgeon: Red Topete MD;  Location: [facility] OR;  Service:     CARDIAC CATHETERIZATION N/A 2018    Procedure: Coronary angiography;  Surgeon: Kylie Victoria MD;  Location:  [facility];  Service: Cardiovascular    CARDIAC CATHETERIZATION N/A 10/30/2018    Procedure: Chronic Total Occlussion;  Surgeon: Roe Wheatley MD;  Location:  [facility];  Service: Cardiovascular    CARDIAC CATHETERIZATION N/A 10/30/2018    Procedure: Stent AURA coronary;  Surgeon: Roe Wheatley MD;  Location:  [facility];  Service: Cardiovascular    COLONOSCOPY N/A 10/9/2019    Procedure: COLONOSCOPY with polypectomy;  Surgeon: Lee Priest MD;  Location: [facility] OR;  Service: Gastroenterology    PILONIDAL CYSTECTOMY N/A 2016    Procedure: PILONIDAL CYSTECTOMY;  Surgeon: Roe Davila MD;  Location: [facility] OR;  Service:     SINUS SURGERY      SKIN LESION EXCISION      DR. DAVILA ABOUT 10 YEARS AGO       Family History   Problem Relation Age of Onset    Heart defect Mother     Pancreatic cancer Father 56    Anemia Sister     Hypertension Brother     Heart disease Brother     Mental illness Brother     Other Brother         Splenectomy    Cancer Cousin        Social History     Socioeconomic History    Marital status:      Spouse name: Charley    Years of education: 12   Tobacco Use    Smoking status: Former     Current packs/day: 0.00     Average packs/day: 3.0 packs/day for 50.0 years (150.0 ttl pk-yrs)     Types: Cigarettes     Start date:      Quit date: 2000     Years since quittin.9    Smokeless tobacco: Never    Tobacco comments:     80 pack year    Vaping Use    Vaping status: Never Used   Substance and Sexual Activity    Alcohol use: No     Comment: Heavy in past, none since around 2000    Drug use: No    Sexual activity: Defer     Comment: , per pt report           Objective   Physical Exam  Vitals and nursing note reviewed.   HENT:      Head: Normocephalic and atraumatic.      Mouth/Throat:      Mouth: Mucous membranes are moist.   Eyes:      Conjunctiva/sclera: Conjunctivae normal.   Cardiovascular:      Rate and Rhythm: Normal rate and regular rhythm.      Heart sounds: Normal heart sounds.   Pulmonary:      Effort: Pulmonary effort is normal.      Breath sounds: Normal breath sounds.   Musculoskeletal:      Cervical back: Neck supple.      Comments: Bilateral lower extremities are diffusely swollen with +2 pitting edema.  +1 DP and PT arterial pulses palpated bilaterally.  Patient has skin flaking on the distal half of the lower leg.  There is also some darkening of the skin and erythema.  No streaking erythema going up the leg.   Skin:     General: Skin is warm and dry.      Capillary Refill: Capillary refill takes 2 to 3 seconds.   Neurological:      Mental Status: He is alert and oriented to person, place, and time.   Psychiatric:         Mood and Affect: Mood normal.         Procedures           ED Course  ED Course as of 12/08/24 1048   Fri Dec 06, 2024   1559 proBNP: 407.0  Normal. Less likely severe CHF exacerbation.  [DL]   1627 I came to bedside to assess patient.  He is 74 years of age.  He has been having leg swelling bilaterally for the past week.  Started on Friday.  He was seen in the emergency room here on Sunday and he was transferred to [facility] for a DVT ultrasound which was reportedly negative.  He was put on doxycycline antibiotics.  His torsemide was up to 200 mg daily for the past 3 days and went down to 100 mg daily which is his baseline.  He states that his legs continue to be swollen.  He states that he does have a chair  at home but it does not work to elevate his legs.  He also states that his left leg started to hurt more.  He thinks that he banged it over the weekend when he tried to get into the van.    On exam, patient appears well.  He wears oxygen which is his baseline.  He has bilateral leg swelling 3+ pitting edema equally.  He does have a really strong 2+ DP pulses in bilateral feet.  He does have normal range of motion of bilateral hips, knees and ankles.  His lower legs with dry skin, redness.  The left leg with some scattered open wounds.  No pus discharge.  Ecchymosis along the lateral side of the left leg.  He is awake and alert x 4, conversing appropriately with me.    Assessment: The patient with leg swelling.  Negative DVT ultrasound which is reassuring.  He has bilateral.  I did obtain a BNP which was normal at 407.  Less likely to be CHF exacerbation.  He is not having any more oxygen need or shortness of breath.    His kidney function is normal with a creatinine of 1.1.  GFR is 69.  Potassium is also normal at 3.6.  80 mg of Lasix was already given to the patient through the IV by Dr. Mackenzie.  I do think that he likely has some venous insufficiency.  Could be nephrotic syndrome as well.  However, given a normal kidney function, we will have him follow-up with PCP instead of further workup in the ER.    He is not an alcoholic or having any LFT abnormalities so I doubt that he is experiencing cirrhosis leading to the leg swelling. [DL]   1631 The patient does not elevate his legs as advised because he does not have a chair to do that at home which could also contribute to leg swelling because of the gravity pulling the fluid down.    There is some ecchymosis on the lateral side of his leg which makes me concerned for injury or fracture so I order x-ray of the left tib-fib, left ankle and left foot.    He does have doxycycline that he takes at home to treat cellulitis which I think is appropriate.   He will need  referral to wound care, especially for dry skin bilaterally and some open sores on left lower leg.    He has been good pulses on his feet.  Low suspicion for an arterial occlusion.   [DL]   1725 XR Tibia Fibula 2 View Left  No acute fracture or malalignment.     Diffuse and nonspecific soft tissue and edema involving the imaged left lower extremity.   [DL]   1726 XR Ankle 3+ View Left  No acute fracture or malalignment.     Diffuse and nonspecific soft tissue and edema involving the imaged left lower extremity.   [DL]   1726 XR Foot 3+ View Left  No acute fracture or malalignment.     Diffuse and nonspecific soft tissue and edema involving the imaged left lower extremity.   [DL]   1738 I came to bedside to speak with the patient, the female friend at bedside and with nurse Kristal Mclain RN to updated patient on results of x-ray, lab tests and my advice for home care instructions as well as return precautions.  He is to see wound care which I referred and wrote the address for.  Advised him to elevate the legs, wear compression socks, continue the torsemide at home. He voices understanding and agreement to this plan. [DL]      ED Course User Index  [DL] Derrick Upton MD                                                       Medical Decision Making  Ddx cellulitis, stasis dermatitis, peripheral edema    Amount and/or Complexity of Data Reviewed  Labs: ordered.    Risk  Prescription drug management.        Final diagnoses:   Leg swelling   Wound of left lower extremity, initial encounter       ED Disposition  ED Disposition       ED Disposition   Discharge    Condition   Stable    Comment   --               [facility] [place] [facility]  [address]  [place] [address] [phone]    If symptoms worsen    Nitesh Arias  [address]  [place] [id number] [phone]    In 1 day      [facility]  [address]  [place]  [place] [address]  [phone]  In 1 day           Medication List      No changes were made to your prescriptions during this visit.            Andrew Mackenzie MD  12/06/24 1456       Andrew Mackenzie MD  12/08/24 104

## 2024-12-09 ENCOUNTER — APPOINTMENT (OUTPATIENT)
Dept: WOUND CARE | Facility: HOSPITAL | Age: 74
End: 2024-12-09
Payer: MEDICARE

## 2024-12-09 PROCEDURE — G0463 HOSPITAL OUTPT CLINIC VISIT: HCPCS

## 2024-12-16 ENCOUNTER — APPOINTMENT (OUTPATIENT)
Dept: WOUND CARE | Facility: HOSPITAL | Age: 74
End: 2024-12-16
Payer: MEDICARE

## 2024-12-31 ENCOUNTER — LAB REQUISITION (OUTPATIENT)
Dept: LAB | Facility: HOSPITAL | Age: 74
End: 2024-12-31
Payer: MEDICARE

## 2024-12-31 DIAGNOSIS — R73.09 OTHER ABNORMAL GLUCOSE: ICD-10-CM

## 2024-12-31 DIAGNOSIS — R77.0 ABNORMALITY OF ALBUMIN: ICD-10-CM

## 2024-12-31 LAB
HBA1C MFR BLD: 5.05 % (ref 4.8–5.6)
PREALB SERPL-MCNC: 24.7 MG/DL (ref 20–40)

## 2024-12-31 PROCEDURE — 83036 HEMOGLOBIN GLYCOSYLATED A1C: CPT | Performed by: NURSE PRACTITIONER

## 2024-12-31 PROCEDURE — 84134 ASSAY OF PREALBUMIN: CPT | Performed by: NURSE PRACTITIONER

## 2025-02-11 ENCOUNTER — APPOINTMENT (OUTPATIENT)
Dept: GENERAL RADIOLOGY | Facility: HOSPITAL | Age: 75
End: 2025-02-11
Payer: MEDICARE

## 2025-02-11 ENCOUNTER — HOSPITAL ENCOUNTER (EMERGENCY)
Facility: HOSPITAL | Age: 75
Discharge: HOME | End: 2025-02-11
Attending: STUDENT IN AN ORGANIZED HEALTH CARE EDUCATION/TRAINING PROGRAM | Admitting: STUDENT IN AN ORGANIZED HEALTH CARE EDUCATION/TRAINING PROGRAM
Payer: MEDICARE

## 2025-02-11 VITALS
RESPIRATION RATE: 16 BRPM | SYSTOLIC BLOOD PRESSURE: 141 MMHG | WEIGHT: 273 LBS | BODY MASS INDEX: 42.85 KG/M2 | HEART RATE: 60 BPM | OXYGEN SATURATION: 96 % | DIASTOLIC BLOOD PRESSURE: 72 MMHG | TEMPERATURE: 98.6 F | HEIGHT: 67 IN

## 2025-02-11 DIAGNOSIS — R53.81 MALAISE: ICD-10-CM

## 2025-02-11 DIAGNOSIS — R06.00 DYSPNEA, UNSPECIFIED TYPE: Primary | ICD-10-CM

## 2025-02-11 LAB
ALBUMIN SERPL-MCNC: 4.1 G/DL (ref 3.5–5.2)
ALBUMIN/GLOB SERPL: 2.2 G/DL
ALP SERPL-CCNC: 126 U/L (ref 39–117)
ALT SERPL W P-5'-P-CCNC: 7 U/L (ref 1–41)
ANION GAP SERPL CALCULATED.3IONS-SCNC: 6.3 MMOL/L (ref 5–15)
AST SERPL-CCNC: 16 U/L (ref 1–40)
BASOPHILS # BLD AUTO: 0.03 10*3/MM3 (ref 0–0.2)
BASOPHILS NFR BLD AUTO: 0.5 % (ref 0–1.5)
BILIRUB SERPL-MCNC: 0.7 MG/DL (ref 0–1.2)
BUN SERPL-MCNC: 20 MG/DL (ref 8–23)
BUN/CREAT SERPL: 18.3 (ref 7–25)
CALCIUM SPEC-SCNC: 9 MG/DL (ref 8.6–10.5)
CHLORIDE SERPL-SCNC: 102 MMOL/L (ref 98–107)
CK SERPL-CCNC: 20 U/L (ref 20–200)
CO2 SERPL-SCNC: 34.7 MMOL/L (ref 22–29)
CREAT SERPL-MCNC: 1.09 MG/DL (ref 0.76–1.27)
DEPRECATED RDW RBC AUTO: 46.5 FL (ref 37–54)
EGFRCR SERPLBLD CKD-EPI 2021: 70.8 ML/MIN/1.73
EOSINOPHIL # BLD AUTO: 0.2 10*3/MM3 (ref 0–0.4)
EOSINOPHIL NFR BLD AUTO: 3.6 % (ref 0.3–6.2)
ERYTHROCYTE [DISTWIDTH] IN BLOOD BY AUTOMATED COUNT: 15.1 % (ref 12.3–15.4)
ETHANOL BLD-MCNC: <10 MG/DL (ref 0–10)
ETHANOL UR QL: <0.01 %
FLUAV RNA RESP QL NAA+PROBE: NOT DETECTED
FLUBV RNA RESP QL NAA+PROBE: NOT DETECTED
GLOBULIN UR ELPH-MCNC: 1.9 GM/DL
GLUCOSE SERPL-MCNC: 124 MG/DL (ref 65–99)
HCT VFR BLD AUTO: 41.5 % (ref 37.5–51)
HGB BLD-MCNC: 13.4 G/DL (ref 13–17.7)
IMM GRANULOCYTES # BLD AUTO: 0.03 10*3/MM3 (ref 0–0.05)
IMM GRANULOCYTES NFR BLD AUTO: 0.5 % (ref 0–0.5)
LIPASE SERPL-CCNC: 29 U/L (ref 13–60)
LYMPHOCYTES # BLD AUTO: 0.73 10*3/MM3 (ref 0.7–3.1)
LYMPHOCYTES NFR BLD AUTO: 13.1 % (ref 19.6–45.3)
MCH RBC QN AUTO: 27.9 PG (ref 26.6–33)
MCHC RBC AUTO-ENTMCNC: 32.3 G/DL (ref 31.5–35.7)
MCV RBC AUTO: 86.5 FL (ref 79–97)
MONOCYTES # BLD AUTO: 0.32 10*3/MM3 (ref 0.1–0.9)
MONOCYTES NFR BLD AUTO: 5.7 % (ref 5–12)
NEUTROPHILS NFR BLD AUTO: 4.26 10*3/MM3 (ref 1.7–7)
NEUTROPHILS NFR BLD AUTO: 76.6 % (ref 42.7–76)
NRBC BLD AUTO-RTO: 0 /100 WBC (ref 0–0.2)
NT-PROBNP SERPL-MCNC: 190 PG/ML (ref 0–1800)
PLATELET # BLD AUTO: 109 10*3/MM3 (ref 140–450)
PMV BLD AUTO: 11.4 FL (ref 6–12)
POTASSIUM SERPL-SCNC: 4.6 MMOL/L (ref 3.5–5.2)
PROT SERPL-MCNC: 6 G/DL (ref 6–8.5)
QT INTERVAL: 429 MS
QTC INTERVAL: 437 MS
RBC # BLD AUTO: 4.8 10*6/MM3 (ref 4.14–5.8)
RSV RNA RESP QL NAA+PROBE: NOT DETECTED
SARS-COV-2 RNA RESP QL NAA+PROBE: NOT DETECTED
SODIUM SERPL-SCNC: 143 MMOL/L (ref 136–145)
TROPONIN T SERPL HS-MCNC: 10 NG/L
TSH SERPL DL<=0.05 MIU/L-ACNC: 2.94 UIU/ML (ref 0.27–4.2)
WBC NRBC COR # BLD AUTO: 5.57 10*3/MM3 (ref 3.4–10.8)

## 2025-02-11 PROCEDURE — 83880 ASSAY OF NATRIURETIC PEPTIDE: CPT | Performed by: STUDENT IN AN ORGANIZED HEALTH CARE EDUCATION/TRAINING PROGRAM

## 2025-02-11 PROCEDURE — 25010000002 ONDANSETRON PER 1 MG: Performed by: STUDENT IN AN ORGANIZED HEALTH CARE EDUCATION/TRAINING PROGRAM

## 2025-02-11 PROCEDURE — 96374 THER/PROPH/DIAG INJ IV PUSH: CPT

## 2025-02-11 PROCEDURE — 87637 SARSCOV2&INF A&B&RSV AMP PRB: CPT | Performed by: STUDENT IN AN ORGANIZED HEALTH CARE EDUCATION/TRAINING PROGRAM

## 2025-02-11 PROCEDURE — 84443 ASSAY THYROID STIM HORMONE: CPT | Performed by: STUDENT IN AN ORGANIZED HEALTH CARE EDUCATION/TRAINING PROGRAM

## 2025-02-11 PROCEDURE — 80053 COMPREHEN METABOLIC PANEL: CPT | Performed by: STUDENT IN AN ORGANIZED HEALTH CARE EDUCATION/TRAINING PROGRAM

## 2025-02-11 PROCEDURE — 82077 ASSAY SPEC XCP UR&BREATH IA: CPT | Performed by: STUDENT IN AN ORGANIZED HEALTH CARE EDUCATION/TRAINING PROGRAM

## 2025-02-11 PROCEDURE — 99284 EMERGENCY DEPT VISIT MOD MDM: CPT | Performed by: STUDENT IN AN ORGANIZED HEALTH CARE EDUCATION/TRAINING PROGRAM

## 2025-02-11 PROCEDURE — 83690 ASSAY OF LIPASE: CPT | Performed by: STUDENT IN AN ORGANIZED HEALTH CARE EDUCATION/TRAINING PROGRAM

## 2025-02-11 PROCEDURE — 93005 ELECTROCARDIOGRAM TRACING: CPT | Performed by: STUDENT IN AN ORGANIZED HEALTH CARE EDUCATION/TRAINING PROGRAM

## 2025-02-11 PROCEDURE — 71045 X-RAY EXAM CHEST 1 VIEW: CPT

## 2025-02-11 PROCEDURE — 82550 ASSAY OF CK (CPK): CPT | Performed by: STUDENT IN AN ORGANIZED HEALTH CARE EDUCATION/TRAINING PROGRAM

## 2025-02-11 PROCEDURE — 84484 ASSAY OF TROPONIN QUANT: CPT | Performed by: STUDENT IN AN ORGANIZED HEALTH CARE EDUCATION/TRAINING PROGRAM

## 2025-02-11 PROCEDURE — 85025 COMPLETE CBC W/AUTO DIFF WBC: CPT | Performed by: STUDENT IN AN ORGANIZED HEALTH CARE EDUCATION/TRAINING PROGRAM

## 2025-02-11 RX ORDER — ONDANSETRON 2 MG/ML
4 INJECTION INTRAMUSCULAR; INTRAVENOUS ONCE
Status: COMPLETED | OUTPATIENT
Start: 2025-02-11 | End: 2025-02-11

## 2025-02-11 RX ORDER — ACETAMINOPHEN 500 MG
1000 TABLET ORAL ONCE
Status: COMPLETED | OUTPATIENT
Start: 2025-02-11 | End: 2025-02-11

## 2025-02-11 RX ADMIN — ONDANSETRON 4 MG: 2 INJECTION INTRAMUSCULAR; INTRAVENOUS at 19:55

## 2025-02-11 RX ADMIN — ACETAMINOPHEN 1000 MG: 500 TABLET, FILM COATED ORAL at 19:55

## 2025-02-11 NOTE — Clinical Note
[facility] EMERGENCY DEPARTMENT  [address]  [place] [address]  Phone: [phone]    Esdras Ko was seen and treated in our emergency department on 2/11/2025.  He may return to work on 02/12/2025.         Thank you for choosing [facility].    Azam Connolly MD

## 2025-02-11 NOTE — ED PROVIDER NOTES
Subjective   History of Present Illness  74 year old male, hx copd, CHF, anemia, presents to ed w/ cc of generalized malaise, associated sx of nausea w/o emesis, myalgias, and dyspnea occurring in context of rest. Denies chest, back abd pain, syncope. ROS otherwise limited 2/2 to mild dementia. Vitals are wnls on ra. On exam pt awake, alert, orientedx3 but amnestic to specifics regarding pmh, bibasilar crackles, no peripheral edema, moving all four extremities, sensation and str intact. No abd ttp.    Ext review of chart, Dr. Staton, reports chronic CHF.  Review of Systems    Past Medical History:   Diagnosis Date    Anemia     Bladder cancer 2008    Cerebrovascular accident     Ischemic and TIAs; most recent 02/2014 which was a TIA.    Chronic kidney disease, stage 3     Dr. KATHERYN Lin    Chronic right-sided CHF (congestive heart failure)         Colon polyp     COPD (chronic obstructive pulmonary disease)     History of blood transfusion     Hyperlipidemia     Hypertension     Hyperuricemia     Left shoulder pain     Lymphoma     MANTLE CELL, HAD CHEMO  2010    On home O2     per pt, 2L    Osteoarthritis     Pilonidal cyst     SCHEDULED FOR SX    Psoriasis     Seizure     Following head trauma in 1970    Skin lesions     SCARRING FROM SHINGLES, & BLISTERS FROM EDEMA    Sleep apnea     Intolerant of CPAP    Splenomegaly 02/21/2013    Stroke     Tobacco abuse     Type 2 diabetes mellitus        Allergies   Allergen Reactions    Amoxicillin Hives    Ceftin [Cefuroxime Axetil] Hives    Codeine     Flonase [Fluticasone] Hives    Spiriva Handihaler [Tiotropium Bromide Monohydrate] Hives    Sulfa Antibiotics Hives    Phenobarbital Rash       Past Surgical History:   Procedure Laterality Date    BRONCHOSCOPY N/A 8/21/2017    Procedure: BRONCHOSCOPY with fluro and biopsy and lavage.;  Surgeon: Red Topete MD;  Location: [facility] OR;  Service:     CARDIAC CATHETERIZATION N/A 9/17/2018    Procedure: Coronary  angiography;  Surgeon: Kylie Victoria MD;  Location:  [facility];  Service: Cardiovascular    CARDIAC CATHETERIZATION N/A 10/30/2018    Procedure: Chronic Total Occlussion;  Surgeon: Roe Wheatley MD;  Location:  [facility];  Service: Cardiovascular    CARDIAC CATHETERIZATION N/A 10/30/2018    Procedure: Stent AURA coronary;  Surgeon: Roe Wheatley MD;  Location:  [facility];  Service: Cardiovascular    COLONOSCOPY N/A 10/9/2019    Procedure: COLONOSCOPY with polypectomy;  Surgeon: Lee Priest MD;  Location:  [facility] OR;  Service: Gastroenterology    PILONIDAL CYSTECTOMY N/A 2016    Procedure: PILONIDAL CYSTECTOMY;  Surgeon: Roe Davila MD;  Location:  [facility] OR;  Service:     SINUS SURGERY      SKIN LESION EXCISION      DR. DAVILA ABOUT 10 YEARS AGO       Family History   Problem Relation Age of Onset    Heart defect Mother     Pancreatic cancer Father 56    Anemia Sister     Hypertension Brother     Heart disease Brother     Mental illness Brother     Other Brother         Splenectomy    Cancer Cousin        Social History     Socioeconomic History    Marital status:      Spouse name: Charley    Years of education: 12   Tobacco Use    Smoking status: Former     Current packs/day: 0.00     Average packs/day: 3.0 packs/day for 50.0 years (150.0 ttl pk-yrs)     Types: Cigarettes     Start date:      Quit date:      Years since quittin.1    Smokeless tobacco: Never    Tobacco comments:     80 pack year   Vaping Use    Vaping status: Never Used   Substance and Sexual Activity    Alcohol use: No     Comment: Heavy in past, none since around     Drug use: No    Sexual activity: Defer     Comment: , per pt report           Objective   Physical Exam    Procedures       EKG interpretation  Interpreted by: Reji Shepard MD   Date: 2025  Time:    Rate: 63 BPM  Rhythm: NSR  Impression: Incomplete RBBB, LAFB, low  voltage. -STEMI  Comparison: no change from prior    Telemetry interpretation  Interpreted by: Reji Shepard MD   Date: 02/11/2025  Time:  1915 -215  Rate: 49-80 BPM  Rhythm: SR  Impression: sinus bradycardia, no evidence of malignant arrythmia    ED Course                                                       Medical Decision Making  Problems Addressed:  Dyspnea, unspecified type: complicated acute illness or injury  Malaise: complicated acute illness or injury    Amount and/or Complexity of Data Reviewed  Labs: ordered.  Radiology: ordered.  ECG/medicine tests: ordered.    Risk  OTC drugs.  Prescription drug management.    74 year old male, hx copd, CHF, anemia, presents to ed w/ cc of generalized malaise, associated sx of nausea w/o emesis, myalgias, and dyspnea occurring in context of rest. Vitals wnls on baseline O2, exam bibasilar crackles.    Concern for CHFe, as well as possible viral syndrome vs pna; eval with cxr; abd/card labs, EKG; tx w/ zofran 4 mg iv    8:32 pm dizziness resolved. trop -, cbc/cmp normal, CXR is clear, CK normal BNP normal, TSH normal, viral swabs negative.    Echo from 3 yrs ago shows normal EF.    There is sinus bradycardia on monitor; which may be causing pt to feel dizzy.    Pt is well-appearing on home oxygen.    Dc w/ pcp and pulm fu    Hst ems 2/2 to pt amneisa  Dsp home      Final diagnoses:   Dyspnea, unspecified type   Malaise       ED Disposition  ED Disposition       ED Disposition   Discharge    Condition   Stable    Comment   --               Nitesh Arias MD  [address]  [address]  [place] [address]  [phone]    In 2 days      Cody Underwood MD  [address]  [address]  [place] [address]  [phone]               Medication List      No changes were made to your prescriptions during this visit.            Azam Connolly MD  02/12/25 0046

## 2025-08-05 ENCOUNTER — APPOINTMENT (OUTPATIENT)
Dept: GENERAL RADIOLOGY | Facility: HOSPITAL | Age: 75
End: 2025-08-05
Payer: MEDICARE

## 2025-08-05 ENCOUNTER — HOSPITAL ENCOUNTER (EMERGENCY)
Facility: HOSPITAL | Age: 75
Discharge: HOME | End: 2025-08-05
Payer: MEDICARE

## 2025-08-05 VITALS
WEIGHT: 250 LBS | TEMPERATURE: 98.4 F | BODY MASS INDEX: 39.24 KG/M2 | HEART RATE: 67 BPM | OXYGEN SATURATION: 95 % | HEIGHT: 67 IN | SYSTOLIC BLOOD PRESSURE: 133 MMHG | RESPIRATION RATE: 18 BRPM | DIASTOLIC BLOOD PRESSURE: 88 MMHG

## 2025-08-05 DIAGNOSIS — R07.81 RIB PAIN ON LEFT SIDE: Primary | ICD-10-CM

## 2025-08-05 DIAGNOSIS — J96.11 CHRONIC HYPOXEMIC RESPIRATORY FAILURE: ICD-10-CM

## 2025-08-05 PROCEDURE — 71101 X-RAY EXAM UNILAT RIBS/CHEST: CPT

## 2025-08-05 PROCEDURE — 99283 EMERGENCY DEPT VISIT LOW MDM: CPT

## 2025-08-05 RX ORDER — HYDROCODONE BITARTRATE AND ACETAMINOPHEN 7.5; 325 MG/1; MG/1
1 TABLET ORAL EVERY 6 HOURS PRN
Refills: 0 | Status: DISCONTINUED | OUTPATIENT
Start: 2025-08-05 | End: 2025-08-05 | Stop reason: HOSPADM

## 2025-08-05 RX ORDER — HYDROCODONE BITARTRATE AND ACETAMINOPHEN 7.5; 325 MG/1; MG/1
1 TABLET ORAL EVERY 8 HOURS PRN
Qty: 10 TABLET | Refills: 0 | Status: SHIPPED | OUTPATIENT
Start: 2025-08-05

## 2025-08-05 RX ADMIN — HYDROCODONE BITARTRATE AND ACETAMINOPHEN 1 TABLET: 7.5; 325 TABLET ORAL at 11:59

## (undated) DEVICE — SNAR POLYP CAPTIFLX WD OVL 27MM 240CM

## (undated) DEVICE — SYR LL 3CC

## (undated) DEVICE — LAG BRONCHOSCOPY: Brand: MEDLINE INDUSTRIES, INC.

## (undated) DEVICE — FRCP BX RADJAW4 NDL 2.8 240CM LG OG BX40

## (undated) DEVICE — KT MANIFLD CARDIAC

## (undated) DEVICE — GLIDESHEATH BASIC HYDROPHILIC COATED INTRODUCER SHEATH: Brand: GLIDESHEATH

## (undated) DEVICE — CATH VENT MIV RADL PIG ST TIP 5F 110CM

## (undated) DEVICE — CATH DIAG IMPULSE FR4 6F 100CM

## (undated) DEVICE — 6F .070 XB LAD 3.5 100CM: Brand: VISTA BRITE TIP

## (undated) DEVICE — Device

## (undated) DEVICE — BW-412T DISP COMBO CLEANING BRUSH: Brand: SINGLE USE COMBINATION CLEANING BRUSH

## (undated) DEVICE — GW EMR FIX EXCHG J STD .035 3MM 260CM

## (undated) DEVICE — GOWN ISOL W/THUMB UNIV BLU BX/15

## (undated) DEVICE — TREK CORONARY DILATATION CATHETER 3.0 MM X 20 MM / RAPID-EXCHANGE: Brand: TREK

## (undated) DEVICE — TREK CORONARY DILATATION CATHETER 2.50 MM X 15 MM / RAPID-EXCHANGE: Brand: TREK

## (undated) DEVICE — HI-TORQUE BALANCE MIDDLEWEIGHT UNIVERSAL II GUIDE WIRE STRAIGHT TIP PAK  190 CM: Brand: HI-TORQUE BALANCE MIDDLEWEIGHT UNIVERSAL II

## (undated) DEVICE — GLV SURG NEOLON 2G PF LF 7.5 STRL

## (undated) DEVICE — INTRO SHEATH ART/FEM ENGAGE .035 6F12CM

## (undated) DEVICE — SPNG GZ WOVN 4X4IN 12PLY 10/BX STRL

## (undated) DEVICE — VIAL FORMALIN CAP 10P 40ML

## (undated) DEVICE — CATH DIAG IMPULSE FR4 5F 100CM

## (undated) DEVICE — JACKT LAB F/R KNIT CUFF/COLR XLG BLU

## (undated) DEVICE — MASK,FACE,SHIELD,BLUE,ANTI FOG,TIES: Brand: MEDLINE

## (undated) DEVICE — TRAP POLYP 4CHAMBER

## (undated) DEVICE — Device: Brand: FIELDER XT

## (undated) DEVICE — SUCTION CANISTER, 3000CC,SAFELINER: Brand: DEROYAL

## (undated) DEVICE — Device: Brand: DEFENDO AIR/WATER/SUCTION AND BIOPSY VALVE

## (undated) DEVICE — MASK,FACE,FLUID RESIST,SHLD,EARLOOP: Brand: MEDLINE

## (undated) DEVICE — ANGIO-SEAL VIP VASCULAR CLOSURE DEVICE: Brand: ANGIO-SEAL

## (undated) DEVICE — SINGLE USE SUCTION VALVE MAJ-209: Brand: SINGLE USE SUCTION VALVE (STERILE)

## (undated) DEVICE — Device: Brand: CORSAIR

## (undated) DEVICE — FRCP BX RADJAW4 PULM WO NDL STD1.8X2 100

## (undated) DEVICE — SINGLE USE BIOPSY VALVE MAJ-210: Brand: SINGLE USE BIOPSY VALVE (STERILE)

## (undated) DEVICE — GLV SURG SENSICARE MICRO PF LF 7.5 STRL

## (undated) DEVICE — PK CATH CARD 40

## (undated) DEVICE — CATH DIAG IMPULSE FL3.5 5F 100CM

## (undated) DEVICE — SYS PANNUS RETENT LF